# Patient Record
Sex: FEMALE | Race: WHITE | NOT HISPANIC OR LATINO | Employment: UNEMPLOYED | URBAN - METROPOLITAN AREA
[De-identification: names, ages, dates, MRNs, and addresses within clinical notes are randomized per-mention and may not be internally consistent; named-entity substitution may affect disease eponyms.]

---
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
## 2020-01-07 DIAGNOSIS — E28.39 PRIMARY OVARIAN FAILURE: ICD-10-CM

## 2020-01-07 RX ORDER — LEVONORGESTREL / ETHINYL ESTRADIOL AND ETHINYL ESTRADIOL 150-30(84)
1 KIT ORAL DAILY
Qty: 91 TABLET | Refills: 0 | Status: SHIPPED | OUTPATIENT
Start: 2020-01-07 | End: 2020-04-08

## 2020-01-08 DIAGNOSIS — Z94.0 KIDNEY REPLACED BY TRANSPLANT: ICD-10-CM

## 2020-01-08 PROCEDURE — 80197 ASSAY OF TACROLIMUS: CPT | Performed by: PEDIATRICS

## 2020-01-09 LAB
TACROLIMUS BLD-MCNC: 4 UG/L (ref 5–15)
TME LAST DOSE: ABNORMAL H

## 2020-01-14 DIAGNOSIS — Z94.0 KIDNEY REPLACED BY TRANSPLANT: ICD-10-CM

## 2020-01-16 DIAGNOSIS — Z94.0 KIDNEY REPLACED BY TRANSPLANT: ICD-10-CM

## 2020-01-16 RX ORDER — TACROLIMUS 0.5 MG/1
CAPSULE ORAL
Qty: 210 CAPSULE | Refills: 6 | Status: SHIPPED | OUTPATIENT
Start: 2020-01-16 | End: 2020-07-10

## 2020-01-16 NOTE — LETTER
OUTPATIENT LABORATORY TEST REQUEST  DIAGNOSES:  KIDNEY TRANSPLANT - PEDIATRIC (ICD-10 Z94.0);  AND LONG TERM USE OF MEDICATIONS (ICD-10 Z79.899)    Patient Name: Anuradha Pearson        YOB: 2000  MR #: [id number]  Issue Date & Time: March 5, 2021  2:01 PM  Expiration Date (1 year after date issued)    Please complete the following laboratory tests in order to assess the function of the transplanted kidney.  If a battery of tests include the following and are more cost effective to perform, please do so.    PLEASE FAX RESULTS -[phone]  Monthly  [x]      CBC with Platelets and Differential  [x]      Renal Panel (Sodium, Potassium, Chloride, CO2, Creatinine, Urea Nitrogen, Glucose, Calcium, Phosphorus and Albumin)  [x]      Magnesium  [x]      Tacrolimus drug level         [x]      EBV DNA Quant by PCR (EBQT)   Yearly  (due March 2021 and March 2022)  [x]      Hepatic Function (Albumin, ALK, ALT, AST, Bili Total, Protein)  [x]      FASTING Lipids (Cholesterol, Triglycerides, HDL, LDL)  [x]      PRA/Single antigen (DSA)  4mL in a red top- Send to [facility] with drug level. Must include lab Slip. Please make copies for future use.     One-time order  [x]      Vitamin D Deficiency    Please call Shayla Camargo RN Transplant Coordinator at [phone] with questions      Nesha MEDLEY CNP-Pediatric  Pediatric Nurse Practitioner  Pediatric Solid Organ Transplant

## 2020-01-16 NOTE — LETTER
OUTPATIENT LABORATORY TEST REQUEST  DIAGNOSES:  KIDNEY TRANSPLANT - PEDIATRIC (ICD-10 Z94.0);  AND LONG TERM USE OF MEDICATIONS (ICD-10 Z79.899)    Patient Name: Anuradha Pearson        YOB: 2000  MR #: [id number]  Issue Date & Time: January 16, 2020  8:34 AM  Expiration Date (1 year after date issued)    Please complete the following laboratory tests in order to assess the function of the transplanted kidney.  If a battery of tests include the following and are more cost effective to perform, please do so.    PLEASE FAX RESULTS -[phone]  Monthly  [x]      CBC with Platelets and Differential  [x]      Renal Panel (Sodium, Potassium, Chloride, CO2, Creatinine, Urea Nitrogen, Glucose, Calcium, Phosphorus and Albumin)  [x]      Magnesium  [x]      Tacrolimus drug level (mail to [facility]  and insructions provided by patient)         [x]      EBV DNA Quant by PCR (EBQT) (send to [facility] with drug level. Must include lab slip. Make Copies for future use)  Yearly  (due February 2020 and February 2021)  [x]      Hepatic Function (Albumin, ALK, ALT, AST, Bili Total, Protein)  [x]      FASTING Lipids (Cholesterol, Triglycerides, HDL, LDL)   [x]      PRA/Single antigen (DSA)  4mL in a red top- Send to [facility] with drug level. Must include lab Slip. Please make copies for future use.    Please call Shayla Camargo RN Transplant Coordinator at [phone] with questions      Nesha MEDLEY CNP-Pediatric  Pediatric Nurse Practitioner  Pediatric Solid Organ Transplant

## 2020-01-16 NOTE — LETTER
OUTPATIENT LABORATORY TEST REQUEST  DIAGNOSES:  KIDNEY TRANSPLANT - PEDIATRIC (ICD-10 Z94.0);  AND LONG TERM USE OF MEDICATIONS (ICD-10 Z79.899)    Patient Name: Anuradha Pearson        YOB: 2000  MR #: [id number]  Issue Date & Time: 12/30/2021  3:30 PM  Expiration Date (1 year after date issued)    Please complete the following laboratory tests in order to assess the function of the transplanted kidney.  If a battery of tests include the following and are more cost effective to perform, please do so.    PLEASE FAX RESULTS [phone]  Monthly  [x]      CBC with Platelets and Differential  [x]      Renal Panel (Sodium, Potassium, Chloride, CO2, Creatinine, Urea Nitrogen, Glucose, Calcium, Phosphorus and Albumin)  [x]      Magnesium  [x]      Tacrolimus drug level         [x]      EBV DNA Quant by PCR (EBQT)     Yearly  (due March 2022 and March 2023)  [x]      Hepatic Function (Albumin, ALK, ALT, AST, Bili Total, Protein)  [x]      FASTING Lipids (Cholesterol, Triglycerides, HDL, LDL)  [x]      PRA/Single antigen (DSA)   [x]      Vitamin D Deficiency         [x]      PTH intact        [x]      Iron and Iron Binding Capacities    With questions please call Pediatric Transplant Coordinator Susanne Samson at [phone]        Nesha MEDLEY CNP-Pediatric  Pediatric Nurse Practitioner  Pediatric Solid Organ Transplant

## 2020-03-10 DIAGNOSIS — I15.1 HYPERTENSION SECONDARY TO OTHER RENAL DISORDERS: ICD-10-CM

## 2020-03-10 RX ORDER — AMLODIPINE BESYLATE 10 MG/1
10 TABLET ORAL DAILY
Qty: 30 TABLET | Refills: 6 | Status: SHIPPED | OUTPATIENT
Start: 2020-03-10 | End: 2020-08-31

## 2020-03-17 ENCOUNTER — RESULTS ONLY (OUTPATIENT)
Dept: OTHER | Facility: CLINIC | Age: 20
End: 2020-03-17

## 2020-03-17 ENCOUNTER — APPOINTMENT (OUTPATIENT)
Dept: LAB | Facility: CLINIC | Age: 20
End: 2020-03-17
Attending: TRANSPLANT SURGERY
Payer: COMMERCIAL

## 2020-03-17 DIAGNOSIS — Z94.0 KIDNEY REPLACED BY TRANSPLANT: ICD-10-CM

## 2020-03-17 PROCEDURE — 86833 HLA CLASS II HIGH DEFIN QUAL: CPT | Performed by: PEDIATRICS

## 2020-03-17 PROCEDURE — 80197 ASSAY OF TACROLIMUS: CPT | Performed by: PEDIATRICS

## 2020-03-17 PROCEDURE — 87799 DETECT AGENT NOS DNA QUANT: CPT | Performed by: PEDIATRICS

## 2020-03-17 PROCEDURE — 86832 HLA CLASS I HIGH DEFIN QUAL: CPT | Performed by: PEDIATRICS

## 2020-03-19 DIAGNOSIS — Z94.0 KIDNEY REPLACED BY TRANSPLANT: ICD-10-CM

## 2020-03-19 DIAGNOSIS — Z94.0 KIDNEY TRANSPLANTED: Primary | ICD-10-CM

## 2020-03-19 LAB
EBV DNA # SPEC NAA+PROBE: 2831 {COPIES}/ML
EBV DNA SPEC NAA+PROBE-LOG#: 3.5 {LOG_COPIES}/ML
TACROLIMUS BLD-MCNC: 5.1 UG/L (ref 5–15)
TME LAST DOSE: NORMAL H

## 2020-03-19 NOTE — PROGRESS NOTES
Communicated to Anuradha to hydrate and repeat this week. If it's still elevated, will get: RBUS, repeat labs, DSA, UA and urine culture, CMV, EBV, BK. Then we can schedule the biopsy if needed.

## 2020-03-19 NOTE — LETTER
PHYSICIAN ORDERS      DATE & TIME ISSUED: July 3, 2020  5:03 PM  PATIENT NAME: Anuradha Pearson   : 2000     [facility] MR# [if applicable]: [id number]     DIAGNOSIS/ICD-10 CODE: Kidney transplanted [Z94.0}    Please draw the following for Anuradha ALVARENGA on or around the week of 2020    Renal Panel    EBV DNA Quant by PCR (EBQT) - Send to [facility] with Drug level.  Must include lab slip. Please make copies for future use.    BK virus Quantitative by PCR (BKQT) - Send to [facility] with Drug level.  Must include lab slip. Please make copies for future use.    If questions please call: Shayla Camargo RN Transplant Coordinator at [phone]    Please fax these results to [phone].      Nesha MEDLEY CNP-Pediatric  Pediatric Nurse Practitioner  Pediatric Solid Organ Transplant      Shayla Camargo RN, BSN  Pediatric Transplant Coordinator  Office: [phone]

## 2020-03-19 NOTE — LETTER
PHYSICIAN ORDERS      DATE & TIME ISSUED: 2020  12:52 PM  PATIENT NAME: Anuradha Pearson   : 2000     [facility] MR# [if applicable]: [id number]     DIAGNOSIS/ICD-10 CODE: Long Term Use of Medication [Z79.899}, After care following organ transplant  [Z48.288} and Kidney transplanted [Z94.0}    Repeat with in one week:    Renal Panel    If questions please call: Shayla Camargo [phone].    Please fax these results to [phone].      Nesha MEDLEY CNP-Pediatric  Pediatric Nurse Practitioner  Pediatric Solid Organ Transplant      Shayla Camargo, RN, BSN  Pediatric Transplant Coordinator  Office: [phone]

## 2020-04-06 DIAGNOSIS — E28.39 PRIMARY OVARIAN FAILURE: ICD-10-CM

## 2020-04-06 RX ORDER — LEVONORGESTREL / ETHINYL ESTRADIOL AND ETHINYL ESTRADIOL 150-30(84)
1 KIT ORAL DAILY
Qty: 91 TABLET | Refills: 0 | Status: CANCELLED | OUTPATIENT
Start: 2020-04-06

## 2020-04-07 DIAGNOSIS — Z92.3 S/P RADIATION THERAPY: ICD-10-CM

## 2020-04-07 DIAGNOSIS — C64.9 NEPHROBLASTOMA, UNSPECIFIED LATERALITY (H): ICD-10-CM

## 2020-04-07 DIAGNOSIS — Z92.21 STATUS POST CHEMOTHERAPY: Primary | ICD-10-CM

## 2020-04-07 DIAGNOSIS — Z94.0 KIDNEY REPLACED BY TRANSPLANT: ICD-10-CM

## 2020-04-07 DIAGNOSIS — E28.39 PRIMARY OVARIAN FAILURE: ICD-10-CM

## 2020-04-08 ENCOUNTER — TRANSFERRED RECORDS (OUTPATIENT)
Dept: HEALTH INFORMATION MANAGEMENT | Facility: CLINIC | Age: 20
End: 2020-04-08

## 2020-04-08 ENCOUNTER — MYC MEDICAL ADVICE (OUTPATIENT)
Dept: ENDOCRINOLOGY | Facility: CLINIC | Age: 20
End: 2020-04-08

## 2020-04-08 ENCOUNTER — MEDICAL CORRESPONDENCE (OUTPATIENT)
Dept: HEALTH INFORMATION MANAGEMENT | Facility: CLINIC | Age: 20
End: 2020-04-08

## 2020-04-08 RX ORDER — LEVONORGESTREL / ETHINYL ESTRADIOL AND ETHINYL ESTRADIOL 150-30(84)
1 KIT ORAL DAILY
Qty: 91 TABLET | Refills: 0 | Status: SHIPPED | OUTPATIENT
Start: 2020-04-08 | End: 2020-10-01

## 2020-04-09 ENCOUNTER — TRANSCRIBE ORDERS (OUTPATIENT)
Dept: OTHER | Age: 20
End: 2020-04-09

## 2020-04-09 DIAGNOSIS — C64.1 NEPHROBLASTOMA OF RIGHT KIDNEY (H): ICD-10-CM

## 2020-04-09 DIAGNOSIS — Z94.0 KIDNEY REPLACED BY TRANSPLANT: Primary | ICD-10-CM

## 2020-04-13 DIAGNOSIS — Z94.0 KIDNEY REPLACED BY TRANSPLANT: ICD-10-CM

## 2020-04-13 PROCEDURE — 80197 ASSAY OF TACROLIMUS: CPT | Performed by: PEDIATRICS

## 2020-04-13 PROCEDURE — 87799 DETECT AGENT NOS DNA QUANT: CPT | Performed by: PEDIATRICS

## 2020-04-15 LAB
TACROLIMUS BLD-MCNC: 4.8 UG/L (ref 5–15)
TME LAST DOSE: ABNORMAL H

## 2020-04-16 DIAGNOSIS — Z94.0 KIDNEY REPLACED BY TRANSPLANT: ICD-10-CM

## 2020-04-16 LAB
CMV DNA SPEC NAA+PROBE-ACNC: NORMAL [IU]/ML
CMV DNA SPEC NAA+PROBE-LOG#: NORMAL {LOG_IU}/ML
EBV DNA # SPEC NAA+PROBE: ABNORMAL {COPIES}/ML
EBV DNA SPEC NAA+PROBE-LOG#: 4.2 {LOG_COPIES}/ML
SPECIMEN SOURCE: NORMAL

## 2020-04-16 RX ORDER — PREDNISONE 1 MG/1
TABLET ORAL
Qty: 60 TABLET | Refills: 3 | Status: SHIPPED | OUTPATIENT
Start: 2020-04-16 | End: 2020-07-31

## 2020-04-17 NOTE — TELEPHONE ENCOUNTER
Spoke with Anuradha and informed her that we received a referral from Dr. Julio Alatorre for you to see Dr. Krishnamurthy for birth control management.  She indicated agreement.  Informed her that with the covid restrictions in place, we are delaying visits to clinic unless there are symptoms present. She denies symptoms.  Informed her will forward to  to schedule for summer with Dr. Krishnamurthy. She indicated understanding and agreed with plan.    Forwarded to  to schedule.

## 2020-05-12 ENCOUNTER — VIRTUAL VISIT (OUTPATIENT)
Dept: TRANSPLANT | Facility: CLINIC | Age: 20
End: 2020-05-12
Attending: NURSE PRACTITIONER
Payer: COMMERCIAL

## 2020-05-12 ENCOUNTER — ALLIED HEALTH/NURSE VISIT (OUTPATIENT)
Dept: PHARMACY | Facility: CLINIC | Age: 20
End: 2020-05-12
Payer: COMMERCIAL

## 2020-05-12 DIAGNOSIS — E28.39 PRIMARY OVARIAN FAILURE: ICD-10-CM

## 2020-05-12 DIAGNOSIS — N12 RECURRENT PYELONEPHRITIS: ICD-10-CM

## 2020-05-12 DIAGNOSIS — N18.2 CKD (CHRONIC KIDNEY DISEASE) STAGE 2, GFR 60-89 ML/MIN: ICD-10-CM

## 2020-05-12 DIAGNOSIS — E83.42 HYPOMAGNESEMIA: ICD-10-CM

## 2020-05-12 DIAGNOSIS — Z71.87 COUNSELING FOR TRANSITION FROM PEDIATRIC TO ADULT CARE PROVIDER: ICD-10-CM

## 2020-05-12 DIAGNOSIS — R21 RASH: ICD-10-CM

## 2020-05-12 DIAGNOSIS — Z94.0 KIDNEY REPLACED BY TRANSPLANT: Primary | ICD-10-CM

## 2020-05-12 DIAGNOSIS — I15.1 HYPERTENSION SECONDARY TO OTHER RENAL DISORDERS: ICD-10-CM

## 2020-05-12 DIAGNOSIS — I15.8 OTHER SECONDARY HYPERTENSION: ICD-10-CM

## 2020-05-12 DIAGNOSIS — D84.9 IMMUNOCOMPROMISED (H): ICD-10-CM

## 2020-05-12 DIAGNOSIS — B27.00 EBV (EPSTEIN-BARR VIRUS) VIREMIA: ICD-10-CM

## 2020-05-12 PROCEDURE — 99605 MTMS BY PHARM NP 15 MIN: CPT | Performed by: PHARMACIST

## 2020-05-12 RX ORDER — AZATHIOPRINE 50 MG/1
50 TABLET ORAL DAILY
Qty: 90 TABLET | Refills: 3 | Status: SHIPPED | OUTPATIENT
Start: 2020-05-12 | End: 2020-07-31

## 2020-05-12 NOTE — PROGRESS NOTES
"Anuradha Pearson is a 19 year old female who is being evaluated via a billable video visit.      The patient has been notified of following:     \"This video visit will be conducted via a call between you and your physician/provider. We have found that certain health care needs can be provided without the need for an in-person physical exam.  This service lets us provide the care you need with a video conversation.  If a prescription is necessary we can send it directly to your pharmacy.  If lab work is needed we can place an order for that and you can then stop by our lab to have the test done at a later time.    Video visits are billed at different rates depending on your insurance coverage.  Please reach out to your insurance provider with any questions.    If during the course of the call the physician/provider feels a video visit is not appropriate, you will not be charged for this service.\"    Patient has given verbal consent for Video visit? Yes    How would you like to obtain your AVS? Zachary    Patient would like the video invitation sent by: Text to cell phone: [phone]    Will anyone else be joining your video visit? No              "

## 2020-05-12 NOTE — PROGRESS NOTES
Video Start Time: 10:10    Shraddha Pearson complains of    Chief Complaint   Patient presents with     RECHECK     SOT       I have reviewed and updated the patient's Past Medical History, Social History, Family History and Medication List.    ALLERGIES  Ibuprofen; Shellfish-derived products; and Vancomycin    HPI  I had the pleasure of conducting a video visit with Shraddha Pearson today for follow-up of kidney transplant follow up. Shraddha is a 19 year old female who attends our video visit alone. 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     Shraddha is currently in college, she has 3 more online classes to get her A.A. in Equine science and is looking to move to [place] to work for the winter. Prefer to wait until age 21 to transition, and would like to stay in [place], her parents are still here and is planning on coming home for hedrick.    Shraddha is independent with her cares. She uses My Chart, fills her own pill box, takes her medications at 10 AM and 10 PM independently with reported excellent compliance. She knew her medications and current doses.    Shraddha currently has a slight red rash on her legs, she thought is was dry skin and has been using lotion but the rash is persisting. Denies cough, congestion, fever, lymphadenotomy, weight loss, constipation.  She will My Chart me recent blood pressures.     ROS    ROS: 10 point ROS neg other than the symptoms noted above in the HPI.    Objective  Vitals: None taken  GENERAL: Healthy, alert and no distress  EYES: Eyes grossly normal to inspection.  No discharge or erythema, or obvious scleral/conjunctival abnormalities.  RESP: No  audible wheeze, cough, or visible cyanosis.  No visible retractions or increased work of breathing.    SKIN: Visible skin clear. No significant rash, abnormal pigmentation or lesions.  NEURO: Cranial nerves grossly intact.  Mentation and speech appropriate for age.  PSYCH: Mentation appears normal, affect normal/bright, judgement and insight intact, normal speech and appearance well-groomed.      Assessment/Plan:  Impression: Anuradha is a 19 year old 15 years s/p living kidney transplant, stable labs and independent with cares.    1. Kidney replaced by transplant  No Donor Specific Antibodies as of 3/17/2020  - azaTHIOprine (IMURAN) 50 MG tablet; Take 1 tablet (50 mg) by mouth daily. Increased from 25 mg as we wean prednisone.  - Tacrolimus 1.5 mg twice daily goal 4-6  - Prednisone 2 mg M-W-F, currently weaning prednisone so she can be prednisone free for her second transplant.    2. Immunocompromised (H)  Bactrim for PCP prophylaxis     3. Hypertension secondary to other renal disorders  Take weekly home blood pressures  -Enalapril Hydrochlorothiazide 5-12.5 mg daily  -Labetalol 100 mg BID  -Amlodipine 10 mg daily    4. Hypomagnesemia  Last level 1.6  -Continue Magnesium 400 mg twice daily, Helping with tremors.    5. EBV (Derrell-Barr virus) viremia  - Check EBV DNA PCR with standing labs.   -Notify us of weight loss, night sweats, swollen lymph nodes, fevers    6. 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 Krishnamurthy in July 2020.    Primary care physician- Dr. Gutierrez Wood    Dermatology- for yearly skin checks due to increased risk for skin cancer due to immunosuppression medications    Opthomology- Yearly    Dental- Every 6  months    7. Rash on legs  -Try over the counter hydrocortisone cream, and make an appointment with dermatology locally.    Return in about 10 weeks (around 7/21/2020).    Video-Visit Details    Type of service:  Video Visit    Video End Time (time video stopped): 10:34  Total visit time: 24 mniutes    Originating Location (pt. Location): Home    Distant Location (provider location):  PEDS TRANSPLANT SURGERY     Mode of Communication:  Video Conference via [facility]      JASMYNE Mir CNP

## 2020-05-12 NOTE — PROGRESS NOTES
MT ENCOUNTER  SUBJECTIVE/OBJECTIVE:                           Anuradha Pearson is a 19 year old female contacted via secure video for a follow-up visit. She was referred to me from Nesha Orta. Today's visit is a co-visit with Nesha Orta.. Today's visit is a follow-up [facility] visit from 7/23/19     Patient consented to a telehealth visit: yes  Telemedicine Visit Details  Type of service:  Video visit  Start Time: 1000  End Time: 1024  Originating Location (pt. Location): Home  Distant Location (provider location):  [facility] PEDIATRIC [facility] TRANS[facility]IC  Mode of Communication:  Video Conference via [employer]    Chief Complaint: Kidney Transplant.    Allergies/ADRs: Reviewed in Epic  Tobacco:  reports that she has never smoked. She has never used smokeless tobacco.  Alcohol: none    Medication Adherence/Access:  no issues reported  Patient uses pill box(es) and uses reminders/alarms.  Patient takes medications 2 time(s) per day (1000/2200).   Per patient, misses medication 0 times per week. She said its been a really long time since she has missed any medications.   The patient fills medications at [facility]: NO, fills medications at [facility] specialty per insurance.    Kidney Transplant:  Patient is on the steroid inclusive protocol (standard protocol at the time of transplant). She has has a complicated post transplant course including infections (viral meningitis, EBV viremia, warts), pseudotumor cerebri and fracture of right femur.     Current immunosuppressants   Tacrolimus 2 mg qAM, 1.5 mg qPM (>12 months post tx (no DSA, but has had low level DR51 in the past), goal 3-5)  Azathioprine (AZA) 50 mg M,W,F and 25 mg daily (0.62 mg/kg)  Prednisone 2 mg every M,W,F. Last weaned 4/16/2020    AZA dosing low secondary to EBV viremia and bone marrow suppression. Dose was slowly increased in July, but patient reports that she is still taking 25 mg daily. She reports that they forgot to increase the dose. No  current side effects reported. .     Estimated Creatinine Clearance: 66 mL/min (A) (based on SCr of 1.24 mg/dL (H)).  CMV prophylaxis: Completed    PCP prophylaxis:Bactrim 80 mg M,W,F  Antifungal Prophylaxis: Completed  Thrombosis prophylaxis: Completed  Supplements: started on magnesium 400 mg BID in July 2019 for fine hand tremors. Anuradha reports improvement in tremors today. Last magnesium 4/13/2020 was 1.6. No side effects reported.   Tx Coordinator: Doug Day MD:Clarisa/Nesha Orta, Lab Frequency: monthly  Recent Infections:  None reported  Recent Hospitalizations: none reported  Date last skin check: uses sunscreen daily, also wears long pants due to riding horses  Date last dentist appt: every 6 months  Immunizations: annual flu shot 12/20/2019, Pneumovax 23: 1/15/19; Prevnar 13: 4/10/18, DTap/TDaP:  3/12/17; HPV: completed      CKD stage 2:    Vitamin D/bone: on calcium plus D with 400 units vitamin D daily. Also on women's one a day with 1000 units vitamin D daily for a total intake by supplement of 1400 units daily. Last vitamin D level 1/15/19 was ~88. PTH 4/10/18 10.  Lipid panel:   Recent Labs   Lab Test 3/17/2020     CHOL 202   HDL 50      TRIG 139     Anemia: on ferrous sulfate 65 mg elemental iron BID. No reported adverse effects.  Last iron studies done 4/10/18 were WNL. Last hemoglobin 4/13/2020 10.5. Anuradha reports that when she decreases her ferrous sulfate dose to once daily, she becomes tired and hemoglobin drops.     Hypertension:   Amlodipine 10 mg daily (at night),   Enalapril-hydrochlorothiazide 5-12.5 mg daily (takes in AM)  Labetalol 100 mg BID      Patient does occasionally self-monitor BP. She did not have access to last BP's but will send to Nesha via "[employer]. No side effects reported. Last Echo 1/15/19 WNL.     BP Readings from Last 3 Encounters:   07/23/19 126/73   07/23/19 126/73   07/23/19 126/73     Recurrent UTI: Anuradha has a history of recurrent UTI. She was  previously on nitrofurantoin 50 mg nightly, this was discontinued in July. She has not had UTI since that time.      Primary ovarian failure: Anruadha is on Seasonique (or equivalent generic) hormone replacement. No reported adverse effects or other issues. She is followed closely by endocrine. Last bone mineral density 1/2019 was normal per report. She is on Oscal plus D supplement as noted above. She eats cheese as a main source of dietary calcium.     Patient Education: Anuradha is now in college and doing well. She now knows all her medications by name. She fills her pill box. She is no longer using alarms, but reports she never misses medications. She communicates via [employer]. No other issues reported.      ASSESSMENT:                              Medication Adherence: excellent, no issues identified    Kidney Transplant: Needs improvement. Anuradha continues on very low dose azathioprine secondary to persistent EBV. Her prednisone has been weaned about every 3 months per Nesha Orta to eventual discontinuation. Anuradha forgot to increase her Azathioprine back in September per last clinic discussions. Will increase her to a full 50 mg tab today as we have had several decreases in prednisone since that time as to optimize her immune suppression. Anuradha's tremors are better on magnesium, plan to continue.     CKD stage 2: Stable. No medication issues identified today.      Hypertension: Stable, BP from last visit within goal <130/80. Anuradha will send home blood pressure into Nesha via [employer].     Recurrent UTI: Resolved. No issues off of nitrofurantoin for the past 9 months.     Primary ovarian failure: Stable. No issues identified today.     Patient Education: No current issues, Anuradha has good medication independence.     PLAN:                            1. Increase azathioprine to 50 mg daily    I spent 24 min with this patient today as co-visit with Nesha Orta. All changes were made via verbal approval with  Nesha Orta.     Will follow up in 3-6 months with next in person visit with Nesha Orta.    The patient declined a summary of these recommendations. See Provider note/AVS from today.     Ibeth MonroyD, [facility]  Pediatric Medication Therapy Management Pharmacist-Solid Organ Transplant  Pager: [phone]

## 2020-06-02 ENCOUNTER — TRANSFERRED RECORDS (OUTPATIENT)
Dept: HEALTH INFORMATION MANAGEMENT | Facility: CLINIC | Age: 20
End: 2020-06-02

## 2020-06-26 DIAGNOSIS — Z94.0 KIDNEY REPLACED BY TRANSPLANT: ICD-10-CM

## 2020-06-26 PROCEDURE — 87799 DETECT AGENT NOS DNA QUANT: CPT | Performed by: PEDIATRICS

## 2020-06-26 PROCEDURE — 80197 ASSAY OF TACROLIMUS: CPT | Performed by: PEDIATRICS

## 2020-06-30 DIAGNOSIS — Z94.0 KIDNEY REPLACED BY TRANSPLANT: ICD-10-CM

## 2020-06-30 DIAGNOSIS — I15.1 HYPERTENSION SECONDARY TO OTHER RENAL DISORDERS: ICD-10-CM

## 2020-06-30 RX ORDER — ENALAPRIL MALEATE AND HYDROCHLOROTHIAZIDE 5; 12.5 MG/1; MG/1
1 TABLET ORAL DAILY
Qty: 30 TABLET | Refills: 11 | Status: SHIPPED | OUTPATIENT
Start: 2020-06-30 | End: 2021-05-17

## 2020-07-01 LAB
TACROLIMUS BLD-MCNC: 4.6 UG/L (ref 5–15)
TME LAST DOSE: ABNORMAL H

## 2020-07-02 LAB
EBV DNA # SPEC NAA+PROBE: ABNORMAL {COPIES}/ML
EBV DNA SPEC NAA+PROBE-LOG#: 4.5 {LOG_COPIES}/ML

## 2020-07-03 ENCOUNTER — TELEPHONE (OUTPATIENT)
Dept: TRANSPLANT | Facility: CLINIC | Age: 20
End: 2020-07-03

## 2020-07-03 DIAGNOSIS — Z94.0 KIDNEY TRANSPLANTED: Primary | ICD-10-CM

## 2020-07-03 NOTE — TELEPHONE ENCOUNTER
Call placed to Anuradha's mobile phone to discuss labs for last week. No answer. Left message on voicemail that Dr. Hartman would like her to repeat labs next week. Instructed Anuradha to hydrate and have repeat labs; will fax over order to [facility]. Will also send message to [employer].

## 2020-07-07 DIAGNOSIS — Z94.0 KIDNEY REPLACED BY TRANSPLANT: ICD-10-CM

## 2020-07-07 RX ORDER — SULFAMETHOXAZOLE AND TRIMETHOPRIM 400; 80 MG/1; MG/1
TABLET ORAL
Qty: 12 TABLET | Refills: 6 | Status: SHIPPED | OUTPATIENT
Start: 2020-07-07 | End: 2020-07-17

## 2020-07-08 ENCOUNTER — TELEPHONE (OUTPATIENT)
Dept: TRANSPLANT | Facility: CLINIC | Age: 20
End: 2020-07-08

## 2020-07-08 NOTE — TELEPHONE ENCOUNTER
Several communications with patient regarding recent labs. EBV and creatinine noted to be slightly elevated. Instructed patient to HOLD Imuran and repeat labs in one week. Patient expressed concerns about holding Imuran and stated that she has been feeling good and asymptomatic. Explained thought behind holding the Imuran for a fews days help her body fight the EBVand her history of Wilms Tumor. Notified patient that her EBV has trended up the last few checks. Patient due to have visits with Hem/Onc and Nephrology the next couple weeks. Encouraged patient to discuss both EBV and kidney function with providers during the visit so she can be clear on risks and how to balance therapy. Instructed patient to not hold Imuran if she is not comfortable with MD recommendation. Will discuss with Nephrologist.

## 2020-07-10 DIAGNOSIS — Z94.0 KIDNEY REPLACED BY TRANSPLANT: ICD-10-CM

## 2020-07-10 RX ORDER — TACROLIMUS 0.5 MG/1
CAPSULE ORAL
Qty: 210 CAPSULE | Refills: 6 | Status: SHIPPED | OUTPATIENT
Start: 2020-07-10 | End: 2021-01-29

## 2020-07-17 ENCOUNTER — VIRTUAL VISIT (OUTPATIENT)
Dept: NEPHROLOGY | Facility: CLINIC | Age: 20
End: 2020-07-17
Attending: PEDIATRICS
Payer: COMMERCIAL

## 2020-07-17 DIAGNOSIS — Z94.0 KIDNEY REPLACED BY TRANSPLANT: ICD-10-CM

## 2020-07-17 RX ORDER — SULFAMETHOXAZOLE AND TRIMETHOPRIM 400; 80 MG/1; MG/1
TABLET ORAL
Qty: 12 TABLET | Refills: 6 | Status: SHIPPED | OUTPATIENT
Start: 2020-07-17 | End: 2020-10-23

## 2020-07-17 NOTE — NURSING NOTE
"Anuradha Pearson is a 20 year old female who is being evaluated via a billable video visit.      The patient has been notified of following:     \"This video visit will be conducted via a call between you and your physician/provider. We have found that certain health care needs can be provided without the need for an in-person physical exam.  This service lets us provide the care you need with a video conversation.  If a prescription is necessary we can send it directly to your pharmacy.  If lab work is needed we can place an order for that and you can then stop by our lab to have the test done at a later time.    Video visits are billed at different rates depending on your insurance coverage.  Please reach out to your insurance provider with any questions.    If during the course of the call the physician/provider feels a video visit is not appropriate, you will not be charged for this service.\"     How would you like to obtain your AVS? Igorharmaximilian    Anuradha Pearson complains of    Chief Complaint   Patient presents with     Video Visit     Nephrology recheck       Patient has given verbal consent for Video visit? Yes    Patient would like the video invitation sent by: Text to cell phone: [phone]     I have reviewed and updated the patient's medication list, allergies and preferred pharmacy.      Candace Benites [facility]    "

## 2020-07-17 NOTE — LETTER
7/17/2020      RE: Anuradha Pearson  [address]  [place] [address]       Return Visit for Kidney Transplant, Immunosuppression Management, CKD, Stage III    Chief Complaint:  Chief Complaint   Patient presents with     Video Visit     Nephrology recheck       HPI:    I had the pleasure of seeing Anuradha Pearson in the Pediatric Nephrology Clinic today for follow-up of her kidney transplant (history of Wilms tumor complicated by inadvertent ligation of the contralateral renal vein, kidney transplant in 2004). Her post-transplant course has been complicated by infections (viral meningitis, EBV viremia, warts), pseudotumor cerebri, and fracture of her right femur.  Anuradha is a 20 year old female who attended the video visit by herself.    Start Time: 0901  Stop Time: 0913    Today is my first time meeting Anuradha.  She had been followed by Dr. Ruiz previously.      She reports that she is feeling well and has no concerns.  She has been going to a lot of [employer] and participating in [employer] racing.  She denies any issues taking her medications.  She reports that she has been weaning down on her prednisone as prescribed.  She was also wondering about decreasing her Bactrim (she is actually on this more for UTI prophylaxis as opposed to PCP prophylaxis).  She denies taking any NSAIDS and reports drinking 2-2.5L of water per day.      Most recently, her creatinine was on the higher end of her baseline (1.46) and her EBV titer was elevated (both an increase in the log as well as the absolute value).  I had recommended holding her Imuran, but this made her nervous and so she opted to continue taking it.  I have asked her to repeat her labs and she is going to do that on Monday of next week.  She denies any lymphadenopathy, fevers or fatigue.     She reports that her tremor in her hands has been much improved since taking the magnesium supplement.    Review of Systems:  A comprehensive review of  systems was performed and found to be negative other than noted in the HPI.    Allergies:  Anuradha is allergic to ibuprofen; shellfish-derived products; and vancomycin..    Active Medications:  Current Outpatient Medications   Medication Sig Dispense Refill     amLODIPine (NORVASC) 10 MG tablet Take 1 tablet (10 mg) by mouth daily 30 tablet 6     azaTHIOprine (IMURAN) 50 MG tablet Take 1 tablet (50 mg) by mouth daily 90 tablet 3     calcium carbonate-vitamin D (OS-DEE) 500-400 MG-UNIT tablet Take 1 tablet by mouth daily 30 tablet 11     Enalapril-hydroCHLOROthiazide 5-12.5 MG TABS Take 1 tablet by mouth daily 30 tablet 11     ferrous sulfate (FEROSUL) 325 (65 Fe) MG tablet Take 1 tablet (325 mg) by mouth 2 times daily 180 tablet 6     labetalol (NORMODYNE) 100 MG tablet Take 1 tablet (100 mg) by mouth 2 times daily 180 tablet 6     levonorgest-eth estrad 91-Day (SEASONIQUE) 0.15-0.03 &0.01 MG tablet Take 1 tablet by mouth daily 91 tablet 0     magnesium oxide (MAG-OX) 400 (241.3 Mg) MG tablet Take 1 tablet (400 mg) by mouth 2 times daily 60 tablet 11     Multiple Vitamins-Minerals (WOMENS MULTIVITAMIN PO) Take 1 tablet by mouth daily       predniSONE (DELTASONE) 1 MG tablet Take 2 tabs (2 mg) by mouth every Mon-Wed-Fri 60 tablet 3     sulfamethoxazole-trimethoprim (BACTRIM) 400-80 MG tablet Take one tab by mouth every Mon-Thursday 12 tablet 6     tacrolimus (GENERIC EQUIVALENT) 0.5 MG capsule Please take 4 capsules (2mg) by mouth in am and take 3 capsules (1.5mg) in the evening 210 capsule 6     amoxicillin (AMOXIL) 500 MG capsule Take 4 caps (2 grams) one hour prior to dental procedure. (Patient not taking: Reported on 7/17/2020) 12 capsule 3        Immunizations:  Immunization History   Administered Date(s) Administered     HEPA 08/04/2004     HPV9 04/10/2018, 07/17/2018, 01/15/2019     HepB 2000, 2000, 06/21/2001     Hib (PRP-T) 2000, 2000, 06/21/2001     Historical DTP/aP 2000,  2000, 02/28/2001, 12/06/2001     Influenza (H1N1) 10/27/2009     Influenza (IIV3) PF 10/25/2005, 02/09/2007, 11/09/2007, 01/22/2009, 10/16/2009, 11/03/2010, 09/19/2011, 09/10/2012, 01/03/2014, 12/01/2014     Influenza Vaccine IM > 6 months Valent IIV4 10/26/2015, 01/09/2017, 01/29/2018, 11/13/2018     Influenza Vaccine Im 4yrs+ 4 Valent CCIIV4 12/20/2019     MMR 06/21/2001     Mantoux Tuberculin Skin Test 08/04/2004     Meningococcal (Bexsero ) 07/23/2019     Meningococcal (Menactra ) 11/13/2018     Pneumo Conj 13-V (2010&after) 04/10/2018     Pneumococcal (PCV 7) 2000, 2000, 02/28/2001, 06/21/2001     Pneumococcal 23 valent 01/15/2019     Poliovirus, inactivated (IPV) 2000, 2000, 12/06/2001     TDAP Vaccine (Adacel) 03/12/2017     Tdap (Adacel,Boostrix) 09/19/2011     Varicella 06/21/2001        PMHx:  Past Medical History:   Diagnosis Date     H/O kidney transplant      Hypertension      Wilm's tumor          Rejection History     Kidney Transplant - 11/9/2004  (#1)     No rejections noted for this transplant.            Infection History     Kidney Transplant - 11/9/2004  (#1)       POD Infections Treatments Organisms Resolved    10/24/2014 9 years 11 months EBV (Derrell-Barr virus) viremia         Recurrent pyelonephritis Antibiotics, Antibiotics, Antibiotics, Antibiotics, Antibiotics KLEBSIELLA, ENTEROCOCCUS, PSEUDOMONAS             Problems     Kidney Transplant - 11/9/2004  (#1)       POD Problem Resolved    11/9/2004 N/A Kidney replaced by transplant       Gingival hypertrophy 11/8/2018          Non-Transplant Related Problems       Problem Resolved    8/14/2015 Scar adherent     7/21/2015 Immunosuppression (H)     7/21/2015 HTN (hypertension)     11/7/2013 Dehydration 11/8/2018 6/6/2013 Primary ovarian failure     10/5/2012 Lack of expected normal physiological development     8/20/2012 Femur fracture, right (H) 11/8/2018 7/17/2012 Wilms' tumor (H)     7/17/2012 Ankle  pain 11/8/2018 7/17/2012 Growth deceleration 7/21/2015 7/17/2012 S/P radiation therapy     7/17/2012 Status post chemotherapy                 PSHx:    Past Surgical History:   Procedure Laterality Date     APPENDECTOMY       CHOLECYSTECTOMY  05/20/2003     ENT SURGERY       OPEN REDUCTION INTERNAL FIXATION RODDING INTRAMEDULLARY FEMUR  8/20/2012    Procedure: OPEN REDUCTION INTERNAL FIXATION RODDING INTRAMEDULLARY FEMUR;  Closed Reduction Internal Fixation Right Femur;  Surgeon: Catracho Hook MD;  Location: [facility] OR     REVISE SCAR TRUNK Right 11/6/2015    Procedure: REVISE SCAR TRUNK;  Surgeon: COOPER Anderson MD;  Location: [facility] OR     right nephrectomy  05/19/2003    due to Wilms tumor; performed by Dr. Myles Velez at [facility]     TONSILLECTOMY  01/31/2005     TRANSPLANT KIDNEY RECIPIENT LIVING RELATED CHILD  11/09/2004    left nephrectomy performed at time of transplant       FHx:  Family History   Problem Relation Age of Onset     Hypertension Father        SHx:  Social History     Tobacco Use     Smoking status: Never Smoker     Smokeless tobacco: Never Used   Substance Use Topics     Alcohol use: No     Drug use: No     Social History     Social History Narrative     Not on file       Physical Exam:    There were no vitals taken for this visit.  Blood pressure percentiles are not available for patients who are 18 years or older.    Exam:  GENERAL: Healthy, alert and no distress  EYES: Eyes grossly normal to inspection.  No discharge or erythema, or obvious scleral/conjunctival abnormalities.  RESP: No audible wheeze, cough, or visible cyanosis.  No visible retractions or increased work of breathing.    SKIN: Visible skin clear. No significant rash, abnormal pigmentation or lesions.  NEURO: Cranial nerves grossly intact.  Mentation and speech appropriate for age.  PSYCH: Mentation appears normal, affect normal/bright, judgement and insight intact, normal speech and  appearance well-groomed.    Labs and Imaging:  No results found for any visits on 07/17/20.  I reviewed labs from 6/26/2020 and previous labs going back to 2018.      I personally reviewed results of laboratory evaluation, imaging studies and past medical records that were available during this outpatient visit.      Assessment and Plan:      ICD-10-CM    1. Kidney replaced by transplant  Z94.0 sulfamethoxazole-trimethoprim (BACTRIM) 400-80 MG tablet       Immunosuppression: Anuradha Pearson is on individualized protocol due to EBV viremia, other infections. tacrolimus goal is 4-6.  Imuran dose is 50mg daily (recently increased with decreasing prednisone)  SteroidsYes, dose 2mg every Monday/Wednesday/Friday  Her steroid dose has been decreasing to transition to a steroid avoidance protocol for her next kidney.    Immunosuppressive Medications     Immunosuppressive Agents     azaTHIOprine (IMURAN) 50 MG tablet        tacrolimus (GENERIC EQUIVALENT) 0.5 MG capsule            Serology Results     Recipient (Pre-transplant Results)     Anti-HBcAb   No results on file    HBsAg   No results on file    HBsAb   No results on file           HBV DNA    No results on file    Anti-HCV   No results on file    Anti-HIV I/II   No results on file           Anti-CMV   No results on file    Anti-HTLV I/II   No results on file    RPR/VDRL   No results on file           EBV IgG   No results on file    EBV IgM   No results on file    EBNA   No results on file                      Left Kidney Donor (Pre-donation Results)     Anti-HBcAb   HBC Total:  Negative    HBsAg   HBsAg:  Negative    HBsAb   No results on file           HBV DNA    No results on file    Anti-HCV   No results on file    Anti-HIV I/II   No results on file           Anti-CMV   No results on file    Anti-HTLV I/II   No results on file    RPR/VDRL   No results on file           EBV IgG   No results on file    EBV IgM   No results on file    EBNA   No results on file                        CKD: Stage III.  Her creatinine fluctuates between 1.2-1.5.  Most recently, it has been at the higher end of her baseline.  She does have a history of low level DSA.  Will need to follow closely and obtain CKD labs next week.    HTN: BP was not obtained, but I will ask her follow-up with a blood pressure locally and report to us.   Most recent blood pressure reading   07/23/19 126/73       Antihypertensive Medications     Antihypertensive Combinations     Enalapril-hydroCHLOROthiazide 5-12.5 MG TABS              Last ECHO done 1/15/2019 and results were Unremarkable    Immunoprophylaxis:  PCP prophylaxis: Bactrim  Antiviral prophylaxis: No  Antifungal prophylaxis: No    Patient Education: During this visit I discussed in detail the patient s symptoms, physical exam and evaluation results findings, tentative diagnosis as well as the treatment plan (Including but not limited to possible side effects and complications related to the disease, treatment modalities and intervention(s). Family expressed understanding and consent. Family was receptive and ready to learn; no apparent learning barriers were identified.  Live virus vaccines are contraindicated in this patient. Any new medications prescribed must be assessed for kidney toxicity and drug-interactions before use.    Health Maintenance: Live vaccines are contraindicated due to immunosuppression.    Anuradha must have all other vaccines updated in a timely fashion including an annual influenza vaccine.  Anuradha must be seen by the dentist annually.  Over the counter medications should be checked prior to use to ensure they are safe in patients with kidney disease.    Follow up: Return in about 3 months (around 10/17/2020) for In-Clinic Visit. Please return sooner should Anuradha become symptomatic. For any questions or concerns, feel free to contact the transplant coordinators   at [phone].    Anuradha would like to transition to adult care  after college (she is currently a sophomore).    Sincerely,    Natalya Hartman MD   Pediatric Nephrology    CC:   Patient Care Team:  Gutierrez Wood MD as PCP - General (Family Practice)  Shayla Camargo, RN as Transplant Coordinator (Transplant)  Nesha Orta APRN CNP as Nurse Practitioner (Nurse Practitioner - Pediatrics)  Jian Alatorre MD as MD (Pediatrics)  Dorcas Samayoa APRN CNP as Nurse Practitioner (Nurse Practitioner - Pediatrics)  Ike Rosa MA as Medical Assistant (Transplant)  Natalya Hartman MD as Transplant Physician (Pediatric Nephrology)  Gauri Mckeon [facility] as Pharmacist (Pharmacist)  PRAVEEN BELLA    Copy to patient  Parent(s) of Anuradha Pearson  [address]  [place] [address]        Natalya Hartman MD

## 2020-07-17 NOTE — PROGRESS NOTES
Return Visit for Kidney Transplant, Immunosuppression Management, CKD, Stage III    Chief Complaint:  Chief Complaint   Patient presents with     Video Visit     Nephrology recheck       HPI:    I had the pleasure of seeing Anuradha Pearson in the Pediatric [facility]phrology [facility] today for follow-up of her kidney transplant (history of Wilms tumor complicated by inadvertent ligation of the contralateral renal vein, kidney transplant in 2004). Her post-transplant course has been complicated by infections (viral meningitis, EBV viremia, warts), pseudotumor cerebri, and fracture of her right femur.  Anuradha is a 20 year old female who attended the video visit by herself.    Start Time: 0901  Stop Time: 0913    Today is my first time meeting Anuradha.  She had been followed by Dr. Ruiz previously.      She reports that she is feeling well and has no concerns.  She has been going to a lot of [employer] and participating in [employer] racing.  She denies any issues taking her medications.  She reports that she has been weaning down on her prednisone as prescribed.  She was also wondering about decreasing her Bactrim (she is actually on this more for UTI prophylaxis as opposed to PCP prophylaxis).  She denies taking any NSAIDS and reports drinking 2-2.5L of water per day.      Most recently, her creatinine was on the higher end of her baseline (1.46) and her EBV titer was elevated (both an increase in the log as well as the absolute value).  I had recommended holding her Imuran, but this made her nervous and so she opted to continue taking it.  I have asked her to repeat her labs and she is going to do that on Monday of next week.  She denies any lymphadenopathy, fevers or fatigue.     She reports that her tremor in her hands has been much improved since taking the magnesium supplement.    Review of Systems:  A comprehensive review of systems was performed and found to be negative other than noted in the  HPI.    Allergies:  Anuradha is allergic to ibuprofen; shellfish-derived products; and vancomycin..    Active Medications:  Current Outpatient Medications   Medication Sig Dispense Refill     amLODIPine (NORVASC) 10 MG tablet Take 1 tablet (10 mg) by mouth daily 30 tablet 6     azaTHIOprine (IMURAN) 50 MG tablet Take 1 tablet (50 mg) by mouth daily 90 tablet 3     calcium carbonate-vitamin D (OS-DEE) 500-400 MG-UNIT tablet Take 1 tablet by mouth daily 30 tablet 11     Enalapril-hydroCHLOROthiazide 5-12.5 MG TABS Take 1 tablet by mouth daily 30 tablet 11     ferrous sulfate (FEROSUL) 325 (65 Fe) MG tablet Take 1 tablet (325 mg) by mouth 2 times daily 180 tablet 6     labetalol (NORMODYNE) 100 MG tablet Take 1 tablet (100 mg) by mouth 2 times daily 180 tablet 6     levonorgest-eth estrad 91-Day (SEASONIQUE) 0.15-0.03 &0.01 MG tablet Take 1 tablet by mouth daily 91 tablet 0     magnesium oxide (MAG-OX) 400 (241.3 Mg) MG tablet Take 1 tablet (400 mg) by mouth 2 times daily 60 tablet 11     Multiple Vitamins-Minerals (WOMENS MULTIVITAMIN PO) Take 1 tablet by mouth daily       predniSONE (DELTASONE) 1 MG tablet Take 2 tabs (2 mg) by mouth every Mon-Wed-Fri 60 tablet 3     sulfamethoxazole-trimethoprim (BACTRIM) 400-80 MG tablet Take one tab by mouth every Mon-Thursday 12 tablet 6     tacrolimus (GENERIC EQUIVALENT) 0.5 MG capsule Please take 4 capsules (2mg) by mouth in am and take 3 capsules (1.5mg) in the evening 210 capsule 6     amoxicillin (AMOXIL) 500 MG capsule Take 4 caps (2 grams) one hour prior to dental procedure. (Patient not taking: Reported on 7/17/2020) 12 capsule 3        Immunizations:  Immunization History   Administered Date(s) Administered     HEPA 08/04/2004     HPV9 04/10/2018, 07/17/2018, 01/15/2019     HepB 2000, 2000, 06/21/2001     Hib (PRP-T) 2000, 2000, 06/21/2001     Historical DTP/aP 2000, 2000, 02/28/2001, 12/06/2001     Influenza (H1N1) 10/27/2009      Influenza (IIV3) PF 10/25/2005, 02/09/2007, 11/09/2007, 01/22/2009, 10/16/2009, 11/03/2010, 09/19/2011, 09/10/2012, 01/03/2014, 12/01/2014     Influenza Vaccine IM > 6 months Valent IIV4 10/26/2015, 01/09/2017, 01/29/2018, 11/13/2018     Influenza Vaccine Im 4yrs+ 4 Valent CCIIV4 12/20/2019     MMR 06/21/2001     Mantoux Tuberculin Skin Test 08/04/2004     Meningococcal (Bexsero ) 07/23/2019     Meningococcal (Menactra ) 11/13/2018     Pneumo Conj 13-V (2010&after) 04/10/2018     Pneumococcal (PCV 7) 2000, 2000, 02/28/2001, 06/21/2001     Pneumococcal 23 valent 01/15/2019     Poliovirus, inactivated (IPV) 2000, 2000, 12/06/2001     TDAP Vaccine (Adacel) 03/12/2017     Tdap (Adacel,Boostrix) 09/19/2011     Varicella 06/21/2001        PMHx:  Past Medical History:   Diagnosis Date     H/O kidney transplant      Hypertension      Wilm's tumor          Rejection History     Kidney Transplant - 11/9/2004  (#1)     No rejections noted for this transplant.            Infection History     Kidney Transplant - 11/9/2004  (#1)       POD Infections Treatments Organisms Resolved    10/24/2014 9 years 11 months EBV (Derrell-Barr virus) viremia         Recurrent pyelonephritis Antibiotics, Antibiotics, Antibiotics, Antibiotics, Antibiotics KLEBSIELLA, ENTEROCOCCUS, PSEUDOMONAS             Problems     Kidney Transplant - 11/9/2004  (#1)       POD Problem Resolved    11/9/2004 N/A Kidney replaced by transplant       Gingival hypertrophy 11/8/2018          Non-Transplant Related Problems       Problem Resolved    8/14/2015 Scar adherent     7/21/2015 Immunosuppression (H)     7/21/2015 HTN (hypertension)     11/7/2013 Dehydration 11/8/2018 6/6/2013 Primary ovarian failure     10/5/2012 Lack of expected normal physiological development     8/20/2012 Femur fracture, right (H) 11/8/2018 7/17/2012 Wilms' tumor (H)     7/17/2012 Ankle pain 11/8/2018 7/17/2012 Growth deceleration 7/21/2015 7/17/2012 S/P  radiation therapy     7/17/2012 Status post chemotherapy                 PSHx:    Past Surgical History:   Procedure Laterality Date     APPENDECTOMY       CHOLECYSTECTOMY  05/20/2003     ENT SURGERY       OPEN REDUCTION INTERNAL FIXATION RODDING INTRAMEDULLARY FEMUR  8/20/2012    Procedure: OPEN REDUCTION INTERNAL FIXATION RODDING INTRAMEDULLARY FEMUR;  Closed Reduction Internal Fixation Right Femur;  Surgeon: Catracho Hook MD;  Location: [facility] OR     REVISE SCAR TRUNK Right 11/6/2015    Procedure: REVISE SCAR TRUNK;  Surgeon: COOPER Anderson MD;  Location: [facility] OR     right nephrectomy  05/19/2003    due to Wilms tumor; performed by Dr. Myles Velez at [facility]     TONSILLECTOMY  01/31/2005     TRANSPLANT KIDNEY RECIPIENT LIVING RELATED CHILD  11/09/2004    left nephrectomy performed at time of transplant       FHx:  Family History   Problem Relation Age of Onset     Hypertension Father        SHx:  Social History     Tobacco Use     Smoking status: Never Smoker     Smokeless tobacco: Never Used   Substance Use Topics     Alcohol use: No     Drug use: No     Social History     Social History Narrative     Not on file       Physical Exam:    There were no vitals taken for this visit.  Blood pressure percentiles are not available for patients who are 18 years or older.    Exam:  GENERAL: Healthy, alert and no distress  EYES: Eyes grossly normal to inspection.  No discharge or erythema, or obvious scleral/conjunctival abnormalities.  RESP: No audible wheeze, cough, or visible cyanosis.  No visible retractions or increased work of breathing.    SKIN: Visible skin clear. No significant rash, abnormal pigmentation or lesions.  NEURO: Cranial nerves grossly intact.  Mentation and speech appropriate for age.  PSYCH: Mentation appears normal, affect normal/bright, judgement and insight intact, normal speech and appearance well-groomed.    Labs and Imaging:  No results found for any visits on  07/17/20.  I reviewed labs from 6/26/2020 and previous labs going back to 2018.      I personally reviewed results of laboratory evaluation, imaging studies and past medical records that were available during this outpatient visit.      Assessment and Plan:      ICD-10-CM    1. Kidney replaced by transplant  Z94.0 sulfamethoxazole-trimethoprim (BACTRIM) 400-80 MG tablet       Immunosuppression: Anuradha Pearson is on individualized protocol due to EBV viremia, other infections. tacrolimus goal is 4-6.  Imuran dose is 50mg daily (recently increased with decreasing prednisone)  SteroidsYes, dose 2mg every Monday/Wednesday/Friday  Her steroid dose has been decreasing to transition to a steroid avoidance protocol for her next kidney.    Immunosuppressive Medications     Immunosuppressive Agents     azaTHIOprine (IMURAN) 50 MG tablet        tacrolimus (GENERIC EQUIVALENT) 0.5 MG capsule            Serology Results     Recipient (Pre-transplant Results)     Anti-HBcAb   No results on file    HBsAg   No results on file    HBsAb   No results on file           HBV DNA    No results on file    Anti-HCV   No results on file    Anti-HIV I/II   No results on file           Anti-CMV   No results on file    Anti-HTLV I/II   No results on file    RPR/VDRL   No results on file           EBV IgG   No results on file    EBV IgM   No results on file    EBNA   No results on file                      Left Kidney Donor (Pre-donation Results)     Anti-HBcAb   HBC Total:  Negative    HBsAg   HBsAg:  Negative    HBsAb   No results on file           HBV DNA    No results on file    Anti-HCV   No results on file    Anti-HIV I/II   No results on file           Anti-CMV   No results on file    Anti-HTLV I/II   No results on file    RPR/VDRL   No results on file           EBV IgG   No results on file    EBV IgM   No results on file    EBNA   No results on file                       CKD: Stage III.  Her creatinine fluctuates between 1.2-1.5.   Most recently, it has been at the higher end of her baseline.  She does have a history of low level DSA.  Will need to follow closely and obtain CKD labs next week.    HTN: BP was not obtained, but I will ask her follow-up with a blood pressure locally and report to us.   Most recent blood pressure reading   07/23/19 126/73       Antihypertensive Medications     Antihypertensive Combinations     Enalapril-hydroCHLOROthiazide 5-12.5 MG TABS              Last ECHO done 1/15/2019 and results were Unremarkable    Immunoprophylaxis:  PCP prophylaxis: Bactrim  Antiviral prophylaxis: No  Antifungal prophylaxis: No    Patient Education: During this visit I discussed in detail the patient s symptoms, physical exam and evaluation results findings, tentative diagnosis as well as the treatment plan (Including but not limited to possible side effects and complications related to the disease, treatment modalities and intervention(s). Family expressed understanding and consent. Family was receptive and ready to learn; no apparent learning barriers were identified.  Live virus vaccines are contraindicated in this patient. Any new medications prescribed must be assessed for kidney toxicity and drug-interactions before use.    Health Maintenance: Live vaccines are contraindicated due to immunosuppression.    Anuradha must have all other vaccines updated in a timely fashion including an annual influenza vaccine.  Anuradha must be seen by the dentist annually.  Over the counter medications should be checked prior to use to ensure they are safe in patients with kidney disease.    Follow up: Return in about 3 months (around 10/17/2020) for In-Clinic Visit. Please return sooner should Anuradha become symptomatic. For any questions or concerns, feel free to contact the transplant coordinators   at [phone].    Anuradha would like to transition to adult care after college (she is currently a sophomore).    Sincerely,    Natalya Hartman MD    Pediatric Nephrology    CC:   Patient Care Team:  Gutierrez Wood MD as PCP - General (Family Practice)  Shayla Camargo, RN as Transplant Coordinator (Transplant)  Nesha Orta APRN CNP as Nurse Practitioner (Nurse Practitioner - Pediatrics)  Jian Alatorre MD as MD (Pediatrics)  Dorcas Samayoa APRN CNP as Nurse Practitioner (Nurse Practitioner - Pediatrics)  Ike Rosa MA as Medical Assistant (Transplant)  Natalya Hartman MD as Transplant Physician (Pediatric Nephrology)  Gauri Mckeon [facility] as Pharmacist (Pharmacist)  PRAVEEN BELLA    Copy to patient  Yaritza Pearson [place]  [address] [place] [id number]

## 2020-07-20 ENCOUNTER — RESULTS ONLY (OUTPATIENT)
Dept: OTHER | Facility: CLINIC | Age: 20
End: 2020-07-20

## 2020-07-20 DIAGNOSIS — Z94.0 KIDNEY TRANSPLANTED: ICD-10-CM

## 2020-07-20 DIAGNOSIS — Z94.0 KIDNEY REPLACED BY TRANSPLANT: ICD-10-CM

## 2020-07-20 PROCEDURE — 86832 HLA CLASS I HIGH DEFIN QUAL: CPT | Performed by: TRANSPLANT SURGERY

## 2020-07-20 PROCEDURE — 86833 HLA CLASS II HIGH DEFIN QUAL: CPT | Performed by: TRANSPLANT SURGERY

## 2020-07-20 PROCEDURE — 87799 DETECT AGENT NOS DNA QUANT: CPT | Performed by: PEDIATRICS

## 2020-07-20 PROCEDURE — 80197 ASSAY OF TACROLIMUS: CPT | Performed by: PEDIATRICS

## 2020-07-21 ENCOUNTER — VIRTUAL VISIT (OUTPATIENT)
Dept: PEDIATRIC HEMATOLOGY/ONCOLOGY | Facility: CLINIC | Age: 20
End: 2020-07-21
Attending: NURSE PRACTITIONER
Payer: COMMERCIAL

## 2020-07-21 DIAGNOSIS — Z85.528 H/O WILMS' TUMOR: Primary | ICD-10-CM

## 2020-07-21 DIAGNOSIS — Z92.3 S/P RADIATION THERAPY: ICD-10-CM

## 2020-07-21 DIAGNOSIS — Z92.21 STATUS POST CHEMOTHERAPY: ICD-10-CM

## 2020-07-21 ASSESSMENT — PAIN SCALES - GENERAL: PAINLEVEL: NO PAIN (0)

## 2020-07-21 NOTE — NURSING NOTE
"Anuradha Pearson is a 20 year old female who is being evaluated via a billable video visit.      The patient has been notified of following:     \"This video visit will be conducted via a call between you and your physician/provider. We have found that certain health care needs can be provided without the need for an in-person physical exam.  This service lets us provide the care you need with a video conversation.  If a prescription is necessary we can send it directly to your pharmacy.  If lab work is needed we can place an order for that and you can then stop by our lab to have the test done at a later time.    If during the course of the call the physician/provider feels a video visit is not appropriate, you will not be charged for this service.\"     Patient has given verbal consent for Video visit? Yes    Patient would like the video invitation sent by: Text to cell phone: [phone]    Video Start Time: 1:15 PM    Anuradha Pearson complains of    Chief Complaint   Patient presents with     RECHECK     Long term follow-up for wilms tumor       No Pain (0)  Data Unavailable      I have reviewed and updated the patient's Past Medical History, Social History, Family History and Medication List.    ALLERGIES  Ibuprofen; Shellfish-derived products; and Vancomycin    "

## 2020-07-21 NOTE — LETTER
"  7/21/2020      RE: Shraddha Pearson  [address]  [place] [address]           Shraddha Pearson is being evaluated via a billable telephone visit due to COVID-19 clinic restrictions for in-person visits.      The patient has been notified of following:     \"This telephone visit will be conducted via a call between you and your physician/provider. We have found that certain health care needs can be provided without the need for a physical exam.  This service lets us provide the care you need with a short phone conversation.  If a prescription is necessary we can send it directly to your pharmacy.  If lab work is needed we can place an order for that and you can then stop by our lab to have the test done at a later time.  If during the course of the call the physician/provider feels a telephone visit is not appropriate, you will not be charged for this service.\"     I have reviewed and updated the patient's Past Medical History, Social History, Family History and Medication List.    Telephone contact:  Shraddha's cell   Phone call start:  1:30pm  Phone call end:  1:52pm         LONG-TERM FOLLOW-UP CLINIC      We had the pleasure of seeing your patient, Shraddha Pearson, at the [facility] Long-Term Follow-Up Clinic for childhood cancer survivors.  Shraddha was referred to us by Dr. Ingrid Constantino for ongoing management and long-term follow up of her Wilms tumor and associated chemotherapy.  The following is a summary of your patient's cancer, therapy, current history, and physical findings followed by assessment and long-term followup recommendations.      THERAPY ACCORDING TO AVAILABLE RECORDS:  Shraddha Pearson is a now 20-year-old  female with a history of stage IV favorable histology Wilms tumor that was diagnosed on 05/19/2003 at 2 1/2 years of age.  She initially presented with disease in her right kidney as well as her lungs.  She had " chemotherapy, radiation, and surgery as part of her treatment.  Her initial surgery was on  05/19/2003 in which she had a right nephrectomy.  Unfortunately, she had some surgical complications which caused her to lose blood supply to the left kidney, and she also lost that one as well.  Her surgeries in detail are as follows:   1. Right nephrectomy on 05/19/2003 by Dr. Myles Velez at the [facility].   2. Cadaveric saphenous vein graft to IVC on 05/20/2003.   3. Cholecystectomy on 05/20/2003.   4. Left nephrectomy and living donor renal transplant from patient's father on 11/14/2004.   5. Tonsillectomy on 01/31/2005.   6. Liver biopsy on 01/31/2005.   7. Left renal biopsy on 11/07/2005.      Shraddha received the following chemotherapies as part of her treatment:   1. Vincristine intravenously.   2. Actinomycin D intravenously.   3. Doxorubicin intravenously with a cumulative dose of 128 mg/m2.      Radiation therapy is as follows:   1. Whole abdomen radiation for a total dose of 1080 cGy.      Shraddha's acute and chronic late effects known to date include:   1. 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   2. 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   3. Idiopathic intracranial hypertension 05/01/2007, resolved.   4. Recurrent UTIs and pyelonephritis, resolved.   5. Hemorrhagic gastritis 04/2007, resolved.   6. Viral meningitis 10/05/2008, resolved.   7. Elevated EBV titers requiring rituximab infusion for 2 doses in March and August 2005.   8.  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  Growth deceleration       HISTORY OF PRESENT ILLNESS:  Shraddha report to the visit today via phone.  She did not have a stable internet connection for the video.   She has had follow up with renal transplant team recently.   No recent fractures.  She did have a break to her left clavicle 4/2019 after her horse kicked at her when it was stung by a bee.  She has completely  recovered and did not need to have surgery.   No fever.  No palpable lymph nodes.  Having regular BMs.  No problems with urination.   EBV levels have been fluctuating and have been going up recently.  Levels from this week are pending.     No night sweats or weight loss. No swelling recently.   No recent hospitalizations. She denies any current N/V/D/C.  No abdominal pain. She doesn't have any activity restrictions. No cough, dizziness or SOB. Shraddha has continued follow up with endocrine for her growth deceleration and primary ovarian failure.  They asked that she transition to adult GYN and she needs to reschedule this appt.     Her menstrual periods have been regular now that they switched to Seasonique.  She has ongoing dental follow up and will be seeing them soon.   Her gingival hyperplasia resolved now that she has been on tacrolimus. No more issues with sleeping.  Completed gardasil vaccines and got Bexsero last visit.  Patient continues to compete in horseback riding.  She took her college classes online this 1st year so she could still travel with her [employer] competitions. She is planning to do online likely this year as well.  Has yearly eye exam.  Creatinine has been uptrending and followed by nephrology closely.  Had an echo in 2019. No recent UTIs and is on Bactrim prophylaxis.      REVIEW OF SYSTEMS:   General:  No acute distress  Skin: negative  Eyes: glasses  Ears/Nose/Throat: gingival hyperplasia- resolved;   Respiratory: No shortness of breath, dyspnea on exertion, cough, or hemoptysis  Cardiovascular: negative  Gastrointestinal: negative  Genitourinary: s/p kidney transplant  Musculoskeletal: left broken clavicle 4/2019  Neurologic: negative  Psychiatric: negative  Hematologic/Lymphatic/Immunologic: negative  Endocrine: ovarian failure and growth deceleration       IMMUNIZATIONS:  Up to date per parents      SOCIAL HISTORY:  Shraddha is currently out of school until fall.  She graduated from [employer]  school and was 3rd in her class.   She is very active,  riding horses. Parents report they have cleared her horseback riding with the renal transplant team.   She plans to study equine science at [employer] and will start her 3rd year in the Fall.  She will plan on doing online learning.     FAMILY HISTORY:  Maternal grandmother with a history of heart valve disease, carotid artery obstruction, and congestive heart failure in her 50s.  Maternal grandfather with cardiac bypass in his 60s.  Paternal grandmother with history of hypertension in her 50s.  Maternal aunt with history of diabetes in her 50s.  Paternal great-grandfather with colon cancer at age 68.  Maternal  great-grandfather with history of colon cancer at 60.  Father with hypertension.  Reviewed with father, No changes.      CURRENT MEDICATIONS:     Current Outpatient Medications   Medication     amLODIPine (NORVASC) 10 MG tablet     azaTHIOprine (IMURAN) 50 MG tablet     calcium carbonate-vitamin D (OS-DEE) 500-400 MG-UNIT tablet     Enalapril-hydroCHLOROthiazide 5-12.5 MG TABS     ferrous sulfate (FEROSUL) 325 (65 Fe) MG tablet     labetalol (NORMODYNE) 100 MG tablet     levonorgest-eth estrad 91-Day (SEASONIQUE) 0.15-0.03 &0.01 MG tablet     magnesium oxide (MAG-OX) 400 (241.3 Mg) MG tablet     Multiple Vitamins-Minerals (WOMENS MULTIVITAMIN PO)     predniSONE (DELTASONE) 1 MG tablet     sulfamethoxazole-trimethoprim (BACTRIM) 400-80 MG tablet     tacrolimus (GENERIC EQUIVALENT) 0.5 MG capsule     amoxicillin (AMOXIL) 500 MG capsule     No current facility-administered medications for this visit.         ALLERGIES:  Vancomycin which causes Ramin syndrome.      PHYSICAL EXAMINATION:   VITAL SIGNS: There were no vitals taken for this visit.    Wt Readings from Last 3 Encounters:   07/23/19 57 kg (125 lb 10.6 oz) (48 %, Z= -0.05)*   07/23/19 56.8 kg (125 lb 3.5 oz) (47 %, Z= -0.07)*   07/23/19 56.8 kg (125 lb 3.5 oz) (47 %, Z= -0.07)*     * Growth percentiles  "are based on [facility] (Girls, 2-20 Years) data.     Ht Readings from Last 2 Encounters:   07/23/19 1.483 m (4' 10.39\") (1 %, Z= -2.30)*   07/23/19 1.484 m (4' 10.43\") (1 %, Z= -2.29)*     * Growth percentiles are based on [facility] (Girls, 2-20 Years) data.     No height and weight on file for this encounter.  Height has started to plateau    Exam deferred as pt had phone visit.     LABORATORY AND DIAGNOSTIC STUDIES:   Reviewed labs in [facility] from last few months.    ASSESSMENT, PLAN, AND LONG-TERM FOLLOWUP RECOMMENDATIONS:  Shraddha Pearson is a now 20-year-old  female with a history of stage IV favorable histology Wilms tumor who is status post renal transplant approximately 16 years ago.  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  She also has some macrocytosis that is likely related to her antirejection medications, work up completed a few years ago.  The following are our late effects recommendations:   1. Risk of recurrence:  Shraddha is currently 16 years status post end of her treatment from her Wilms tumor.  Given the distance from the diagnosis of her primary malignancy, the likelihood of recurrence at this point would be extremely low.   2. Psychosocial effects:  Shraddha appears to be doing very well socially, as well as with her school performance.  We do not believe she is having any difficulties in this area.SW evaluation today.  3. Renal transplant:  Shraddha is currently followed by  Pediatric Nephrology, and we recommend continued follow up and management of her renal transplant.  Continue to report any signs of bowel obstruction related to her previous surgeries and new medications. Drink plenty of water.  Any medications the patient decides to take should be cleared with her renal transplant team.  Additionally, Shraddha should clear any major activity changes with her renal transplant team as well.    4. Risk for cardiac toxicity:  Shraddha has a history of 128 mg/m2 of " doxorubicin, as well as whole abdomen radiation, which can predispose her to late cardiac difficulties, including dilated cardiomyopathy.  Shraddha had an echocardiogram completed in 2019 which was within normal limits. She needs to have surveillance echos from an oncology perspective every 5 years based on the new updated guidelines.  Next due in 2024.  5. Female issues related to fertility:  Shraddha has a history of whole abdomen radiation in which her ovaries were included in the field.  This may potentially have an impact on her future fertility.  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  Fertility potential would be low given this diagnosis.  She will have continued follow up with adult GYN.  We gave her the phone number to reschedule this appt.  6. Risk for peripheral neuropathy:  Shraddha has a history of vincristine exposure which can predispose her to having issues with ongoing peripheral neuropathy.  She did have problems with foot drop during treatment.  However, this is resolved, and she is doing well.   7. Growth and development:  Shraddha's height has drifted slightly down to below the 5th percentile over the past several years.  She is following up with endocrine regularly. This year her growth has really started to plateau.  She has  reached her final adult height.   8. Risk for secondary malignancies:  We asked that Sharddha wear sunscreen when she is outdoors due to her increased risk of skin cancer from radiation.  Additionally, should she noted any new mass, lump or area of concern, particularly in the radiation field, we would recommend prompt evaluation. New guidelines recommend starting early colon cancer screening when she is 30 years old due to her abdominal radiation.  9.  EBV viremia:  Shraddha has been having rising EBV levels since 2013.  Last assessment was improved . Continue to monitor EBV level monthly given her risk for PTLD.  If level continues  to rise, I would recommend that she have a CT of the neck/chest/abdomen/pelvis. No palpable LN currently.  She will have continued EBV labs followed by nephrology.    11.  Macrocytosis:  Shraddha has been having a rising MCV.  She has had some mild chronic anemia.  Work up in 2016 and this is likely related to her antirejection meds.  12.  Insomnia:  Resolved  13.  Immunizations:  UTD to parents.  Not able to get live virus vaccines due to immunosuppression.    It was a pleasure to see Shraddha in our Long-Term Follow-Up Clinic today.  We certainly appreciate the opportunity to participate in your patient's care.  If you have any questions or concerns, please do not hesitate to contact us at [phone].   We ask that Shraddha return to our clinic in one year for followup.  We will try to coordinate this on a day that she is seeing nephrology.            Dorcas Samayoa MSN, APRN, CPNP-AC, CPON  Department of Pediatrics  Division of Hematology/Oncology

## 2020-07-22 LAB
TACROLIMUS BLD-MCNC: 7.3 UG/L (ref 5–15)
TME LAST DOSE: NORMAL H

## 2020-07-23 LAB
DONOR IDENTIFICATION: NORMAL
DR51: 672
DSA COMMENTS: NORMAL
DSA PRESENT: YES
DSA TEST METHOD: NORMAL
EBV DNA # SPEC NAA+PROBE: 7675 {COPIES}/ML
EBV DNA SPEC NAA+PROBE-LOG#: 3.9 {LOG_COPIES}/ML
ORGAN: NORMAL
SA1 CELL: NORMAL
SA1 COMMENTS: NORMAL
SA1 HI RISK ABY: NORMAL
SA1 MOD RISK ABY: NORMAL
SA1 TEST METHOD: NORMAL
SA2 CELL: NORMAL
SA2 COMMENTS: NORMAL
SA2 HI RISK ABY UA: NORMAL
SA2 MOD RISK ABY: NORMAL
SA2 TEST METHOD: NORMAL
UNACCEPTABLE ANTIGEN: NORMAL
UNOS CPRA: 51

## 2020-07-23 NOTE — PROGRESS NOTES
"    Shraddha Pearson is being evaluated via a billable telephone visit due to COVID-19 clinic restrictions for in-person visits.      The patient has been notified of following:     \"This telephone visit will be conducted via a call between you and your physician/provider. We have found that certain health care needs can be provided without the need for a physical exam.  This service lets us provide the care you need with a short phone conversation.  If a prescription is necessary we can send it directly to your pharmacy.  If lab work is needed we can place an order for that and you can then stop by our lab to have the test done at a later time.  If during the course of the call the physician/provider feels a telephone visit is not appropriate, you will not be charged for this service.\"     I have reviewed and updated the patient's Past Medical History, Social History, Family History and Medication List.    Telephone contact:  Shraddha's cell   Phone call start:  1:30pm  Phone call end:  1:52pm         LONG-TERM FOLLOW-UP CLINIC      We had the pleasure of seeing your patient, Shraddha Pearson, at the [facility] Long-Term Follow-Up Clinic for childhood cancer survivors.  Shraddha was referred to us by Dr. Ingrid Constantino for ongoing management and long-term follow up of her Wilms tumor and associated chemotherapy.  The following is a summary of your patient's cancer, therapy, current history, and physical findings followed by assessment and long-term followup recommendations.      THERAPY ACCORDING TO AVAILABLE RECORDS:  Shraddha Pearson is a now 20-year-old  female with a history of stage IV favorable histology Wilms tumor that was diagnosed on 05/19/2003 at 2 1/2 years of age.  She initially presented with disease in her right kidney as well as her lungs.  She had chemotherapy, radiation, and surgery as part of her treatment.  Her initial surgery was on  " 05/19/2003 in which she had a right nephrectomy.  Unfortunately, she had some surgical complications which caused her to lose blood supply to the left kidney, and she also lost that one as well.  Her surgeries in detail are as follows:   1. Right nephrectomy on 05/19/2003 by Dr. Myles Velez at the [facility].   2. Cadaveric saphenous vein graft to IVC on 05/20/2003.   3. Cholecystectomy on 05/20/2003.   4. Left nephrectomy and living donor renal transplant from patient's father on 11/14/2004.   5. Tonsillectomy on 01/31/2005.   6. Liver biopsy on 01/31/2005.   7. Left renal biopsy on 11/07/2005.      Shraddha received the following chemotherapies as part of her treatment:   1. Vincristine intravenously.   2. Actinomycin D intravenously.   3. Doxorubicin intravenously with a cumulative dose of 128 mg/m2.      Radiation therapy is as follows:   1. Whole abdomen radiation for a total dose of 1080 cGy.      Shraddha's acute and chronic late effects known to date include:   1. 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   2. 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   3. Idiopathic intracranial hypertension 05/01/2007, resolved.   4. Recurrent UTIs and pyelonephritis, resolved.   5. Hemorrhagic gastritis 04/2007, resolved.   6. Viral meningitis 10/05/2008, resolved.   7. Elevated EBV titers requiring rituximab infusion for 2 doses in March and August 2005.   8.  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  Growth deceleration       HISTORY OF PRESENT ILLNESS:  Shraddha report to the visit today via phone.  She did not have a stable internet connection for the video.   She has had follow up with renal transplant team recently.   No recent fractures.  She did have a break to her left clavicle 4/2019 after her horse kicked at her when it was stung by a bee.  She has completely recovered and did not need to have surgery.   No fever.  No palpable lymph nodes.  Having regular  BMs.  No problems with urination.   EBV levels have been fluctuating and have been going up recently.  Levels from this week are pending.     No night sweats or weight loss. No swelling recently.   No recent hospitalizations. She denies any current N/V/D/C.  No abdominal pain. She doesn't have any activity restrictions. No cough, dizziness or SOB. Shraddha has continued follow up with endocrine for her growth deceleration and primary ovarian failure.  They asked that she transition to adult GYN and she needs to reschedule this appt.     Her menstrual periods have been regular now that they switched to Seasonique.  She has ongoing dental follow up and will be seeing them soon.   Her gingival hyperplasia resolved now that she has been on tacrolimus. No more issues with sleeping.  Completed gardasil vaccines and got Bexsero last visit.  Patient continues to compete in horseback riding.  She took her college classes online this 1st year so she could still travel with her [employer] competitions. She is planning to do online likely this year as well.  Has yearly eye exam.  Creatinine has been uptrending and followed by nephrology closely.  Had an echo in 2019. No recent UTIs and is on Bactrim prophylaxis.      REVIEW OF SYSTEMS:   General:  No acute distress  Skin: negative  Eyes: glasses  Ears/Nose/Throat: gingival hyperplasia- resolved;   Respiratory: No shortness of breath, dyspnea on exertion, cough, or hemoptysis  Cardiovascular: negative  Gastrointestinal: negative  Genitourinary: s/p kidney transplant  Musculoskeletal: left broken clavicle 4/2019  Neurologic: negative  Psychiatric: negative  Hematologic/Lymphatic/Immunologic: negative  Endocrine: ovarian failure and growth deceleration       IMMUNIZATIONS:  Up to date per parents      SOCIAL HISTORY:  Shraddha is currently out of school until fall.  She graduated from high school and was 3rd in her class.   She is very active,  riding horses. Parents report they have  cleared her horseback riding with the renal transplant team.   She plans to study equine science at [employer] and will start her 3rd year in the Fall.  She will plan on doing online learning.     FAMILY HISTORY:  Maternal grandmother with a history of heart valve disease, carotid artery obstruction, and congestive heart failure in her 50s.  Maternal grandfather with cardiac bypass in his 60s.  Paternal grandmother with history of hypertension in her 50s.  Maternal aunt with history of diabetes in her 50s.  Paternal great-grandfather with colon cancer at age 68.  Maternal  great-grandfather with history of colon cancer at 60.  Father with hypertension.  Reviewed with father, No changes.      CURRENT MEDICATIONS:     Current Outpatient Medications   Medication     amLODIPine (NORVASC) 10 MG tablet     azaTHIOprine (IMURAN) 50 MG tablet     calcium carbonate-vitamin D (OS-DEE) 500-400 MG-UNIT tablet     Enalapril-hydroCHLOROthiazide 5-12.5 MG TABS     ferrous sulfate (FEROSUL) 325 (65 Fe) MG tablet     labetalol (NORMODYNE) 100 MG tablet     levonorgest-eth estrad 91-Day (SEASONIQUE) 0.15-0.03 &0.01 MG tablet     magnesium oxide (MAG-OX) 400 (241.3 Mg) MG tablet     Multiple Vitamins-Minerals (WOMENS MULTIVITAMIN PO)     predniSONE (DELTASONE) 1 MG tablet     sulfamethoxazole-trimethoprim (BACTRIM) 400-80 MG tablet     tacrolimus (GENERIC EQUIVALENT) 0.5 MG capsule     amoxicillin (AMOXIL) 500 MG capsule     No current facility-administered medications for this visit.         ALLERGIES:  Vancomycin which causes Ramin syndrome.      PHYSICAL EXAMINATION:   VITAL SIGNS: There were no vitals taken for this visit.    Wt Readings from Last 3 Encounters:   07/23/19 57 kg (125 lb 10.6 oz) (48 %, Z= -0.05)*   07/23/19 56.8 kg (125 lb 3.5 oz) (47 %, Z= -0.07)*   07/23/19 56.8 kg (125 lb 3.5 oz) (47 %, Z= -0.07)*     * Growth percentiles are based on CDC (Girls, 2-20 Years) data.     Ht Readings from Last 2 Encounters:   07/23/19  "1.483 m (4' 10.39\") (1 %, Z= -2.30)*   07/23/19 1.484 m (4' 10.43\") (1 %, Z= -2.29)*     * Growth percentiles are based on CDC (Girls, 2-20 Years) data.     No height and weight on file for this encounter.  Height has started to plateau    Exam deferred as pt had phone visit.     LABORATORY AND DIAGNOSTIC STUDIES:   Reviewed labs in [facility] from last few months.    ASSESSMENT, PLAN, AND LONG-TERM FOLLOWUP RECOMMENDATIONS:  Shraddha Pearson is a now 20-year-old  female with a history of stage IV favorable histology Wilms tumor who is status post renal transplant approximately 16 years ago.  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  She also has some macrocytosis that is likely related to her antirejection medications, work up completed a few years ago.  The following are our late effects recommendations:   1. Risk of recurrence:  Shraddha is currently 16 years status post end of her treatment from her Wilms tumor.  Given the distance from the diagnosis of her primary malignancy, the likelihood of recurrence at this point would be extremely low.   2. Psychosocial effects:  Shraddha appears to be doing very well socially, as well as with her school performance.  We do not believe she is having any difficulties in this area.SW evaluation today.  3. Renal transplant:  Shraddha is currently followed by  Pediatric Nephrology, and we recommend continued follow up and management of her renal transplant.  Continue to report any signs of bowel obstruction related to her previous surgeries and new medications. Drink plenty of water.  Any medications the patient decides to take should be cleared with her renal transplant team.  Additionally, Shraddha should clear any major activity changes with her renal transplant team as well.    4. Risk for cardiac toxicity:  Shraddha has a history of 128 mg/m2 of doxorubicin, as well as whole abdomen radiation, which can predispose her to late cardiac difficulties, " including dilated cardiomyopathy.  Shraddha had an echocardiogram completed in 2019 which was within normal limits. She needs to have surveillance echos from an oncology perspective every 5 years based on the new updated guidelines.  Next due in 2024.  5. Female issues related to fertility:  Shraddha has a history of whole abdomen radiation in which her ovaries were included in the field.  This may potentially have an impact on her future fertility.  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  Fertility potential would be low given this diagnosis.  She will have continued follow up with adult GYN.  We gave her the phone number to reschedule this appt.  6. Risk for peripheral neuropathy:  Shraddha has a history of vincristine exposure which can predispose her to having issues with ongoing peripheral neuropathy.  She did have problems with foot drop during treatment.  However, this is resolved, and she is doing well.   7. Growth and development:  Shraddha's height has drifted slightly down to below the 5th percentile over the past several years.  She is following up with endocrine regularly. This year her growth has really started to plateau.  She has  reached her final adult height.   8. Risk for secondary malignancies:  We asked that Shraddha wear sunscreen when she is outdoors due to her increased risk of skin cancer from radiation.  Additionally, should she noted any new mass, lump or area of concern, particularly in the radiation field, we would recommend prompt evaluation. New guidelines recommend starting early colon cancer screening when she is 30 years old due to her abdominal radiation.  9.  EBV viremia:  Shraddha has been having rising EBV levels since 2013.  Last assessment was improved . Continue to monitor EBV level monthly given her risk for PTLD.  If level continues to rise, I would recommend that she have a CT of the neck/chest/abdomen/pelvis. No palpable LN  currently.  She will have continued EBV labs followed by nephrology.    11.  Macrocytosis:  Shraddha has been having a rising MCV.  She has had some mild chronic anemia.  Work up in 2016 and this is likely related to her antirejection meds.  12.  Insomnia:  Resolved  13.  Immunizations:  UTD to parents.  Not able to get live virus vaccines due to immunosuppression.    It was a pleasure to see Shraddha in our Long-Term Follow-Up Clinic today.  We certainly appreciate the opportunity to participate in your patient's care.  If you have any questions or concerns, please do not hesitate to contact us at [phone].   We ask that Shraddha return to our clinic in one year for followup.  We will try to coordinate this on a day that she is seeing nephrology.            Dorcas Samayoa MSN, APRN, CPNP-AC, CPON  Department of Pediatrics  Division of Hematology/Oncology

## 2020-07-24 LAB
BKV DNA # SPEC NAA+PROBE: NORMAL COPIES/ML
BKV DNA SPEC NAA+PROBE-LOG#: NORMAL LOG COPIES/ML
SPECIMEN SOURCE: NORMAL

## 2020-07-27 ENCOUNTER — TELEPHONE (OUTPATIENT)
Dept: NEPHROLOGY | Facility: CLINIC | Age: 20
End: 2020-07-27

## 2020-07-31 ENCOUNTER — VIRTUAL VISIT (OUTPATIENT)
Dept: TRANSPLANT | Facility: CLINIC | Age: 20
End: 2020-07-31
Attending: NURSE PRACTITIONER
Payer: COMMERCIAL

## 2020-07-31 DIAGNOSIS — E83.42 HYPOMAGNESEMIA: ICD-10-CM

## 2020-07-31 DIAGNOSIS — Z94.0 KIDNEY REPLACED BY TRANSPLANT: ICD-10-CM

## 2020-07-31 DIAGNOSIS — D84.9 IMMUNOCOMPROMISED (H): ICD-10-CM

## 2020-07-31 DIAGNOSIS — B27.00 EBV (EPSTEIN-BARR VIRUS) VIREMIA: ICD-10-CM

## 2020-07-31 DIAGNOSIS — I15.1 HYPERTENSION SECONDARY TO OTHER RENAL DISORDERS: Primary | ICD-10-CM

## 2020-07-31 DIAGNOSIS — Z71.87 COUNSELING FOR TRANSITION FROM PEDIATRIC TO ADULT CARE PROVIDER: ICD-10-CM

## 2020-07-31 DIAGNOSIS — D63.1 ANEMIA DUE TO CHRONIC KIDNEY DISEASE, UNSPECIFIED CKD STAGE: ICD-10-CM

## 2020-07-31 DIAGNOSIS — N18.9 ANEMIA DUE TO CHRONIC KIDNEY DISEASE, UNSPECIFIED CKD STAGE: ICD-10-CM

## 2020-07-31 RX ORDER — AZATHIOPRINE 50 MG/1
75 TABLET ORAL DAILY
Qty: 140 TABLET | Refills: 3 | Status: SHIPPED | OUTPATIENT
Start: 2020-07-31 | End: 2021-05-17

## 2020-07-31 NOTE — LETTER
7/31/2020      RE: Shraddha Pearson  [address]  [place] [address]       Video Start Time: 10:57    Shraddha Pearson complains of    Chief Complaint   Patient presents with     Transplant     Follow up       I have reviewed and updated the patient's Past Medical History, Social History, Family History and Medication List.    ALLERGIES  Ibuprofen; Shellfish-derived products; and Vancomycin    HPI  I had the pleasure of conducting a video visit with Shraddha Pearson today for follow-up of kidney transplant follow up. Shraddha is a 20 year old female who attends our video visit alone. 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     Shraddha is currently in [employer] and participating in [employer] 2ours as she has been doing for many years. She attends college, she has 2 more online classes to get her A.A. in Equine science and is looking to move to [place] to work for the winter. Prefer to wait until age 21 to transition, and would like to stay in [place], her parents are still here and is planning on coming home for hedrick.    Shraddha is independent with her cares. She uses My Chart, fills her own pill box, takes her medications at 10 AM and 10 PM independently with reported excellent compliance. She knew her medications and current doses. Mom is doing refills for her but she knows how.    Shraddha denies rash, cough, congestion, fever, lymphadenotomy, weight loss, constipation.  She has not taken recent blood pressure.     ROS    ROS: 10 point ROS neg other than the symptoms noted above in the HPI.    Objective  Vitals: None taken  GENERAL: Healthy, alert and no  distress  EYES: Eyes grossly normal to inspection.  No discharge or erythema, or obvious scleral/conjunctival abnormalities.  RESP: No audible wheeze, cough, or visible cyanosis.  No visible retractions or increased work of breathing.    SKIN: Visible skin clear. No significant rash, abnormal pigmentation or lesions.  NEURO: Cranial nerves grossly intact.  Mentation and speech appropriate for age.  PSYCH: Mentation appears normal, affect normal/bright, judgement and insight intact, normal speech and appearance well-groomed.    Assessment/Plan:  Impression: Anuradha is a 19 year old 15 years s/p living kidney transplant, stable labs and independent with cares.    1. Kidney replaced by transplant  No Donor Specific Antibodies as of 3/17/2020  - azaTHIOprine (IMURAN) 50 MG tablet; Take 1 tablet (50 mg) by mouth daily. Increased to 75 mg daily as we wean prednisone  - Tacrolimus 1.5 mg twice daily goal 4-6 (last level 7's, timing a little off. No changes today.)  - Prednisone 2 mg M-W-F, Stop prednisone so she can be prednisone free for her second transplant.  - Labs again in August    2. Immunocompromised (H)  Bactrim for PCP prophylaxis  -Monday and Thursdays    3. Hypertension secondary to other renal disorders  Take weekly home blood pressures  -Enalapril Hydrochlorothiazide 5-12.5 mg daily  -Labetalol 100 mg BID  -Amlodipine 10 mg daily    4. CKD stage 2, Anemia- Iron Saturation good at 31 with last labs. Currently on ferrous sulfate 325 mg twice daily, tolerating dose.  -Continue supplement, in the past we we have decreased she felt dizzy and needed to return to twice a day dosing.    5. Hypomagnesemia  Last level 1.8 (normal at home lab 1.8-2.4)  -Continue Magnesium 400 mg twice daily, Helping with tremors.    6. EBV (Derrell-Barr virus) viremia  - Check EBV DNA PCR with standing labs.   -Notify us of weight loss, night sweats, swollen lymph nodes, fevers    7. Counseling for transition from pediatric to adult  care provider  Anuradha will need the follow adult providers:    Adult Tx Nephrology- for history of kidney transplant    Heme/Onc Long Term Follow up- for history of Wilms Tumor will continue to see Dorcas Samayoa    Endocrine- for history of primary ovarian failure /GYN for woman's health issues scheduled with Dr. Krishnamurthy in July 2020.    Primary care physician- Dr. Gutierrez Wood (needs yearly physical- discussed with patient)    Dermatology- for yearly skin checks due to increased risk for skin cancer due to immunosuppression medications (Aug 3rd, 2020 at home clinic)    Opthomology- Yearly    Dental- Every 6 months    Return in about 12 weeks (around 10/23/2020).    Video-Visit Details    Type of service:  Video Visit    Video End Time (time video stopped): 11:17  Total visit time: 20 minutes    Originating Location (pt. Location): Home    Distant Location (provider location):  PEDS TRANSPLANT SURGERY     Mode of Communication:  Video Conference via [employer]      JASMYNE Mir CNP

## 2020-07-31 NOTE — PROGRESS NOTES
Video Start Time: 10:57    Shraddha Pearson complains of    Chief Complaint   Patient presents with     Transplant     Follow up       I have reviewed and updated the patient's Past Medical History, Social History, Family History and Medication List.    ALLERGIES  Ibuprofen; Shellfish-derived products; and Vancomycin    HPI  I had the pleasure of conducting a video visit with Shraddha Pearson today for follow-up of kidney transplant follow up. Shraddha is a 20 year old female who attends our video visit alone. 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     hSraddha is currently in [facility] [facility] [employer] [employer] [facility] and participating in [employer] as she has been doing for many years. She attends college, she has 2 more online classes to get her A.A. in Equine science and is looking to move to [place] to work for the winter. Prefer to wait until age 21 to transition, and would like to stay in [place], her parents are still here and is planning on coming home for hedrick.    Shraddha is independent with her cares. She uses My Chart, fills her own pill box, takes her medications at 10 AM and 10 PM independently with reported excellent compliance. She knew her medications and current doses. Mom is doing refills for her but she knows how.    Shraddha denies rash, cough, congestion, fever, lymphadenotomy, weight loss, constipation.  She has not taken recent blood pressure.     ROS    ROS: 10 point ROS neg other than the symptoms noted above in the HPI.    Objective  Vitals: None taken  GENERAL: Healthy, alert and no distress  EYES: Eyes grossly normal to inspection.  No discharge or erythema, or obvious  scleral/conjunctival abnormalities.  RESP: No audible wheeze, cough, or visible cyanosis.  No visible retractions or increased work of breathing.    SKIN: Visible skin clear. No significant rash, abnormal pigmentation or lesions.  NEURO: Cranial nerves grossly intact.  Mentation and speech appropriate for age.  PSYCH: Mentation appears normal, affect normal/bright, judgement and insight intact, normal speech and appearance well-groomed.    Assessment/Plan:  Impression: Anuradha is a 19 year old 15 years s/p living kidney transplant, stable labs and independent with cares.    1. Kidney replaced by transplant  No Donor Specific Antibodies as of 3/17/2020  - azaTHIOprine (IMURAN) 50 MG tablet; Take 1 tablet (50 mg) by mouth daily. Increased to 75 mg daily as we wean prednisone  - Tacrolimus 1.5 mg twice daily goal 4-6 (last level 7's, timing a little off. No changes today.)  - Prednisone 2 mg M-W-F, Stop prednisone so she can be prednisone free for her second transplant.  - Labs again in August    2. Immunocompromised (H)  Bactrim for PCP prophylaxis  -Monday and Thursdays    3. Hypertension secondary to other renal disorders  Take weekly home blood pressures  -Enalapril Hydrochlorothiazide 5-12.5 mg daily  -Labetalol 100 mg BID  -Amlodipine 10 mg daily    4. CKD stage 2, Anemia- Iron Saturation good at 31 with last labs. Currently on ferrous sulfate 325 mg twice daily, tolerating dose.  -Continue supplement, in the past we we have decreased she felt dizzy and needed to return to twice a day dosing.    5. Hypomagnesemia  Last level 1.8 (normal at home lab 1.8-2.4)  -Continue Magnesium 400 mg twice daily, Helping with tremors.    6. EBV (Derrell-Barr virus) viremia  - Check EBV DNA PCR with standing labs.   -Notify us of weight loss, night sweats, swollen lymph nodes, fevers    7. Counseling for transition from pediatric to adult care provider  Anuradha will need the follow adult providers:    Adult Tx Nephrology- for  history of kidney transplant    Heme/Onc Long Term Follow up- for history of Wilms Tumor will continue to see Dorcas Samayoa    Endocrine- for history of primary ovarian failure /GYN for woman's health issues scheduled with Dr. Krishnamurthy in July 2020.    Primary care physician- Dr. Gutierrez Wood (needs yearly physical- discussed with patient)    Dermatology- for yearly skin checks due to increased risk for skin cancer due to immunosuppression medications (Aug 3rd, 2020 at home clinic)    Opthomology- Yearly    Dental- Every 6 months    Return in about 12 weeks (around 10/23/2020).    Video-Visit Details    Type of service:  Video Visit    Video End Time (time video stopped): 11:17  Total visit time: 20 minutes    Originating Location (pt. Location): Home    Distant Location (provider location):  PEDS TRANSPLANT SURGERY     Mode of Communication:  Video Conference via JASMYNE Meyers CNP

## 2020-07-31 NOTE — NURSING NOTE
"Anuradha Pearson is a 20 year old female who is being evaluated via a billable video visit.      The patient has been notified of following:     \"This video visit will be conducted via a call between you and your physician/provider. We have found that certain health care needs can be provided without the need for an in-person physical exam.  This service lets us provide the care you need with a video conversation.  If a prescription is necessary we can send it directly to your pharmacy.  If lab work is needed we can place an order for that and you can then stop by our lab to have the test done at a later time.    Video visits are billed at different rates depending on your insurance coverage.  Please reach out to your insurance provider with any questions.    If during the course of the call the physician/provider feels a video visit is not appropriate, you will not be charged for this service.\"     How would you like to obtain your AVS? Zachary    Anuradha Pearson complains of    Chief Complaint   Patient presents with     Transplant     Follow up       Patient has given verbal consent for Video visit? Yes    Patient would like the video invitation sent by: Text to cell phone: [phone]     I have reviewed and updated the patient's medication list, allergies and preferred pharmacy.      Candace Benites [facility]      "

## 2020-08-31 DIAGNOSIS — I15.1 HYPERTENSION SECONDARY TO OTHER RENAL DISORDERS: ICD-10-CM

## 2020-08-31 RX ORDER — AMLODIPINE BESYLATE 10 MG/1
10 TABLET ORAL DAILY
Qty: 30 TABLET | Refills: 6 | Status: SHIPPED | OUTPATIENT
Start: 2020-08-31 | End: 2021-04-16

## 2020-09-02 DIAGNOSIS — Z94.0 KIDNEY TRANSPLANTED: ICD-10-CM

## 2020-09-02 PROCEDURE — 80197 ASSAY OF TACROLIMUS: CPT | Performed by: PEDIATRICS

## 2020-09-02 PROCEDURE — 87799 DETECT AGENT NOS DNA QUANT: CPT | Performed by: PEDIATRICS

## 2020-09-03 LAB
TACROLIMUS BLD-MCNC: 3.6 UG/L (ref 5–15)
TME LAST DOSE: ABNORMAL H

## 2020-09-04 DIAGNOSIS — I15.1 HYPERTENSION SECONDARY TO OTHER RENAL DISORDERS: ICD-10-CM

## 2020-09-04 LAB
EBV DNA # SPEC NAA+PROBE: ABNORMAL {COPIES}/ML
EBV DNA SPEC NAA+PROBE-LOG#: 4.2 {LOG_COPIES}/ML

## 2020-09-04 RX ORDER — LABETALOL 100 MG/1
100 TABLET, FILM COATED ORAL 2 TIMES DAILY
Qty: 180 TABLET | Refills: 6 | Status: SHIPPED | OUTPATIENT
Start: 2020-09-04 | End: 2021-09-27

## 2020-10-01 ENCOUNTER — TELEPHONE (OUTPATIENT)
Dept: ENDOCRINOLOGY | Facility: CLINIC | Age: 20
End: 2020-10-01

## 2020-10-01 DIAGNOSIS — E28.39 PRIMARY OVARIAN FAILURE: ICD-10-CM

## 2020-10-01 RX ORDER — LEVONORGESTREL / ETHINYL ESTRADIOL AND ETHINYL ESTRADIOL 150-30(84)
1 KIT ORAL DAILY
Qty: 91 TABLET | Refills: 0 | Status: SHIPPED | OUTPATIENT
Start: 2020-10-01 | End: 2020-10-14

## 2020-10-01 NOTE — TELEPHONE ENCOUNTER
Writer returned mother's call and let her know we sent through one more refill to allow coverage until Anuradha murrieta's the adult endocrinologist at the [facility].     Danielle PATELN, RN, PHN  Pediatric Endocrine Nurse Care Coordinator  [facility]  Phone: [phone]  Fax: [phone]

## 2020-10-01 NOTE — TELEPHONE ENCOUNTER
[facility] Call Center    Phone Message    May a detailed message be left on voicemail: yes     Reason for Call: Medication Refill Request    Has the patient contacted the pharmacy for the refill? Yes   Name of medication being requested: birth control pills  Provider who prescribed the medication: Dr. Julio Alatorre  Pharmacy: [facility] in [place]  Date medication is needed: as soon as possible    Mom Yaritza is calling to get renewal for patient's birth control pills that Dr. Alatorre had prescribed. Mom is requesting for one month refill until patient is able to see Dr. Delgadillo on 10/14.

## 2020-10-02 NOTE — TELEPHONE ENCOUNTER
RECORDS RECEIVED FROM: internal   DATE RECEIVED: 10.14.20    NOTES (FOR ALL VISITS) STATUS DETAILS   OFFICE NOTES from referring provider Internal  Landry Villar    OFFICE NOTES from other specialist Internal  7.23.19 [facility]   1.15.19 [facility]ER    ED NOTES na    OPERATIVE REPORT  (thyroid, pituitary, adrenal, parathyroid) na    MEDICATION LIST Internal     IMAGING      DEXASCAN Internal  1.15.19    MRI (BRAIN) na    XR (Chest) na    CT (HEAD/NECK/CHEST/ABDOMEN) na    NUCLEAR  na    ULTRASOUND (HEAD/NECK) na    LABS     DIABETES: HBGA1C, CREATININE, FASTING LIPIDS, MICROALBUMIN URINE, POTASSIUM, TSH, T4    THYROID: TSH, T4, CBC, THYRODLONULIN, TOTAL T3, FREE T4, CALCITONIN, CEA Internal  CREATININE 6.28.18  LIPIDS 5.1.19   T4- 1.15.19   TSH- 1.15.19

## 2020-10-13 NOTE — PROGRESS NOTES
"Anuradha Pearson is a 20 year old female who is being evaluated via a billable telephone visit.      The patient has been notified of following:     \"This telephone visit will be conducted via a call between you and your physician/provider. We have found that certain health care needs can be provided without the need for a physical exam.  This service lets us provide the care you need with a short phone conversation.  If a prescription is necessary we can send it directly to your pharmacy.  If lab work is needed we can place an order for that and you can then stop by our lab to have the test done at a later time.    Telephone visits are billed at different rates depending on your insurance coverage. During this emergency period, for some insurers they may be billed the same as an in-person visit.  Please reach out to your insurance provider with any questions.    If during the course of the call the physician/provider feels a telephone visit is not appropriate, you will not be charged for this service.\"    Patient has given verbal consent for Telephone visit?  Yes    What phone number would you like to be contacted at?   [phone]    How would you like to obtain your AVS? 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 She had a kidney transplant at age 4.     She was evaluated by [facility] for delayed " growth. At the time of the initial evaluation in 07/2012, she had LH and FSH values that were significantly elevated at 40 and 93.7, respectively, with a detectable but low estradiol of 34. She was initially treated with Premarin and then Provera was added, and eventually was switched to Seasonique (extended cycle OCP). She never took growth hormone. She is now almost 5 foot tall.   She gets a menstrual cycle every 4 months. No breakthrough bleeding. No side effects to the OCPs.      Regarding her bone health:   - DXA in Jan 2019: Bone mineral density within the expected range for age with significant increase since DXA on 6/28/13.  - no major bone fx except 2 from falling off from horse riding.   - takes Oscal 500 mg tablet once a day, and women's one-a-day MVI. No separate vitamin D.   - last vitamin D level: 88 in 2019. (stopped the separate vitamin D after that lab).   - dairy intake: no milk. Eats cheese and yogurt.    Her thyroid function tests were last checked in January of 2018 and were normal. She denies any symptoms of hypothyroidism (constipation, irritability, fatigue/sleepiness, brittle hair or unexpected weight gain).     She was on prednisone every other day for immune suppression, and eventually weaned off of it 2-3 ago. She takes Imurane and tacrolimus.     She also remains on 3 medications for hypertension (amlodipine, labetalol and enalapril-hydrochlorothiazide).     Problem List     Patient Active Problem List   Diagnosis     Kidney replaced by transplant     Wilms' tumor (H)     Status post chemotherapy     S/P radiation therapy     Lack of expected normal physiological development     Primary ovarian failure     EBV (Derrell-Barr virus) viremia     HTN (hypertension)     Immunosuppression (H)     Scar adherent     Recurrent pyelonephritis        Medications     Current Outpatient Medications   Medication     amLODIPine (NORVASC) 10 MG tablet     amoxicillin (AMOXIL) 500 MG capsule      azaTHIOprine (IMURAN) 50 MG tablet     calcium carbonate-vitamin D (OS-DEE) 500-400 MG-UNIT tablet     Enalapril-hydroCHLOROthiazide 5-12.5 MG TABS     ferrous sulfate (FEROSUL) 325 (65 Fe) MG tablet     labetalol (NORMODYNE) 100 MG tablet     levonorgest-eth estrad 91-Day (SEASONIQUE) 0.15-0.03 &0.01 MG tablet     magnesium oxide (MAG-OX) 400 (241.3 Mg) MG tablet     Multiple Vitamins-Minerals (WOMENS MULTIVITAMIN PO)     sulfamethoxazole-trimethoprim (BACTRIM) 400-80 MG tablet     tacrolimus (GENERIC EQUIVALENT) 0.5 MG capsule     No current facility-administered medications for this visit.         Allergies     Allergies   Allergen Reactions     Ibuprofen Other (See Comments)     Avoid nephrotoxic medications as needed due to kidney transplant status     Shellfish-Derived Products Nausea and Vomiting     Vancomycin      Uses Benadryl Prior to administration       Medical / Surgical History     Past Medical History:   Diagnosis Date     H/O kidney transplant      Hypertension      Wilm's tumor      Past Surgical History:   Procedure Laterality Date     APPENDECTOMY       CHOLECYSTECTOMY  05/20/2003     ENT SURGERY       OPEN REDUCTION INTERNAL FIXATION RODDING INTRAMEDULLARY FEMUR  8/20/2012    Procedure: OPEN REDUCTION INTERNAL FIXATION RODDING INTRAMEDULLARY FEMUR;  Closed Reduction Internal Fixation Right Femur;  Surgeon: Catracho Hook MD;  Location: [facility] OR     REVISE SCAR TRUNK Right 11/6/2015    Procedure: REVISE SCAR TRUNK;  Surgeon: COOPER Anderson MD;  Location: [facility] OR     right nephrectomy  05/19/2003    due to Wilms tumor; performed by Dr. Myles Velez at [facility]     TONSILLECTOMY  01/31/2005     TRANSPLANT KIDNEY RECIPIENT LIVING RELATED CHILD  11/09/2004    left nephrectomy performed at time of transplant       Social History     Social History     Socioeconomic History     Marital status: Single     Spouse name: Not on file     Number of children: Not on file     Years  of education: Not on file     Highest education level: Not on file   Occupational History     Not on file   Social Needs     Financial resource strain: Not on file     Food insecurity     Worry: Not on file     Inability: Not on file     Transportation needs     Medical: Not on file     Non-medical: Not on file   Tobacco Use     Smoking status: Never Smoker     Smokeless tobacco: Never Used   Substance and Sexual Activity     Alcohol use: No     Drug use: No     Sexual activity: Never   Lifestyle     Physical activity     Days per week: Not on file     Minutes per session: Not on file     Stress: Not on file   Relationships     Social connections     Talks on phone: Not on file     Gets together: Not on file     Attends Rastafarian service: Not on file     Active member of club or organization: Not on file     Attends meetings of clubs or organizations: Not on file     Relationship status: Not on file     Intimate partner violence     Fear of current or ex partner: Not on file     Emotionally abused: Not on file     Physically abused: Not on file     Forced sexual activity: Not on file   Other Topics Concern     Not on file   Social History Narrative     Not on file       Family History     Family History   Problem Relation Age of Onset     Hypertension Father        ROS     Constitutional: no fevers, chills, night sweats. No weight loss/gain. No fatigue. Good appetite  Eyes: no vision changes, no eye redness, no diplopia  Ears, Nose, mouth, throat: no hearing changes, no tinnitus, no rhinorrhea, no nasal congestion, no anosmia  Cardiovascular: no chest pain, no orthopnea or PND, no edema, no palpitations  Respiratory: no dyspnea, no cough, no sputum, no wheezing  Gastrointestinal: no nausea, no vomiting, no abdominal pain, no diarrhea, no constipation  Genitourinary: no dysuria, no frequency, no urgency, no nocturia  Musculoskeletal: no joint pains, no back pain, no cramps, no fractures  Skin: no rash, no itching, no  dryness, no ulcers, no hair loss, no nail changes  Neurological:no headaches, no weakness, no numbness, no tingling, no tremors, no difficulty sleeping, no snoring, no AM sleepiness  Psychiatric: no anxiety, no sadness  Hematologic/lymphatic: no easy bruising, no bleeding, no palor    Physical Exam   There were no vitals taken for this visit.   No exam was done since this was a telephone visit.     Labs/Imaging     Pertinent Labs were reviewed and updated in [facility].  Radiology Results were  reviewed and updated in EPIC.    Summary of recent findings:   No results found for: A1C    TSH   Date Value Ref Range Status   01/15/2019 1.68 0.40 - 4.00 mU/L Final   01/29/2018 0.81 0.40 - 4.00 mU/L Final   08/31/2015 1.23 0.40 - 4.00 mU/L Final   07/17/2012 1.05 0.4 - 5.0 mU/L Final   06/22/2009 0.76 0.4 - 5.0 mU/L Final     T4 Free   Date Value Ref Range Status   01/15/2019 1.08 0.76 - 1.46 ng/dL Final   01/29/2018 1.05 0.76 - 1.46 ng/dL Final   08/31/2015 1.13 0.76 - 1.46 ng/dL Final   07/17/2012 0.98 0.70 - 1.85 ng/dL Final       Creatinine   Date Value Ref Range Status   01/15/2019 1.19 (H) 0.50 - 1.00 mg/dL Final       Recent Labs   Lab Test 07/21/15  1324   CHOL 196*   HDL 57   LDL 99   TRIG 202*   CHOLHDLRATIO 3.4       No results found for: [id number], [id number], [id number]    I personally reviewed the patient's outside records from [facility] EMR. Summary of pertinent findings in HPI.    Impression / Plan     1. Primary Ovarian Failure:   Related to chemotherapy which she received at age 3-4 for Wilm's tumor.   Treated with OCP. Tolerating them fine.   Plan:   - continue Seasonique    2. Bone Health:   She is off prednisone since 2020  She takes calcium and vitamin D  DXA in Jan 2019: within expected range for age.   - update vitamin D status  - check TSH      Follow up: annual      Isidoro Delgadillo MD  Endocrinology, Diabetes and Metabolism  [facility]

## 2020-10-13 NOTE — NURSING NOTE
Chief Complaint   Patient presents with     New Patient     primary ovarian failure     Brina Lim CMA

## 2020-10-14 ENCOUNTER — PRE VISIT (OUTPATIENT)
Dept: ENDOCRINOLOGY | Facility: CLINIC | Age: 20
End: 2020-10-14

## 2020-10-14 ENCOUNTER — TELEPHONE (OUTPATIENT)
Dept: ENDOCRINOLOGY | Facility: CLINIC | Age: 20
End: 2020-10-14

## 2020-10-14 ENCOUNTER — VIRTUAL VISIT (OUTPATIENT)
Dept: ENDOCRINOLOGY | Facility: CLINIC | Age: 20
End: 2020-10-14
Payer: COMMERCIAL

## 2020-10-14 DIAGNOSIS — Z92.21 STATUS POST CHEMOTHERAPY: ICD-10-CM

## 2020-10-14 DIAGNOSIS — Z94.0 KIDNEY REPLACED BY TRANSPLANT: ICD-10-CM

## 2020-10-14 DIAGNOSIS — C64.9 NEPHROBLASTOMA, UNSPECIFIED LATERALITY (H): Primary | ICD-10-CM

## 2020-10-14 DIAGNOSIS — Z92.3 S/P RADIATION THERAPY: ICD-10-CM

## 2020-10-14 DIAGNOSIS — E28.39 PRIMARY OVARIAN FAILURE: Primary | ICD-10-CM

## 2020-10-14 PROCEDURE — 99214 OFFICE O/P EST MOD 30 MIN: CPT | Mod: TEL | Performed by: INTERNAL MEDICINE

## 2020-10-14 RX ORDER — LEVONORGESTREL / ETHINYL ESTRADIOL AND ETHINYL ESTRADIOL 150-30(84)
1 KIT ORAL DAILY
Qty: 91 TABLET | Refills: 3 | Status: SHIPPED | OUTPATIENT
Start: 2020-10-14 | End: 2021-12-01

## 2020-10-14 NOTE — TELEPHONE ENCOUNTER
Lab fax number [facility]   Lab orders faxed to [facility].   Kristy Alatorre RN on 10/14/2020 at 12:50 PM

## 2020-10-14 NOTE — LETTER
[facility] [address] [address]  [phone]  Fax     FACSIMILE TRANSMITTAL SHEET sent 14 OCT 2020 at 12:54PM    TO:  LAB  COMPANY: [facility]  FAX NUMBER:   PHONE NUMBER:      FROM:   PHONE:   DATE: 10/14/20  NUMBER OF PAGES:     _____URGENT _____REVIEW ONLY _____PLEASE COMMENT____PLEASE REPLY    NOTES/COMMENTS: Please forward results via CareEverywhere/fax  attn: Dr Isidoro Delgadillo     RE: Anuradha Pearson.      Female, 20 year old, 2000      Phone: [phone]              IF YOU DID NOT RECEIVE THE CORRECT NUMBER OF PAGES OR THE FAX DID NOT COME THROUGH CLEARLY, PLEASE CALL THE SENDER     CONFIDENTIALITY STATEMENT: Confidential information that may accompany this transmission contains protected health information under state and federal law and is legally privileged. This information is intended only for the use of the individual or entity named above and may be used only for carrying out treatment, payment or other healthcare operations. The recipient or person responsible for delivering this information is prohibited by law from disclosing this information without proper authorization to any other party, unless required to do so by law or regulation. If you are not the intended recipient, you are hereby notified that any review, dissemination, distribution, or copying of this message is strictly prohibited. If you have received this communication in error, please destroy the materials and contact us immediately by calling the number listed above. No response indicates that the information was received by the appropriate authorized party

## 2020-10-14 NOTE — TELEPHONE ENCOUNTER
----- Message from Opal Delgadillo MD sent at 10/14/2020 12:22 PM CDT -----  Regarding: labs  This patients needs a TSH and vitamin D checked at [facility]. Can you fill out a lab form and have me sign it and then fax it over and let her know?   Thanks!  Opal

## 2020-10-14 NOTE — LETTER
"10/14/2020       RE: Anuradha Pearson  [address]  [facility] [address]     Dear Colleague,    Thank you for referring your patient, Anuradha Pearson, to the [facility] ENDOCRINOLOG[facility]IC [place] at [facility]. Please see a copy of my visit note below.    Anuradha Pearson is a 20 year old female who is being evaluated via a billable telephone visit.      The patient has been notified of following:     \"This telephone visit will be conducted via a call between you and your physician/provider. We have found that certain health care needs can be provided without the need for a physical exam.  This service lets us provide the care you need with a short phone conversation.  If a prescription is necessary we can send it directly to your pharmacy.  If lab work is needed we can place an order for that and you can then stop by our lab to have the test done at a later time.    Telephone visits are billed at different rates depending on your insurance coverage. During this emergency period, for some insurers they may be billed the same as an in-person visit.  Please reach out to your insurance provider with any questions.    If during the course of the call the physician/provider feels a telephone visit is not appropriate, you will not be charged for this service.\"    Patient has given verbal consent for Telephone visit?  Yes    What phone number would you like to be contacted at?   [phone]    How would you like to obtain your AVS? Mail a copy    Phone call duration: 30 minutes    Isidoro Delgadillo MD    Endocrinology Clinic Visit 10/14/2020    NAME:  Anuradha Pearson  PCP:  Gutierrez Wood  MRN:  [id number]  Reason for Consult:  Primary ovarian failure  Requesting Provider:  Jian Alatorre    Chief Complaint     Chief Complaint   Patient presents with     New Patient     primary ovarian failure       History of Present Illness     Anuradha" ISACC Pearson is a 20 year old female who I am evaluating today via telephone visit for the first time (transferring from 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 She had a kidney transplant at age 4.     She was evaluated by [facility] for delayed growth. At the time of the initial evaluation in 07/2012, she had LH and FSH values that were significantly elevated at 40 and 93.7, respectively, with a detectable but low estradiol of 34. She was initially treated with Premarin and then Provera was added, and eventually was switched to Seasonique (extended cycle OCP). She never took growth hormone. She is now almost 5 foot tall.   She gets a menstrual cycle every 4 months. No breakthrough bleeding. No side effects to the OCPs.      Regarding her bone health:   - DXA in Jan 2019: Bone mineral density within the expected range for age with significant increase since DXA on 6/28/13.  - no major bone fx except 2 from falling off from horse riding.   - takes Oscal 500 mg tablet once a day, and women's one-a-day MVI. No separate vitamin D.   - last vitamin D level: 88 in 2019. (stopped the separate vitamin D after that lab).   - dairy intake: no milk. Eats cheese and yogurt.    Her thyroid function tests were last checked in January of 2018 and were normal. She denies any symptoms of hypothyroidism (constipation, irritability, fatigue/sleepiness, brittle hair or unexpected weight gain).     She was on prednisone every other day for immune suppression, and eventually weaned off of it 2-3 ago. She takes Imurane and tacrolimus.     She also remains on 3 medications for hypertension (amlodipine, labetalol and enalapril-hydrochlorothiazide).     Problem List     Patient Active Problem List   Diagnosis     Kidney replaced by transplant     Wilms' tumor (H)     Status post chemotherapy     S/P radiation therapy     Lack of expected normal physiological  development     Primary ovarian failure     EBV (Derrell-Barr virus) viremia     HTN (hypertension)     Immunosuppression (H)     Scar adherent     Recurrent pyelonephritis        Medications     Current Outpatient Medications   Medication     amLODIPine (NORVASC) 10 MG tablet     amoxicillin (AMOXIL) 500 MG capsule     azaTHIOprine (IMURAN) 50 MG tablet     calcium carbonate-vitamin D (OS-DEE) 500-400 MG-UNIT tablet     Enalapril-hydroCHLOROthiazide 5-12.5 MG TABS     ferrous sulfate (FEROSUL) 325 (65 Fe) MG tablet     labetalol (NORMODYNE) 100 MG tablet     levonorgest-eth estrad 91-Day (SEASONIQUE) 0.15-0.03 &0.01 MG tablet     magnesium oxide (MAG-OX) 400 (241.3 Mg) MG tablet     Multiple Vitamins-Minerals (WOMENS MULTIVITAMIN PO)     sulfamethoxazole-trimethoprim (BACTRIM) 400-80 MG tablet     tacrolimus (GENERIC EQUIVALENT) 0.5 MG capsule     No current facility-administered medications for this visit.         Allergies     Allergies   Allergen Reactions     Ibuprofen Other (See Comments)     Avoid nephrotoxic medications as needed due to kidney transplant status     Shellfish-Derived Products Nausea and Vomiting     Vancomycin      Uses Benadryl Prior to administration       Medical / Surgical History     Past Medical History:   Diagnosis Date     H/O kidney transplant      Hypertension      Wilm's tumor      Past Surgical History:   Procedure Laterality Date     APPENDECTOMY       CHOLECYSTECTOMY  05/20/2003     ENT SURGERY       OPEN REDUCTION INTERNAL FIXATION RODDING INTRAMEDULLARY FEMUR  8/20/2012    Procedure: OPEN REDUCTION INTERNAL FIXATION RODDING INTRAMEDULLARY FEMUR;  Closed Reduction Internal Fixation Right Femur;  Surgeon: Catracho Hook MD;  Location: [facility] OR     REVISE SCAR TRUNK Right 11/6/2015    Procedure: REVISE SCAR TRUNK;  Surgeon: COOPER Anderson MD;  Location: [facility] OR     right nephrectomy  05/19/2003    due to Wilms tumor; performed by Dr. Myles Velez at [facility]  [place]     TONSILLECTOMY  01/31/2005     TRANSPLANT KIDNEY RECIPIENT LIVING RELATED CHILD  11/09/2004    left nephrectomy performed at time of transplant       Social History     Social History     Socioeconomic History     Marital status: Single     Spouse name: Not on file     Number of children: Not on file     Years of education: Not on file     Highest education level: Not on file   Occupational History     Not on file   Social Needs     Financial resource strain: Not on file     Food insecurity     Worry: Not on file     Inability: Not on file     Transportation needs     Medical: Not on file     Non-medical: Not on file   Tobacco Use     Smoking status: Never Smoker     Smokeless tobacco: Never Used   Substance and Sexual Activity     Alcohol use: No     Drug use: No     Sexual activity: Never   Lifestyle     Physical activity     Days per week: Not on file     Minutes per session: Not on file     Stress: Not on file   Relationships     Social connections     Talks on phone: Not on file     Gets together: Not on file     Attends Hoahaoism service: Not on file     Active member of club or organization: Not on file     Attends meetings of clubs or organizations: Not on file     Relationship status: Not on file     Intimate partner violence     Fear of current or ex partner: Not on file     Emotionally abused: Not on file     Physically abused: Not on file     Forced sexual activity: Not on file   Other Topics Concern     Not on file   Social History Narrative     Not on file       Family History     Family History   Problem Relation Age of Onset     Hypertension Father        ROS     Constitutional: no fevers, chills, night sweats. No weight loss/gain. No fatigue. Good appetite  Eyes: no vision changes, no eye redness, no diplopia  Ears, Nose, mouth, throat: no hearing changes, no tinnitus, no rhinorrhea, no nasal congestion, no anosmia  Cardiovascular: no chest pain, no orthopnea or PND, no edema, no  palpitations  Respiratory: no dyspnea, no cough, no sputum, no wheezing  Gastrointestinal: no nausea, no vomiting, no abdominal pain, no diarrhea, no constipation  Genitourinary: no dysuria, no frequency, no urgency, no nocturia  Musculoskeletal: no joint pains, no back pain, no cramps, no fractures  Skin: no rash, no itching, no dryness, no ulcers, no hair loss, no nail changes  Neurological:no headaches, no weakness, no numbness, no tingling, no tremors, no difficulty sleeping, no snoring, no AM sleepiness  Psychiatric: no anxiety, no sadness  Hematologic/lymphatic: no easy bruising, no bleeding, no palor    Physical Exam   There were no vitals taken for this visit.   No exam was done since this was a telephone visit.     Labs/Imaging     Pertinent Labs were reviewed and updated in [facility].  Radiology Results were  reviewed and updated in EPIC.    Summary of recent findings:   No results found for: A1C    TSH   Date Value Ref Range Status   01/15/2019 1.68 0.40 - 4.00 mU/L Final   01/29/2018 0.81 0.40 - 4.00 mU/L Final   08/31/2015 1.23 0.40 - 4.00 mU/L Final   07/17/2012 1.05 0.4 - 5.0 mU/L Final   06/22/2009 0.76 0.4 - 5.0 mU/L Final     T4 Free   Date Value Ref Range Status   01/15/2019 1.08 0.76 - 1.46 ng/dL Final   01/29/2018 1.05 0.76 - 1.46 ng/dL Final   08/31/2015 1.13 0.76 - 1.46 ng/dL Final   07/17/2012 0.98 0.70 - 1.85 ng/dL Final       Creatinine   Date Value Ref Range Status   01/15/2019 1.19 (H) 0.50 - 1.00 mg/dL Final       Recent Labs   Lab Test 07/21/15  1324   CHOL 196*   HDL 57   LDL 99   TRIG 202*   CHOLHDLRATIO 3.4       No results found for: [id number], [id number], [id number]    I personally reviewed the patient's outside records from [facility] EMR. Summary of pertinent findings in HPI.    Impression / Plan     1. Primary Ovarian Failure:   Related to chemotherapy which she received at age 3-4 for Wilm's tumor.   Treated with OCP. Tolerating them fine.   Plan:   - continue Seasonique    2. Bone  Health:   She is off prednisone since 2020  She takes calcium and vitamin D  DXA in Jan 2019: within expected range for age.   - update vitamin D status  - check TSH      Follow up: annual      Isidoro Delgadillo MD  Endocrinology, Diabetes and Metabolism  [facility]

## 2020-10-23 ENCOUNTER — VIRTUAL VISIT (OUTPATIENT)
Dept: TRANSPLANT | Facility: CLINIC | Age: 20
End: 2020-10-23
Attending: NURSE PRACTITIONER
Payer: COMMERCIAL

## 2020-10-23 DIAGNOSIS — Z94.0 KIDNEY REPLACED BY TRANSPLANT: ICD-10-CM

## 2020-10-23 DIAGNOSIS — N18.9 ANEMIA DUE TO CHRONIC KIDNEY DISEASE, UNSPECIFIED CKD STAGE: ICD-10-CM

## 2020-10-23 DIAGNOSIS — I15.1 HYPERTENSION SECONDARY TO OTHER RENAL DISORDERS: Primary | ICD-10-CM

## 2020-10-23 DIAGNOSIS — E83.42 HYPOMAGNESEMIA: ICD-10-CM

## 2020-10-23 DIAGNOSIS — Z71.87 COUNSELING FOR TRANSITION FROM PEDIATRIC TO ADULT CARE PROVIDER: ICD-10-CM

## 2020-10-23 DIAGNOSIS — B27.00 EBV (EPSTEIN-BARR VIRUS) VIREMIA: ICD-10-CM

## 2020-10-23 DIAGNOSIS — D63.1 ANEMIA DUE TO CHRONIC KIDNEY DISEASE, UNSPECIFIED CKD STAGE: ICD-10-CM

## 2020-10-23 DIAGNOSIS — D84.9 IMMUNOCOMPROMISED (H): ICD-10-CM

## 2020-10-23 PROCEDURE — 99214 OFFICE O/P EST MOD 30 MIN: CPT | Mod: 95 | Performed by: NURSE PRACTITIONER

## 2020-10-23 NOTE — LETTER
10/23/2020      RE: Shraddha Vizcarratinyyandel  [address]  [place] [address]       Video Start Time: 11:57    Shraddha Pearson complains of    Chief Complaint   Patient presents with     Transplant       I have reviewed and updated the patient's Past Medical History, Social History, Family History and Medication List.    ALLERGIES  Ibuprofen, Shellfish-derived products, and Vancomycin    HPI  I had the pleasure of conducting a video visit with Shraddha Pearson today for follow-up of kidney transplant follow up. Shraddha is a 20 year old female who attends our video visit alone. 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     Kev continues to participate in barrel racing with her horses, she just returned from [place] where she had been for 1 1/2 months. She qualified for a special event in [facility] [place] in March.     Shraddha is independent with her cares. She uses My Chart, fills her own pill box, takes her medications at 10 AM and 10 PM independently with reported excellent compliance. She knew her medications and current doses. Mom is doing refills for her but she knows how.    Shraddha denies rash, cough, congestion, fever, lymphadenotomy, weight loss, constipation.       ROS    ROS: 10 point ROS neg other than the symptoms noted above in the HPI.    Objective  Vitals: None taken  GENERAL: Healthy, alert and no distress  EYES: Eyes grossly normal to inspection.  No discharge or erythema, or obvious scleral/conjunctival abnormalities.  RESP: No audible wheeze, cough, or visible cyanosis.  No visible retractions or increased work of breathing.    SKIN: Visible skin clear. No  significant rash, abnormal pigmentation or lesions.  NEURO: Cranial nerves grossly intact.  Mentation and speech appropriate for age.  PSYCH: Mentation appears normal, affect normal/bright, judgement and insight intact, normal speech and appearance well-groomed.    Assessment/Plan:  Impression: Anuradha is a 19 year old 15 years s/p living kidney transplant, stable labs and independent with cares.    1. Kidney replaced by transplant  Low MFI DR51 on and off, last Donor Specific Antibodies 7/2020  - azaTHIOprine (IMURAN) 50 MG tablet;  75 mg daily   - Tacrolimus 1.5 mg twice daily goal 4-6   - Prednisone has been weaned off  - Labs on Monday    2. Immunocompromised (H)  Bactrim was stopped by patient.    3. Hypertension secondary to other renal disorders  Take weekly home blood pressures  -Enalapril Hydrochlorothiazide 5-12.5 mg daily  -Labetalol 100 mg BID  -Amlodipine 10 mg daily    4. CKD stage 2, Anemia- Iron Saturation good at 31 with last labs. Currently on ferrous sulfate 325 mg twice daily, tolerating dose.  -Continue supplement, in the past we we have decreased she felt dizzy and needed to return to twice a day dosing.    5. Hypomagnesemia  Last level 1.6 (normal at home lab 1.8-2.4)  -Continue Magnesium 400 mg twice daily, Helping with tremors.    6. EBV (Derrell-Barr virus) viremia  - Check EBV DNA PCR with standing labs.   -Notify us of weight loss, night sweats, swollen lymph nodes, fevers    7. Counseling for transition from pediatric to adult care provider  [facility] transition Checklist completed today.  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 Krishnamurthy in July 2020.    Primary care physician- Dr. Gutierrez Wood (needs yearly physical- discussed with  patient)    Dermatology- for yearly skin checks due to increased risk for skin cancer due to immunosuppression medications (Aug 3rd, 2020 at home clinic)    Opthomology- Yearly    Dental- Every 6 months    Return in about 5 months (around 3/15/2021).          TRANSITION READINESS CHECKLIST LATE TRANSITION (17 YEARS and older)    NAME:  Anuradha Pearson                       DATE: October 23, 2020       DOMAINS COMMENTS   MY TRANSPLANT     1. I know why I needed to have a transplant and I can name my disease/condition that required transplantation [x] I know this         [] I know some things about this        [] I don't know anything about this   2. I know what rejection is, how my healthcare provider will check for rejection, and how it would be treated. [x] I know this       [] I  know some things about this   [] I don't know anything about this       3. I know why it is important to get my labs checked routinely. [x] I know this       [] I  know some things about this   [] I don't know anything about this       4. I have a personal health record (hard copy or electronic).   [x] I have a personal health record    [] I  have some information  [] I'm not sure  [] I don't know anything about this         MY MEDICATIONS   5. I can list each of my medications, why I take each medication, the dose, and the time I take the medication.   [x] I can do this for all my meds  [] I can do this for most meds  [] I can do this for a couple meds     [] I cannot do this at all  [] This does not apply to me   6. I can list the most common side effects of each of my medications.   [] I can do this  for all my meds  [] I can do this for most meds  [] I can do this for a couple meds     [x] I cannot do this at all  [] This does not apply to me   7. I independently keep track of my medications and update any changes through an organized method (tino, on my phone, hard copy, and/or communicating with my health care provider). [x] I do  this    [] I do this sometimes   [] I never do this  [] This does not apply to me   8. I independently contact my pharmacy for medication refills before I run out of medication. [] I do this    [] I do this sometimes   [x] I never do this  [] This does not apply to me   ADHERENCE   9.   I usually take my medications every day and on time.  [x] I agree  [] I somewhat agree  [] I disagree  [] This does not apply to me   10. I take my medications independently without any supervision by my parents/guardians.  [x] I agree  [] I somewhat agree  [] I disagree  [] This does not apply to me   11. I have an organized routine for taking my medications (pill container, phone alarms, other reminders) [x] I agree  [] I somewhat agree  [] I disagree  [] This does not apply to me   12. I get my labs drawn routinely as requested by my healthcare provider. [x] I always do this      [] I sometimes do this      [] I never do this    [] I'm not sure  [] This does not apply to me   RISKY BEHAVIORS   13. I know that smoking, drinking and/or taking street drugs are behaviors that can affect everyone's health and why these behaviors are more unsafe for me because I had a transplant. [x] I know this       [] I know some things about this        [] I don't know anything about this  [] I'm not sure         14. If I am with a group of friends and there is some drinking or drug activity going on, I have a plan for what to do so that I do not get involved in these behaviors. [x] I have plan       [] I have some ideas of what to do       [] I don't know anything about this  [] I'm not sure           MANAGING MY HEALTH: WHAT I DO TO STAY HEALTHY   15. I live a healthy lifestyle and do things to stay healthy. [x] I always do this      [] I sometimes do this      [] I never do this    [] I'm not sure   16. I know what foods I should not eat because I had a transplant and why I should avoid them. [x] I know this       [] I know some things about this         [] I don't know anything about this        17. I know that sun exposure can lead to skin problems in transplant patients and I can list ways to protect my skin from the sun. [x] I know this       [] I know some things about this        [] I don't know anything about this        18. I know what over-the-counter medications I should not take because I have had a transplant and why I should avoid them. [x] I know this       [] I know some things about this        [] I don't know anything about this      19. If I have questions about my health, medications, or medical care, I know who I should call for advice. [x] I agree  [] I somewhat agree  [] I disagree   20. I independently keep track of my health information (labs, appointments, medication changes, procedures). [x] I always do this      [] I sometimes do this      [] I never do this     MANAGING MY HEALTH CARE NEEDS (SELF-ADVOCACY)   21. I independently contact my health care provider to check my labs, ask about medications, or to make appointments. [x] I always do this      [] I sometimes do this      [] I never do this     22. I meet with my health care provider by myself for appointments and I discuss my health, medical needs and questions with him/her.  [x] I always do this      [] I sometimes do this      [] I never do this     23. I am able to complete a personal medical history form if asked to do this (i.e. first appointment with a new physician, going to an ER) [x] I can do this      [] I can sometimes do this      [] I never do this    [] I'm not sure if I could do this   24. I have a plan for my health care needs if I am traveling away from home or if there was an emergency situation (i.e. earthquake, flooding, hurricane)?  [x] I have a plan  [] I have some ideas of what to do  [] I do not know what to do   25. I know how to get a referral for an adult health care provider when I am ready to transfer to adult care. [] I know how to do this       [x] I know some things about this        [] I don't know anything about this     REPRODUCTIVE HEALTH   26. Females:  Having a transplant may affect my ability to have a baby and may also affect the unborn baby's health during pregnancy.  I know what medications may be harmful to the developing baby.    Males:  Having a transplant may affect my ability to father a child. [x] I agree  [] I somewhat agree  [] I disagree  [] I'm not sure   []  This does not apply to me   27.   I know my best options for birth control if/when I become sexually active. [x] I agree  [] I somewhat agree  [] I disagree  [] I'm not sure  [] This does not apply to me   28. I know what sexually transmitted infections (STI) are, my risk of getting an STI, and how to prevent getting an STI.   [x] I agree  [] I somewhat agree  [] I disagree  [] I'm not sure  [] This does not apply to me   SCHOOL/WORK   29. I attend school and/or work regularly and usually don't miss many days due to illness. [x] I agree  [] I somewhat agree  [] I disagree  [] This does not apply to me   30. I have plans for my future (school, career, employment, family).   [x] I agree  [] I somewhat agree  [] I disagree   MY SUPPORT SYSTEM   31. I have someone to contact when I need to talk or need help with a problem.   [x] I agree  [] I somewhat agree  [] I disagree  [] I'm not sure     32. I participate in activities at my school or in my community with family and/or friends. [x] I always do this      [] I sometimes do this      [] I never do this     HOW I FEEL ABOUT MYSELF   33.   I have concerns about my health because I had a transplant.   [] I agree  [] I somewhat agree  [x] I disagree  [] I'm not sure   34.   I have concerns about my future because I had a transplant.   [] I agree  [] I somewhat agree  [x] I disagree  [] I'm not sure   PAYING FOR MY HEALTH CARE   35.   I can name my current health care insurance provider.    [x] I know this       [] I  know some things  about this        [] I don't know anything about this     36. I have a current insurance card and can access my insurance information (ID number, phone numbers to call for questions) when I need it. [x] I agree  [] I somewhat agree  [] I disagree   37. I know what  out-of-pocket expenses  are and what expenses I have to pay.   [] I know this       [x] I  know some things about this        [] I don't know anything about this    [] I'm not sure       38. I know how old I will be when I will no longer be covered by my parent/guardian's insurance and how to get information about getting my own insurance. [] I know this       [x] I  know some things about this        [] I don't know anything about this    [] I'm not sure           Video-Visit Details    Type of service:  Video Visit    Video End Time (time video stopped): 12:22  Total visit time: 25 minutes    Originating Location (pt. Location): Home    Distant Location (provider location):  PEDS TRANSPLANT SURGERY     Mode of Communication:  Video Conference via [facility]ASMYNE Degroot CNP

## 2020-10-23 NOTE — NURSING NOTE
"Anuradha Pearson is a 20 year old female who is being evaluated via a billable video visit.      The patient has been notified of following:     \"This video visit will be conducted via a call between you and your physician/provider. We have found that certain health care needs can be provided without the need for an in-person physical exam.  This service lets us provide the care you need with a video conversation.  If a prescription is necessary we can send it directly to your pharmacy.  If lab work is needed we can place an order for that and you can then stop by our lab to have the test done at a later time.    Video visits are billed at different rates depending on your insurance coverage.  Please reach out to your insurance provider with any questions.    If during the course of the call the physician/provider feels a video visit is not appropriate, you will not be charged for this service.\"     How would you like to obtain your AVS? Zachary    Anuradha Pearson complains of  No chief complaint on file.      Patient has given verbal consent for Video visit? Yes    Patient would like the video invitation sent by: Text to cell phone: [phone]     I have reviewed and updated the patient's medication list, allergies and preferred pharmacy.      Latesha Jonas, EMT  "

## 2020-10-23 NOTE — PROGRESS NOTES
Video Start Time: 11:57    Shraddha Pearson complains of    Chief Complaint   Patient presents with     Transplant       I have reviewed and updated the patient's Past Medical History, Social History, Family History and Medication List.    ALLERGIES  Ibuprofen, Shellfish-derived products, and Vancomycin    HPI  I had the pleasure of conducting a video visit with Shraddha Pearson today for follow-up of kidney transplant follow up. Shraddha is a 20 year old female who attends our video visit alone. 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     Kev continues to participate in barrel racing with her horses, she just returned from [place] where she had been for 1 1/2 months. She qualified for a special event in [facility] in March.     Shraddha is independent with her cares. She uses My Chart, fills her own pill box, takes her medications at 10 AM and 10 PM independently with reported excellent compliance. She knew her medications and current doses. Mom is doing refills for her but she knows how.    Shraddha denies rash, cough, congestion, fever, lymphadenotomy, weight loss, constipation.       ROS    ROS: 10 point ROS neg other than the symptoms noted above in the HPI.    Objective  Vitals: None taken  GENERAL: Healthy, alert and no distress  EYES: Eyes grossly normal to inspection.  No discharge or erythema, or obvious scleral/conjunctival abnormalities.  RESP: No audible wheeze, cough, or visible cyanosis.  No visible retractions or increased work of breathing.    SKIN: Visible skin clear. No significant rash, abnormal pigmentation or lesions.  NEURO: Cranial nerves grossly intact.  Mentation and  speech appropriate for age.  PSYCH: Mentation appears normal, affect normal/bright, judgement and insight intact, normal speech and appearance well-groomed.    Assessment/Plan:  Impression: Anuradha is a 19 year old 15 years s/p living kidney transplant, stable labs and independent with cares.    1. Kidney replaced by transplant  Low MFI DR51 on and off, last Donor Specific Antibodies 7/2020  - azaTHIOprine (IMURAN) 50 MG tablet;  75 mg daily   - Tacrolimus 1.5 mg twice daily goal 4-6   - Prednisone has been weaned off  - Labs on Monday    2. Immunocompromised (H)  Bactrim was stopped by patient.    3. Hypertension secondary to other renal disorders  Take weekly home blood pressures  -Enalapril Hydrochlorothiazide 5-12.5 mg daily  -Labetalol 100 mg BID  -Amlodipine 10 mg daily    4. CKD stage 2, Anemia- Iron Saturation good at 31 with last labs. Currently on ferrous sulfate 325 mg twice daily, tolerating dose.  -Continue supplement, in the past we we have decreased she felt dizzy and needed to return to twice a day dosing.    5. Hypomagnesemia  Last level 1.6 (normal at home lab 1.8-2.4)  -Continue Magnesium 400 mg twice daily, Helping with tremors.    6. EBV (Derrell-Barr virus) viremia  - Check EBV DNA PCR with standing labs.   -Notify us of weight loss, night sweats, swollen lymph nodes, fevers    7. Counseling for transition from pediatric to adult care provider  [facility] transition Checklist completed today.  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 Krishnamurthy in July 2020.    Primary care physician- Dr. Gutierrez Wood (needs yearly physical- discussed with patient)    Dermatology- for yearly skin checks due to increased risk for skin cancer due to immunosuppression  medications (Aug 3rd, 2020 at home clinic)    Opthomology- Yearly    Dental- Every 6 months    Return in about 5 months (around 3/15/2021).          TRANSITION READINESS CHECKLIST LATE TRANSITION (17 YEARS and older)    NAME:  Anuradah Pearson                       DATE: October 23, 2020       DOMAINS COMMENTS   MY TRANSPLANT     1. I know why I needed to have a transplant and I can name my disease/condition that required transplantation [x] I know this         [] I know some things about this        [] I don't know anything about this   2. I know what rejection is, how my healthcare provider will check for rejection, and how it would be treated. [x] I know this       [] I  know some things about this   [] I don't know anything about this       3. I know why it is important to get my labs checked routinely. [x] I know this       [] I  know some things about this   [] I don't know anything about this       4. I have a personal health record (hard copy or electronic).   [x] I have a personal health record    [] I  have some information  [] I'm not sure  [] I don't know anything about this         MY MEDICATIONS   5. I can list each of my medications, why I take each medication, the dose, and the time I take the medication.   [x] I can do this for all my meds  [] I can do this for most meds  [] I can do this for a couple meds     [] I cannot do this at all  [] This does not apply to me   6. I can list the most common side effects of each of my medications.   [] I can do this  for all my meds  [] I can do this for most meds  [] I can do this for a couple meds     [x] I cannot do this at all  [] This does not apply to me   7. I independently keep track of my medications and update any changes through an organized method (tino, on my phone, hard copy, and/or communicating with my health care provider). [x] I do this    [] I do this sometimes   [] I never do this  [] This does not apply to me   8. I independently contact  my pharmacy for medication refills before I run out of medication. [] I do this    [] I do this sometimes   [x] I never do this  [] This does not apply to me   ADHERENCE   9.   I usually take my medications every day and on time.  [x] I agree  [] I somewhat agree  [] I disagree  [] This does not apply to me   10. I take my medications independently without any supervision by my parents/guardians.  [x] I agree  [] I somewhat agree  [] I disagree  [] This does not apply to me   11. I have an organized routine for taking my medications (pill container, phone alarms, other reminders) [x] I agree  [] I somewhat agree  [] I disagree  [] This does not apply to me   12. I get my labs drawn routinely as requested by my healthcare provider. [x] I always do this      [] I sometimes do this      [] I never do this    [] I'm not sure  [] This does not apply to me   RISKY BEHAVIORS   13. I know that smoking, drinking and/or taking street drugs are behaviors that can affect everyone's health and why these behaviors are more unsafe for me because I had a transplant. [x] I know this       [] I know some things about this        [] I don't know anything about this  [] I'm not sure         14. If I am with a group of friends and there is some drinking or drug activity going on, I have a plan for what to do so that I do not get involved in these behaviors. [x] I have plan       [] I have some ideas of what to do       [] I don't know anything about this  [] I'm not sure           MANAGING MY HEALTH: WHAT I DO TO STAY HEALTHY   15. I live a healthy lifestyle and do things to stay healthy. [x] I always do this      [] I sometimes do this      [] I never do this    [] I'm not sure   16. I know what foods I should not eat because I had a transplant and why I should avoid them. [x] I know this       [] I know some things about this        [] I don't know anything about this        17. I know that sun exposure can lead to skin problems in  transplant patients and I can list ways to protect my skin from the sun. [x] I know this       [] I know some things about this        [] I don't know anything about this        18. I know what over-the-counter medications I should not take because I have had a transplant and why I should avoid them. [x] I know this       [] I know some things about this        [] I don't know anything about this      19. If I have questions about my health, medications, or medical care, I know who I should call for advice. [x] I agree  [] I somewhat agree  [] I disagree   20. I independently keep track of my health information (labs, appointments, medication changes, procedures). [x] I always do this      [] I sometimes do this      [] I never do this     MANAGING MY HEALTH CARE NEEDS (SELF-ADVOCACY)   21. I independently contact my health care provider to check my labs, ask about medications, or to make appointments. [x] I always do this      [] I sometimes do this      [] I never do this     22. I meet with my health care provider by myself for appointments and I discuss my health, medical needs and questions with him/her.  [x] I always do this      [] I sometimes do this      [] I never do this     23. I am able to complete a personal medical history form if asked to do this (i.e. first appointment with a new physician, going to an ER) [x] I can do this      [] I can sometimes do this      [] I never do this    [] I'm not sure if I could do this   24. I have a plan for my health care needs if I am traveling away from home or if there was an emergency situation (i.e. earthquake, flooding, hurricane)?  [x] I have a plan  [] I have some ideas of what to do  [] I do not know what to do   25. I know how to get a referral for an adult health care provider when I am ready to transfer to adult care. [] I know how to do this      [x] I know some things about this        [] I don't know anything about this     REPRODUCTIVE HEALTH   26.  Females:  Having a transplant may affect my ability to have a baby and may also affect the unborn baby's health during pregnancy.  I know what medications may be harmful to the developing baby.    Males:  Having a transplant may affect my ability to father a child. [x] I agree  [] I somewhat agree  [] I disagree  [] I'm not sure   []  This does not apply to me   27.   I know my best options for birth control if/when I become sexually active. [x] I agree  [] I somewhat agree  [] I disagree  [] I'm not sure  [] This does not apply to me   28. I know what sexually transmitted infections (STI) are, my risk of getting an STI, and how to prevent getting an STI.   [x] I agree  [] I somewhat agree  [] I disagree  [] I'm not sure  [] This does not apply to me   SCHOOL/WORK   29. I attend school and/or work regularly and usually don't miss many days due to illness. [x] I agree  [] I somewhat agree  [] I disagree  [] This does not apply to me   30. I have plans for my future (school, career, employment, family).   [x] I agree  [] I somewhat agree  [] I disagree   MY SUPPORT SYSTEM   31. I have someone to contact when I need to talk or need help with a problem.   [x] I agree  [] I somewhat agree  [] I disagree  [] I'm not sure     32. I participate in activities at my school or in my community with family and/or friends. [x] I always do this      [] I sometimes do this      [] I never do this     HOW I FEEL ABOUT MYSELF   33.   I have concerns about my health because I had a transplant.   [] I agree  [] I somewhat agree  [x] I disagree  [] I'm not sure   34.   I have concerns about my future because I had a transplant.   [] I agree  [] I somewhat agree  [x] I disagree  [] I'm not sure   PAYING FOR MY HEALTH CARE   35.   I can name my current health care insurance provider.    [x] I know this       [] I  know some things about this        [] I don't know anything about this     36. I have a current insurance card and can access  my insurance information (ID number, phone numbers to call for questions) when I need it. [x] I agree  [] I somewhat agree  [] I disagree   37. I know what  out-of-pocket expenses  are and what expenses I have to pay.   [] I know this       [x] I  know some things about this        [] I don't know anything about this    [] I'm not sure       38. I know how old I will be when I will no longer be covered by my parent/guardian's insurance and how to get information about getting my own insurance. [] I know this       [x] I  know some things about this        [] I don't know anything about this    [] I'm not sure           Video-Visit Details    Type of service:  Video Visit    Video End Time (time video stopped): 12:22  Total visit time: 25 minutes    Originating Location (pt. Location): Home    Distant Location (provider location):  PEDS TRANSPLANT SURGERY     Mode of Communication:  Video Conference via [facility] Degroot CNP

## 2020-10-23 NOTE — LETTER
TRANSITION READINESS CHECKLIST LATE TRANSITION (17 YEARS and older)    NAME:  Anuradha Pearson                       DATE: October 23, 2020       DOMAINS COMMENTS   MY TRANSPLANT     1. I know why I needed to have a transplant and I can name my disease/condition that required transplantation [x] I know this         [] I know some things about this        [] I don't know anything about this   2. I know what rejection is, how my healthcare provider will check for rejection, and how it would be treated. [x] I know this       [] I  know some things about this   [] I don't know anything about this       3. I know why it is important to get my labs checked routinely. [x] I know this       [] I  know some things about this   [] I don't know anything about this       4. I have a personal health record (hard copy or electronic).   [x] I have a personal health record    [] I  have some information  [] I'm not sure  [] I don't know anything about this         MY MEDICATIONS   5. I can list each of my medications, why I take each medication, the dose, and the time I take the medication.   [x] I can do this for all my meds  [] I can do this for most meds  [] I can do this for a couple meds     [] I cannot do this at all  [] This does not apply to me   6. I can list the most common side effects of each of my medications.   [] I can do this  for all my meds  [] I can do this for most meds  [] I can do this for a couple meds     [x] I cannot do this at all  [] This does not apply to me   7. I independently keep track of my medications and update any changes through an organized method (tino, on my phone, hard copy, and/or communicating with my health care provider). [x] I do this    [] I do this sometimes   [] I never do this  [] This does not apply to me   8. I independently contact my pharmacy for medication refills before I run out of medication. [] I do this    [] I do this sometimes   [x] I never do this  [] This does  not apply to me   ADHERENCE   9.   I usually take my medications every day and on time.  [x] I agree  [] I somewhat agree  [] I disagree  [] This does not apply to me   10. I take my medications independently without any supervision by my parents/guardians.  [x] I agree  [] I somewhat agree  [] I disagree  [] This does not apply to me   11. I have an organized routine for taking my medications (pill container, phone alarms, other reminders) [x] I agree  [] I somewhat agree  [] I disagree  [] This does not apply to me   12. I get my labs drawn routinely as requested by my healthcare provider. [x] I always do this      [] I sometimes do this      [] I never do this    [] I'm not sure  [] This does not apply to me   RISKY BEHAVIORS   13. I know that smoking, drinking and/or taking street drugs are behaviors that can affect everyone's health and why these behaviors are more unsafe for me because I had a transplant. [x] I know this       [] I know some things about this        [] I don't know anything about this  [] I'm not sure         14. If I am with a group of friends and there is some drinking or drug activity going on, I have a plan for what to do so that I do not get involved in these behaviors. [x] I have plan       [] I have some ideas of what to do       [] I don't know anything about this  [] I'm not sure           MANAGING MY HEALTH: WHAT I DO TO STAY HEALTHY   15. I live a healthy lifestyle and do things to stay healthy. [x] I always do this      [] I sometimes do this      [] I never do this    [] I'm not sure   16. I know what foods I should not eat because I had a transplant and why I should avoid them. [x] I know this       [] I know some things about this        [] I don't know anything about this        17. I know that sun exposure can lead to skin problems in transplant patients and I can list ways to protect my skin from the sun. [x] I know this       [] I know some things about this        [] I  don't know anything about this        18. I know what over-the-counter medications I should not take because I have had a transplant and why I should avoid them. [x] I know this       [] I know some things about this        [] I don't know anything about this      19. If I have questions about my health, medications, or medical care, I know who I should call for advice. [x] I agree  [] I somewhat agree  [] I disagree   20. I independently keep track of my health information (labs, appointments, medication changes, procedures). [x] I always do this      [] I sometimes do this      [] I never do this     MANAGING MY HEALTH CARE NEEDS (SELF-ADVOCACY)   21. I independently contact my health care provider to check my labs, ask about medications, or to make appointments. [x] I always do this      [] I sometimes do this      [] I never do this     22. I meet with my health care provider by myself for appointments and I discuss my health, medical needs and questions with him/her.  [x] I always do this      [] I sometimes do this      [] I never do this     23. I am able to complete a personal medical history form if asked to do this (i.e. first appointment with a new physician, going to an ER) [x] I can do this      [] I can sometimes do this      [] I never do this    [] I'm not sure if I could do this   24. I have a plan for my health care needs if I am traveling away from home or if there was an emergency situation (i.e. earthquake, flooding, hurricane)?  [x] I have a plan  [] I have some ideas of what to do  [] I do not know what to do   25. I know how to get a referral for an adult health care provider when I am ready to transfer to adult care. [] I know how to do this      [x] I know some things about this        [] I don't know anything about this     REPRODUCTIVE HEALTH   26. Females:  Having a transplant may affect my ability to have a baby and may also affect the unborn baby's health during pregnancy.  I know what  medications may be harmful to the developing baby.    Males:  Having a transplant may affect my ability to father a child. [x] I agree  [] I somewhat agree  [] I disagree  [] I'm not sure   []  This does not apply to me   27.   I know my best options for birth control if/when I become sexually active. [x] I agree  [] I somewhat agree  [] I disagree  [] I'm not sure  [] This does not apply to me   28. I know what sexually transmitted infections (STI) are, my risk of getting an STI, and how to prevent getting an STI.   [x] I agree  [] I somewhat agree  [] I disagree  [] I'm not sure  [] This does not apply to me   SCHOOL/WORK   29. I attend school and/or work regularly and usually don't miss many days due to illness. [x] I agree  [] I somewhat agree  [] I disagree  [] This does not apply to me   30. I have plans for my future (school, career, employment, family).   [x] I agree  [] I somewhat agree  [] I disagree   MY SUPPORT SYSTEM   31. I have someone to contact when I need to talk or need help with a problem.   [x] I agree  [] I somewhat agree  [] I disagree  [] I'm not sure     32. I participate in activities at my school or in my community with family and/or friends. [x] I always do this      [] I sometimes do this      [] I never do this     HOW I FEEL ABOUT MYSELF   33.   I have concerns about my health because I had a transplant.   [] I agree  [] I somewhat agree  [x] I disagree  [] I'm not sure   34.   I have concerns about my future because I had a transplant.   [] I agree  [] I somewhat agree  [x] I disagree  [] I'm not sure   PAYING FOR MY HEALTH CARE   35.   I can name my current health care insurance provider.    [x] I know this       [] I  know some things about this        [] I don't know anything about this     36. I have a current insurance card and can access my insurance information (ID number, phone numbers to call for questions) when I need it. [x] I agree  [] I somewhat agree  [] I disagree   37.  I know what  out-of-pocket expenses  are and what expenses I have to pay.   [] I know this       [x] I  know some things about this        [] I don't know anything about this    [] I'm not sure       38. I know how old I will be when I will no longer be covered by my parent/guardian's insurance and how to get information about getting my own insurance. [] I know this       [x] I  know some things about this        [] I don't know anything about this    [] I'm not sure

## 2020-11-11 ENCOUNTER — TELEPHONE (OUTPATIENT)
Dept: TRANSPLANT | Facility: CLINIC | Age: 20
End: 2020-11-11

## 2020-11-11 NOTE — TELEPHONE ENCOUNTER
Received a call from Mom (Lexy) stating that Anurad[place] was currently out of state. Reported complaints of mouth pain, sore throat; dysphagia. Denies any breathing issues. Mom instructed Anuradha to go to a local dentist. Mom stated that dentist confirmed that Anuradha has an infection. During call with mom; she received a call from Nesha Orta NP who instructed her to have Anuradha go to the ED. Reinforced recommendations to go to local ED for evaluation and possible IV ABT. Mom verbalized understanding.

## 2020-11-20 DIAGNOSIS — Z94.0 KIDNEY REPLACED BY TRANSPLANT: ICD-10-CM

## 2020-11-20 RX ORDER — FERROUS SULFATE 325(65) MG
325 TABLET ORAL 2 TIMES DAILY
Qty: 180 TABLET | Refills: 6 | Status: SHIPPED | OUTPATIENT
Start: 2020-11-20 | End: 2021-11-23

## 2020-11-22 ENCOUNTER — HEALTH MAINTENANCE LETTER (OUTPATIENT)
Age: 20
End: 2020-11-22

## 2021-01-29 DIAGNOSIS — Z94.0 KIDNEY REPLACED BY TRANSPLANT: ICD-10-CM

## 2021-01-29 RX ORDER — TACROLIMUS 0.5 MG/1
CAPSULE ORAL
Qty: 210 CAPSULE | Refills: 6 | Status: SHIPPED | OUTPATIENT
Start: 2021-01-29 | End: 2021-09-02

## 2021-03-05 ENCOUNTER — VIRTUAL VISIT (OUTPATIENT)
Dept: TRANSPLANT | Facility: CLINIC | Age: 21
End: 2021-03-05
Attending: NURSE PRACTITIONER
Payer: COMMERCIAL

## 2021-03-05 DIAGNOSIS — I15.1 HYPERTENSION SECONDARY TO OTHER RENAL DISORDERS: ICD-10-CM

## 2021-03-05 DIAGNOSIS — N18.31 STAGE 3A CHRONIC KIDNEY DISEASE (H): ICD-10-CM

## 2021-03-05 DIAGNOSIS — Z94.0 KIDNEY REPLACED BY TRANSPLANT: Primary | ICD-10-CM

## 2021-03-05 PROBLEM — N18.30 CHRONIC KIDNEY DISEASE, STAGE 3 (H): Status: ACTIVE | Noted: 2021-03-05

## 2021-03-05 PROCEDURE — 99215 OFFICE O/P EST HI 40 MIN: CPT | Mod: GT | Performed by: NURSE PRACTITIONER

## 2021-03-05 NOTE — PROGRESS NOTES
Video Start Time: 10:15 AM    Shraddha Pearson complains of    Chief Complaint   Patient presents with     Video Visit     TX FOLLOW UP       I have reviewed and updated the patient's Past Medical History, Social History, Family History and Medication List.    ALLERGIES  Ibuprofen, Shellfish-derived products, and Vancomycin    HPI  I had the pleasure of conducting a video visit with Shraddha Pearson today for follow-up of kidney transplant follow up. Shraddha is a 20 year old female who attends our video visit alone. 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     Kev continues to participate in barrel racing with her horses, she is traveling next week to the [place]. She had dental abscess in November that needed to be treated with IV and oral antibiotics (see notes in care everywhere), this was the last time she had her creatinine checked, it was 1.39. She will get labs on Monday March 8th, she requests updated orders to be faxed.    Shraddha is independent with her cares. She uses My Chart, fills her own pill box, takes her medications at 10 AM and 10 PM independently with reported excellent compliance. She knew her medications and current doses. She has even taken over ordering and picking up her medication refills.    Shraddha denies rash, cough, congestion, fever, lymphadenotomy, weight loss, constipation.       ROS    ROS: 10 point ROS neg other than the symptoms noted above in the HPI.    Objective  Vitals: None taken  GENERAL: Healthy, alert and no distress  EYES: Eyes grossly normal to inspection.  No discharge or erythema, or obvious scleral/conjunctival  abnormalities.  RESP: No audible wheeze, cough, or visible cyanosis.  No visible retractions or increased work of breathing.    SKIN: Visible skin clear. No significant rash, abnormal pigmentation or lesions.  NEURO: Cranial nerves grossly intact.  Mentation and speech appropriate for age.  PSYCH: Mentation appears normal, affect normal/bright, judgement and insight intact, normal speech and appearance well-groomed.    Assessment/Plan:  Impression: Anuradha is a 19 year old 15 years s/p living kidney transplant, stable labs and independent with cares.    1. Kidney replaced by transplant  Low MFI DR51 on and off, last Donor Specific Antibodies 7/2020  - azaTHIOprine (IMURAN) 50 MG tablet;  75 mg daily 1.5 mg/kg lower dose due to EBV  - Tacrolimus 1.5 mg twice daily goal 4-6   - Prednisone has been weaned off  - Labs on Monday    2. Immunocompromised (H)  Bactrim was stopped by patient.    3. Hypertension secondary to other renal disorders  Normal ECHO 1/2019, get ECHO and 24 hour APBM when here in July.  Continue:  -Enalapril Hydrochlorothiazide 5-12.5 mg daily  -Labetalol 100 mg BID  -Amlodipine 10 mg daily    4. CKD stage 2, Anemia- Iron Saturation good at 31 July/2020. Currently on ferrous sulfate 325 mg twice daily, tolerating dose.  -Continue supplement, in the past we we have decreased she felt dizzy and needed to return to twice a day dosing.    5. Hypomagnesemia  -Continue Magnesium 400 mg twice daily, Helping with hand tremors.    6. EBV (Derrell-Barr virus) viremia  - Check EBV DNA PCR with standing labs.   -Notify us of weight loss, night sweats, swollen lymph nodes, fevers    7. Counseling for transition from pediatric to adult care provider  Work on compliance with monthly labs.  Anuradha will need the follow adult providers:    Adult Tx Nephrology- for history of kidney transplant    Heme/Onc Long Term Follow up- for history of Wilms Tumor will continue to see Dorcas Samayoa    Endocrine- for history of primary  ovarian failure /GYN for woman's health issues scheduled with Dr. Krishnamurthy in July 2021.    Primary care physician- Dr. Gutierrez Wood (needs yearly physical- discussed with patient)    Dermatology- for yearly skin checks due to increased risk for skin cancer due to immunosuppression medications (Last apt Aug 3rd, 2020 at home clinic)    Opthomology- Yearly    Dental- Every 6 months    Return in about 5 months (around 7/21/2021). with Labs/ECHO/24 hour ABPM    Video-Visit Details  45 minutes spent on the date of the encounter doing chart review, history and exam, documentation and further activities as noted above      Type of service:  Video Visit    Video End Time (time video stopped): 10:43  Total visit time: 27 minutes    Originating Location (pt. Location): Other traveling and in [place] at the time of the visit.    Distant Location (provider location):  PEDS TRANSPLANT SURGERY     Mode of Communication:  Video Conference via [employer]    I had a virtual visit with Anuradha DEXTER Lili on March 5, 2021. At the time of the visit Anuradha DEXTER Gabyyandel was located in [place]. I do not have a license in [place] but in my clinical judgment because this patient requires follow up care by a provider who is familiar with our transplant specific protocols, I provided the virtual visit. I advised the patient that if at any time I determine s/he should be seen in person, s/he will either need to travel to [place] or I will arrange transfer of her/his care. I also advised her/him that if her/his health condition becomes emergent, s/he will need to seek care with a local provider and/or emergency room.      JASMYNE Mir CNP

## 2021-03-05 NOTE — LETTER
3/5/2021      RE: Shraddha Pearson  [address]  [place] [address]       Video Start Time: 10:15 AM    Shraddha Pearson complains of    Chief Complaint   Patient presents with     Video Visit     TX FOLLOW UP       I have reviewed and updated the patient's Past Medical History, Social History, Family History and Medication List.    ALLERGIES  Ibuprofen, Shellfish-derived products, and Vancomycin    HPI  I had the pleasure of conducting a video visit with Shraddha Pearson today for follow-up of kidney transplant follow up. Shraddha is a 20 year old female who attends our video visit alone. 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     Kev continues to participate in barrel racing with her horses, she is traveling next week to the [employer] [place] tour. She had dental abscess in November that needed to be treated with IV and oral antibiotics (see notes in care everywhere), this was the last time she had her creatinine checked, it was 1.39. She will get labs on Monday March 8th, she requests updated orders to be faxed.    Shraddha is independent with her cares. She uses My Chart, fills her own pill box, takes her medications at 10 AM and 10 PM independently with reported excellent compliance. She knew her medications and current doses. She has even taken over ordering and picking up her medication refills.    Shraddha denies rash, cough, congestion, fever, lymphadenotomy, weight loss, constipation.       ROS    ROS: 10 point ROS neg other than the symptoms noted above in the HPI.    Objective  Vitals: None taken  GENERAL: Healthy, alert and no distress  EYES: Eyes grossly normal  to inspection.  No discharge or erythema, or obvious scleral/conjunctival abnormalities.  RESP: No audible wheeze, cough, or visible cyanosis.  No visible retractions or increased work of breathing.    SKIN: Visible skin clear. No significant rash, abnormal pigmentation or lesions.  NEURO: Cranial nerves grossly intact.  Mentation and speech appropriate for age.  PSYCH: Mentation appears normal, affect normal/bright, judgement and insight intact, normal speech and appearance well-groomed.    Assessment/Plan:  Impression: Anuradha is a 19 year old 15 years s/p living kidney transplant, stable labs and independent with cares.    1. Kidney replaced by transplant  Low MFI DR51 on and off, last Donor Specific Antibodies 7/2020  - azaTHIOprine (IMURAN) 50 MG tablet;  75 mg daily 1.5 mg/kg lower dose due to EBV  - Tacrolimus 1.5 mg twice daily goal 4-6   - Prednisone has been weaned off  - Labs on Monday    2. Immunocompromised (H)  Bactrim was stopped by patient.    3. Hypertension secondary to other renal disorders  Normal ECHO 1/2019, get ECHO and 24 hour APBM when here in July.  Continue:  -Enalapril Hydrochlorothiazide 5-12.5 mg daily  -Labetalol 100 mg BID  -Amlodipine 10 mg daily    4. CKD stage 2, Anemia- Iron Saturation good at 31 July/2020. Currently on ferrous sulfate 325 mg twice daily, tolerating dose.  -Continue supplement, in the past we we have decreased she felt dizzy and needed to return to twice a day dosing.    5. Hypomagnesemia  -Continue Magnesium 400 mg twice daily, Helping with hand tremors.    6. EBV (Derrell-Barr virus) viremia  - Check EBV DNA PCR with standing labs.   -Notify us of weight loss, night sweats, swollen lymph nodes, fevers    7. Counseling for transition from pediatric to adult care provider  Work on compliance with monthly labs.  Anuradha will need the follow adult providers:    Adult Tx Nephrology- for history of kidney transplant    Heme/Onc Long Term Follow up- for history of  Wilms Tumor will continue to see Dorcas Samayoa    Endocrine- for history of primary ovarian failure /GYN for woman's health issues scheduled with Dr. Krishnamurthy in July 2021.    Primary care physician- Dr. Gutierrez Wood (needs yearly physical- discussed with patient)    Dermatology- for yearly skin checks due to increased risk for skin cancer due to immunosuppression medications (Last apt Aug 3rd, 2020 at home clinic)    Opthomology- Yearly    Dental- Every 6 months    Return in about 5 months (around 7/21/2021). with Labs/ECHO/24 hour ABPM    Video-Visit Details  45 minutes spent on the date of the encounter doing chart review, history and exam, documentation and further activities as noted above      Type of service:  Video Visit    Video End Time (time video stopped): 10:43  Total visit time: 27 minutes    Originating Location (pt. Location): Other traveling and in [place] at the time of the visit.    Distant Location (provider location):  PEDS TRANSPLANT SURGERY     Mode of Communication:  Video Conference via [employer]    I had a virtual visit with Anuradha Pearson on March 5, 2021. At the time of the visit Anuradha Pearson was located in [place]. I do not have a license in [place] but in my clinical judgment because this patient requires follow up care by a provider who is familiar with our transplant specific protocols, I provided the virtual visit. I advised the patient that if at any time I determine s/he should be seen in person, s/he will either need to travel to [place] or I will arrange transfer of her/his care. I also advised her/him that if her/his health condition becomes emergent, s/he will need to seek care with a local provider and/or emergency room.      JASMYNE Mir CNP

## 2021-03-27 ENCOUNTER — MYC MEDICAL ADVICE (OUTPATIENT)
Dept: TRANSPLANT | Facility: CLINIC | Age: 21
End: 2021-03-27

## 2021-03-30 NOTE — TELEPHONE ENCOUNTER
Called Edi Ling to start Claritin or Zyrtec for allergies, 10 mg daily.    Called labs in [place] to get results that were done March 8th.

## 2021-04-16 DIAGNOSIS — I15.1 HYPERTENSION SECONDARY TO OTHER RENAL DISORDERS: ICD-10-CM

## 2021-04-16 RX ORDER — AMLODIPINE BESYLATE 10 MG/1
10 TABLET ORAL DAILY
Qty: 30 TABLET | Refills: 6 | Status: SHIPPED | OUTPATIENT
Start: 2021-04-16 | End: 2021-10-21

## 2021-05-17 DIAGNOSIS — I15.1 HYPERTENSION SECONDARY TO OTHER RENAL DISORDERS: ICD-10-CM

## 2021-05-17 DIAGNOSIS — Z94.0 KIDNEY REPLACED BY TRANSPLANT: ICD-10-CM

## 2021-05-17 RX ORDER — AZATHIOPRINE 50 MG/1
75 TABLET ORAL DAILY
Qty: 140 TABLET | Refills: 11 | Status: SHIPPED | OUTPATIENT
Start: 2021-05-17 | End: 2022-06-02

## 2021-05-17 RX ORDER — ENALAPRIL MALEATE AND HYDROCHLOROTHIAZIDE 5; 12.5 MG/1; MG/1
1 TABLET ORAL DAILY
Qty: 30 TABLET | Refills: 11 | Status: SHIPPED | OUTPATIENT
Start: 2021-05-17 | End: 2021-09-28

## 2021-08-26 ENCOUNTER — LAB (OUTPATIENT)
Dept: LAB | Facility: CLINIC | Age: 21
End: 2021-08-26
Payer: COMMERCIAL

## 2021-08-26 DIAGNOSIS — Z94.0 KIDNEY TRANSPLANTED: ICD-10-CM

## 2021-08-26 PROCEDURE — 86833 HLA CLASS II HIGH DEFIN QUAL: CPT | Performed by: PEDIATRICS

## 2021-08-26 PROCEDURE — 86832 HLA CLASS I HIGH DEFIN QUAL: CPT | Performed by: PEDIATRICS

## 2021-08-27 DIAGNOSIS — Z94.0 KIDNEY TRANSPLANTED: Primary | ICD-10-CM

## 2021-08-28 PROCEDURE — 36415 COLL VENOUS BLD VENIPUNCTURE: CPT

## 2021-08-28 PROCEDURE — 80197 ASSAY OF TACROLIMUS: CPT

## 2021-08-29 LAB
TACROLIMUS BLD-MCNC: 3.7 UG/L (ref 5–15)
TME LAST DOSE: ABNORMAL H
TME LAST DOSE: ABNORMAL H

## 2021-09-02 ENCOUNTER — TELEPHONE (OUTPATIENT)
Dept: TRANSPLANT | Facility: CLINIC | Age: 21
End: 2021-09-02

## 2021-09-02 DIAGNOSIS — Z94.0 KIDNEY REPLACED BY TRANSPLANT: ICD-10-CM

## 2021-09-02 RX ORDER — TACROLIMUS 0.5 MG/1
CAPSULE ORAL
Qty: 210 CAPSULE | Refills: 6 | Status: SHIPPED | OUTPATIENT
Start: 2021-09-02 | End: 2022-02-22

## 2021-09-02 NOTE — TELEPHONE ENCOUNTER
Provider Call: Medication Refill  Route to LPN  Pharmacy Name: [facility]  Pharmacy Location: [place]  Name of Medication: Tacro Dose: 0.5 mg   30 day wit refills   When will the patient be out of this medication?: Less than 3 days (Route high priority)  Callback needed? Yes

## 2021-09-03 ENCOUNTER — HOSPITAL ENCOUNTER (INPATIENT)
Facility: CLINIC | Age: 21
LOS: 1 days | Discharge: HOME | DRG: 208 | End: 2021-09-05
Attending: STUDENT IN AN ORGANIZED HEALTH CARE EDUCATION/TRAINING PROGRAM | Admitting: PEDIATRICS
Payer: COMMERCIAL

## 2021-09-03 ENCOUNTER — TELEPHONE (OUTPATIENT)
Dept: TRANSPLANT | Facility: CLINIC | Age: 21
End: 2021-09-03

## 2021-09-03 DIAGNOSIS — Z94.0 STATUS POST KIDNEY TRANSPLANT: ICD-10-CM

## 2021-09-03 DIAGNOSIS — N17.9 ACUTE KIDNEY INJURY (H): ICD-10-CM

## 2021-09-03 DIAGNOSIS — R50.9 FEVER, UNSPECIFIED FEVER CAUSE: ICD-10-CM

## 2021-09-03 DIAGNOSIS — Z94.0 KIDNEY REPLACED BY TRANSPLANT: ICD-10-CM

## 2021-09-03 DIAGNOSIS — U07.1 COVID-19: ICD-10-CM

## 2021-09-03 LAB
ALBUMIN SERPL-MCNC: 3.3 G/DL (ref 3.4–5)
ANION GAP SERPL CALCULATED.3IONS-SCNC: 5 MMOL/L (ref 3–14)
BUN SERPL-MCNC: 36 MG/DL (ref 7–30)
CALCIUM SERPL-MCNC: 8.8 MG/DL (ref 8.5–10.1)
CHLORIDE BLD-SCNC: 100 MMOL/L (ref 94–109)
CO2 SERPL-SCNC: 26 MMOL/L (ref 20–32)
CREAT SERPL-MCNC: 2.1 MG/DL (ref 0.52–1.04)
CRP SERPL-MCNC: 53 MG/L (ref 0–8)
DEPRECATED S PYO AG THROAT QL EIA: NEGATIVE
GFR SERPL CREATININE-BSD FRML MDRD: 33 ML/MIN/1.73M2
GLUCOSE BLD-MCNC: 122 MG/DL (ref 70–99)
PHOSPHATE SERPL-MCNC: 3.8 MG/DL (ref 2.5–4.5)
POTASSIUM BLD-SCNC: 4.2 MMOL/L (ref 3.4–5.3)
SARS-COV-2 RNA RESP QL NAA+PROBE: POSITIVE
SODIUM SERPL-SCNC: 131 MMOL/L (ref 133–144)

## 2021-09-03 PROCEDURE — 87651 STREP A DNA AMP PROBE: CPT | Performed by: STUDENT IN AN ORGANIZED HEALTH CARE EDUCATION/TRAINING PROGRAM

## 2021-09-03 PROCEDURE — 87799 DETECT AGENT NOS DNA QUANT: CPT | Performed by: STUDENT IN AN ORGANIZED HEALTH CARE EDUCATION/TRAINING PROGRAM

## 2021-09-03 PROCEDURE — 86140 C-REACTIVE PROTEIN: CPT | Performed by: STUDENT IN AN ORGANIZED HEALTH CARE EDUCATION/TRAINING PROGRAM

## 2021-09-03 PROCEDURE — 87040 BLOOD CULTURE FOR BACTERIA: CPT | Performed by: STUDENT IN AN ORGANIZED HEALTH CARE EDUCATION/TRAINING PROGRAM

## 2021-09-03 PROCEDURE — 99285 EMERGENCY DEPT VISIT HI MDM: CPT | Mod: 25 | Performed by: STUDENT IN AN ORGANIZED HEALTH CARE EDUCATION/TRAINING PROGRAM

## 2021-09-03 PROCEDURE — 80069 RENAL FUNCTION PANEL: CPT | Performed by: STUDENT IN AN ORGANIZED HEALTH CARE EDUCATION/TRAINING PROGRAM

## 2021-09-03 PROCEDURE — 85027 COMPLETE CBC AUTOMATED: CPT | Performed by: STUDENT IN AN ORGANIZED HEALTH CARE EDUCATION/TRAINING PROGRAM

## 2021-09-03 PROCEDURE — 80197 ASSAY OF TACROLIMUS: CPT | Performed by: STUDENT IN AN ORGANIZED HEALTH CARE EDUCATION/TRAINING PROGRAM

## 2021-09-03 PROCEDURE — U0003 INFECTIOUS AGENT DETECTION BY NUCLEIC ACID (DNA OR RNA); SEVERE ACUTE RESPIRATORY SYNDROME CORONAVIRUS 2 (SARS-COV-2) (CORONAVIRUS DISEASE [COVID-19]), AMPLIFIED PROBE TECHNIQUE, MAKING USE OF HIGH THROUGHPUT TECHNOLOGIES AS DESCRIBED BY CMS-2020-01-R: HCPCS | Performed by: STUDENT IN AN ORGANIZED HEALTH CARE EDUCATION/TRAINING PROGRAM

## 2021-09-03 PROCEDURE — 36415 COLL VENOUS BLD VENIPUNCTURE: CPT | Performed by: STUDENT IN AN ORGANIZED HEALTH CARE EDUCATION/TRAINING PROGRAM

## 2021-09-03 PROCEDURE — 99285 EMERGENCY DEPT VISIT HI MDM: CPT | Performed by: STUDENT IN AN ORGANIZED HEALTH CARE EDUCATION/TRAINING PROGRAM

## 2021-09-03 RX ORDER — DIPHENHYDRAMINE HCL 12.5MG/5ML
50 LIQUID (ML) ORAL ONCE
Status: DISCONTINUED | OUTPATIENT
Start: 2021-09-03 | End: 2021-09-04 | Stop reason: CLARIF

## 2021-09-03 RX ORDER — VANCOMYCIN HYDROCHLORIDE 1 G/200ML
20 INJECTION, SOLUTION INTRAVENOUS ONCE
Status: COMPLETED | OUTPATIENT
Start: 2021-09-04 | End: 2021-09-04

## 2021-09-03 RX ORDER — CEFEPIME HYDROCHLORIDE 1 G/1
1 INJECTION, POWDER, FOR SOLUTION INTRAMUSCULAR; INTRAVENOUS ONCE
Status: COMPLETED | OUTPATIENT
Start: 2021-09-03 | End: 2021-09-04

## 2021-09-03 ASSESSMENT — MIFFLIN-ST. JEOR: SCORE: 1186.13

## 2021-09-03 NOTE — TELEPHONE ENCOUNTER
Received a call from patients mom stating that Shraddha has had fever between 100-102 since Sunday. Mom stated that Shraddha has been taking Tylenol for fevers all week. Held tylenol today and temp around 102.2. Advised parents that they should go to local ED for evaluation and have provider call Ped Nephrology here at the [facility]; parents do not want to go locally to ED; would rather bring her here to the ED for evaluation. Stated that they will drive Shraddha here ASAP. Estimated arrival 4 hours. Spoke with Renal provider on call; will also give ED report that patient is coming.

## 2021-09-04 ENCOUNTER — APPOINTMENT (OUTPATIENT)
Dept: GENERAL RADIOLOGY | Facility: CLINIC | Age: 21
DRG: 208 | End: 2021-09-04
Attending: PEDIATRICS
Payer: COMMERCIAL

## 2021-09-04 PROBLEM — N17.9 ACUTE KIDNEY INJURY (H): Status: ACTIVE | Noted: 2021-09-04

## 2021-09-04 LAB
ALBUMIN SERPL-MCNC: 2.8 G/DL (ref 3.4–5)
ALBUMIN UR-MCNC: 10 MG/DL
ALT SERPL W P-5'-P-CCNC: 46 U/L (ref 0–50)
ANION GAP SERPL CALCULATED.3IONS-SCNC: 6 MMOL/L (ref 3–14)
APPEARANCE UR: ABNORMAL
AST SERPL W P-5'-P-CCNC: 35 U/L (ref 0–45)
BACTERIA #/AREA URNS HPF: ABNORMAL /HPF
BASOPHILS # BLD MANUAL: 0 10E3/UL (ref 0–0.2)
BASOPHILS NFR BLD MANUAL: 0 %
BILIRUB UR QL STRIP: NEGATIVE
BUN SERPL-MCNC: 29 MG/DL (ref 7–30)
CALCIUM SERPL-MCNC: 8.1 MG/DL (ref 8.5–10.1)
CHLORIDE BLD-SCNC: 104 MMOL/L (ref 94–109)
CMV DNA SPEC NAA+PROBE-ACNC: NOT DETECTED IU/ML
CO2 SERPL-SCNC: 25 MMOL/L (ref 20–32)
COLOR UR AUTO: ABNORMAL
CREAT SERPL-MCNC: 1.48 MG/DL (ref 0.52–1.04)
D DIMER PPP FEU-MCNC: <0.27 UG/ML FEU (ref 0–0.5)
EOSINOPHIL # BLD MANUAL: 0 10E3/UL (ref 0–0.7)
EOSINOPHIL NFR BLD MANUAL: 0 %
ERYTHROCYTE [DISTWIDTH] IN BLOOD BY AUTOMATED COUNT: 11.5 % (ref 10–15)
GFR SERPL CREATININE-BSD FRML MDRD: 50 ML/MIN/1.73M2
GLUCOSE BLD-MCNC: 92 MG/DL (ref 70–99)
GLUCOSE UR STRIP-MCNC: NEGATIVE MG/DL
GROUP A STREP BY PCR: NOT DETECTED
HCG UR QL: NEGATIVE
HCT VFR BLD AUTO: 33.2 % (ref 35–47)
HGB BLD-MCNC: 10.9 G/DL (ref 11.7–15.7)
HGB UR QL STRIP: ABNORMAL
HOLD SPECIMEN: NORMAL
HYALINE CASTS: 1 /LPF
KETONES UR STRIP-MCNC: NEGATIVE MG/DL
LEUKOCYTE ESTERASE UR QL STRIP: ABNORMAL
LYMPHOCYTES # BLD MANUAL: 2.1 10E3/UL (ref 0.8–5.3)
LYMPHOCYTES NFR BLD MANUAL: 13 %
MCH RBC QN AUTO: 32 PG (ref 26.5–33)
MCHC RBC AUTO-ENTMCNC: 32.8 G/DL (ref 31.5–36.5)
MCV RBC AUTO: 97 FL (ref 78–100)
MONOCYTES # BLD MANUAL: 0.5 10E3/UL (ref 0–1.3)
MONOCYTES NFR BLD MANUAL: 3 %
MUCOUS THREADS #/AREA URNS LPF: PRESENT /LPF
NEUTROPHILS # BLD MANUAL: 13.4 10E3/UL (ref 1.6–8.3)
NEUTROPHILS NFR BLD MANUAL: 84 %
NITRATE UR QL: NEGATIVE
PH UR STRIP: 5.5 [PH] (ref 5–7)
PHOSPHATE SERPL-MCNC: 3.3 MG/DL (ref 2.5–4.5)
PLAT MORPH BLD: ABNORMAL
PLATELET # BLD AUTO: 337 10E3/UL (ref 150–450)
POTASSIUM BLD-SCNC: 4.4 MMOL/L (ref 3.4–5.3)
RBC # BLD AUTO: 3.41 10E6/UL (ref 3.8–5.2)
RBC MORPH BLD: ABNORMAL
RBC URINE: 2 /HPF
SODIUM SERPL-SCNC: 135 MMOL/L (ref 133–144)
SP GR UR STRIP: 1.01 (ref 1–1.03)
SQUAMOUS EPITHELIAL: 7 /HPF
TACROLIMUS BLD-MCNC: 3.1 UG/L (ref 5–15)
TME LAST DOSE: ABNORMAL H
TME LAST DOSE: ABNORMAL H
TRANSITIONAL EPI: <1 /HPF
TROPONIN I SERPL-MCNC: <0.015 UG/L (ref 0–0.04)
UROBILINOGEN UR STRIP-MCNC: NORMAL MG/DL
WBC # BLD AUTO: 15.9 10E3/UL (ref 4–11)
WBC URINE: 16 /HPF

## 2021-09-04 PROCEDURE — 85379 FIBRIN DEGRADATION QUANT: CPT | Performed by: PEDIATRICS

## 2021-09-04 PROCEDURE — 258N000003 HC RX IP 258 OP 636

## 2021-09-04 PROCEDURE — 80069 RENAL FUNCTION PANEL: CPT | Performed by: PEDIATRICS

## 2021-09-04 PROCEDURE — G0378 HOSPITAL OBSERVATION PER HR: HCPCS

## 2021-09-04 PROCEDURE — 96361 HYDRATE IV INFUSION ADD-ON: CPT

## 2021-09-04 PROCEDURE — 250N000011 HC RX IP 250 OP 636: Performed by: PEDIATRICS

## 2021-09-04 PROCEDURE — 250N000013 HC RX MED GY IP 250 OP 250 PS 637: Performed by: STUDENT IN AN ORGANIZED HEALTH CARE EDUCATION/TRAINING PROGRAM

## 2021-09-04 PROCEDURE — 250N000013 HC RX MED GY IP 250 OP 250 PS 637: Performed by: PEDIATRICS

## 2021-09-04 PROCEDURE — 96376 TX/PRO/DX INJ SAME DRUG ADON: CPT

## 2021-09-04 PROCEDURE — 81001 URINALYSIS AUTO W/SCOPE: CPT | Performed by: STUDENT IN AN ORGANIZED HEALTH CARE EDUCATION/TRAINING PROGRAM

## 2021-09-04 PROCEDURE — 36415 COLL VENOUS BLD VENIPUNCTURE: CPT | Performed by: PEDIATRICS

## 2021-09-04 PROCEDURE — 84484 ASSAY OF TROPONIN QUANT: CPT | Performed by: PEDIATRICS

## 2021-09-04 PROCEDURE — 258N000003 HC RX IP 258 OP 636: Performed by: STUDENT IN AN ORGANIZED HEALTH CARE EDUCATION/TRAINING PROGRAM

## 2021-09-04 PROCEDURE — 71045 X-RAY EXAM CHEST 1 VIEW: CPT | Mod: 26 | Performed by: RADIOLOGY

## 2021-09-04 PROCEDURE — 87086 URINE CULTURE/COLONY COUNT: CPT | Performed by: STUDENT IN AN ORGANIZED HEALTH CARE EDUCATION/TRAINING PROGRAM

## 2021-09-04 PROCEDURE — 84460 ALANINE AMINO (ALT) (SGPT): CPT | Performed by: PEDIATRICS

## 2021-09-04 PROCEDURE — 250N000012 HC RX MED GY IP 250 OP 636 PS 637: Performed by: PEDIATRICS

## 2021-09-04 PROCEDURE — 258N000003 HC RX IP 258 OP 636: Performed by: PEDIATRICS

## 2021-09-04 PROCEDURE — 99222 1ST HOSP IP/OBS MODERATE 55: CPT | Mod: GC | Performed by: PEDIATRICS

## 2021-09-04 PROCEDURE — 96375 TX/PRO/DX INJ NEW DRUG ADDON: CPT

## 2021-09-04 PROCEDURE — 84450 TRANSFERASE (AST) (SGOT): CPT | Performed by: PEDIATRICS

## 2021-09-04 PROCEDURE — 120N000007 HC R&B PEDS UMMC

## 2021-09-04 PROCEDURE — 250N000011 HC RX IP 250 OP 636: Performed by: STUDENT IN AN ORGANIZED HEALTH CARE EDUCATION/TRAINING PROGRAM

## 2021-09-04 PROCEDURE — 71045 X-RAY EXAM CHEST 1 VIEW: CPT

## 2021-09-04 PROCEDURE — 81025 URINE PREGNANCY TEST: CPT | Performed by: STUDENT IN AN ORGANIZED HEALTH CARE EDUCATION/TRAINING PROGRAM

## 2021-09-04 PROCEDURE — 96365 THER/PROPH/DIAG IV INF INIT: CPT | Performed by: STUDENT IN AN ORGANIZED HEALTH CARE EDUCATION/TRAINING PROGRAM

## 2021-09-04 RX ORDER — MULTIVITAMIN,THERAPEUTIC
1 TABLET ORAL DAILY
Status: DISCONTINUED | OUTPATIENT
Start: 2021-09-04 | End: 2021-09-05 | Stop reason: HOSPADM

## 2021-09-04 RX ORDER — SODIUM CHLORIDE 9 MG/ML
INJECTION, SOLUTION INTRAVENOUS
Status: COMPLETED
Start: 2021-09-04 | End: 2021-09-04

## 2021-09-04 RX ORDER — CEFEPIME HYDROCHLORIDE 1 G/1
1 INJECTION, POWDER, FOR SOLUTION INTRAMUSCULAR; INTRAVENOUS EVERY 12 HOURS
Status: CANCELLED | OUTPATIENT
Start: 2021-09-04

## 2021-09-04 RX ORDER — DIPHENHYDRAMINE HCL 25 MG
50 CAPSULE ORAL ONCE
Status: COMPLETED | OUTPATIENT
Start: 2021-09-04 | End: 2021-09-04

## 2021-09-04 RX ORDER — LABETALOL 100 MG/1
100 TABLET, FILM COATED ORAL 2 TIMES DAILY
Status: DISCONTINUED | OUTPATIENT
Start: 2021-09-04 | End: 2021-09-05 | Stop reason: HOSPADM

## 2021-09-04 RX ORDER — AMLODIPINE BESYLATE 10 MG/1
10 TABLET ORAL DAILY
Status: DISCONTINUED | OUTPATIENT
Start: 2021-09-04 | End: 2021-09-05 | Stop reason: HOSPADM

## 2021-09-04 RX ORDER — LEVONORGESTREL / ETHINYL ESTRADIOL AND ETHINYL ESTRADIOL 150-30(84)
1 KIT ORAL EVERY 24 HOURS
Status: DISCONTINUED | OUTPATIENT
Start: 2021-09-04 | End: 2021-09-05 | Stop reason: HOSPADM

## 2021-09-04 RX ORDER — DIPHENHYDRAMINE HCL 25 MG
50 CAPSULE ORAL
Status: COMPLETED | OUTPATIENT
Start: 2021-09-04 | End: 2021-09-05

## 2021-09-04 RX ORDER — VANCOMYCIN HYDROCHLORIDE 1 G/200ML
20 INJECTION, SOLUTION INTRAVENOUS EVERY 24 HOURS
Status: DISCONTINUED | OUTPATIENT
Start: 2021-09-05 | End: 2021-09-05

## 2021-09-04 RX ORDER — ENALAPRIL MALEATE AND HYDROCHLOROTHIAZIDE 5; 12.5 MG/1; MG/1
1 TABLET ORAL DAILY
Status: DISCONTINUED | OUTPATIENT
Start: 2021-09-04 | End: 2021-09-04

## 2021-09-04 RX ORDER — SODIUM CHLORIDE 9 MG/ML
INJECTION, SOLUTION INTRAVENOUS CONTINUOUS
Status: DISCONTINUED | OUTPATIENT
Start: 2021-09-04 | End: 2021-09-05 | Stop reason: HOSPADM

## 2021-09-04 RX ORDER — CEFEPIME HYDROCHLORIDE 1 G/1
1 INJECTION, POWDER, FOR SOLUTION INTRAMUSCULAR; INTRAVENOUS EVERY 12 HOURS
Status: DISCONTINUED | OUTPATIENT
Start: 2021-09-04 | End: 2021-09-05

## 2021-09-04 RX ORDER — ENALAPRIL MALEATE 2.5 MG/1
5 TABLET ORAL DAILY
Status: DISCONTINUED | OUTPATIENT
Start: 2021-09-04 | End: 2021-09-05 | Stop reason: HOSPADM

## 2021-09-04 RX ORDER — LEVONORGESTREL / ETHINYL ESTRADIOL AND ETHINYL ESTRADIOL 150-30(84)
1 KIT ORAL DAILY
Status: DISCONTINUED | OUTPATIENT
Start: 2021-09-04 | End: 2021-09-04

## 2021-09-04 RX ORDER — TACROLIMUS 1 MG/1
2 CAPSULE ORAL
Status: DISCONTINUED | OUTPATIENT
Start: 2021-09-04 | End: 2021-09-05 | Stop reason: HOSPADM

## 2021-09-04 RX ORDER — HYDROCHLOROTHIAZIDE 12.5 MG/1
12.5 TABLET ORAL DAILY
Status: DISCONTINUED | OUTPATIENT
Start: 2021-09-04 | End: 2021-09-05 | Stop reason: HOSPADM

## 2021-09-04 RX ORDER — FERROUS SULFATE 325(65) MG
325 TABLET ORAL 2 TIMES DAILY
Status: DISCONTINUED | OUTPATIENT
Start: 2021-09-04 | End: 2021-09-05 | Stop reason: HOSPADM

## 2021-09-04 RX ORDER — MAGNESIUM OXIDE 400 MG/1
400 TABLET ORAL 2 TIMES DAILY
Status: DISCONTINUED | OUTPATIENT
Start: 2021-09-04 | End: 2021-09-05 | Stop reason: HOSPADM

## 2021-09-04 RX ORDER — ACETAMINOPHEN 325 MG/1
650 TABLET ORAL EVERY 4 HOURS PRN
Status: DISCONTINUED | OUTPATIENT
Start: 2021-09-04 | End: 2021-09-05 | Stop reason: HOSPADM

## 2021-09-04 RX ADMIN — Medication 400 MG: at 09:59

## 2021-09-04 RX ADMIN — SODIUM CHLORIDE: 9 INJECTION, SOLUTION INTRAVENOUS at 13:01

## 2021-09-04 RX ADMIN — LABETALOL HYDROCHLORIDE 100 MG: 100 TABLET, FILM COATED ORAL at 20:58

## 2021-09-04 RX ADMIN — SODIUM CHLORIDE: 9 INJECTION, SOLUTION INTRAVENOUS at 01:53

## 2021-09-04 RX ADMIN — VANCOMYCIN HYDROCHLORIDE 1000 MG: 1 INJECTION, SOLUTION INTRAVENOUS at 01:56

## 2021-09-04 RX ADMIN — FERROUS SULFATE TAB 325 MG (65 MG ELEMENTAL FE) 325 MG: 325 (65 FE) TAB at 20:57

## 2021-09-04 RX ADMIN — TACROLIMUS 1.5 MG: 1 CAPSULE ORAL at 20:58

## 2021-09-04 RX ADMIN — CEFEPIME HYDROCHLORIDE 1 G: 1 INJECTION, POWDER, FOR SOLUTION INTRAMUSCULAR; INTRAVENOUS at 23:57

## 2021-09-04 RX ADMIN — CEFEPIME HYDROCHLORIDE 1 G: 1 INJECTION, POWDER, FOR SOLUTION INTRAMUSCULAR; INTRAVENOUS at 00:15

## 2021-09-04 RX ADMIN — SODIUM CHLORIDE: 9 INJECTION, SOLUTION INTRAVENOUS at 23:56

## 2021-09-04 RX ADMIN — THERA TABS 1 TABLET: TAB at 09:59

## 2021-09-04 RX ADMIN — ENALAPRIL MALEATE 5 MG: 2.5 TABLET ORAL at 09:58

## 2021-09-04 RX ADMIN — BENZOCAINE AND MENTHOL 1 LOZENGE: 15; 3.6 LOZENGE ORAL at 21:11

## 2021-09-04 RX ADMIN — SODIUM CHLORIDE, POTASSIUM CHLORIDE, SODIUM LACTATE AND CALCIUM CHLORIDE 1000 ML: 600; 310; 30; 20 INJECTION, SOLUTION INTRAVENOUS at 00:15

## 2021-09-04 RX ADMIN — FERROUS SULFATE TAB 325 MG (65 MG ELEMENTAL FE) 325 MG: 325 (65 FE) TAB at 10:00

## 2021-09-04 RX ADMIN — Medication 400 MG: at 20:58

## 2021-09-04 RX ADMIN — LEVONORGESTREL / ETHINYL ESTRADIOL AND ETHINYL ESTRADIOL 1 TABLET: KIT at 20:59

## 2021-09-04 RX ADMIN — DIPHENHYDRAMINE HYDROCHLORIDE 50 MG: 25 CAPSULE ORAL at 00:21

## 2021-09-04 RX ADMIN — SODIUM CHLORIDE 1000 ML: 9 INJECTION, SOLUTION INTRAVENOUS at 00:15

## 2021-09-04 RX ADMIN — TACROLIMUS 2 MG: 1 CAPSULE ORAL at 10:05

## 2021-09-04 RX ADMIN — HYDROCHLOROTHIAZIDE 12.5 MG: 12.5 TABLET ORAL at 09:59

## 2021-09-04 RX ADMIN — LABETALOL HYDROCHLORIDE 100 MG: 100 TABLET, FILM COATED ORAL at 09:59

## 2021-09-04 RX ADMIN — CEFEPIME HYDROCHLORIDE 1 G: 1 INJECTION, POWDER, FOR SOLUTION INTRAMUSCULAR; INTRAVENOUS at 12:23

## 2021-09-04 RX ADMIN — AMLODIPINE BESYLATE 10 MG: 10 TABLET ORAL at 20:58

## 2021-09-04 RX ADMIN — BENZOCAINE AND MENTHOL 1 LOZENGE: 15; 3.6 LOZENGE ORAL at 17:04

## 2021-09-04 ASSESSMENT — MIFFLIN-ST. JEOR: SCORE: 1199.58

## 2021-09-04 NOTE — ED TRIAGE NOTES
"Pt presents with mom for fever x 5 days, starting Monday.  Recent travel outside [place].  Per mom, pt fatigued, pt describes head fog (\"head heaviness\"), migraines, sore throat, congestion vs allergies, cough with phlegm last night.  Pt having \"chills and hot flashes\".  Tmax at home 102.7 tympanic - last Tylenol at 1700hrs. Pt afebrile in triage with oral temp and no fever without antipyretics on board (typenaic in triage was 99.3).  Per mom, tactile fevers at home.     Pt awake, alert, resps with ease.  No cough today.  Pt denies rash.  Pt denies rash.  Denies dysuria, no changes in urine.  Pt denies back pain.   "

## 2021-09-04 NOTE — PLAN OF CARE
Tmax 99.1. VSS. Denies pain. Good appetite. Voiding. IVMF running at 90ml/hr. CXR and labs done. Continuing abx. Plan for Regen-Cov to be administered this afternoon/evening. Mom at bedside, updated on plan of care.

## 2021-09-04 NOTE — PLAN OF CARE
Pt admitted at 0130 from ED. VSS. Afebrile. Denies pain. Pt does have some congestion and infrequent cough, but LS clear and O2 sats high 90's. IV fluids running. Voiding. Labs pending. Mom attentive at bedside.

## 2021-09-04 NOTE — H&P
[facility]    History and Physical - Pediatric Nephrology Service        Date of Admission:  9/3/2021    Assessment & Plan      Anuradha Pearson is a 21 year old female admitted on 9/3/2021. She has a history of Wilms tumor and living donor kidney transplant in 11/2004, and CKD and is admitted for 5 days of fever, malaise, and decreased appetite.  UTI cannot be ruled out at this time given wbc in urine and elevated CRP. Acute kidney injury may also be caused by pyelonephritis or decreased intake. COVID19+, however minimally symptomatic from respiratory standpoint and not requiring oxygen. Given her history of kidney transplant and her elevation in creatinine, decision was made to admit Anuradha for IV fluids and IV antibiotics while awaiting blood and urine culture results. Additional viral testing was obtained in the ED and further results are pending at this time. At time of admission, Anuradha is well appearing and hemodynamically stable. She does not meet SIRS criteria at this time.     FEN/Renal:  # CKD  # SHARON  # UTI  Estimated GFR based on creatinine at time of admission is 33  - UCx - pending  - BCx - pending  - Vancomycin renally dosed per pharmacy  - Cefepime 1g q12H  - NS at 90mL/hr  - Strict I/Os  - Regular diet  - PTA mag oxide 400 mg BID    # Hyponatremia  - Started NS at maintenance  - Renal panel - qAM    # Hypertension  - PTA amlodipine 10 mg every day  - PTA enalapril-hydrochlorothiazide 5-12.5 mg every day  - PTA labetalol 100 mg BID      ID/immunology:  # H/o kidney transplant  - Adenovirus, BK virus, CMV virus - pending  - PTA tacrolimus 2.0 mg qAM  - PTA tacrolimus 1.5 mg qHS  - Tacro goal 4 - 6  - Tacro level qAM      # Acute COVID-19 infection  - Consider baseline COVID labs qAM       - Recommended baseline COVID labs:CBC w/diff, retic count, CMP, D-dimer, CRP, IL-6, PT/INR, aPTT, fibrinogen, antithrombin, ferritin, LDH, HBV, HIV, HCV, EKG,  troponin, BNP, CPK  - Patient is stable on room air. No indication for supplemental oxygen at this time.  - Patient may meet criteria for remdesivir.  - Remdesivir treatment criteria        - Positive COVID-19 PCR within 14 days        - GFR greater than 30 mL/min/1.73m2 and NOT on renal replacement therapy        - AST and ALT less than 5x Upper Limit Normal (ULN)       - 1 out of 3 of the following must be met:         - Lung infiltrates on imaging         - O2 saturation less than or equal to 94% on room air         - Requiring supplemental oxygen (e.g. nasal cannula, CPAP/BiPAP, mechanical ventilation).    # VTE ppx  Per treatment protocols, VTE prophylaxis is recommended for all adults admitted with COVID-19, with treatment intensity determined based on severity of symptoms. Based on patient's mild symptoms, prophylactic dosing would be appropriate at this time. Based on GFR >30 and BMI 18-40, recommended lovenox 40mg subQ once daily.  - Consider baseline COVID labs qAM   - Consider VTE prophylaxis qAM    Heme:  # Anemia  - PTA ferrous sulfate 325 mg  BID    HCM:  - PTA OCPs  - daily MV    Disposition Plan   Expected discharge: 1-3 days recommended to prior living arrangement once adequate pain management/ tolerating PO medications, antibiotic plan established and SIRS/Sepsis treated.     The patient's care was discussed with the Bedside Nurse, Patient and Patient's Family.    Kyle Mitchell  Pediatric Resident  Pediatric Nephrology Service  [facility]  Securely message with the Vocera Web Console (learn more here)  Text page via [facility] Paging/Directory    Physician Attestation   I, Shilpi Wild MD, saw this patient independently of this resident and agree with the resident/fellow's findings and plan of care as documented in the note.      I personally reviewed vital signs, medications and labs.    Key findings: 21 year old young woman s/p kidney transplant  in 2004. Baseline creatinine 1.3-1.4. SHARON most likely due to decrease po intake in the past week. Received bolus and IV fluids overnight. Recheck later today. Fever x 4-5 days at home. Differential includes UTI with wbc in urine, other bacterial or viral infection (pending) or COVID19. Currently mild respiratory symptoms. Will treat with IV antibiotics pending culture results. Will consult transplant ID for advice RE: COVID treatment. Discussed with Anuradha and her mother at bedside.     Shilpi Wild MD  Date of Service (when I saw the patient): 09/04/21______________________________________________________________________    Chief Complaint   Fever    History is obtained from the patient and the patient's parent(s)    History of Present Illness   Anuradha Pearson is a 21 year old female. She has a history of Wilms tumor and living donor kidney transplant in 11/2004, and CKD and is admitted for 5 days of fever, malaise, and decreased appetite. Anuradha is admitted for IV fluids and IV antibiotics in the setting of 6 days of fever and malaise.    Anuradha states that 6 days prior to admission she developed a fever that she was managing at home with APAP. Fevers ar responsive to tylenol, but she states that she never drops < 100.5. In addition to he fever, she has had headaches, congestion, sore throat, and an occasional cough. She denies any SOB, N/V, constipation/diarrhea, pain with urination, or changes in her urine. She is currently living at home with her mother and father and only leaves home for lab draws. She is not vaccinated against COVID-19. She has no recent sick contacts or known COVID exposures. She takes tacrolimus for management of her solid organ transplant and states that she has not missed any doses.      Review of Systems    The 10 point Review of Systems is negative other than noted in the HPI.    Past Medical History    I have reviewed this patient's medical history and updated it with  pertinent information if needed.   Past Medical History:   Diagnosis Date     H/O kidney transplant      Hypertension      Wilm's tumor         Past Surgical History   I have reviewed this patient's surgical history and updated it with pertinent information if needed.  Past Surgical History:   Procedure Laterality Date     APPENDECTOMY       CHOLECYSTECTOMY  05/20/2003     ENT SURGERY       OPEN REDUCTION INTERNAL FIXATION RODDING INTRAMEDULLARY FEMUR  8/20/2012    Procedure: OPEN REDUCTION INTERNAL FIXATION RODDING INTRAMEDULLARY FEMUR;  Closed Reduction Internal Fixation Right Femur;  Surgeon: Catracho Hook MD;  Location: [facility] OR     REVISE SCAR TRUNK Right 11/6/2015    Procedure: REVISE SCAR TRUNK;  Surgeon: COOPER Anderson MD;  Location: [facility] OR     right nephrectomy  05/19/2003    due to Wilms tumor; performed by Dr. Myles Velez at [facility]     TONSILLECTOMY  01/31/2005     TRANSPLANT KIDNEY RECIPIENT LIVING RELATED CHILD  11/09/2004    left nephrectomy performed at time of transplant        Social History   I have reviewed this patient's social history and updated it with pertinent information if needed. Anuradha Pearson  reports that she has never smoked. She has never used smokeless tobacco. She reports that she does not drink alcohol and does not use drugs.    Family History   I have reviewed this patient's family history and updated it with pertinent information if needed.  Family History   Problem Relation Age of Onset     Hypertension Father        Prior to Admission Medications   Prior to Admission Medications   Prescriptions Last Dose Informant Patient Reported? Taking?   Enalapril-hydroCHLOROthiazide 5-12.5 MG TABS 9/3/2021 at 1000  No Yes   Sig: Take 1 tablet by mouth daily   Multiple Vitamins-Minerals (WOMENS MULTIVITAMIN PO)  at 1000  Yes No   Sig: Take 1 tablet by mouth daily   amLODIPine (NORVASC) 10 MG tablet 9/3/2021 at 1000  No Yes   Sig: Take 1 tablet (10 mg)  by mouth daily   amoxicillin (AMOXIL) 500 MG capsule   No No   Sig: Take 4 caps (2 grams) one hour prior to dental procedure.   azaTHIOprine (IMURAN) 50 MG tablet 9/3/2021 at 2200  No Yes   Sig: Take 1.5 tablets (75 mg) by mouth daily   calcium carbonate-vitamin D (OS-DEE) 500-400 MG-UNIT tablet   No No   Sig: Take 1 tablet by mouth daily   ferrous sulfate (FEROSUL) 325 (65 Fe) MG tablet   No No   Sig: Take 1 tablet (325 mg) by mouth 2 times daily   labetalol (NORMODYNE) 100 MG tablet   No No   Sig: Take 1 tablet (100 mg) by mouth 2 times daily   levonorgest-eth estrad 91-Day (SEASONIQUE) 0.15-0.03 &0.01 MG tablet   No No   Sig: Take 1 tablet by mouth daily   magnesium oxide (MAG-OX) 400 (241.3 Mg) MG tablet   No No   Sig: Take 1 tablet (400 mg) by mouth 2 times daily   tacrolimus (GENERIC EQUIVALENT) 0.5 MG capsule   No No   Sig: Please take 4 capsules (2mg) by mouth in am and take 3 capsules (1.5mg) in the evening      Facility-Administered Medications: None     Allergies   Allergies   Allergen Reactions     Ibuprofen Other (See Comments)     Avoid nephrotoxic medications as needed due to kidney transplant status     Shellfish-Derived Products Nausea and Vomiting     Vancomycin      Uses Benadryl Prior to administration       Physical Exam   Vital Signs: Temp: 98.4  F (36.9  C) Temp src: Oral BP: 112/74 Pulse: 103   Resp: 16 SpO2: 100 % O2 Device: None (Room air)    Weight: 117 lbs 12.8 oz    Appearance: Alert and appropriate, well developed, nontoxic, with moist mucous membranes.  HEENT: Head: Normocephalic and atraumatic. Eyes: PERRL, EOM grossly intact, conjunctivae and sclerae clear. Nose: Congested sounding, but nares clear with no active discharge.  Mouth/Throat: No oral lesions, pharynx clear with no erythema or exudate.  Pulmonary: Lungs clear bilaterally. No increased work of breathing.   Cardiovascular: Regular rate and rhythm, normal S1 and S2, with no murmurs.  Abdominal: Normal bowel sounds, soft,  nontender, nondistended, with no masses or hepatosplenomegaly.  Neurologic: Alert and oriented, cranial nerves II-XII grossly intact, moving all extremities equally with grossly normal coordination.  Extremities/Back: No deformity, no CVA tenderness.  Skin: No significant rashes, ecchymoses, or lacerations.      Data   Data reviewed today: I reviewed all medications, new labs and imaging results over the last 24 hours. I personally reviewed no images or EKG's today.    Recent Labs   Lab 09/03/21  2253   WBC 15.9*   HGB 10.9*   MCV 97      *   POTASSIUM 4.2   CHLORIDE 100   CO2 26   BUN 36*   CR 2.10*   ANIONGAP 5   DEE 8.8   *   ALBUMIN 3.3*

## 2021-09-04 NOTE — ED PROVIDER NOTES
History     Chief Complaint   Patient presents with     Fever     HPI    History obtained from patient and mother    Anuradha is a 21 year old F who presents at 10:17 PM with fever for 5 days with accompanying malaise, fatigue, decreased appetite. She denies any shortness of breath but she does have decreased energy. She reports sore throat. She is not vaccinated for COVID. She states that she has not recently been traveling. No known sick contacts, only recent travel has been for lab draws. She states that she has been tending to her horses at home. She has decreased appetite. She has been compliant with her medications which include tacrolimus as well as antihypertensives. She denies any chest pain, shortness of breath, nausea/vomiting/diarrhea, dysuria, discharge  PMHx:  Past Medical History:   Diagnosis Date     H/O kidney transplant      Hypertension      Wilm's tumor      Past Surgical History:   Procedure Laterality Date     APPENDECTOMY       CHOLECYSTECTOMY  05/20/2003     ENT SURGERY       OPEN REDUCTION INTERNAL FIXATION RODDING INTRAMEDULLARY FEMUR  8/20/2012    Procedure: OPEN REDUCTION INTERNAL FIXATION RODDING INTRAMEDULLARY FEMUR;  Closed Reduction Internal Fixation Right Femur;  Surgeon: Catracho Hook MD;  Location: [facility] OR     REVISE SCAR TRUNK Right 11/6/2015    Procedure: REVISE SCAR TRUNK;  Surgeon: COOPER Anderson MD;  Location: [facility] OR     right nephrectomy  05/19/2003    due to Wilms tumor; performed by Dr. Myles Velez at [facility]     TONSILLECTOMY  01/31/2005     TRANSPLANT KIDNEY RECIPIENT LIVING RELATED CHILD  11/09/2004    left nephrectomy performed at time of transplant     These were reviewed with the patient/family.    MEDICATIONS were reviewed and are as follows:   Current Facility-Administered Medications   Medication     lidocaine 1 %     Current Outpatient Medications   Medication     amLODIPine (NORVASC) 10 MG tablet     amoxicillin (AMOXIL) 500 MG  capsule     azaTHIOprine (IMURAN) 50 MG tablet     calcium carbonate-vitamin D (OS-DEE) 500-400 MG-UNIT tablet     Enalapril-hydroCHLOROthiazide 5-12.5 MG TABS     ferrous sulfate (FEROSUL) 325 (65 Fe) MG tablet     labetalol (NORMODYNE) 100 MG tablet     levonorgest-eth estrad 91-Day (SEASONIQUE) 0.15-0.03 &0.01 MG tablet     magnesium oxide (MAG-OX) 400 (241.3 Mg) MG tablet     Multiple Vitamins-Minerals (WOMENS MULTIVITAMIN PO)     tacrolimus (GENERIC EQUIVALENT) 0.5 MG capsule       ALLERGIES:  Ibuprofen, Shellfish-derived products, and Vancomycin    IMMUNIZATIONS:  Up to date by report.    SOCIAL HISTORY: Anuradha lives with parents.      I have reviewed the Medications, Allergies, Past Medical and Surgical History, and Social History in the Epic system.    Review of Systems  Please see HPI for pertinent positives and negatives.  All other systems reviewed and found to be negative.        Physical Exam   BP: 114/73 (right arm, adult cuff)  Pulse: 105  Temp: 98.5  F (36.9  C)  Resp: 16  SpO2: 99 %      Physical Exam  Vitals and nursing note reviewed.   Constitutional:       General: She is not in acute distress.     Appearance: Normal appearance. She is normal weight. She is not ill-appearing, toxic-appearing or diaphoretic.   HENT:      Head: Normocephalic and atraumatic.      Right Ear: External ear normal.      Left Ear: External ear normal.      Nose: Nose normal. No congestion or rhinorrhea.      Mouth/Throat:      Mouth: Mucous membranes are moist.      Pharynx: No oropharyngeal exudate or posterior oropharyngeal erythema.   Eyes:      General:         Right eye: No discharge.         Left eye: No discharge.      Conjunctiva/sclera: Conjunctivae normal.   Cardiovascular:      Rate and Rhythm: Tachycardia present.      Pulses: Normal pulses.      Heart sounds: No murmur heard.     Pulmonary:      Effort: Pulmonary effort is normal. No respiratory distress.      Breath sounds: No stridor. No wheezing or  rales.   Abdominal:      General: Abdomen is flat. There is no distension.      Palpations: Abdomen is soft.      Tenderness: There is no abdominal tenderness.      Comments: Midline abdominal surgical scar from transplant surgery   Musculoskeletal:         General: No swelling or tenderness. Normal range of motion.      Cervical back: Normal range of motion and neck supple. No rigidity or tenderness.   Skin:     General: Skin is warm and dry.      Capillary Refill: Capillary refill takes less than 2 seconds.   Neurological:      General: No focal deficit present.      Mental Status: She is alert and oriented to person, place, and time.   Psychiatric:         Mood and Affect: Mood normal.         ED Course     ED Course as of Sep 04 0027   Fri Sep 03, 2021   2341 Creatinine(!): 2.10   2342 WBC(!): 15.9   2346 SARS CoV2 PCR(!): Positive   2347 Rapid Strep A Screen: Negative     Procedures    Results for orders placed or performed during the hospital encounter of 09/03/21 (from the past 24 hour(s))   CBC with platelets differential    Narrative    The following orders were created for panel order CBC with platelets differential.  Procedure                               Abnormality         Status                     ---------                               -----------         ------                     CBC with platelets and d...[[id number]]                      In process                   Please view results for these tests on the individual orders.   [place] Draw    Narrative    The following orders were created for panel order [place] Draw.  Procedure                               Abnormality         Status                     ---------                               -----------         ------                     Extra Blue Top Tube[[id number]]                              In process                 Extra Green Top (Lithium...[[id number]]                      In process                   Please view results for these  tests on the individual orders.       Medications   lidocaine 1 % (has no administration in time range)       Old chart from [facility] reviewed, supported history as above.  Labs reviewed and revealed SHARON and COVID.  Patient was attended to immediately upon arrival and assessed for immediate life-threatening conditions.  The patient was rechecked before leaving the Emergency Department.  Her symptoms were stable and the repeat exam is benign.  Discussed with the admitting physician, Dr. Wild.  History obtained from family.    Critical care time:  none       Assessments & Plan (with Medical Decision Making)     Anuradha Pearson is a 21 year old F with hx of renal transpant 15 years ago for Wilms tumor here for 5 days of fever found to have acute kidney injury, COVID-19, and we will initiate sepsis evaluation in context of fever in transplant patient on tacrolimus. Will give a fluid bolus as well as cefepime and vancomycin. Found to be positive for COVID-19 which I suspect is largely the cause of her current illness. She is not in respiratory distress at this time. Will admit to the renal service for continued management at this time.     I have reviewed the nursing notes.    I have reviewed the findings, diagnosis, plan and need for follow up with the patient.  New Prescriptions    No medications on file       Final diagnoses:   Fever, unspecified fever cause   COVID-19   Status post kidney transplant   Acute kidney injury (H)     Chalino Ospina MD  Attending Emergency Physician  12:40 AM 9/4/2021   9/3/2021   [facility] EMERGENCY DEPARTMENT     Chalino Ospina MD  09/04/21 0041

## 2021-09-04 NOTE — PROGRESS NOTES
[facility]    Progress Note - Pediatric Nephrology Service        Date of Admission:  9/3/2021    Assessment & Plan      Anuradha Pearson is a 21 year old female admitted on 9/3/2021. She has a history of Wilms tumor and living donor kidney transplant in 11/2004, and CKD and is admitted for 5 days of fever, malaise, and decreased appetite, found to be positive for COVID-19 on admission. Despite lack of urinary symptoms, patient's UA, leukocytosis with left shift, and creatinine bump from her baseline of 1.46 to 2.10 are concerning for UTI and this is important to rule out given her history of kidney transplant. She requires admission for IV fluids and IV antibiotics while awaiting blood and urine culture results. Patient is well appearing and has reassuring CXR and initial select COVID screening labs.     Changes 9/4:  - Talked to transplant ID, obtained CXR, troponin, D-dimer, AST/ALT, all reassuring  - Discussed Regeneron COVID antibody (Transplant ID suggested, pharmacy ordered, fact sheet reviewed), mom and patient want to think about it and readdress 9/5  - Antibody stored in fridge, scheduled to be given 9/5 at noon if family agrees after discussion on rounds  - Will consider anticoagulation 9/5 pending ID recs (would need enoxaparin 40 mg subcutaneous Q24H)  - Tomorrow discuss with ID waiting period prior to COVID vaccine    FEN/Renal:  # CKD  # SHARON  # UTI  Estimated GFR based on creatinine at time of admission is 33.  - UCx - pending  - BCx - pending  - Vancomycin renally dosed per pharmacy  - Cefepime 1g q12H  - NS at 90mL/hr  - Strict I/Os  - Regular diet  - PTA mag oxide 400 mg BID  - Labs: 9/5 renal panel, CBC, tacro level     # Hyponatremia  Sodium of 131 on admission, improved to 135 9/4.  - Continue NS at maintenance  - Renal panel - qAM     # Hypertension  - PTA amlodipine 10 mg every day  - PTA enalapril-hydrochlorothiazide 5-12.5 mg every day  -  PTA labetalol 100 mg BID     ID/immunology:  # H/o kidney transplant  - Adenovirus, BK virus, CMV virus - pending  - PTA tacrolimus 2.0 mg qAM  - PTA tacrolimus 1.5 mg qHS  - Tacro goal 4 - 6  - Tacro level qAM  - On azathioprine 75 mg PTA, held this admission     # Acute COVID-19 infection  Discussed with transplant ID (Dr. Rodriguez), ordered select COVID screening workup 9/4, all reassuring (CXR, D-dimer, troponin, AST, ALT) reassuring.  - Appreciate ongoing input from Transplant ID  - Patient is stable on room air. No indication for supplemental oxygen at this time.  - Patient candidate for Regeneron antibody treatment, discussed with ID and family, family considering and wishes to discuss further 9/5, stored in fridge 9/4 and available to dose 9/5 at noon if patient consents  - Patient does not meet criteria for remdesivir.  - Remdesivir treatment criteria        - Positive COVID-19 PCR within 14 days - true       - GFR greater than 30 mL/min/1.73m2 and NOT on renal replacement therapy - true       - AST and ALT less than 5x Upper Limit Normal (ULN) - true       - 1 out of 3 of the following must be met (none met):        - Lung infiltrates on imaging         - O2 saturation less than or equal to 94% on room air         - Requiring supplemental oxygen (e.g. nasal cannula, CPAP/BiPAP, mechanical ventilation).     # VTE ppx  Per treatment protocols, VTE prophylaxis is recommended for all adults admitted with COVID-19, with treatment intensity determined based on severity of symptoms. Based on patient's mild symptoms, prophylactic dosing would be appropriate at this time. Based on GFR >30 and BMI 18-40, recommended lovenox 40mg subQ once daily.  - Consider baseline COVID labs qAM   - Consider VTE prophylaxis qAM, discuss with transplant ID 9/5     Heme:  # Anemia  - PTA ferrous sulfate 325 mg  BID     HCM:  - PTA OCPs  - daily MV     Diet: Peds Diet Age 9-18 yrs    DVT Prophylaxis: Currently none, likely starting  lovenox 9/5 after discussion  Hagan Catheter: Not present  Fluids: NS at 90mL/hr  Central Lines: None  Code Status:  Full    Disposition Plan   Expected discharge: Likely 2-4 days pending urine culture results and therapy plan.     The patient's care was discussed with the Attending Physician, Dr. Wild.    Abigail Burgos  Pediatric Nephrology Service  [facility]  Securely message with the Vocera Web Console (learn more here)  Text page via [facility] Paging/Directory    Physician Attestation   I, Shilpi Wild MD, saw this patient with the resident and agree with the resident/fellow's findings and plan of care as documented in the note.      I personally reviewed vital signs, medications and labs.    Key findings: Afebrile. Cultures pending. Will discuss regeneron with transplant ID and patient. Continue antibiotics. Discussed with mother. SHARON improved.     Shilpi Wild MD  Date of Service (when I saw the patient): 9/4/21_____________________________________________________________________    Interval History   Overnight, patient had congestion and infrequent cough, maintaining saturations in high 90's. Voiding. This morning, patient is feeling fine aside from a bit of a congestion headache. Mild cough at times but not bothersome. No abdominal pain or urinary symptoms. Discussed therapy plan and antibody treatment option at length with both patient and her mother. They prefer to think about it and discuss more tomorrow 9/5. Questions answered.     Data reviewed today: I reviewed all medications, new labs and imaging results over the last 24 hours. I personally reviewed the chest x-ray image(s) showing no infiltrate.    Temp:  [97.8  F (36.6  C)-98.7  F (37.1  C)] 97.8  F (36.6  C)  Pulse:  [] 93  Resp:  [16] 16  BP: ()/(56-74) 94/56  SpO2:  [98 %-100 %] 99 %      Physical Exam   Vital Signs: Temp: 97.8  F (36.6  C) Temp src: Oral BP: 94/56  Pulse: 93   Resp: 16 SpO2: 99 % O2 Device: None (Room air)    Weight: 117 lbs 12.8 oz     Constitutional: Awake, alert, laying comfortably supine in bed  Eyes: Conjunctiva clear  ENT: Moist mucous membranes  Respiratory: CTAB  Cardiovascular: RRR  GI: Abdomen soft, non-tender, non-distended, normal bowel sounds  Skin: No obvious rashes on exposed skin  Musculoskeletal: No peripheral edema  Neurologic: Conversational, moving all 4 extremities  Neuropsychiatric: Appropriate affect    Data   Recent Labs   Lab 09/04/21  1315 09/03/21  2253   WBC  --  15.9*   HGB  --  10.9*   MCV  --  97   PLT  --  337    131*   POTASSIUM 4.4 4.2   CHLORIDE 104 100   CO2 25 26   BUN 29 36*   CR 1.48* 2.10*   ANIONGAP 6 5   DEE 8.1* 8.8   GLC 92 122*   ALBUMIN 2.8* 3.3*   ALT 46  --    AST 35  --    TROPONIN <0.015  --      Recent Results (from the past 24 hour(s))   XR Chest Port 1 View    Narrative    EXAM: XR CHEST PORT 1 VIEW  9/4/2021 12:45 PM     HISTORY:  21 yoF hx renal transplant, here with symptomatic COVID-19        COMPARISON:  2/10/2012    TECHNIQUE: Single frontal radiograph of the chest    FINDINGS:     Midline trachea. Well-defined cardiomediastinal silhouette. Distinct  pulmonary vasculature. No consolidation, pleural effusion or  appreciable pneumothorax.      Impression    IMPRESSION: No acute consolidation or diffuse opacities.     I have personally reviewed the examination and initial interpretation  and I agree with the findings.    NELSY WILLIAMSON MD         SYSTEM ID:  [id number]

## 2021-09-04 NOTE — PHARMACY-VANCOMYCIN DOSING SERVICE
Pharmacy Vancomycin Initial Note  Date of Service 2021  Patient's  2000  21 year old, female    Indication: Sepsis    Current estimated CrCl = Estimated Creatinine Clearance: 35.7 mL/min (A) (based on SCr of 2.1 mg/dL (H)).    Creatinine for last 3 days  9/3/2021: 10:53 PM Creatinine 2.10 mg/dL    Recent Vancomycin Level(s) for last 3 days  No results found for requested labs within last 72 hours.      Vancomycin IV Administrations (past 72 hours)                   vancomycin (VANCOCIN) 1000 mg in dextrose 5% 200 mL PREMIX (mg) 1,000 mg New Bag 21 0156                Nephrotoxins and other renal medications (From now, onward)    Start     Dose/Rate Route Frequency Ordered Stop    21 0800  Enalapril-hydroCHLOROthiazide 5-12.5 MG TABS 1 tablet      1 tablet Oral DAILY 21 0405      21 0800  tacrolimus (GENERIC EQUIVALENT) capsule 1.5 mg      1.5 mg Oral EVERY MORNING. 21 0405            Contrast Orders - past 72 hours (72h ago, onward)    None        Loading dose: N/A  Regimen: 1000 mg IV every 24 hours.  Start time: 04:43 on 2021  Exposure target: AUC24 (range)400-600 mg/L.hr                           AUC24,ss: 532 mg/L.hr  Probability of AUC24 > 400: 82 %  Ctrough,ss: 16.5 mg/L  Probability of Ctrough,ss > 20: 29 %  Probability of nephrotoxicity (Lodise GEORGE ): 12 %        Plan:  1. Start vancomycin  1000 mg IV q24h.   2. Vancomycin monitoring method: AUC  3. Vancomycin therapeutic monitoring goal: 400-600 mg*h/L  4. Pharmacy will check vancomycin levels as appropriate in 1-3 Days.    5. Serum creatinine levels will be ordered daily for the first week of therapy and at least twice weekly for subsequent weeks.      Sony Boss [facility]

## 2021-09-05 VITALS
SYSTOLIC BLOOD PRESSURE: 111 MMHG | RESPIRATION RATE: 16 BRPM | TEMPERATURE: 98.3 F | WEIGHT: 117.06 LBS | DIASTOLIC BLOOD PRESSURE: 73 MMHG | HEIGHT: 59 IN | BODY MASS INDEX: 23.6 KG/M2 | HEART RATE: 88 BPM | OXYGEN SATURATION: 98 %

## 2021-09-05 LAB
ALBUMIN SERPL-MCNC: 2.6 G/DL (ref 3.4–5)
ANION GAP SERPL CALCULATED.3IONS-SCNC: 5 MMOL/L (ref 3–14)
BACTERIA UR CULT: NO GROWTH
BASOPHILS # BLD MANUAL: 0 10E3/UL (ref 0–0.2)
BASOPHILS NFR BLD MANUAL: 0 %
BKV DNA # SPEC NAA+PROBE: NOT DETECTED COPIES/ML
BUN SERPL-MCNC: 22 MG/DL (ref 7–30)
CALCIUM SERPL-MCNC: 8.5 MG/DL (ref 8.5–10.1)
CHLORIDE BLD-SCNC: 106 MMOL/L (ref 94–109)
CO2 SERPL-SCNC: 25 MMOL/L (ref 20–32)
CREAT SERPL-MCNC: 1.27 MG/DL (ref 0.52–1.04)
EOSINOPHIL # BLD MANUAL: 0.1 10E3/UL (ref 0–0.7)
EOSINOPHIL NFR BLD MANUAL: 1 %
ERYTHROCYTE [DISTWIDTH] IN BLOOD BY AUTOMATED COUNT: 11.7 % (ref 10–15)
GFR SERPL CREATININE-BSD FRML MDRD: 60 ML/MIN/1.73M2
GLUCOSE BLD-MCNC: 95 MG/DL (ref 70–99)
HCT VFR BLD AUTO: 29.5 % (ref 35–47)
HGB BLD-MCNC: 9.5 G/DL (ref 11.7–15.7)
LYMPHOCYTES # BLD MANUAL: 2.7 10E3/UL (ref 0.8–5.3)
LYMPHOCYTES NFR BLD MANUAL: 33 %
MCH RBC QN AUTO: 32.3 PG (ref 26.5–33)
MCHC RBC AUTO-ENTMCNC: 32.2 G/DL (ref 31.5–36.5)
MCV RBC AUTO: 100 FL (ref 78–100)
MONOCYTES # BLD MANUAL: 0.3 10E3/UL (ref 0–1.3)
MONOCYTES NFR BLD MANUAL: 4 %
MYELOCYTES # BLD MANUAL: 0.1 10E3/UL
MYELOCYTES NFR BLD MANUAL: 1 %
NEUTROPHILS # BLD MANUAL: 5.1 10E3/UL (ref 1.6–8.3)
NEUTROPHILS NFR BLD MANUAL: 61 %
NRBC # BLD AUTO: 0.1 10E3/UL
NRBC BLD MANUAL-RTO: 1 %
PHOSPHATE SERPL-MCNC: 3.7 MG/DL (ref 2.5–4.5)
PLAT MORPH BLD: ABNORMAL
PLATELET # BLD AUTO: 268 10E3/UL (ref 150–450)
POTASSIUM BLD-SCNC: 4.3 MMOL/L (ref 3.4–5.3)
RBC # BLD AUTO: 2.94 10E6/UL (ref 3.8–5.2)
RBC MORPH BLD: ABNORMAL
SODIUM SERPL-SCNC: 136 MMOL/L (ref 133–144)
TACROLIMUS BLD-MCNC: 5.5 UG/L (ref 5–15)
TME LAST DOSE: NORMAL H
TME LAST DOSE: NORMAL H
WBC # BLD AUTO: 8.3 10E3/UL (ref 4–11)

## 2021-09-05 PROCEDURE — 80069 RENAL FUNCTION PANEL: CPT | Performed by: PEDIATRICS

## 2021-09-05 PROCEDURE — 258N000003 HC RX IP 258 OP 636: Performed by: PEDIATRICS

## 2021-09-05 PROCEDURE — 85027 COMPLETE CBC AUTOMATED: CPT | Performed by: PEDIATRICS

## 2021-09-05 PROCEDURE — 5A1945Z RESPIRATORY VENTILATION, 24-96 CONSECUTIVE HOURS: ICD-10-PCS | Performed by: PEDIATRICS

## 2021-09-05 PROCEDURE — 250N000013 HC RX MED GY IP 250 OP 250 PS 637: Performed by: PEDIATRICS

## 2021-09-05 PROCEDURE — 250N000011 HC RX IP 250 OP 636: Performed by: PEDIATRICS

## 2021-09-05 PROCEDURE — 80197 ASSAY OF TACROLIMUS: CPT | Performed by: PEDIATRICS

## 2021-09-05 PROCEDURE — 250N000013 HC RX MED GY IP 250 OP 250 PS 637: Performed by: STUDENT IN AN ORGANIZED HEALTH CARE EDUCATION/TRAINING PROGRAM

## 2021-09-05 PROCEDURE — BW03ZZZ PLAIN RADIOGRAPHY OF CHEST: ICD-10-PCS | Performed by: PEDIATRICS

## 2021-09-05 PROCEDURE — 99239 HOSP IP/OBS DSCHRG MGMT >30: CPT | Mod: GC | Performed by: PEDIATRICS

## 2021-09-05 PROCEDURE — 250N000012 HC RX MED GY IP 250 OP 636 PS 637: Performed by: PEDIATRICS

## 2021-09-05 PROCEDURE — 36415 COLL VENOUS BLD VENIPUNCTURE: CPT | Performed by: PEDIATRICS

## 2021-09-05 RX ADMIN — FERROUS SULFATE TAB 325 MG (65 MG ELEMENTAL FE) 325 MG: 325 (65 FE) TAB at 09:23

## 2021-09-05 RX ADMIN — Medication 400 MG: at 09:23

## 2021-09-05 RX ADMIN — ENALAPRIL MALEATE 5 MG: 2.5 TABLET ORAL at 09:22

## 2021-09-05 RX ADMIN — HYDROCHLOROTHIAZIDE 12.5 MG: 12.5 TABLET ORAL at 09:21

## 2021-09-05 RX ADMIN — VANCOMYCIN HYDROCHLORIDE 1000 MG: 1 INJECTION, SOLUTION INTRAVENOUS at 01:00

## 2021-09-05 RX ADMIN — LABETALOL HYDROCHLORIDE 100 MG: 100 TABLET, FILM COATED ORAL at 09:23

## 2021-09-05 RX ADMIN — THERA TABS 1 TABLET: TAB at 09:23

## 2021-09-05 RX ADMIN — DIPHENHYDRAMINE HYDROCHLORIDE 50 MG: 25 CAPSULE ORAL at 00:07

## 2021-09-05 RX ADMIN — SODIUM CHLORIDE: 9 INJECTION, SOLUTION INTRAVENOUS at 10:28

## 2021-09-05 RX ADMIN — TACROLIMUS 2 MG: 1 CAPSULE ORAL at 09:22

## 2021-09-05 ASSESSMENT — ACTIVITIES OF DAILY LIVING (ADL)
TOILETING_ISSUES: NO
VISION_MANAGEMENT: GLASSES
HEARING_DIFFICULTY_OR_DEAF: NO
FALL_HISTORY_WITHIN_LAST_SIX_MONTHS: NO
DRESSING/BATHING_DIFFICULTY: NO
DIFFICULTY_COMMUNICATING: NO
WEAR_GLASSES_OR_BLIND: YES
WALKING_OR_CLIMBING_STAIRS_DIFFICULTY: NO
DIFFICULTY_EATING/SWALLOWING: NO
DOING_ERRANDS_INDEPENDENTLY_DIFFICULTY: NO
CONCENTRATING,_REMEMBERING_OR_MAKING_DECISIONS_DIFFICULTY: NO

## 2021-09-05 ASSESSMENT — MIFFLIN-ST. JEOR: SCORE: 1196.24

## 2021-09-05 NOTE — DISCHARGE SUMMARY
[facility]  Discharge Summary - Medicine & Pediatrics       Date of Admission:  9/3/2021  Date of Discharge:  9/5/2021  Discharging Provider: Shilpi Wild MD  Discharge Service: Pediatric Nephrology    Discharge Diagnoses   COVID-19 infection  History of Wilms tumor  S/p living donor kidney transplant 2004  Chronic kidney disease  Acute kidney injury secondary to volume depletion    Follow-ups Needed After Discharge    - Monthly transplant labs  - Follow up with pediatric nephrology (Dr. Hartman) 12/1 as previously scheduled    Unresulted Labs Ordered in the Past 30 Days of this Admission     Date and Time Order Name Status Description    9/3/2021 10:30 PM Blood Culture Peripheral Blood Preliminary     9/3/2021 10:30 PM Adenovirus DNA QT PCR In process     9/3/2021 10:30 PM EBV DNA PCR Quantitative Whole Blood In process       These results will be followed up by pediatric nephrology.    Discharge Disposition   Discharged to home  Condition at discharge: Stable    Hospital Course   Anuradha Pearson was admitted on 9/3/2021 to rule out UTI in the setting of multiple days of fevers and malaise, found to be COVID-positive. The following problems were addressed during her hospitalization:    FEN/Renal  CKD  SHARON  Hyponatremia  S/p kidney transplant  Anuradha was initially mildly hyponatremic, but this resolved with use of normal saline IV fluids. Her tacrolimus was continued on admission, and tacrolimus level was 5.5, within her goal of 4-6. Her creatinine was monitored and was 1.27 at discharge, compared to 2.10 on admission.     ID  Concern for UTI, culture negative  COVID-19 positive  Anuradha had an equivocal urinalysis on admission and was started on vancomycin and cefepime during UTI rule-out. Her urine and blood cultures both had no growth to date at the time of her discharge. BK and CMV viruses were negative; adenovirus DNA is in process.     Anuradha's  presentation was discussed with the Transplant ID team, and it was decided that she would get select COVID screening tests and would be a candidate for the Regeneron antibody treatment. Her screening tests including troponin, D-dimer, ALT/AST, and CXR were unremarkable. Information regarding Regeneron antibody treatement was provided to the patient and her mother. As she was afebrile and feeling better, they elected to not pursue this treatment. It was emphasized that if her symptoms were to worsen, she should return to care promptly and antibody treatment could be considered. Recommendations for isolation and testing for close contacts was given to the family.     Anuradha's azathioprine was held while she was admitted but can be restarted upon her discharge.    CV  Hypertension  Anuradha was continued on her home amlodipine, enalapril, and labetalol. She was normotensive throughout her admission.    Heme  Anemia  Anuradha continued on her home ferrous sulfate.    Consultations This Hospital Stay   PHARMACY TO DOSE VANCO    Code Status   Full Code       The patient was discussed with Dr. Wild.    Sheba Ryan MD  Pediatric Nephrology Service  [facility] P[facility]IATR[facility] SURGICAL UNIT [address]  [address] [place] [id number]  Phone: [phone]  ______________________________________________________________________    Physical Exam   Vital Signs: Temp: 98.3  F (36.8  C) Temp src: Oral BP: 111/73 Pulse: 88   Resp: 16 SpO2: 98 % O2 Device: None (Room air)    Weight: 117 lbs 1.03 oz    Appearance: Alert and appropriate, well developed, nontoxic, pleasant and interactive.  HEENT: Head: Normocephalic and atraumatic. Eyes: PERRL, EOM grossly intact, conjunctivae and sclerae clear. Nose: Congested sounding, but nares clear with no active discharge.  Mouth/Throat: Moist mucous membranes.  Pulmonary: Lungs clear to auscultation bilaterally. No increased work of breathing. Speaking easily in full sentences on  room air.   Cardiovascular: Regular rate and rhythm, normal S1 and S2, with no murmurs. Peripheral pulse full/equal.  Abdominal: + bowel sounds. Abdomen is soft, nontender, nondistended, with no masses or hepatosplenomegaly.  Neurologic: Alert and oriented, cranial nerves II-XII grossly intact, moving all extremities equally with grossly normal coordination.  Extremities: No deformity. Warm and well-perfused.  Skin: Warm and dry. No rashes on exposed skin.      Primary Care Physician   Gutierrez Wood    Discharge Orders      Activity    Your activity upon discharge: activity as tolerated.     Follow Up and recommended labs and tests    Please get monthly transplant labs!    Follow-up with pediatric nephrology, Dr. Hartman, on 12/1 as previously scheduled. Please reach out sooner if you need us!     Reason for your hospital stay    Anuradha was hospitalized with fever and decreased appetite, found to be COVID-19 positive. Blood and urine cultures were collected which showed no growth, so antibiotics can be discontinued.     The transplant infectious disease doctor was consulted; Regeneron COVID antibody was offered but as you were without fever and feeling better, this was declined. If you develop worsening symptoms like high fevers, difficulty breathing, shortness of breath or difficulty catching your breath, or other concerning symptoms, please know that you can come back for this treatment. If you do have worsening symptoms, we would encourage you to promptly return to in-person care for evaluation. If you have ongoing trouble with runny nose, allergies, or recurrent sinus pain, we would recommend seeing the ear, nose and throat doctors (ENT) in clinic.    You can re-start your azathioprine at your usual dosage when you get home. There are no other medication changes. Try your best to stay well-hydrated.    Please isolate for 10 days from your positive COVID test (9/3/21). We recommend COVID-19 testing and  quarantine in all close contacts. Please follow CDC recommendations and guidance.     Diet    Follow this diet upon discharge: regular diet as tolerated. Stay well-hydrated.       Significant Results and Procedures   Most Recent 3 CBC's:  Recent Labs   Lab Test 09/05/21  0603 09/03/21  2253 01/15/19  1237   WBC 8.3 15.9* 11.7*   HGB 9.5* 10.9* 12.3    97 112*    337 357     Most Recent 3 BMP's:  Recent Labs   Lab Test 09/05/21  0603 09/04/21  1315 09/03/21  2253    135 131*   POTASSIUM 4.3 4.4 4.2   CHLORIDE 106 104 100   CO2 25 25 26   BUN 22 29 36*   CR 1.27* 1.48* 2.10*   ANIONGAP 5 6 5   DEE 8.5 8.1* 8.8   GLC 95 92 122*     Most Recent 2 LFT's:  Recent Labs   Lab Test 09/04/21  1315 01/15/19  1237 01/29/18  1608   AST 35 18 16   ALT 46 23 20   ALKPHOS  --  37* 35*   BILITOTAL  --  0.2 0.2   ,   Results for orders placed or performed during the hospital encounter of 09/03/21   XR Chest Port 1 View    Narrative    EXAM: XR CHEST PORT 1 VIEW  9/4/2021 12:45 PM     HISTORY:  21 yoF hx renal transplant, here with symptomatic COVID-19        COMPARISON:  2/10/2012    TECHNIQUE: Single frontal radiograph of the chest    FINDINGS:     Midline trachea. Well-defined cardiomediastinal silhouette. Distinct  pulmonary vasculature. No consolidation, pleural effusion or  appreciable pneumothorax.      Impression    IMPRESSION: No acute consolidation or diffuse opacities.     I have personally reviewed the examination and initial interpretation  and I agree with the findings.    NELSY WILLIAMSON MD         SYSTEM ID:  [id number]       Discharge Medications   Discharge Medication List as of 9/5/2021 12:21 PM      CONTINUE these medications which have NOT CHANGED    Details   amLODIPine (NORVASC) 10 MG tablet Take 1 tablet (10 mg) by mouth daily, Disp-30 tablet, R-6, E-Prescribe      amoxicillin (AMOXIL) 500 MG capsule Take 4 caps (2 grams) one hour prior to dental procedure., Disp-12 capsule, R-3, E-Prescribe       azaTHIOprine (IMURAN) 50 MG tablet Take 1.5 tablets (75 mg) by mouth daily, Disp-140 tablet, R-11, E-Prescribe      calcium carbonate-vitamin D (OS-DEE) 500-400 MG-UNIT tablet Take 1 tablet by mouth daily, Disp-30 tablet, R-11, E-Prescribe      Enalapril-hydroCHLOROthiazide 5-12.5 MG TABS Take 1 tablet by mouth daily, Disp-30 tablet, R-11, E-Prescribe      ferrous sulfate (FEROSUL) 325 (65 Fe) MG tablet Take 1 tablet (325 mg) by mouth 2 times daily, Disp-180 tablet, R-6, E-Prescribe      labetalol (NORMODYNE) 100 MG tablet Take 1 tablet (100 mg) by mouth 2 times daily, Disp-180 tablet, R-6, E-Prescribe      levonorgest-eth estrad 91-Day (SEASONIQUE) 0.15-0.03 &0.01 MG tablet Take 1 tablet by mouth daily, Disp-91 tablet, R-3, E-Prescribe      magnesium oxide (MAG-OX) 400 (241.3 Mg) MG tablet Take 1 tablet (400 mg) by mouth 2 times daily, Disp-60 tablet, R-11, E-Prescribe      Multiple Vitamins-Minerals (WOMENS MULTIVITAMIN PO) Take 1 tablet by mouth daily, Historical      tacrolimus (GENERIC EQUIVALENT) 0.5 MG capsule Please take 4 capsules (2mg) by mouth in am and take 3 capsules (1.5mg) in the evening, Disp-210 capsule, R-6, E-Prescribe           Allergies   Allergies   Allergen Reactions     Ibuprofen Other (See Comments)     Avoid nephrotoxic medications as needed due to kidney transplant status     Shellfish-Derived Products Nausea and Vomiting     Vancomycin      Uses Benadryl Prior to administration     Physician Attestation   I, Shilpi Wild, saw and evaluated this patient prior to discharge.  I discussed the patient with the resident/fellow and agree with plan of care as documented in the note.  Dr. Rodriguez discussed Regeneron treatment with patient and her mother and they declined. Afebrile since admission and cultures negative >36 hours. Primary nephrologist updated. Discussed with Anuradha and her mother.     I personally reviewed vital signs, medications, labs and imaging.    I personally  spent 35 minutes on discharge activities.    Shilpi Wild MD  Date of Service (when I saw the patient): 09/05/21

## 2021-09-05 NOTE — PLAN OF CARE
[id number]. VSS. Denies pain. Drinking well and IV fluids still running. Voiding well. Mom at bedside. Labs this AM.

## 2021-09-05 NOTE — PLAN OF CARE
VSS. Denies pain. C/o some burning in nose. Eating and drinking. Voiding. AVS reviewed with pt and mother. All questions and concerns addressed. Continue to monitor.

## 2021-09-07 LAB
EBV DNA COPIES/ML, INSTRUMENT: ABNORMAL COPIES/ML
EBV DNA SPEC NAA+PROBE-LOG#: 5.3 {LOG_COPIES}/ML
HADV DNA # SPEC NAA+PROBE: NOT DETECTED COPIES/ML

## 2021-09-08 DIAGNOSIS — Z94.0 KIDNEY TRANSPLANTED: Primary | ICD-10-CM

## 2021-09-09 ENCOUNTER — TELEPHONE (OUTPATIENT)
Dept: TRANSPLANT | Facility: CLINIC | Age: 21
End: 2021-09-09

## 2021-09-09 LAB — BACTERIA BLD CULT: NO GROWTH

## 2021-09-09 NOTE — TELEPHONE ENCOUNTER
Called and spoke to Anuradha Pugh's mother. She left me a message last night.    Anuradha was admitted 9/3-9/5 for fever and increased WBC, Creatinine. She tested positive for COVID. She felt great when she was discharged WBC and creatinine came down, but the fever is back, 100-101, having ear and sinus pain, and she feels dizzy. She is going to be seen at urgent care walk in clinic today. My have sinus infection, may be dehydrated and need fluids, she is on Enalapril-hydrochlorothiazide that can cause significant increased creatinine if she is dyhydrated.    Plan: when she is seen at Urgent care today ask provider to call Trang fairbanks renal MD on call (Dr. Hua, Dr. Cohn, or Dr. Mckenna) at [phone].

## 2021-09-09 NOTE — TELEPHONE ENCOUNTER
Anuradha reached out again, she was seen in ED today had elevated CRP, WBC, ANC, she was started on Amoxicillin for suspected sinus infection.  Her blood pressure usually runs 12/70's but was 94/60 in ED today and she is on Amlodipine, Enalapril-hydrochlorothiazide, labetalol for blood pressure.    She was instructed to hold Enalapril Hydrochlorothiazide if systolic is <95.  She was instructed to return to ED for worsening pain, fever, or other concerns.

## 2021-09-14 NOTE — PATIENT INSTRUCTIONS
Thank you for choosing [facility].  It was a pleasure to see you for your office visit today.   Jian Alatorre MD PhD, Anup Harmon MD,   Lianne Haines, MB[facility],  Lali Pinedo, RN CNP  Yenni Knapp MD    If you had any blood work, imaging or other tests:  Normal test results will be mailed to your home address in a letter.  Abnormal results will be communicated to you via phone call / letter.  Please allow 2 weeks for processing/interpretation of most lab work.  For urgent issues that cannot wait until the next business day, call [phone] and ask for the Pediatric Endocrinologist on call.    RN Care Coordinators (non urgent) Mon- Fri:  Dolores Rosas MS,RN  [phone]  Destiny Gregory [phone]  Please leave a message on one line only. Calls will be returned as soon as possible.  Requests for results will be returned after your physician has been able to review the results.  Main Office: [phone]  Fax: [phone]  Growth Hormone Coordinator:  Angélica Eddy [phone]  Medication renewals: Contact your pharmacy. Allow 3-4 days for completion.     Scheduling:    Pediatric Call Center, [phone]  Infusion Center: [phone] (for stimulation tests)  Radiology/ [phone]     Services:   [phone]     Please try the Passport to [facility] ([facility]) phone application for Virtual Tours, Procedure Preparation, Resources, Preparation for Hospital Stay and the Coloring Board.     MD Instructions:  I recommend continuing the current dose of premarin and medroxyprogesterone.  Please continue to monitor menstrual period frequency and contact my office if Anuradha is having irregular menstrual periods.   no

## 2021-09-19 ENCOUNTER — HEALTH MAINTENANCE LETTER (OUTPATIENT)
Age: 21
End: 2021-09-19

## 2021-09-27 DIAGNOSIS — I15.1 HYPERTENSION SECONDARY TO OTHER RENAL DISORDERS: ICD-10-CM

## 2021-09-27 RX ORDER — LABETALOL 100 MG/1
100 TABLET, FILM COATED ORAL 2 TIMES DAILY
Qty: 180 TABLET | Refills: 6 | Status: SHIPPED | OUTPATIENT
Start: 2021-09-27 | End: 2021-09-27

## 2021-09-27 RX ORDER — LABETALOL 100 MG/1
100 TABLET, FILM COATED ORAL 2 TIMES DAILY
Qty: 180 TABLET | Refills: 6 | Status: SHIPPED | OUTPATIENT
Start: 2021-09-27 | End: 2023-01-09

## 2021-09-27 NOTE — TELEPHONE ENCOUNTER
Refill request received from: [facility]  Medication Requested: Labetalol  Directions:Take 1 tablet by mouth twice a day  Quantity:180  Last Office Visit: 7/17/2020  Next Appointment Scheduled for: -  Last refill: 6/30/21  Sent To:  RN

## 2021-09-28 ENCOUNTER — TELEPHONE (OUTPATIENT)
Dept: TRANSPLANT | Facility: CLINIC | Age: 21
End: 2021-09-28

## 2021-09-28 NOTE — TELEPHONE ENCOUNTER
Spoke to Anuradha, she has been continuing to hold her enalapril since her b/p's have been 100's/70's.  She will get labs in October.  She is feeling great.

## 2021-10-21 DIAGNOSIS — I15.1 HYPERTENSION SECONDARY TO OTHER RENAL DISORDERS: ICD-10-CM

## 2021-10-21 RX ORDER — AMLODIPINE BESYLATE 10 MG/1
10 TABLET ORAL DAILY
Qty: 30 TABLET | Refills: 6 | Status: SHIPPED | OUTPATIENT
Start: 2021-10-21 | End: 2022-06-15

## 2021-10-21 NOTE — TELEPHONE ENCOUNTER
Refill request received from: [facility]  Medication Requested: Amlodipine Besylate 10mg Tab  Directions:Take 1 tablet by mouth every day  Quantity:30  Last Office Visit: 9/3/2021  Next Appointment Scheduled for: JOE  Last refill: 7/22/2021  Sent To:  Provider/Neph Pool

## 2021-11-04 ENCOUNTER — TELEPHONE (OUTPATIENT)
Dept: NEPHROLOGY | Facility: CLINIC | Age: 21
End: 2021-11-04

## 2021-11-04 ENCOUNTER — TELEPHONE (OUTPATIENT)
Dept: TRANSPLANT | Facility: CLINIC | Age: 21
End: 2021-11-04

## 2021-11-04 NOTE — TELEPHONE ENCOUNTER
Spoke to Anuradha. She went to the ED yesterday. They swabbed for strep and sent patient home. Patient is still feeling very sick. Low grade fever, sore throat, congested, swollen lymph nodes. Nesha would like patient to go back to ED. Patient needs an infectious workup done, CRP/CBC/renal panel, respiratory viral swab, EBV DNA PCR, possible rapid Mono test. Also have the ED physician call either myself, or on-call nephrologist. Anuradha will head to the ED in a couple of hours.

## 2021-11-04 NOTE — TELEPHONE ENCOUNTER
"Got a call from ER in [place].    Almaz has been having cough, runny nose and sore throat for a few days. No fevers, no emesis. She went to the ER and I spoke to the NP there.    BP is fine and no edema on exam, she \"overall looks good and it seems like this is a bad cold.\" Negative rapid strep and Monospot test, CBC with leukocytosis, Cr is up to 1.6mg/dL (baseline is about 1.2mg/dL), UA with some leuk esterase and WBCs, pending culture, sent EBV.    Asked NP to also send CMV, send UA for culture, get blood culture, send full respiratory viral panel, give a dose of CTX and send home with Cefdinir. He will encourage good PO intake and f/u with labs and repeat renal panel to trend creatinine in 2 days.    Giselle Hua, DO  Pediatric Nephrology Fellow  "

## 2021-11-22 ENCOUNTER — TELEPHONE (OUTPATIENT)
Dept: TRANSPLANT | Facility: CLINIC | Age: 21
End: 2021-11-22
Payer: COMMERCIAL

## 2021-11-22 NOTE — TELEPHONE ENCOUNTER
Pt left message on my vm over the weekend. Wanted to talk about recent illness. She tested negative for strep but has a very sore throat. She was prescribed antibiotics which did help initially. She tested negative for Covid. Told pt to call back to discuss.

## 2021-11-23 ENCOUNTER — TELEPHONE (OUTPATIENT)
Dept: TRANSPLANT | Facility: CLINIC | Age: 21
End: 2021-11-23
Payer: COMMERCIAL

## 2021-11-23 DIAGNOSIS — Z94.0 KIDNEY REPLACED BY TRANSPLANT: ICD-10-CM

## 2021-11-23 RX ORDER — FERROUS SULFATE 325(65) MG
325 TABLET ORAL 2 TIMES DAILY
Qty: 180 TABLET | Refills: 6 | Status: SHIPPED | OUTPATIENT
Start: 2021-11-23 | End: 2021-11-24

## 2021-11-24 DIAGNOSIS — Z94.0 KIDNEY REPLACED BY TRANSPLANT: ICD-10-CM

## 2021-11-24 RX ORDER — FERROUS SULFATE 325(65) MG
325 TABLET ORAL 2 TIMES DAILY
Qty: 180 TABLET | Refills: 6 | Status: SHIPPED | OUTPATIENT
Start: 2021-11-24 | End: 2022-11-29

## 2021-11-30 ENCOUNTER — VIRTUAL VISIT (OUTPATIENT)
Dept: PEDIATRIC HEMATOLOGY/ONCOLOGY | Facility: CLINIC | Age: 21
End: 2021-11-30
Attending: NURSE PRACTITIONER
Payer: COMMERCIAL

## 2021-11-30 DIAGNOSIS — Z92.21 STATUS POST CHEMOTHERAPY: ICD-10-CM

## 2021-11-30 DIAGNOSIS — Z85.528 H/O WILMS' TUMOR: Primary | ICD-10-CM

## 2021-11-30 DIAGNOSIS — Z92.3 S/P RADIATION THERAPY: ICD-10-CM

## 2021-11-30 PROCEDURE — 99215 OFFICE O/P EST HI 40 MIN: CPT | Mod: GT | Performed by: NURSE PRACTITIONER

## 2021-11-30 NOTE — PROGRESS NOTES
[facility]  Gynecology Consult Telephone Visit    Anuradha Pearson is a 21 year old female who is being evaluated via a billable telephone visit.    Patient opted to conduct today's return visit via telephone secondary to the COVID-19 pandemic vs. an in person visit to the clinic.    I spoke with: Anuradha Pearson    SUBJECTIVE    Anuradha Pearson is a 21 year old G0 who is here for a birth control consult.     Anuradha has had a kidney transplant because of a Wilms tumor diagnosed at age 2. She states that she believes she needed treatment to help her enter puberty, which happened around age 15 or so. When she started having periods, they were monthly, but they were quite painful, so she was given birth control around age 17 to help with this. Now she takes seasonique. Her periods are still painful and she manages with tylenol. They last 3-4 days.     Anuradha hasn't thought too much about whether she wants children. She has thought a little bit potentially about adoption. She was told she can't have her own biologic children.    She has never had a pap smear. She believes she has received the HPV vaccine previously.     PAST MEDICAL HISTORY  Past Medical History:   Diagnosis Date     H/O kidney transplant      Hypertension      Wilm's tumor        MEDICATIONS  Current Outpatient Medications   Medication     amLODIPine (NORVASC) 10 MG tablet     amoxicillin (AMOXIL) 500 MG capsule     azaTHIOprine (IMURAN) 50 MG tablet     calcium carbonate-vitamin D (OS-DEE) 500-400 MG-UNIT tablet     ferrous sulfate (FEROSUL) 325 (65 Fe) MG tablet     labetalol (NORMODYNE) 100 MG tablet     levonorgest-eth estrad 91-Day (SEASONIQUE) 0.15-0.03 &0.01 MG tablet     magnesium oxide (MAG-OX) 400 (241.3 Mg) MG tablet     Multiple Vitamins-Minerals (WOMENS MULTIVITAMIN PO)     tacrolimus (GENERIC EQUIVALENT) 0.5 MG capsule     No current facility-administered medications for this visit.       ALLERGIES  Allergies    Allergen Reactions     Ibuprofen Other (See Comments)     Avoid nephrotoxic medications as needed due to kidney transplant status     Shellfish-Derived Products Nausea and Vomiting     Vancomycin      Uses Benadryl Prior to administration       OB History   No obstetric history on file.       GYN HISTORY  Menarche around age 15. Required hormonal treatment to enter puberty per Anuradha's report.   Uses Seasonique for contraception.  No STD history.  Has never had a pap smear.     PAST SURGICAL HISTORY  Past Surgical History:   Procedure Laterality Date     APPENDECTOMY       CHOLECYSTECTOMY  05/20/2003     ENT SURGERY       OPEN REDUCTION INTERNAL FIXATION RODDING INTRAMEDULLARY FEMUR  8/20/2012    Procedure: OPEN REDUCTION INTERNAL FIXATION RODDING INTRAMEDULLARY FEMUR;  Closed Reduction Internal Fixation Right Femur;  Surgeon: Catracho Hook MD;  Location: [facility] OR     REVISE SCAR TRUNK Right 11/6/2015    Procedure: REVISE SCAR TRUNK;  Surgeon: COOPER Anderson MD;  Location: [facility] OR     right nephrectomy  05/19/2003    due to Wilms tumor; performed by Dr. Myles Velez at [facility]     TONSILLECTOMY  01/31/2005     TRANSPLANT KIDNEY RECIPIENT LIVING RELATED CHILD  11/09/2004    left nephrectomy performed at time of transplant       SOCIAL HISTORY  Social History     Tobacco Use     Smoking status: Never Smoker     Smokeless tobacco: Never Used   Substance Use Topics     Alcohol use: No     Drug use: No       FAMILY HISTORY  Family History   Problem Relation Age of Onset     Hypertension Father        REVIEW OF SYSTEMS  A 10 point review of systems including Constitutional, Eyes, Respiratory, Cardiovascular, Gastroenterology, Genitourinary, Integumentary, Musculoskeletal, and Psychiatric, were all negative, except for pertinent positives noted in the above HPI.    OBJECTIVE  No physical examination was perforemd during this virtual visit.    Gen: Anuradha was speaking in fluent sentences  without distress during our encounter.    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     1. Premature ovarian failure:   Anuradha and I discussed that her clinical history is concerning for premature ovarian failure, given the chemotherapy and radiation she received as a child and need for treatment to bring about puberty. We discussed the use of FSH and estradiol testing in the evaluation of POF. Her previous testing is inconsistent with POF, but may have been done while she was taking oral contraceptives which will falsely lower/normalize FSH.     We discussed to confirm the diagnosis for her to help her plan for family building in the future, I would recommend checking an FSH after a week-long pill-free interval. Anuradha lives far away; we will try to coordinate a future visit with this pill-free interval where she can have this bloodwork performed.    We otherwise discussed that hormone replacement in young individuals is important for prevention of osteoporosis, heart disease, and all-cause mortality. An oral contraceptive is an excellent method for this, and Anuradha is otherwise happy with this method. I have provided her with refills of her birth control pill.    2. Pelvic Pain:   I offered Anuradha an ultrasound to investigate for causes of the pain with menses. We will also coordinate this with an upcoming office visit.     3. Well-woman care:  I discussed that she should have a pelvic exam and pap smear for cervical cancer screening now that she is 21 years old, and a solid organ transplant recipient. She will make an appointment at her convenience. I asked her to confirm that she has received HPV vaccination, and otherwise encouraged her to receive the series.    Anuradha will  "return for an annual examination, ultrasound, and lab testing at her convenience.    An after visit summary was emailed to: [email or web address] with Anuradha's permission.    The patient was notified of following prior to conducting the telephone visit:   \"This telephone visit will be conducted via a call between you and your physician/provider. We have found that certain health care needs can be provided without the need for a physical exam. This service lets us provide the care you need with a short phone conversation. If a prescription is necessary we can send it directly to your pharmacy. If lab work is needed we can place an order for that and you can then stop by our lab to have the test done at a later time. If during the course of the call the physician/provider feels a telephone visit is not appropriate, you will not be charged for this service.\"     Phone call start: 228PM  Phone call end: 250PM  Phone call duration:  22 minutes    I spent 60 minutes on the date of the encounter doing chart review/review of test results/interpretation of tests/on the visit with the patient/documentation.    Sejal Webb MD, MSCI    [facility]/OB[facility]    "

## 2021-11-30 NOTE — PROGRESS NOTES
Shraddha is a 21 year old who is being evaluated via a billable video visit.      How would you like to obtain your AVS? MyChart  If the video visit is dropped, the invitation should be resent by: Send to e-mail at: [email or web address]  Will anyone else be joining your video visit? No    Video Start Time: 2:37pm       Video-Visit Details    Type of service:  Video Visit    Video End Time:3:08 pm    Originating Location (pt. Location): Home    Distant Location (provider location):  [facility]     Platform used for Video Visit: [employer]         LONG-TERM FOLLOW-UP CLINIC      We had the pleasure of seeing your patient, Shraddha Pearson, at the [facility]Term [facility]Up [facility] for childhood cancer survivors.  Shraddha was referred to us by Dr. Ingrid Constantino for ongoing management and long-term follow up of her Wilms tumor and associated chemotherapy.  The following is a summary of your patient's cancer, therapy, current history, and physical findings followed by assessment and long-term followup recommendations.      THERAPY ACCORDING TO AVAILABLE RECORDS:  Shraddha Pearson is a now 21-year-old  female with a history of stage IV favorable histology Wilms tumor that was diagnosed on 05/19/2003 at 2 1/2 years of age.  She initially presented with disease in her right kidney as well as her lungs.  She had chemotherapy, radiation, and surgery as part of her treatment.  Her initial surgery was on  05/19/2003 in which she had a right nephrectomy.  Unfortunately, she had some surgical complications which caused her to lose blood supply to the left kidney, and she also lost that one as well.  Her surgeries in detail are as follows:   1. Right nephrectomy on 05/19/2003 by Dr. Myles Velez at the [facility].   2. Cadaveric saphenous vein graft to IVC on 05/20/2003.   3. Cholecystectomy on 05/20/2003.   4. Left  nephrectomy and living donor renal transplant from patient's father on 11/14/2004.   5. Tonsillectomy on 01/31/2005.   6. Liver biopsy on 01/31/2005.   7. Left renal biopsy on 11/07/2005.      Shraddha received the following chemotherapies as part of her treatment:   1. Vincristine intravenously.   2. Actinomycin D intravenously.   3. Doxorubicin intravenously with a cumulative dose of 128 mg/m2.      Radiation therapy is as follows:   1. Whole abdomen radiation for a total dose of 1080 cGy.      Shraddha's acute and chronic late effects known to date include:   1. 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   2. 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   3. Idiopathic intracranial hypertension 05/01/2007, resolved.   4. Recurrent UTIs and pyelonephritis, resolved.   5. Hemorrhagic gastritis 04/2007, resolved.   6. Viral meningitis 10/05/2008, resolved.   7. Elevated EBV titers requiring rituximab infusion for 2 doses in March and August 2005.   8.  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  Growth deceleration       HISTORY OF PRESENT ILLNESS:  Shraddha report to the visit today via video.  She has been sick a few times recently.  She was seen in the ED here on 11/20/21 for fever, dehydration.  She was put on antibiotics for an infection.  In addition she was also see on 11/4/21 for a likely bacterial infection in her upper airway.  She said that several of her friends also had the same illness.  She did have COVID-19 diagnosed on 9/24/21.  She had to be hospitalized due to her immunocompromised status and fever.  She did get IV antibiotics for a few days as well for prophylaxis.  She has since recovered from all 3 of these recent infections.  She has completed her last dose of antibiotics after her 11/20/21 ED trip.  She is currently in [place] visiting family but she did buy a house in [place] close to [place] that is more convenient for her horse shows and rodeo in the winter.  She  is planning to be in [place] until after [place].  She completed college with a degree in Equine Science.  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 No issues with stomach pain.  Urination is normal.  No N/V/D/C.  No issues with breathing.  She is wondering about her BP.  She did have low BP around the time of her illness so she talked with nephrology about holding her enalapril.  She did start it back when her BP increased but now she is wondering if she should hold it again.  She has been getting some muscle cramping since restarting.  She has a visit with nephrology tomorrow.  She also has a visit with women's health tomorrow as well to discuss her hormone replacement therapy.   She has ongoing dental follow up and will be seeing them soon.   Her gingival hyperplasia resolved now that she has been on tacrolimus. No more issues with sleeping.  Completed gardasil vaccines and got Bexsero in past.  Considering the COVID vaccine but hasn't gotten it yet.  Had an echo in 2019.  Has yearly eye exam.       REVIEW OF SYSTEMS:   General:  No acute distress  Skin: negative  Eyes: glasses  Ears/Nose/Throat: gingival hyperplasia- resolved;   Respiratory: No shortness of breath, dyspnea on exertion, cough, or hemoptysis  Cardiovascular: negative  Gastrointestinal: negative  Genitourinary: s/p kidney transplant  Musculoskeletal: left broken clavicle 4/2019  Neurologic: negative  Psychiatric: negative  Hematologic/Lymphatic/Immunologic: negative  Endocrine: ovarian failure and growth deceleration       IMMUNIZATIONS:  Up to date per parents      SOCIAL HISTORY:  Shraddha has completed her 2 year degree in Equine Science.  She is very active,  riding horses. Parents report they have cleared her horseback riding with the renal transplant team.        FAMILY HISTORY:  Maternal grandmother with a history of heart valve disease, carotid artery obstruction, and congestive heart failure in her 50s.  Maternal grandfather  with cardiac bypass in his 60s.  Paternal grandmother with history of hypertension in her 50s.  Maternal aunt with history of diabetes in her 50s.  Paternal great-grandfather with colon cancer at age 68.  Maternal  great-grandfather with history of colon cancer at 60.  Father with hypertension. No changes.      CURRENT MEDICATIONS:     Current Outpatient Medications   Medication     amLODIPine (NORVASC) 10 MG tablet     amoxicillin (AMOXIL) 500 MG capsule     azaTHIOprine (IMURAN) 50 MG tablet     calcium carbonate-vitamin D (OS-DEE) 500-400 MG-UNIT tablet     ferrous sulfate (FEROSUL) 325 (65 Fe) MG tablet     labetalol (NORMODYNE) 100 MG tablet     levonorgest-eth estrad 91-Day (SEASONIQUE) 0.15-0.03 &0.01 MG tablet     magnesium oxide (MAG-OX) 400 (241.3 Mg) MG tablet     Multiple Vitamins-Minerals (WOMENS MULTIVITAMIN PO)     tacrolimus (GENERIC EQUIVALENT) 0.5 MG capsule     No current facility-administered medications for this visit.        ALLERGIES:  Vancomycin which causes Ramin syndrome.      PHYSICAL EXAMINATION:   VITAL SIGNS: There were no vitals taken for this visit.       Physical Exam   GENERAL: Healthy, alert and no distress  EYES: Eyes grossly normal to inspection.  No discharge or erythema, or obvious scleral/conjunctival abnormalities.  HENT: Normal cephalic/atraumatic.  External ears, nose and mouth without ulcers or lesions.  No nasal drainage visible.  NECK: No asymmetry, visible masses or scars  RESP: No audible wheeze, cough, or visible cyanosis.  No visible retractions or increased work of breathing.    MS: No gross musculoskeletal defects noted.  Normal range of motion.  No visible edema.  SKIN: Visible skin clear. No significant rash, abnormal pigmentation or lesions.  NEURO: Cranial nerves grossly intact.  Mentation and speech appropriate for age.  PSYCH: Mentation appears normal, affect normal/bright, judgement and insight intact, normal speech and appearance well-groomed.        Wt  "Readings from Last 3 Encounters:   09/05/21 53.1 kg (117 lb 1 oz)   07/23/19 57 kg (125 lb 10.6 oz) (48 %, Z= -0.05)*   07/23/19 56.8 kg (125 lb 3.5 oz) (47 %, Z= -0.07)*     * Growth percentiles are based on CDC (Girls, 2-20 Years) data.     Ht Readings from Last 2 Encounters:   09/04/21 1.49 m (4' 10.66\")   07/23/19 1.483 m (4' 10.39\") (1 %, Z= -2.30)*     * Growth percentiles are based on CDC (Girls, 2-20 Years) data.     No height and weight on file for this encounter.       LABORATORY AND DIAGNOSTIC STUDIES:   Reviewed labs in [facility] from last few months.    ASSESSMENT, PLAN, AND LONG-TERM FOLLOWUP RECOMMENDATIONS:  Shraddha Pearson is a now 21-year-old  female with a history of stage IV favorable histology Wilms tumor who is status post renal transplant approximately 17 years ago.  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  She also has some macrocytosis that is likely related to her antirejection medications, work up completed a few years ago.  She is mildly anemic.  She has been having issues with her BP lately and some cramps.  I recommended holding one dose of enalapril and discussing her symptoms at her appt tomorrow.   The following are our late effects recommendations:   1. Risk of recurrence:  Shraddha is currently 17 years status post end of her treatment from her Wilms tumor.  Given the distance from the diagnosis of her primary malignancy, the likelihood of recurrence at this point would be extremely low.   2. Psychosocial effects:  Shraddha appears to be doing very well socially, as well as with her school performance.  We do not believe she is having any difficulties in this area.  3. Renal transplant:  Shraddha is currently followed by  Pediatric Nephrology, and we recommend continued follow up and management of her renal transplant.  Continue to report any signs of bowel obstruction related to her previous surgeries and new medications. Drink plenty of water.  Any " medications the patient decides to take should be cleared with her renal transplant team.  Additionally, Shraddha should clear any major activity changes with her renal transplant team as well.    4. Risk for cardiac toxicity:  Shraddha has a history of 128 mg/m2 of doxorubicin, as well as whole abdomen radiation, which can predispose her to late cardiac difficulties, including dilated cardiomyopathy.  Shraddha had an echocardiogram completed in 2019 which was within normal limits. She needs to have surveillance echos from an oncology perspective every 5 years based on the new updated guidelines.  Next due in 2024.  May be sooner for nephrology.  5. Female issues related to fertility:  Shraddha has a history of whole abdomen radiation in which her ovaries were included in the field.  This may potentially have an impact on her future fertility.  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  Fertility potential would be low given this diagnosis.  She will have continued follow up with adult GYN.  Appt tomorrow.  6. Risk for peripheral neuropathy:  Shraddha has a history of vincristine exposure which can predispose her to having issues with ongoing peripheral neuropathy.  She did have problems with foot drop during treatment.  However, this is resolved, and she is doing well.   7. Growth and development:  Shraddha's height has drifted slightly down to below the 5th percentile over the past several years.  She is following up with endocrine regularly.   She has reached her final adult height.   8. Risk for secondary malignancies:  We asked that Shraddha wear sunscreen when she is outdoors due to her increased risk of skin cancer from radiation.  Additionally, should she noted any new mass, lump or area of concern, particularly in the radiation field, we would recommend prompt evaluation. New guidelines recommend starting early colon cancer screening when she is 30 years old due to her  abdominal radiation.  9.  EBV viremia:  Shraddha has been having rising EBV levels since 2013.  Last assessment was improved . Continue to monitor EBV level monthly given her risk for PTLD.  She will have continued EBV labs followed by nephrology.    11.  Macrocytosis:  Shraddha has been having a rising MCV.  She has had some mild chronic anemia.  Work up in 2016 and this is likely related to her antirejection meds.  12.  Insomnia:  Resolved  13.  Immunizations:  Not able to get live virus vaccines due to immunosuppression.  Considering the covid vaccine and I would recommend that she get this.    It was a pleasure to see Shraddha in our Long-Term Follow-Up Clinic today.  We certainly appreciate the opportunity to participate in your patient's care.  If you have any questions or concerns, please do not hesitate to contact us at [phone].   We ask that Shraddha return to our clinic in one year for followup.  We will try to coordinate this on a day that she is seeing nephrology.            Dorcas Samayoa MSN, APRN, CPNP-AC, CPON  Department of Pediatrics  Division of Hematology/Oncology    Review of external notes as documented elsewhere in note  Total time spent on the following services on the date of the encounter:  Preparing to see patient, chart review, review of outside records, Interpretation of labs, imaging and other tests, Counseling and educating the patient/family/caregiver , Documenting clinical information in the electronic or other health record  and Total time spent: 40 min

## 2021-11-30 NOTE — LETTER
11/30/2021      RE: Shraddha Pearson  [id number]  [facility] [id number]       LONG-TERM FOLLOW-UP CLINIC      We had the pleasure of seeing your patient, Shraddha Pearson, at the [facility] Long[facility]Term Follow[facility]Up [facility] for childhood cancer survivors.  Shraddha was referred to us by Dr. Ingrid Constantino for ongoing management and long-term follow up of her Wilms tumor and associated chemotherapy.  The following is a summary of your patient's cancer, therapy, current history, and physical findings followed by assessment and long-term followup recommendations.      THERAPY ACCORDING TO AVAILABLE RECORDS:  Shraddha Pearson is a now 21-year-old  female with a history of stage IV favorable histology Wilms tumor that was diagnosed on 05/19/2003 at 2 1/2 years of age.  She initially presented with disease in her right kidney as well as her lungs.  She had chemotherapy, radiation, and surgery as part of her treatment.  Her initial surgery was on  05/19/2003 in which she had a right nephrectomy.  Unfortunately, she had some surgical complications which caused her to lose blood supply to the left kidney, and she also lost that one as well.  Her surgeries in detail are as follows:   1. Right nephrectomy on 05/19/2003 by Dr. Myles Velez at the [facility].   2. Cadaveric saphenous vein graft to IVC on 05/20/2003.   3. Cholecystectomy on 05/20/2003.   4. Left nephrectomy and living donor renal transplant from patient's father on 11/14/2004.   5. Tonsillectomy on 01/31/2005.   6. Liver biopsy on 01/31/2005.   7. Left renal biopsy on 11/07/2005.      Shraddha received the following chemotherapies as part of her treatment:   1. Vincristine intravenously.   2. Actinomycin D intravenously.   3. Doxorubicin intravenously with a cumulative dose of 128 mg/m2.      Radiation therapy is as follows:   1. Whole abdomen radiation for a total dose of 1080 cGy.       Shraddha's acute and chronic late effects known to date include:   1. 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   2. 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   3. Idiopathic intracranial hypertension 05/01/2007, resolved.   4. Recurrent UTIs and pyelonephritis, resolved.   5. Hemorrhagic gastritis 04/2007, resolved.   6. Viral meningitis 10/05/2008, resolved.   7. Elevated EBV titers requiring rituximab infusion for 2 doses in March and August 2005.   8.  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  Growth deceleration       HISTORY OF PRESENT ILLNESS:  Shraddha report to the visit today via video.  She has been sick a few times recently.  She was seen in the ED here on 11/20/21 for fever, dehydration.  She was put on antibiotics for an infection.  In addition she was also see on 11/4/21 for a likely bacterial infection in her upper airway.  She said that several of her friends also had the same illness.  She did have COVID-19 diagnosed on 9/24/21.  She had to be hospitalized due to her immunocompromised status and fever.  She did get IV antibiotics for a few days as well for prophylaxis.  She has since recovered from all 3 of these recent infections.  She has completed her last dose of antibiotics after her 11/20/21 ED trip.  She is currently in [place] visiting family but she did buy a house in [place] close to [place] that is more convenient for her horse shows and rodeo in the winter.  She is planning to be in [place] until after Christmas.  She completed college with a degree in Equine Science.  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 No issues with stomach pain.  Urination is normal.  No N/V/D/C.  No issues with breathing.  She is wondering about her BP.  She did have low BP around the time of her illness so she talked with nephrology about holding her enalapril.  She did start it back when her BP increased but now she is wondering if she should hold it again.   She has been getting some muscle cramping since restarting.  She has a visit with nephrology tomorrow.  She also has a visit with women's health tomorrow as well to discuss her hormone replacement therapy.   She has ongoing dental follow up and will be seeing them soon.   Her gingival hyperplasia resolved now that she has been on tacrolimus. No more issues with sleeping.  Completed gardasil vaccines and got Bexsero in past.  Considering the COVID vaccine but hasn't gotten it yet.  Had an echo in 2019.  Has yearly eye exam.       REVIEW OF SYSTEMS:   General:  No acute distress  Skin: negative  Eyes: glasses  Ears/Nose/Throat: gingival hyperplasia- resolved;   Respiratory: No shortness of breath, dyspnea on exertion, cough, or hemoptysis  Cardiovascular: negative  Gastrointestinal: negative  Genitourinary: s/p kidney transplant  Musculoskeletal: left broken clavicle 4/2019  Neurologic: negative  Psychiatric: negative  Hematologic/Lymphatic/Immunologic: negative  Endocrine: ovarian failure and growth deceleration       IMMUNIZATIONS:  Up to date per parents      SOCIAL HISTORY:  Shraddha has completed her 2 year degree in Equine Science.  She is very active,  riding horses. Parents report they have cleared her horseback riding with the renal transplant team.        FAMILY HISTORY:  Maternal grandmother with a history of heart valve disease, carotid artery obstruction, and congestive heart failure in her 50s.  Maternal grandfather with cardiac bypass in his 60s.  Paternal grandmother with history of hypertension in her 50s.  Maternal aunt with history of diabetes in her 50s.  Paternal great-grandfather with colon cancer at age 68.  Maternal  great-grandfather with history of colon cancer at 60.  Father with hypertension. No changes.      CURRENT MEDICATIONS:     Current Outpatient Medications   Medication     amLODIPine (NORVASC) 10 MG tablet     amoxicillin (AMOXIL) 500 MG capsule     azaTHIOprine (IMURAN) 50 MG tablet  "    calcium carbonate-vitamin D (OS-DEE) 500-400 MG-UNIT tablet     ferrous sulfate (FEROSUL) 325 (65 Fe) MG tablet     labetalol (NORMODYNE) 100 MG tablet     levonorgest-eth estrad 91-Day (SEASONIQUE) 0.15-0.03 &0.01 MG tablet     magnesium oxide (MAG-OX) 400 (241.3 Mg) MG tablet     Multiple Vitamins-Minerals (WOMENS MULTIVITAMIN PO)     tacrolimus (GENERIC EQUIVALENT) 0.5 MG capsule     No current facility-administered medications for this visit.        ALLERGIES:  Vancomycin which causes Ramin syndrome.      PHYSICAL EXAMINATION:   VITAL SIGNS: There were no vitals taken for this visit.       Physical Exam   GENERAL: Healthy, alert and no distress  EYES: Eyes grossly normal to inspection.  No discharge or erythema, or obvious scleral/conjunctival abnormalities.  HENT: Normal cephalic/atraumatic.  External ears, nose and mouth without ulcers or lesions.  No nasal drainage visible.  NECK: No asymmetry, visible masses or scars  RESP: No audible wheeze, cough, or visible cyanosis.  No visible retractions or increased work of breathing.    MS: No gross musculoskeletal defects noted.  Normal range of motion.  No visible edema.  SKIN: Visible skin clear. No significant rash, abnormal pigmentation or lesions.  NEURO: Cranial nerves grossly intact.  Mentation and speech appropriate for age.  PSYCH: Mentation appears normal, affect normal/bright, judgement and insight intact, normal speech and appearance well-groomed.        Wt Readings from Last 3 Encounters:   09/05/21 53.1 kg (117 lb 1 oz)   07/23/19 57 kg (125 lb 10.6 oz) (48 %, Z= -0.05)*   07/23/19 56.8 kg (125 lb 3.5 oz) (47 %, Z= -0.07)*     * Growth percentiles are based on CDC (Girls, 2-20 Years) data.     Ht Readings from Last 2 Encounters:   09/04/21 1.49 m (4' 10.66\")   07/23/19 1.483 m (4' 10.39\") (1 %, Z= -2.30)*     * Growth percentiles are based on CDC (Girls, 2-20 Years) data.     No height and weight on file for this encounter.       LABORATORY AND " DIAGNOSTIC STUDIES:   Reviewed labs in [facility] from last few months.    ASSESSMENT, PLAN, AND LONG-TERM FOLLOWUP RECOMMENDATIONS:  Shraddha Pearson is a now 21-year-old  female with a history of stage IV favorable histology Wilms tumor who is status post renal transplant approximately 17 years ago.  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  She also has some macrocytosis that is likely related to her antirejection medications, work up completed a few years ago.  She is mildly anemic.  She has been having issues with her BP lately and some cramps.  I recommended holding one dose of enalapril and discussing her symptoms at her appt tomorrow.   The following are our late effects recommendations:   1. Risk of recurrence:  Shraddha is currently 17 years status post end of her treatment from her Wilms tumor.  Given the distance from the diagnosis of her primary malignancy, the likelihood of recurrence at this point would be extremely low.   2. Psychosocial effects:  Shraddha appears to be doing very well socially, as well as with her school performance.  We do not believe she is having any difficulties in this area.  3. Renal transplant:  Shraddha is currently followed by  Pediatric Nephrology, and we recommend continued follow up and management of her renal transplant.  Continue to report any signs of bowel obstruction related to her previous surgeries and new medications. Drink plenty of water.  Any medications the patient decides to take should be cleared with her renal transplant team.  Additionally, Shraddha should clear any major activity changes with her renal transplant team as well.    4. Risk for cardiac toxicity:  Shraddha has a history of 128 mg/m2 of doxorubicin, as well as whole abdomen radiation, which can predispose her to late cardiac difficulties, including dilated cardiomyopathy.  Shraddha had an echocardiogram completed in 2019 which was within normal limits. She needs to have  surveillance echos from an oncology perspective every 5 years based on the new updated guidelines.  Next due in 2024.  May be sooner for nephrology.  5. Female issues related to fertility:  Shraddha has a history of whole abdomen radiation in which her ovaries were included in the field.  This may potentially have an impact on her future fertility.  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  Fertility potential would be low given this diagnosis.  She will have continued follow up with adult GYN.  Appt tomorrow.  6. Risk for peripheral neuropathy:  Shraddha has a history of vincristine exposure which can predispose her to having issues with ongoing peripheral neuropathy.  She did have problems with foot drop during treatment.  However, this is resolved, and she is doing well.   7. Growth and development:  Shraddha's height has drifted slightly down to below the 5th percentile over the past several years.  She is following up with endocrine regularly.   She has reached her final adult height.   8. Risk for secondary malignancies:  We asked that Shraddha wear sunscreen when she is outdoors due to her increased risk of skin cancer from radiation.  Additionally, should she noted any new mass, lump or area of concern, particularly in the radiation field, we would recommend prompt evaluation. New guidelines recommend starting early colon cancer screening when she is 30 years old due to her abdominal radiation.  9.  EBV viremia:  Shraddha has been having rising EBV levels since 2013.  Last assessment was improved . Continue to monitor EBV level monthly given her risk for PTLD.  She will have continued EBV labs followed by nephrology.    11.  Macrocytosis:  Shraddha has been having a rising MCV.  She has had some mild chronic anemia.  Work up in 2016 and this is likely related to her antirejection meds.  12.  Insomnia:  Resolved  13.  Immunizations:  Not able to get live virus vaccines due  to immunosuppression.  Considering the covid vaccine and I would recommend that she get this.    It was a pleasure to see Shraddha in our Long-Term Follow-Up Clinic today.  We certainly appreciate the opportunity to participate in your patient's care.  If you have any questions or concerns, please do not hesitate to contact us at [phone].   We ask that Shraddha return to our clinic in one year for followup.  We will try to coordinate this on a day that she is seeing nephrology.            Dorcas Samayoa MSN, APRN, CPNP-AC, CPON  Department of Pediatrics  Division of Hematology/Oncology    Review of external notes as documented elsewhere in note  Total time spent on the following services on the date of the encounter:  Preparing to see patient, chart review, review of outside records, Interpretation of labs, imaging and other tests, Counseling and educating the patient/family/caregiver , Documenting clinical information in the electronic or other health record  and Total time spent: 40 min    JASMYNE Trevino CNP

## 2021-11-30 NOTE — NURSING NOTE
"Anuradha Pearson is a 21 year old female who is being evaluated via a billable video visit.      The patient has been notified of following:     \"This video visit will be conducted via a call between you and your physician/provider. We have found that certain health care needs can be provided without the need for an in-person physical exam.  This service lets us provide the care you need with a video conversation.  If a prescription is necessary we can send it directly to your pharmacy.  If lab work is needed we can place an order for that and you can then stop by our lab to have the test done at a later time.    If during the course of the call the physician/provider feels a video visit is not appropriate, you will not be charged for this service.\"     Patient has given verbal consent for Video visit? Yes    Patient would like the video invitation sent by: Text to cell phone: [phone]    Video Start Time: 2:32 PM    Anuradha Pearson complains of    Chief Complaint   Patient presents with     RECHECK     Pt here for Wilm's tumour       Data Unavailable  Data Unavailable      I have reviewed and updated the patient's Past Medical History, Social History, Family History and Medication List.    ALLERGIES  Ibuprofen, Shellfish-derived products, and Vancomycin    "

## 2021-12-01 ENCOUNTER — VIRTUAL VISIT (OUTPATIENT)
Dept: NEPHROLOGY | Facility: CLINIC | Age: 21
End: 2021-12-01
Attending: PEDIATRICS
Payer: COMMERCIAL

## 2021-12-01 ENCOUNTER — VIRTUAL VISIT (OUTPATIENT)
Dept: TRANSPLANT | Facility: CLINIC | Age: 21
End: 2021-12-01
Attending: NURSE PRACTITIONER
Payer: COMMERCIAL

## 2021-12-01 ENCOUNTER — VIRTUAL VISIT (OUTPATIENT)
Dept: OBGYN | Facility: CLINIC | Age: 21
End: 2021-12-01
Attending: OBSTETRICS & GYNECOLOGY
Payer: COMMERCIAL

## 2021-12-01 DIAGNOSIS — E28.39 PRIMARY OVARIAN FAILURE: ICD-10-CM

## 2021-12-01 DIAGNOSIS — J32.9 OTHER SINUSITIS, UNSPECIFIED CHRONICITY: ICD-10-CM

## 2021-12-01 DIAGNOSIS — I15.1 HYPERTENSION SECONDARY TO OTHER RENAL DISORDERS: Primary | ICD-10-CM

## 2021-12-01 DIAGNOSIS — Z30.09 GENERAL COUNSELING FOR PRESCRIPTION OF ORAL CONTRACEPTIVES: Primary | ICD-10-CM

## 2021-12-01 DIAGNOSIS — N94.6 MENSES PAINFUL: ICD-10-CM

## 2021-12-01 DIAGNOSIS — Z71.87 COUNSELING FOR TRANSITION FROM PEDIATRIC TO ADULT CARE PROVIDER: Primary | ICD-10-CM

## 2021-12-01 PROCEDURE — 99214 OFFICE O/P EST MOD 30 MIN: CPT | Mod: GT | Performed by: NURSE PRACTITIONER

## 2021-12-01 PROCEDURE — 99205 OFFICE O/P NEW HI 60 MIN: CPT | Mod: TEL | Performed by: OBSTETRICS & GYNECOLOGY

## 2021-12-01 PROCEDURE — 99214 OFFICE O/P EST MOD 30 MIN: CPT | Mod: GT | Performed by: PEDIATRICS

## 2021-12-01 RX ORDER — ENALAPRIL MALEATE AND HYDROCHLOROTHIAZIDE 5; 12.5 MG/1; MG/1
1 TABLET ORAL DAILY
Qty: 30 TABLET | Refills: 11 | COMMUNITY
Start: 2021-12-01 | End: 2021-12-01

## 2021-12-01 RX ORDER — LEVONORGESTREL / ETHINYL ESTRADIOL AND ETHINYL ESTRADIOL 150-30(84)
1 KIT ORAL DAILY
Qty: 91 TABLET | Refills: 3 | Status: SHIPPED | OUTPATIENT
Start: 2021-12-01 | End: 2022-12-05

## 2021-12-01 RX ORDER — ENALAPRIL MALEATE 5 MG/1
5 TABLET ORAL DAILY
Qty: 30 TABLET | Refills: 4 | Status: SHIPPED | OUTPATIENT
Start: 2021-12-01 | End: 2021-12-28

## 2021-12-01 NOTE — LETTER
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 Her post-transplant course has been complicated by infections (viral meningitis, EBV viremia, warts), pseudotumor cerebri, and fracture of her right femur.  Anuradha is a 21 year old female who attended the video visit by herself.    Start Time: 3:01  Stop Time: 3:13      Since her last visit with me in 2020, she has had several illnesses.  She was admitted at [facility] in September 2021 with COVID and a sinus infection.  She was recently treated for a tonsillitis in [place] and required two courses of antibiotics.     She also feels like she has never been able to breathe through her nose.     After her hospitalization in September 2021, her enalapril-hydrochlorothiazide was held due to low     Review of Systems:  A comprehensive review of systems was performed and found to be negative other than noted in the HPI.    Allergies:  Anuradha is allergic to ibuprofen, shellfish-derived products, and vancomycin..    Active Medications:  Current Outpatient Medications   Medication Sig Dispense Refill     enalapril (VASOTEC) 5 MG tablet Take 1 tablet (5 mg) by mouth daily 30 tablet 4     amLODIPine (NORVASC) 10 MG tablet Take 1 tablet (10 mg) by mouth daily 30 tablet 6     amoxicillin (AMOXIL) 500 MG capsule Take 4 caps (2 grams) one hour prior to dental procedure. 12 capsule 3     azaTHIOprine (IMURAN) 50 MG tablet Take 1.5 tablets (75 mg) by mouth daily 140 tablet 11     calcium carbonate-vitamin D  (OS-DEE) 500-400 MG-UNIT tablet Take 1 tablet by mouth daily (Patient not taking: Reported on 12/1/2021) 30 tablet 11     ferrous sulfate (FEROSUL) 325 (65 Fe) MG tablet Take 1 tablet (325 mg) by mouth 2 times daily 180 tablet 6     labetalol (NORMODYNE) 100 MG tablet Take 1 tablet (100 mg) by mouth 2 times daily 180 tablet 6     levonorgest-eth estrad 91-Day (SEASONIQUE) 0.15-0.03 &0.01 MG tablet Take 1 tablet by mouth daily 91 tablet 3     magnesium oxide (MAG-OX) 400 (241.3 Mg) MG tablet Take 1 tablet (400 mg) by mouth 2 times daily 60 tablet 11     Multiple Vitamins-Minerals (WOMENS MULTIVITAMIN PO) Take 1 tablet by mouth daily       tacrolimus (GENERIC EQUIVALENT) 0.5 MG capsule Please take 4 capsules (2mg) by mouth in am and take 3 capsules (1.5mg) in the evening 210 capsule 6        Immunizations:  Immunization History   Administered Date(s) Administered     HEPA 08/04/2004     HPV9 04/10/2018, 07/17/2018, 01/15/2019     HepB 2000, 2000, 06/21/2001     Hib (PRP-T) 2000, 2000, 06/21/2001     Historical DTP/aP 2000, 2000, 02/28/2001, 12/06/2001     Influenza (H1N1) 10/27/2009     Influenza (IIV3) PF 10/25/2005, 02/09/2007, 11/09/2007, 01/22/2009, 10/16/2009, 11/03/2010, 09/19/2011, 09/10/2012, 01/03/2014, 12/01/2014     Influenza Vaccine IM > 6 months Valent IIV4 (Alfuria,Fluzone) 10/26/2015, 01/09/2017, 01/29/2018, 11/13/2018     Influenza Vaccine Im 4yrs+ 4 Valent CCIIV4 12/20/2019     MMR 06/21/2001, 06/21/2018     Mantoux Tuberculin Skin Test 08/04/2004     Meningococcal (Bexsero ) 07/23/2019     Meningococcal (Menactra ) 11/13/2018     Pneumo Conj 13-V (2010&after) 04/10/2018     Pneumococcal (PCV 7) 2000, 2000, 02/28/2001, 06/21/2001     Pneumococcal 23 valent 01/15/2019     Poliovirus, inactivated (IPV) 2000, 2000, 12/06/2001     TDAP Vaccine (Adacel) 03/12/2017     Tdap (Adacel,Boostrix) 09/19/2011     Varicella 06/21/2001        PMHx:  Past  Medical History:   Diagnosis Date     H/O kidney transplant      Hypertension      Wilm's tumor          Rejection History     Kidney Transplant - 11/9/2004  (#1)     No rejections noted for this transplant.            Infection History     Kidney Transplant - 11/9/2004  (#1)       POD Infections Treatments Organisms Resolved    10/24/2014 9 years 11 months EBV (Derrell-Barr virus) viremia         Recurrent pyelonephritis Antibiotics, Antibiotics, Antibiotics, Antibiotics, Antibiotics KLEBSIELLA, ENTEROCOCCUS, PSEUDOMONAS             Problems     Kidney Transplant - 11/9/2004  (#1)       POD Problem Resolved    11/9/2004 N/A Kidney replaced by transplant       Gingival hypertrophy 11/8/2018          Non-Transplant Related Problems       Problem Resolved    8/14/2015 Scar adherent     7/21/2015 Immunosuppression (H)     7/21/2015 HTN (hypertension)     11/7/2013 Dehydration 11/8/2018 6/6/2013 Primary ovarian failure     10/5/2012 Lack of expected normal physiological development     8/20/2012 Femur fracture, right (H) 11/8/2018 7/17/2012 Wilms' tumor (H)     7/17/2012 Ankle pain 11/8/2018 7/17/2012 Growth deceleration 7/21/2015 7/17/2012 S/P radiation therapy     7/17/2012 Status post chemotherapy                 PSHx:    Past Surgical History:   Procedure Laterality Date     APPENDECTOMY       CHOLECYSTECTOMY  05/20/2003     ENT SURGERY       OPEN REDUCTION INTERNAL FIXATION RODDING INTRAMEDULLARY FEMUR  8/20/2012    Procedure: OPEN REDUCTION INTERNAL FIXATION RODDING INTRAMEDULLARY FEMUR;  Closed Reduction Internal Fixation Right Femur;  Surgeon: Catracho Hook MD;  [facility]     REVISE SCAR TRUNK Right 11/6/2015    Procedure: REVISE SCAR TRUNK;  Surgeon: COOPER Anderson MD;  Location: [facility] OR     right nephrectomy  05/19/2003    due to Wilms tumor; performed by Dr. Myles [facility] at [facility]     TONSILLECTOMY  01/31/2005     TRANSPLANT KIDNEY RECIPIENT LIVING RELATED  CHILD  11/09/2004    left nephrectomy performed at time of transplant       FHx:  Family History   Problem Relation Age of Onset     Hypertension Father        SHx:  Social History     Tobacco Use     Smoking status: Never Smoker     Smokeless tobacco: Never Used   Substance Use Topics     Alcohol use: No     Drug use: No     Social History     Social History Narrative     Not on file       Physical Exam:    There were no vitals taken for this visit.  Growth percentile SmartLinks can only be used for patients less than 20 years old.    Exam:  GENERAL: Healthy, alert and no distress  EYES: Eyes grossly normal to inspection.  No discharge or erythema, or obvious scleral/conjunctival abnormalities.  RESP: No audible wheeze, cough, or visible cyanosis.  No visible retractions or increased work of breathing.    SKIN: Visible skin clear. No significant rash, abnormal pigmentation or lesions.  NEURO: Cranial nerves grossly intact.  Mentation and speech appropriate for age.  PSYCH: Mentation appears normal, affect normal/bright, judgement and insight intact, normal speech and appearance well-groomed.    Labs and Imaging:  No results found for any visits on 12/01/21.  I reviewed labs from 6/26/2020 and previous labs going back to 2018.      I personally reviewed results of laboratory evaluation, imaging studies and past medical records that were available during this outpatient visit.      Assessment and Plan:      ICD-10-CM    1. Hypertension secondary to other renal disorders  I15.1 enalapril (VASOTEC) 5 MG tablet    N28.89    2. Other sinusitis, unspecified chronicity  J32.9 Otolaryngology Referral       Immunosuppression: Anuradha Pearson is on individualized protocol due to EBV viremia, other infections. tacrolimus goal is 4-6.  Imuran dose is 50mg daily (recently increased with decreasing prednisone)  SteroidsYes, dose 2mg every Monday/Wednesday/Friday  Her steroid dose has been decreasing to transition to a steroid  "avoidance protocol for her next kidney.    Immunosuppressive Medications     Immunosuppressive Agents     azaTHIOprine (IMURAN) 50 MG tablet        tacrolimus (GENERIC EQUIVALENT) 0.5 MG capsule            Serology Results     Recipient (Pre-transplant Results)     Anti-HBcAb   No results on file    HBsAg   No results on file    HBsAb   No results on file           HBV DNA    No results on file    Anti-HCV   No results on file    Anti-HIV I/II   No results on file           Anti-CMV   No results on file    Anti-HTLV I/II   No results on file    RPR/VDRL   No results on file           EBV IgG   No results on file    EBV IgM   No results on file    EBNA   No results on file                      Left Kidney Donor (Pre-donation Results)     Anti-HBcAb   HBC Total:  Negative    HBsAg   HBsAg:  Negative    HBsAb   No results on file           HBV DNA    No results on file    Anti-HCV   No results on file    Anti-HIV I/II   No results on file           Anti-CMV   No results on file    Anti-HTLV I/II   No results on file    RPR/VDRL   No results on file           EBV IgG   No results on file    EBV IgM   No results on file    EBNA   No results on file                       CKD: Stage III.  Her creatinine fluctuates between 1.2-1.5.  Most recently, it has been at the higher end of her baseline.  She does have a history of low level DSA.      She has not had full labs in several months due to her multiple trips to the hospital.    HTN: BP was not obtained, but it has most recently been in the 120s systolic at home.  She restarted her enalapril hydrochlorothiazide and has felt \"dehydrated\" and had some cramping in her hands.  I have recommended that she switch to enalapril and repeat labs next week.  Most recent blood pressure reading   09/05/21 111/73       Antihypertensive Medications     Antihypertensive Combinations     Enalapril-hydroCHLOROthiazide 5-12.5 MG TABS              Last ECHO done 1/15/2019 and results were " Unremarkable.  She needs to have this repeated and it has been ordered.    Immunoprophylaxis:  PCP prophylaxis: Bactrim  Antiviral prophylaxis: No  Antifungal prophylaxis: No    Patient Education: During this visit I discussed in detail the patient s symptoms, physical exam and evaluation results findings, tentative diagnosis as well as the treatment plan (Including but not limited to possible side effects and complications related to the disease, treatment modalities and intervention(s). Family expressed understanding and consent. Family was receptive and ready to learn; no apparent learning barriers were identified.  Live virus vaccines are contraindicated in this patient. Any new medications prescribed must be assessed for kidney toxicity and drug-interactions before use.    Health Maintenance: Live vaccines are contraindicated due to immunosuppression.    Anuradha must have all other vaccines updated in a timely fashion including an annual influenza vaccine.  Anuradha must be seen by the dentist annually.  Over the counter medications should be checked prior to use to ensure they are safe in patients with kidney disease.    Follow up: Return in about 6 months (around 6/1/2022). Please return sooner should Anuradha become symptomatic. For any questions or concerns, feel free to contact the transplant coordinators   at [phone].    Anuradha would like to transition to adult care after college (she is currently a sophomore).    Sincerely,    Natalya Hartman MD   Pediatric Nephrology    CC:   Patient Care Team:  Gutierrez Wood MD as PCP - General (Family Practice)  Nesha Orta APRN CNP as Nurse Practitioner (Nurse Practitioner - Pediatrics)  Jian Alatorre MD as MD (Pediatrics)  Dorcas Samayoa APRN CNP as Nurse Practitioner (Nurse Practitioner - Pediatrics)  Ike Rosa MA as Medical Assistant (Transplant)  Gauri Mckeon [facility] as Pharmacist (Pharmacist)  Isidoro Delgadillo MD as Assigned  Endocrinology Provider  Ellie Krishnamurthy MD as MD (OB/Gyn)  Susanne Leon, RN as Transplant Coordinator (Transplant)  PRAVEEN BELLA     Copy to patient    Parent(s) of Anuradha Pearson  [address]  [place] [address]      She will be getting the COVID vaccine and the flu shot later this month.      Natalya Hartman MD

## 2021-12-01 NOTE — NURSING NOTE
How would you like to obtain your AVS? Zachary Pearson complains of    Chief Complaint   Patient presents with     RECHECK     Virtual follow up       Patient would like the video invitation sent by: Text to cell phone: [phone]     Patient is located in [place]? Yes     I have reviewed and updated the patient's medication list, allergies and preferred pharmacy.    Brad Barrientos, EMT

## 2021-12-01 NOTE — NURSING NOTE
How would you like to obtain your AVS? Zachary Pearson complains of    Chief Complaint   Patient presents with     RECHECK     Patient transition readiness transplant       Patient would like the video invitation sent by: Text to cell phone: [phone]     Patient is located in [place]? Yes     I have reviewed and updated the patient's medication list, allergies and preferred pharmacy.      Giselle Murray, EMT

## 2021-12-01 NOTE — LETTER
12/1/2021       RE: Anuradha Pearson  [address]  [place] [address]     Dear Colleague,    Thank you for referring your patient, Anuradha Pearson, to the [facility] [place] [facility]. Please see a copy of my visit note below.    [facility]  Gynecology Consult Telephone Visit    Anuradha Pearson is a 21 year old female who is being evaluated via a billable telephone visit.    Patient opted to conduct today's return visit via telephone secondary to the COVID-19 pandemic vs. an in person visit to the clinic.    I spoke with: Anuradha Pearson    SUBJECTIVE    Anuradha Pearson is a 21 year old G0 who is here for a birth control consult.     Anuradha has had a kidney transplant because of a Wilms tumor diagnosed at age 2. She states that she believes she needed treatment to help her enter puberty, which happened around age 15 or so. When she started having periods, they were monthly, but they were quite painful, so she was given birth control around age 17 to help with this. Now she takes seasonique. Her periods are still painful and she manages with tylenol. They last 3-4 days.     Anuradha hasn't thought too much about whether she wants children. She has thought a little bit potentially about adoption. She was told she can't have her own biologic children.    She has never had a pap smear. She believes she has received the HPV vaccine previously.     PAST MEDICAL HISTORY  Past Medical History:   Diagnosis Date     H/O kidney transplant      Hypertension      Wilm's tumor        MEDICATIONS  Current Outpatient Medications   Medication     amLODIPine (NORVASC) 10 MG tablet     amoxicillin (AMOXIL) 500 MG capsule     azaTHIOprine (IMURAN) 50 MG tablet     calcium carbonate-vitamin D (OS-DEE) 500-400 MG-UNIT tablet     ferrous sulfate (FEROSUL) 325 (65 Fe) MG tablet     labetalol (NORMODYNE) 100 MG  tablet     levonorgest-eth estrad 91-Day (SEASONIQUE) 0.15-0.03 &0.01 MG tablet     magnesium oxide (MAG-OX) 400 (241.3 Mg) MG tablet     Multiple Vitamins-Minerals (WOMENS MULTIVITAMIN PO)     tacrolimus (GENERIC EQUIVALENT) 0.5 MG capsule     No current facility-administered medications for this visit.       ALLERGIES  Allergies   Allergen Reactions     Ibuprofen Other (See Comments)     Avoid nephrotoxic medications as needed due to kidney transplant status     Shellfish-Derived Products Nausea and Vomiting     Vancomycin      Uses Benadryl Prior to administration       OB History   No obstetric history on file.       GYN HISTORY  Menarche around age 15. Required hormonal treatment to enter puberty per Anuradha's report.   Uses Seasonique for contraception.  No STD history.  Has never had a pap smear.     PAST SURGICAL HISTORY  Past Surgical History:   Procedure Laterality Date     APPENDECTOMY       CHOLECYSTECTOMY  05/20/2003     ENT SURGERY       OPEN REDUCTION INTERNAL FIXATION RODDING INTRAMEDULLARY FEMUR  8/20/2012    Procedure: OPEN REDUCTION INTERNAL FIXATION RODDING INTRAMEDULLARY FEMUR;  Closed Reduction Internal Fixation Right Femur;  Surgeon: Catracho Hook MD;  [facility]     REVISE SCAR TRUNK Right 11/6/2015    Procedure: REVISE SCAR TRUNK;  Surgeon: COOPER Anderson MD;  Location: [facility] OR     right nephrectomy  05/19/2003    due to Wilms tumor; performed by Dr[facility]les [facility] at [facility]     TONSILLECTOMY  01/31/2005     TRANSPLANT KIDNEY RECIPIENT LIVING RELATED CHILD  11/09/2004    left nephrectomy performed at time of transplant       SOCIAL HISTORY  Social History     Tobacco Use     Smoking status: Never Smoker     Smokeless tobacco: Never Used   Substance Use Topics     Alcohol use: No     Drug use: No       FAMILY HISTORY  Family History   Problem Relation Age of Onset     Hypertension Father        REVIEW OF SYSTEMS  A 10 point review of systems including  Constitutional, Eyes, Respiratory, Cardiovascular, Gastroenterology, Genitourinary, Integumentary, Musculoskeletal, and Psychiatric, were all negative, except for pertinent positives noted in the above HPI.    OBJECTIVE  No physical examination was perforemd during this virtual visit.    Gen: Anuradha was speaking in fluent sentences without distress during our encounter.    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     1. Premature ovarian failure:   Anuradha and I discussed that her clinical history is concerning for premature ovarian failure, given the chemotherapy and radiation she received as a child and need for treatment to bring about puberty. We discussed the use of FSH and estradiol testing in the evaluation of POF. Her previous testing is inconsistent with POF, but may have been done while she was taking oral contraceptives which will falsely lower/normalize FSH.     We discussed to confirm the diagnosis for her to help her plan for family building in the future, I would recommend checking an FSH after a week-long pill-free interval. Anuradha lives far away; we will try to coordinate a future visit with this pill-free interval where she can have this bloodwork performed.    We otherwise discussed that hormone replacement in young individuals is important for prevention of osteoporosis, heart disease, and all-cause mortality. An oral contraceptive is an excellent method for this, and Anuradha is otherwise happy with this method. I have provided her with refills of her birth control pill.    2. Pelvic Pain:   I offered Anuradha an ultrasound to investigate for causes of the pain with menses. We will also coordinate this with an upcoming office visit.     3. Well-woman care:  I  "discussed that she should have a pelvic exam and pap smear for cervical cancer screening now that she is 21 years old, and a solid organ transplant recipient. She will make an appointment at her convenience. I asked her to confirm that she has received HPV vaccination, and otherwise encouraged her to receive the series.    Anuradha will return for an annual examination, ultrasound, and lab testing at her convenience.    An after visit summary was emailed to: [email or web address] with Anuradha's permission.    The patient was notified of following prior to conducting the telephone visit:   \"This telephone visit will be conducted via a call between you and your physician/provider. We have found that certain health care needs can be provided without the need for a physical exam. This service lets us provide the care you need with a short phone conversation. If a prescription is necessary we can send it directly to your pharmacy. If lab work is needed we can place an order for that and you can then stop by our lab to have the test done at a later time. If during the course of the call the physician/provider feels a telephone visit is not appropriate, you will not be charged for this service.\"     Phone call start: 228PM  Phone call end: 250PM  Phone call duration:  22 minutes    I spent 60 minutes on the date of the encounter doing chart review/review of test results/interpretation of tests/on the visit with the patient/documentation.    Sejal Webb MD, MSCI    [facility]/OBGYN      "

## 2021-12-01 NOTE — PROGRESS NOTES
Video Start Time: 1:06 PM    Shraddha Neelyeuniceyandel complains of    Chief Complaint   Patient presents with     RECHECK     Patient transition readiness transplant       I have reviewed and updated the patient's Past Medical History, Social History, Family History and Medication List.    ALLERGIES  Ibuprofen, Shellfish-derived products, and Vancomycin    HPI  I had the pleasure of conducting a video visit with Shraddha Neelyportiasameer today for follow-up of kidney transplant follow up. Shraddha is a 21 year old female who attends our video visit alone. She will also meet with Dr. Hartman and Gyn today.     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     Kev continues to participate in barrel racing with her horses, she is traveling after the holidays to [place] where she has a second home and trains. She had COVID in September and tonsillitis recently for which she received two rounds of antibiotics.    Shraddha is independent with her cares. She uses My Chart, fills her own pill box, takes her medications at 10 AM and 10 PM independently with reported excellent compliance. She knew her medications and current doses. She has even taken over ordering and picking up her medication refills.    Shraddha wants to discuss her blood pressure medications with Dr. Hartman and requests a referral to ENT for snoring/congestion in her sinuses.     ROS    ROS: 10 point ROS neg other than the symptoms noted above in the HPI.    Objective  Vitals: None taken  GENERAL: Healthy, alert and no distress  EYES: Eyes grossly normal to inspection.  No discharge or erythema, or obvious scleral/conjunctival  abnormalities.  RESP: No audible wheeze, cough, or visible cyanosis.  No visible retractions or increased work of breathing.    SKIN: Visible skin clear. No significant rash, abnormal pigmentation or lesions.  NEURO: Cranial nerves grossly intact.  Mentation and speech appropriate for age.  PSYCH: Mentation appears normal, affect normal/bright, judgement and insight intact, normal speech and appearance well-groomed.    Assessment/Plan:  Impression: Anuradha is a 19 year old 15 years s/p living kidney transplant, stable labs and independent with cares.    Counseling for transition from pediatric to adult care provider  Work on compliance with monthly labs.  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 Krishnamurthy in July 2021. Will see GYN today also Dr. Webb    Primary care physician- Dr. Gutierrez Wood     Dermatology- for yearly skin checks due to increased risk for skin cancer due to immunosuppression medications (Last apt Aug 3rd, 2020 at home clinic)    Opthomology- Yearly    Dental- Every 6 months    Return in about 4 months (around 3/28/2022).     Video-Visit Details  30 minutes spent on the date of the encounter doing chart review, history and exam, documentation and further activities as noted above      Type of service:  Video Visit    Video End Time (time video stopped): 1:31 Total visit time: 25 minutes    Originating Location (pt. Location): Home    Distant Location (provider location):  PEDS TRANSPLANT SURGERY     Mode of Communication:  Video Conference via JASMYNE Meyers CNP

## 2021-12-01 NOTE — PATIENT INSTRUCTIONS
"Luis Daniel Ling, it was nice to meet by phone today!    We are planning on the following tests in the coming months:    Pelvic ultrasound to see if there are any reasons why your periods are painful.  Checking your hormone levels to see if you are in \"menopause\" or that your ovaries cannot produce their own estrogen and progesterone. The hormones we will check are FSH and estradiol. Please have these drawn at the end (the 7th day) of your placebo week of your birth control pills.  Please come in for an annual exam - we will do a pap smear to screen for early signs of cervical cancer.  Please check if you have gotten the HPV (also called Gardasil) vaccine. You can get it at my office if you haven't received the vaccine previously. It protects you from becoming infected with the virus that causes cervical cancer.    Please call my office with any questions! [phone]    Dr. Webb  "

## 2021-12-01 NOTE — LETTER
12/1/2021      RE: Shraddha DEXTER Lili  [address]  [place] [address]       Video Start Time: 1:06 PM    Shraddha DEXTER Lili complains of    Chief Complaint   Patient presents with     RECHECK     Patient transition readiness transplant       I have reviewed and updated the patient's Past Medical History, Social History, Family History and Medication List.    ALLERGIES  Ibuprofen, Shellfish-derived products, and Vancomycin    HPI  I had the pleasure of conducting a video visit with Shraddha J Lili today for follow-up of kidney transplant follow up. Shraddha is a 21 year old female who attends our video visit alone. She will also meet with Dr. Hartman and Gyn today.     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     Kev continues to participate in barrel racing with her horses, she is traveling after the holidays to [place] where she has a second home and trains. She had COVID in September and tonsillitis recently for which she received two rounds of antibiotics.    Shraddha is independent with her cares. She uses My Chart, fills her own pill box, takes her medications at 10 AM and 10 PM independently with reported excellent compliance. She knew her medications and current doses. She has even taken over ordering and picking up her medication refills.    Shraddha wants to discuss her blood pressure medications with Dr. Hartman and requests a referral to ENT for snoring/congestion in her sinuses.     ROS    ROS: 10 point ROS neg other than the symptoms noted above in the HPI.    Objective  Vitals: None taken  GENERAL: Healthy, alert and no distress  EYES:  Eyes grossly normal to inspection.  No discharge or erythema, or obvious scleral/conjunctival abnormalities.  RESP: No audible wheeze, cough, or visible cyanosis.  No visible retractions or increased work of breathing.    SKIN: Visible skin clear. No significant rash, abnormal pigmentation or lesions.  NEURO: Cranial nerves grossly intact.  Mentation and speech appropriate for age.  PSYCH: Mentation appears normal, affect normal/bright, judgement and insight intact, normal speech and appearance well-groomed.    Assessment/Plan:  Impression: Anuradha is a 19 year old 15 years s/p living kidney transplant, stable labs and independent with cares.    Counseling for transition from pediatric to adult care provider  Work on compliance with monthly labs.  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 Krishnamurthy in July 2021. Will see GYN today also Dr. Webb    Primary care physician- Dr. Gutierrez Wood     Dermatology- for yearly skin checks due to increased risk for skin cancer due to immunosuppression medications (Last apt Aug 3rd, 2020 at home clinic)    Opthomology- Yearly    Dental- Every 6 months    Return in about 4 months (around 3/28/2022).     Video-Visit Details  30 minutes spent on the date of the encounter doing chart review, history and exam, documentation and further activities as noted above      Type of service:  Video Visit    Video End Time (time video stopped): 1:31 Total visit time: 25 minutes    Originating Location (pt. Location): Home    Distant Location (provider location):  PEDS TRANSPLANT SURGERY     Mode of Communication:  Video Conference via JASMYNE Meyers CNP

## 2021-12-01 NOTE — PROGRESS NOTES
Return Visit for Kidney Transplant, Immunosuppression Management, CKD, Stage III    Chief Complaint:  Chief Complaint   Patient presents with     RECHECK     Virtual follow up       HPI:    I had the pleasure of seeing Anuradha Pearson in the Pediatric Nephrology Clinic today for follow-up of her kidney transplant (history of Wilms tumor complicated by inadvertent ligation of the contralateral renal vein, kidney transplant in 2004). Her post-transplant course has been complicated by infections (viral meningitis, EBV viremia, warts), pseudotumor cerebri, and fracture of her right femur.  Anuradha is a 21 year old female who attended the video visit by herself.    Start Time: 3:01  Stop Time: 3:13      Since her last visit with me in 2020, she has had several illnesses.  She was admitted at [facility] in September 2021 with COVID and a sinus infection.  She was recently treated for a tonsillitis in [place] and required two courses of antibiotics.     She also feels like she has never been able to breathe through her nose.     After her hospitalization in September 2021, her enalapril-hydrochlorothiazide was held due to low     Review of Systems:  A comprehensive review of systems was performed and found to be negative other than noted in the HPI.    Allergies:  Anuradha is allergic to ibuprofen, shellfish-derived products, and vancomycin..    Active Medications:  Current Outpatient Medications   Medication Sig Dispense Refill     enalapril (VASOTEC) 5 MG tablet Take 1 tablet (5 mg) by mouth daily 30 tablet 4     amLODIPine (NORVASC) 10 MG tablet Take 1 tablet (10 mg) by mouth daily 30 tablet 6     amoxicillin (AMOXIL) 500 MG capsule Take 4 caps (2 grams) one hour prior to dental procedure. 12 capsule 3     azaTHIOprine (IMURAN) 50 MG tablet Take 1.5 tablets (75 mg) by mouth daily 140 tablet 11     calcium carbonate-vitamin D (OS-DEE) 500-400 MG-UNIT tablet Take 1 tablet by mouth daily (Patient not taking: Reported on  12/1/2021) 30 tablet 11     ferrous sulfate (FEROSUL) 325 (65 Fe) MG tablet Take 1 tablet (325 mg) by mouth 2 times daily 180 tablet 6     labetalol (NORMODYNE) 100 MG tablet Take 1 tablet (100 mg) by mouth 2 times daily 180 tablet 6     levonorgest-eth estrad 91-Day (SEASONIQUE) 0.15-0.03 &0.01 MG tablet Take 1 tablet by mouth daily 91 tablet 3     magnesium oxide (MAG-OX) 400 (241.3 Mg) MG tablet Take 1 tablet (400 mg) by mouth 2 times daily 60 tablet 11     Multiple Vitamins-Minerals (WOMENS MULTIVITAMIN PO) Take 1 tablet by mouth daily       tacrolimus (GENERIC EQUIVALENT) 0.5 MG capsule Please take 4 capsules (2mg) by mouth in am and take 3 capsules (1.5mg) in the evening 210 capsule 6        Immunizations:  Immunization History   Administered Date(s) Administered     HEPA 08/04/2004     HPV9 04/10/2018, 07/17/2018, 01/15/2019     HepB 2000, 2000, 06/21/2001     Hib (PRP-T) 2000, 2000, 06/21/2001     Historical DTP/aP 2000, 2000, 02/28/2001, 12/06/2001     Influenza (H1N1) 10/27/2009     Influenza (IIV3) PF 10/25/2005, 02/09/2007, 11/09/2007, 01/22/2009, 10/16/2009, 11/03/2010, 09/19/2011, 09/10/2012, 01/03/2014, 12/01/2014     Influenza Vaccine IM > 6 months Valent IIV4 (Alfuria,Fluzone) 10/26/2015, 01/09/2017, 01/29/2018, 11/13/2018     Influenza Vaccine Im 4yrs+ 4 Valent CCIIV4 12/20/2019     MMR 06/21/2001, 06/21/2018     Mantoux Tuberculin Skin Test 08/04/2004     Meningococcal (Bexsero ) 07/23/2019     Meningococcal (Menactra ) 11/13/2018     Pneumo Conj 13-V (2010&after) 04/10/2018     Pneumococcal (PCV 7) 2000, 2000, 02/28/2001, 06/21/2001     Pneumococcal 23 valent 01/15/2019     Poliovirus, inactivated (IPV) 2000, 2000, 12/06/2001     TDAP Vaccine (Adacel) 03/12/2017     Tdap (Adacel,Boostrix) 09/19/2011     Varicella 06/21/2001        PMHx:  Past Medical History:   Diagnosis Date     H/O kidney transplant      Hypertension      Wilm's tumor           Rejection History     Kidney Transplant - 11/9/2004  (#1)     No rejections noted for this transplant.            Infection History     Kidney Transplant - 11/9/2004  (#1)       POD Infections Treatments Organisms Resolved    10/24/2014 9 years 11 months EBV (Derrell-Barr virus) viremia         Recurrent pyelonephritis Antibiotics, Antibiotics, Antibiotics, Antibiotics, Antibiotics KLEBSIELLA, ENTEROCOCCUS, PSEUDOMONAS             Problems     Kidney Transplant - 11/9/2004  (#1)       POD Problem Resolved    11/9/2004 N/A Kidney replaced by transplant       Gingival hypertrophy 11/8/2018          Non-Transplant Related Problems       Problem Resolved    8/14/2015 Scar adherent     7/21/2015 Immunosuppression (H)     7/21/2015 HTN (hypertension)     11/7/2013 Dehydration 11/8/2018 6/6/2013 Primary ovarian failure     10/5/2012 Lack of expected normal physiological development     8/20/2012 Femur fracture, right (H) 11/8/2018 7/17/2012 Wilms' tumor (H)     7/17/2012 Ankle pain 11/8/2018 7/17/2012 Growth deceleration 7/21/2015 7/17/2012 S/P radiation therapy     7/17/2012 Status post chemotherapy                 PSHx:    Past Surgical History:   Procedure Laterality Date     APPENDECTOMY       CHOLECYSTECTOMY  05/20/2003     ENT SURGERY       OPEN REDUCTION INTERNAL FIXATION RODDING INTRAMEDULLARY FEMUR  8/20/2012    Procedure: OPEN REDUCTION INTERNAL FIXATION RODDING INTRAMEDULLARY FEMUR;  Closed Reduction Internal Fixation Right Femur;  Surgeon: Catracho Hook MD;  Location: [facility] OR     REVISE SCAR TRUNK Right 11/6/2015    Procedure: REVISE SCAR TRUNK;  Surgeon: COOPER Anderson MD;  Location: [facility] OR     right nephrectomy  05/19/2003    due to Wilms tumor; performed by Dr. Myles Velez at [facility]     TONSILLECTOMY  01/31/2005     TRANSPLANT KIDNEY RECIPIENT LIVING RELATED CHILD  11/09/2004    left nephrectomy performed at time of transplant       FHx:  Family History    Problem Relation Age of Onset     Hypertension Father        SHx:  Social History     Tobacco Use     Smoking status: Never Smoker     Smokeless tobacco: Never Used   Substance Use Topics     Alcohol use: No     Drug use: No     Social History     Social History Narrative     Not on file       Physical Exam:    There were no vitals taken for this visit.  Growth percentile SmartLinks can only be used for patients less than 20 years old.    Exam:  GENERAL: Healthy, alert and no distress  EYES: Eyes grossly normal to inspection.  No discharge or erythema, or obvious scleral/conjunctival abnormalities.  RESP: No audible wheeze, cough, or visible cyanosis.  No visible retractions or increased work of breathing.    SKIN: Visible skin clear. No significant rash, abnormal pigmentation or lesions.  NEURO: Cranial nerves grossly intact.  Mentation and speech appropriate for age.  PSYCH: Mentation appears normal, affect normal/bright, judgement and insight intact, normal speech and appearance well-groomed.    Labs and Imaging:  No results found for any visits on 12/01/21.  I reviewed labs from 6/26/2020 and previous labs going back to 2018.      I personally reviewed results of laboratory evaluation, imaging studies and past medical records that were available during this outpatient visit.      Assessment and Plan:      ICD-10-CM    1. Hypertension secondary to other renal disorders  I15.1 enalapril (VASOTEC) 5 MG tablet    N28.89    2. Other sinusitis, unspecified chronicity  J32.9 Otolaryngology Referral       Immunosuppression: Anuradha Pearson is on individualized protocol due to EBV viremia, other infections. tacrolimus goal is 4-6.  Imuran dose is 50mg daily (recently increased with decreasing prednisone)  SteroidsYes, dose 2mg every Monday/Wednesday/Friday  Her steroid dose has been decreasing to transition to a steroid avoidance protocol for her next kidney.    Immunosuppressive Medications     Immunosuppressive  "Agents     azaTHIOprine (IMURAN) 50 MG tablet        tacrolimus (GENERIC EQUIVALENT) 0.5 MG capsule            Serology Results     Recipient (Pre-transplant Results)     Anti-HBcAb   No results on file    HBsAg   No results on file    HBsAb   No results on file           HBV DNA    No results on file    Anti-HCV   No results on file    Anti-HIV I/II   No results on file           Anti-CMV   No results on file    Anti-HTLV I/II   No results on file    RPR/VDRL   No results on file           EBV IgG   No results on file    EBV IgM   No results on file    EBNA   No results on file                      Left Kidney Donor (Pre-donation Results)     Anti-HBcAb   HBC Total:  Negative    HBsAg   HBsAg:  Negative    HBsAb   No results on file           HBV DNA    No results on file    Anti-HCV   No results on file    Anti-HIV I/II   No results on file           Anti-CMV   No results on file    Anti-HTLV I/II   No results on file    RPR/VDRL   No results on file           EBV IgG   No results on file    EBV IgM   No results on file    EBNA   No results on file                       CKD: Stage III.  Her creatinine fluctuates between 1.2-1.5.  Most recently, it has been at the higher end of her baseline.  She does have a history of low level DSA.      She has not had full labs in several months due to her multiple trips to the hospital.    HTN: BP was not obtained, but it has most recently been in the 120s systolic at home.  She restarted her enalapril hydrochlorothiazide and has felt \"dehydrated\" and had some cramping in her hands.  I have recommended that she switch to enalapril and repeat labs next week.  Most recent blood pressure reading   09/05/21 111/73       Antihypertensive Medications     Antihypertensive Combinations     Enalapril-hydroCHLOROthiazide 5-12.5 MG TABS              Last ECHO done 1/15/2019 and results were Unremarkable.  She needs to have this repeated and it has been ordered.    Immunoprophylaxis:  PCP " prophylaxis: Bactrim  Antiviral prophylaxis: No  Antifungal prophylaxis: No    Patient Education: During this visit I discussed in detail the patient s symptoms, physical exam and evaluation results findings, tentative diagnosis as well as the treatment plan (Including but not limited to possible side effects and complications related to the disease, treatment modalities and intervention(s). Family expressed understanding and consent. Family was receptive and ready to learn; no apparent learning barriers were identified.  Live virus vaccines are contraindicated in this patient. Any new medications prescribed must be assessed for kidney toxicity and drug-interactions before use.    Health Maintenance: Live vaccines are contraindicated due to immunosuppression.    Anuradha must have all other vaccines updated in a timely fashion including an annual influenza vaccine.  Anuradha must be seen by the dentist annually.  Over the counter medications should be checked prior to use to ensure they are safe in patients with kidney disease.    Follow up: Return in about 6 months (around 6/1/2022). Please return sooner should Anuradha become symptomatic. For any questions or concerns, feel free to contact the transplant coordinators   at [phone].    Anuradha would like to transition to adult care after college (she is currently a sophomore).    Sincerely,    Natalya Hartman MD   Pediatric Nephrology    CC:   Patient Care Team:  Gutierrez Wood MD as PCP - General (Family Practice)  Nesha Orta APRN CNP as Nurse Practitioner (Nurse Practitioner - Pediatrics)  Jian Alatorre MD as MD (Pediatrics)  Dorcas Saamyoa APRN CNP as Nurse Practitioner (Nurse Practitioner - Pediatrics)  Ike Rosa MA as Medical Assistant (Transplant)  Natalya Hartman MD as Transplant Physician (Pediatric Nephrology)  Gauri Mckeon [facility] as Pharmacist (Pharmacist)  Isidoro Delgadillo MD as MD (INTERNAL MEDICINE - ENDOCRINOLOGY,  DIABETES & METABOLISM)  Isidoro Delgadillo MD as Assigned Endocrinology Provider  Nesha Orta APRN CNP as Assigned Pediatric Specialist Provider  Ellie Krishnamurthy MD as MD (OB/Gyn)  Susanne Leon, RN as Transplant Coordinator (Transplant)  PRAVEEN BELLA    Copy to patient  [place]  [address]  [place] [address]    She will be getting the COVID vaccine and the flu shot later this month.

## 2021-12-02 ENCOUNTER — DOCUMENTATION ONLY (OUTPATIENT)
Dept: TRANSPLANT | Facility: CLINIC | Age: 21
End: 2021-12-02
Payer: COMMERCIAL

## 2021-12-02 DIAGNOSIS — Z94.0 KIDNEY TRANSPLANTED: Primary | ICD-10-CM

## 2021-12-06 ENCOUNTER — TELEPHONE (OUTPATIENT)
Dept: TRANSPLANT | Facility: CLINIC | Age: 21
End: 2021-12-06
Payer: COMMERCIAL

## 2021-12-07 DIAGNOSIS — Z94.0 KIDNEY TRANSPLANTED: Primary | ICD-10-CM

## 2021-12-10 ENCOUNTER — TELEPHONE (OUTPATIENT)
Dept: OBGYN | Facility: CLINIC | Age: 21
End: 2021-12-10
Payer: COMMERCIAL

## 2021-12-10 NOTE — TELEPHONE ENCOUNTER
----- Message from Sejal Webb MD sent at 12/1/2021  3:56 PM CST -----  Regarding: help schedule an annual exam  Hi  team,     Could you call Anuradha and help her schedule an annual exam visit with me with an ultrasound prior sometime in February or so of next year? She lives far away so we're trying to coordinate as much of her care on the same day as possible.     Thank you so much,  Sejal Webb

## 2021-12-17 DIAGNOSIS — Z94.0 KIDNEY TRANSPLANTED: Primary | ICD-10-CM

## 2021-12-17 RX ORDER — PANTOPRAZOLE SODIUM 40 MG/1
40 TABLET, DELAYED RELEASE ORAL DAILY
Qty: 30 TABLET | Refills: 11 | Status: SHIPPED | OUTPATIENT
Start: 2021-12-17 | End: 2022-03-29

## 2021-12-28 ENCOUNTER — TELEPHONE (OUTPATIENT)
Dept: TRANSPLANT | Facility: CLINIC | Age: 21
End: 2021-12-28
Payer: COMMERCIAL

## 2021-12-28 DIAGNOSIS — I15.1 HYPERTENSION SECONDARY TO OTHER RENAL DISORDERS: ICD-10-CM

## 2021-12-28 RX ORDER — ENALAPRIL MALEATE 5 MG/1
5 TABLET ORAL DAILY
Qty: 30 TABLET | Refills: 11 | Status: SHIPPED | OUTPATIENT
Start: 2021-12-28 | End: 2022-10-03

## 2021-12-28 NOTE — TELEPHONE ENCOUNTER
Left message on Anuradha's cell. The transplant team would prefer your adenoidectomy be done here at the [facility] instead of your local ENT. Please call back to discuss.

## 2021-12-29 NOTE — PROGRESS NOTES
Spoke to [facility]. They have tissue from Anuradha's recent adenoidectomy. Request faxed to have tissue sent to [facility] path lab for evaluation.

## 2021-12-30 DIAGNOSIS — Z94.0 KIDNEY TRANSPLANTED: Primary | ICD-10-CM

## 2022-01-04 PROCEDURE — 88341 IMHCHEM/IMCYTCHM EA ADD ANTB: CPT | Mod: 26 | Performed by: PATHOLOGY

## 2022-01-04 PROCEDURE — 88365 INSITU HYBRIDIZATION (FISH): CPT | Mod: TC | Performed by: PEDIATRICS

## 2022-01-04 PROCEDURE — 88321 CONSLTJ&REPRT SLD PREP ELSWR: CPT | Performed by: PATHOLOGY

## 2022-01-04 PROCEDURE — 88365 INSITU HYBRIDIZATION (FISH): CPT | Mod: 26 | Performed by: PATHOLOGY

## 2022-01-04 PROCEDURE — 88342 IMHCHEM/IMCYTCHM 1ST ANTB: CPT | Mod: 26 | Performed by: PATHOLOGY

## 2022-01-04 PROCEDURE — 88342 IMHCHEM/IMCYTCHM 1ST ANTB: CPT | Mod: TC | Performed by: PEDIATRICS

## 2022-01-09 ENCOUNTER — HEALTH MAINTENANCE LETTER (OUTPATIENT)
Age: 22
End: 2022-01-09

## 2022-01-10 ENCOUNTER — TELEPHONE (OUTPATIENT)
Dept: TRANSPLANT | Facility: CLINIC | Age: 22
End: 2022-01-10
Payer: COMMERCIAL

## 2022-01-10 NOTE — LETTER
OUTPATIENT LABORATORY TEST REQUEST  DIAGNOSES:  KIDNEY TRANSPLANT - PEDIATRIC (ICD-10 Z94.0);  AND LONG TERM USE OF MEDICATIONS (ICD-10 Z79.899)    Patient Name: Anuradha Pearson        YOB: 2000  MR #: [id number]  Issue Date & Time: January 11, 2022  2:42 PM  Expiration Date (1 year after date issued)    Please complete the following laboratory tests in order to assess the function of the transplanted kidney.    PLEASE FAX RESULTS -[phone]     Monthly  [x]      CBC with Platelets and Differential  [x]      Renal Panel (Sodium, Potassium, Chloride, CO2, Creatinine, Urea Nitrogen, Glucose, Calcium,          Phosphorus and Albumin)  [x]      Magnesium  [x]      Tacrolimus drug level  [x]      EBV DNA Quant by PCR    Every 3 Months  DUE January 2022, April 2022, July 2022, October 2022, January 2023)         [x] Iron binding capacities          [x] EBV PCR Quant Serum Plasma     Yearly  DUE January 2022  [x]      Hepatic Function (Albumin, ALK, ALT, AST, Bili Total, Protein)  [x]      FASTING Lipids (Cholesterol, Triglycerides, HDL, LDL)  [x]      HLA Antibody (PRA)      [x]      BK Virus by PCR, Quantitative (BKQT)      [x]      Urine Protein/Creatinine Ratio      Natalya Hartman MD  , Pediatric Nephrology    With questions please call Pediatric Transplant Coordinator Susanne Samson at [phone]

## 2022-01-10 NOTE — LETTER
2PHYSICIAN ORDERS      DATE & TIME ISSUED: 2022  PATIENT NAME: Anuradha Pearson  : 2000  [facility] MR# [if applicable]: [id number]  DIAGNOSIS/ICD-10 CODE: Kidney transplanted [Z94.0}    PLEASE FAX RESULTS -[phone]    Please draw the following lab in 2022  - EBV Nuclear Antigen EBNA Antibody     With questions please call Pediatric Transplant Coordinator Susanne Samson at [phone]      Susanne Samson, RN BSN        Natalya Hartman MD  , Pediatric Nephrology

## 2022-01-10 NOTE — LETTER
OUTPATIENT LABORATORY TEST REQUEST  DIAGNOSES:  KIDNEY TRANSPLANT - PEDIATRIC (ICD-10 Z94.0);  AND LONG TERM USE OF MEDICATIONS (ICD-10 Z79.899)    Patient Name: Anuradha Pearson        YOB: 2000  MR #: [id number]  Issue Date & Time: June 7, 2022 9:00a  Expiration Date (1 year after date issued)    Please complete the following laboratory tests in order to assess the function of the transplanted kidney.  PLEASE FAX RESULTS [phone]   Monthly  [x]      CBC with Platelets and Differential  [x]      Renal Panel (Sodium, Potassium, Chloride, CO2, Creatinine, Urea Nitrogen, Glucose, Calcium,          Phosphorus and Albumin)  [x]      Magnesium  [x]      Tacrolimus drug level  [x]      EBV DNA Quant by PCR  Every 3 Months  DUE July 2022, October 2022, January 2023)         [x] Iron binding capacities          [x] EBV PCR Quant Serum Plasma          [x]      HLA Antibody (PRA)  Yearly  DUE January 2023  [x]      Hepatic Function (Albumin, ALK, ALT, AST, Bili Total, Protein)  [x]      FASTING Lipids (Cholesterol, Triglycerides, HDL, LDL)        [x]      BK Virus by PCR, Quantitative (BKQT)        [x]      Urine Protein/Creatinine Ratio      Natalya Hartman MD  , Pediatric Nephrology    With questions please call Pediatric Transplant Coordinator Susanne Samson at [phone]

## 2022-01-10 NOTE — LETTER
PHYSICIAN ORDERS      DATE & TIME ISSUED: March 10, 2022  PATIENT NAME: Anuradha Pearson  : 2000  [facility] MR# [if applicable]: [id number]  DIAGNOSIS/ICD-10 CODE: Kidney transplanted [Z94.0}    PLEASE FAX RESULTS [phone]    Please perform the following labs the week of -3/18    -Renal panel  -Urine protein/creatinine ratio  -Urinalysis and urine culture     With questions please call Pediatric Transplant Coordinator Susanne Samson at [phone]      Susanne Samson, RN BSN        Natalya Hartman MD  , Pediatric Nephrology

## 2022-01-11 NOTE — TELEPHONE ENCOUNTER
Spoke to Anuradha. Dr. Hartman would like you to repeat labs this week. Also need a DSA,  iron studies, and EBV plasma. Updated lab order sent to lab and patient. Need to schedule patient in EBV clinic. Anuradha will be back in [place] 2/8 and 2/9. Will try to get her seen then.

## 2022-01-13 ENCOUNTER — TELEPHONE (OUTPATIENT)
Dept: TRANSPLANT | Facility: CLINIC | Age: 22
End: 2022-01-13

## 2022-02-03 NOTE — PROGRESS NOTES
[facility]  Gynecology Visit    SUBJECTIVE    Anuradha Pearson is a 21 year old P0 who is here for an annual exam. She had an US prior to this appointment to evaluate dysmenorrhea.     Regarding overall health, she has no concerns today. She is currently on OCPs. No issues.  She does still report pain with menses.  She uses Tylenol (she cannot use ibuprofen due to kidney function) and heat pads.  She finds the pain to be tolerable.    She has not become sexually active.  No concerns for STIs.  No increased vaginal discharge.  No new breast or vaginal complaints.     She is curious about her future fertility and would like to do initial work-up.    PAST MEDICAL HISTORY  Past Medical History:   Diagnosis Date     H/O kidney transplant      Hypertension      Urinary tract infection 11/04/2021     Wilm's tumor        MEDICATIONS  Current Outpatient Medications   Medication     amLODIPine (NORVASC) 10 MG tablet     amoxicillin (AMOXIL) 500 MG capsule     azaTHIOprine (IMURAN) 50 MG tablet     calcium carbonate-vitamin D (OS-DEE) 500-400 MG-UNIT tablet     enalapril (VASOTEC) 5 MG tablet     ferrous sulfate (FEROSUL) 325 (65 Fe) MG tablet     labetalol (NORMODYNE) 100 MG tablet     levonorgest-eth estrad 91-Day (SEASONIQUE) 0.15-0.03 &0.01 MG tablet     magnesium oxide (MAG-OX) 400 (241.3 Mg) MG tablet     Multiple Vitamins-Minerals (WOMENS MULTIVITAMIN PO)     pantoprazole (PROTONIX) 40 MG EC tablet     tacrolimus (GENERIC EQUIVALENT) 0.5 MG capsule     No current facility-administered medications for this visit.       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   Period  "cycle/length/flow/associated symptoms: some cramping, uses tylenol   Current contraception: OCPs  Number of partners in last year:  0  History of STDs: none  No results found for: collected today    OB History    Para Term  AB Living   0 0 0 0 0 0   SAB IAB Ectopic Multiple Live Births   0 0 0 0 0       PAST SURGICAL HISTORY   Past Surgical History:   Procedure Laterality Date     APPENDECTOMY       CHOLECYSTECTOMY  2003     ENT SURGERY       OPEN REDUCTION INTERNAL FIXATION RODDING INTRAMEDULLARY FEMUR  2012    Procedure: OPEN REDUCTION INTERNAL FIXATION RODDING INTRAMEDULLARY FEMUR;  Closed Reduction Internal Fixation Right Femur;  Surgeon: Catracho Hook MD;  Location: [facility]     REVISE SCAR TRUNK Right 2015    Procedure: REVISE SCAR TRUNK;  Surgeon: COOPER Anderson MD;  Location: [facility] OR     right nephrectomy  2003    due to Wilms tumor; performed by Dr. Myles [facility]elez at [facility]     TONSILLECTOMY  2005     TRANSPLANT KIDNEY RECIPIENT LIVING RELATED CHILD  2004    left nephrectomy performed at time of transplant       SOCIAL HISTORY    Social History     Tobacco Use     Smoking status: Never Smoker     Smokeless tobacco: Never Used   Substance Use Topics     Alcohol use: No     Drug use: No     Social History     Social History Narrative     Not on file       FAMILY HISTORY    Family History   Problem Relation Age of Onset     Hypertension Father        REVIEW OF SYSTEMS  A 10 point review of systems including Constitutional, Eyes, Respiratory, Cardiovascular, Gastroenterology, Genitourinary, Integumentary, Musculoskeletal, and Psychiatric, were all negative, except for pertinent positives noted in the above HPI.    OBJECTIVE  Ht 1.49 m (4' 10.66\")   Wt 53.3 kg (117 lb 8 oz)   BMI 24.01 kg/m      General: Alert, without distress  HEENT: normocephalic, without obvious abnormality; normal thyroid gland   Breast: Within normal limits " bilaterally, no nipple discharge, no lymphadenopathy  Cardiovascular: regular rate, well perfused   Lungs: no increased work of breathing    Abdomen: soft, non-tender, non-distended   Pelvic: normal external female genitalia; normal vagina without discharge; normal cervix without lesions/masses; uterus mobile anteverted, mobile, nontender; adnexae nontender and without masses; normal anus/perineum   Extremities: normal    Ultrasound 2/9/22  Gynecological Ultrasound Report   Pelvic U/S - Transabdominal   [facility]   Referring Provider: Sejal Webb MD   Sonographer:  Shilpi Wagner RDMS   Indication: Pain- Dyspareunia   LMP: No LMP recorded.   History: Wilm's Tumor, renal transplant   Gynecological Ultrasonography:   Uterus: anteverted. Contour is smooth/regular.   Size: 5.9 x 3.0 x 2.7 cm   Endometrium: Thickness Total 2.5 mm   Findings:   Right Ovary: Not visualized   Left Ovary:  Not visualized   Cul de Sac Free Fluid: Mild     Impression:   Normal uterus without structural cause for dysmenorrhea. +small free fluid in the cul de sac. Ovaries not visualized    ASSESSMENT/PLAN  Anuradha Pearson is a 21 year old P0 who presents here for an annual exam    #Primary ovarian insufficiency    -Continue on OCPs   -wondering about future fertility. Printed order of FSH after she's been off her OCPs for 7-10 days. She is living in [place] so will work on coordinating this blood draw.     Age 19-39 Annual Preventive Exam  1.  Screening:   STD testing: GC/CT, HIV - declined, discussed we can provide STD testing at any time in the future as needed. Reviewed indications for PrEP should Anuradha ever need this in the future.   Cervical cancer: pap collected today. If normal, repeat in 3 years.   Breast cancer: CBE today normal, repeat annually; patient believes no radiation to chest wall during cancer treatment, and no family history of breast/ovarian cancer; mammograms to be started at age 40    2.   Immunizations   S/p HPV series     RTC in one year for an annual examination.    Mikaela Lou MD MPH   OBGYN PGY-1  02/09/22  4:54 PM    OBGYN Attending Addendum     I, Sejal Webb, saw Anuradha Pearson with the resident, Dr. Catracho Lou, and agree with their findings and plan of care as documented in the above note.    I personally reviewed vitals, meds, labs, exam.     Key findings: 21 year old G0 here for an annual examination.     I agree with the plan for pap smear, assessment of FSH, OCPs for treatment of POF. Return in 1 year for annual exam, any time for concerns.    Seajl Webb MD, MSCI  Date of Service: 02/09/2022

## 2022-02-09 ENCOUNTER — OFFICE VISIT (OUTPATIENT)
Dept: OBGYN | Facility: CLINIC | Age: 22
End: 2022-02-09
Attending: OBSTETRICS & GYNECOLOGY
Payer: COMMERCIAL

## 2022-02-09 ENCOUNTER — ANCILLARY PROCEDURE (OUTPATIENT)
Dept: ULTRASOUND IMAGING | Facility: CLINIC | Age: 22
End: 2022-02-09
Attending: OBSTETRICS & GYNECOLOGY
Payer: COMMERCIAL

## 2022-02-09 ENCOUNTER — OFFICE VISIT (OUTPATIENT)
Dept: INFECTIOUS DISEASES | Facility: CLINIC | Age: 22
End: 2022-02-09
Attending: PEDIATRICS
Payer: COMMERCIAL

## 2022-02-09 VITALS — BODY MASS INDEX: 23.69 KG/M2 | WEIGHT: 117.5 LBS | HEIGHT: 59 IN

## 2022-02-09 VITALS
TEMPERATURE: 99 F | DIASTOLIC BLOOD PRESSURE: 84 MMHG | HEART RATE: 95 BPM | SYSTOLIC BLOOD PRESSURE: 125 MMHG | HEIGHT: 59 IN | WEIGHT: 116.4 LBS | BODY MASS INDEX: 23.47 KG/M2

## 2022-02-09 DIAGNOSIS — B27.00 EBV (EPSTEIN-BARR VIRUS) VIREMIA: ICD-10-CM

## 2022-02-09 DIAGNOSIS — D84.9 IMMUNOSUPPRESSION (H): Primary | ICD-10-CM

## 2022-02-09 DIAGNOSIS — Z12.4 CERVICAL CANCER SCREENING: Primary | ICD-10-CM

## 2022-02-09 DIAGNOSIS — N94.6 MENSES PAINFUL: ICD-10-CM

## 2022-02-09 DIAGNOSIS — Z01.419 ENCOUNTER FOR WELL WOMAN EXAM: ICD-10-CM

## 2022-02-09 PROCEDURE — 99213 OFFICE O/P EST LOW 20 MIN: CPT | Performed by: OBSTETRICS & GYNECOLOGY

## 2022-02-09 PROCEDURE — G0463 HOSPITAL OUTPT CLINIC VISIT: HCPCS

## 2022-02-09 PROCEDURE — 99203 OFFICE O/P NEW LOW 30 MIN: CPT | Performed by: PEDIATRICS

## 2022-02-09 PROCEDURE — 76856 US EXAM PELVIC COMPLETE: CPT

## 2022-02-09 PROCEDURE — G0463 HOSPITAL OUTPT CLINIC VISIT: HCPCS | Mod: 25

## 2022-02-09 PROCEDURE — 88175 CYTOPATH C/V AUTO FLUID REDO: CPT

## 2022-02-09 PROCEDURE — 76856 US EXAM PELVIC COMPLETE: CPT | Mod: 26 | Performed by: OBSTETRICS & GYNECOLOGY

## 2022-02-09 ASSESSMENT — MIFFLIN-ST. JEOR
SCORE: 1193.24
SCORE: 1198.21

## 2022-02-09 NOTE — PROGRESS NOTES
"PEDIATRIC INFECTIOUS DISEASES  [facility]  [address], [address]th [place] [address]  Office: [phone]  Fax: [phone]     Date: 2022     To: Gutierrez Wood MD  [facility] [place]  [address] [place] [address]    Pt: Anuradha Pearson  MR: [id number]  : 2000  GILLIAN: 2022     Dear Dr. Wood     I had the pleasure of seeing Anuradha Pearson at the Pediatric Infectious Diseases Clinic at the [facility]. 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 Her post-transplant course has been complicated by infections (viral meningitis, EBV viremia, warts), pseudotumor cerebri, and fracture of her right femur. She reports that she was treated with rituximab early in her transplant course for EBV (not PTLD), and her EBV DNA was undetectable between early  and late . Following EBV DNA rebound she peaked at log 5.3 in 2021 (measurements done at [facility]); most recently an outside lab reported a log EBV copy number of 3.95 on 1/3/22. She had her adenoids removed in 2021 in [facility] (brief report in Care Everywhere: \"Tonsillar tissue with reactive follicular and paracortical lymphoid hyperplasia.\" No viral testing was reported). On the day of my evaluation she had no symptoms concerning for PTLD including rash, abdominal pain, vomiting, diarrhea, adenopathy, recurrent fevers, weight loss, cough, dysphagia, snoring, or neurologic changes.    Immunosuppression is with tacrolimus and cyclospoirine.     Problem list:   Patient Active Problem List   Diagnosis     Kidney replaced by transplant     Wilms' tumor (H)     Status post chemotherapy     S/P radiation therapy     Lack of expected normal physiological development     Primary ovarian failure     EBV (Derrell-Barr " virus) viremia     HTN (hypertension)     Immunosuppression (H)     Scar adherent     Recurrent pyelonephritis     Chronic kidney disease, stage 3 (H)     Status post kidney transplant     Fever, unspecified fever cause     COVID-19     Acute kidney injury (H)        ROS: 10 point ROS neg other than the symptoms noted above in the HPI.     Past Medical History:   Diagnosis Date     H/O kidney transplant      Hypertension      Urinary tract infection 11/04/2021     Wilm's tumor        Past Surgical History:   Procedure Laterality Date     APPENDECTOMY       CHOLECYSTECTOMY  05/20/2003     ENT SURGERY       OPEN REDUCTION INTERNAL FIXATION RODDING INTRAMEDULLARY FEMUR  8/20/2012    Procedure: OPEN REDUCTION INTERNAL FIXATION RODDING INTRAMEDULLARY FEMUR;  Closed Reduction Internal Fixation Right Femur;  Surgeon: Catracho Hook MD;  [facility]     REVISE SCAR TRUNK Right 11/6/2015    Procedure: REVISE SCAR TRUNK;  Surgeon: COOPER Anderson MD;  Location: [facility] OR     right nephrectomy  05/19/2003    due to Wilms tumor; performed by Dr. Myles [facility]elez at [facility]     TONSILLECTOMY  01/31/2005     TRANSPLANT KIDNEY RECIPIENT LIVING RELATED CHILD  11/09/2004    left nephrectomy performed at time of transplant       Family History   Problem Relation Age of Onset     Hypertension Father        Social History     Tobacco Use     Smoking status: Never Smoker     Smokeless tobacco: Never Used   Substance Use Topics     Alcohol use: No         Immunization:   Immunization History   Administered Date(s) Administered     HEPA 08/04/2004     HPV9 04/10/2018, 07/17/2018, 01/15/2019     HepB 2000, 2000, 06/21/2001     Hib (PRP-T) 2000, 2000, 06/21/2001     Historical DTP/aP 2000, 2000, 02/28/2001, 12/06/2001     Influenza (H1N1) 10/27/2009     Influenza (IIV3) PF 10/25/2005, 02/09/2007, 11/09/2007, 01/22/2009, 10/16/2009, 11/03/2010, 09/19/2011, 09/10/2012,  01/03/2014, 12/01/2014     Influenza Vaccine IM > 6 months Valent IIV4 (Alfuria,Fluzone) 10/26/2015, 01/09/2017, 01/29/2018, 11/13/2018     Influenza Vaccine Im 4yrs+ 4 Valent CCIIV4 12/20/2019     MMR 06/21/2001, 06/21/2018     Mantoux Tuberculin Skin Test 08/04/2004     Meningococcal (Bexsero ) 07/23/2019     Meningococcal (Menactra ) 11/13/2018     Pneumo Conj 13-V (2010&after) 04/10/2018     Pneumococcal (PCV 7) 2000, 2000, 02/28/2001, 06/21/2001     Pneumococcal 23 valent 01/15/2019     Poliovirus, inactivated (IPV) 2000, 2000, 12/06/2001     TDAP Vaccine (Adacel) 03/12/2017     Tdap (Adacel,Boostrix) 09/19/2011     Varicella 06/21/2001     Allergies:    Allergies   Allergen Reactions     Ibuprofen Other (See Comments)     Avoid nephrotoxic medications as needed due to kidney transplant status     Shellfish-Derived Products Nausea and Vomiting     Vancomycin      Uses Benadryl Prior to administration        Current Outpatient Medications   Medication Sig Dispense Refill     amLODIPine (NORVASC) 10 MG tablet Take 1 tablet (10 mg) by mouth daily 30 tablet 6     amoxicillin (AMOXIL) 500 MG capsule Take 4 caps (2 grams) one hour prior to dental procedure. 12 capsule 3     azaTHIOprine (IMURAN) 50 MG tablet Take 1.5 tablets (75 mg) by mouth daily 140 tablet 11     calcium carbonate-vitamin D (OS-DEE) 500-400 MG-UNIT tablet Take 1 tablet by mouth daily 30 tablet 11     enalapril (VASOTEC) 5 MG tablet Take 1 tablet (5 mg) by mouth daily 30 tablet 11     ferrous sulfate (FEROSUL) 325 (65 Fe) MG tablet Take 1 tablet (325 mg) by mouth 2 times daily 180 tablet 6     labetalol (NORMODYNE) 100 MG tablet Take 1 tablet (100 mg) by mouth 2 times daily 180 tablet 6     levonorgest-eth estrad 91-Day (SEASONIQUE) 0.15-0.03 &0.01 MG tablet Take 1 tablet by mouth daily 91 tablet 3     magnesium oxide (MAG-OX) 400 (241.3 Mg) MG tablet Take 1 tablet (400 mg) by mouth 2 times daily 60 tablet 11     Multiple  "Vitamins-Minerals (WOMENS MULTIVITAMIN PO) Take 1 tablet by mouth daily       tacrolimus (GENERIC EQUIVALENT) 0.5 MG capsule Please take 4 capsules (2mg) by mouth in am and take 3 capsules (1.5mg) in the evening 210 capsule 6     pantoprazole (PROTONIX) 40 MG EC tablet Take 1 tablet (40 mg) by mouth daily (Patient not taking: Reported on 2/9/2022) 30 tablet 11        Vitals  Weight: 52.8 kg (116 lb 6.5 oz)  Height: 1.49 m (4' 10.66\")  Head circumference:    BMI: Body mass index is 23.78 kg/m . Facility age limit for growth percentiles is 20 years.    Physical Exam   GENERAL: Active, alert, in no acute distress.  SKIN: Clear. No significant rash, abnormal pigmentation or lesions  HEAD: Normocephalic  EYES: Pupils equal, round, reactive, Extraocular muscles intact. Normal conjunctivae.  NOSE: Normal without discharge.  MOUTH/THROAT: Clear. No oral lesions. Teeth without obvious abnormalities.  NECK: Supple, no masses.  No thyromegaly.  LYMPH NODES: No adenopathy   LUNGS: Clear. No rales, rhonchi, wheezing or retractions  HEART: Regular rhythm. Normal S1/S2. No murmurs. Normal pulses.  ABDOMEN: Soft, non-tender, not distended, no masses or hepatosplenomegaly. Bowel sounds normal.   NEUROLOGIC: No focal findings.   EXTREMITIES: Full range of motion, no deformities       Lab:  No results found for any visits on 02/09/22.     Assessment: Anuradha is a 21 year old female with iatrogenic immunosuppression following kidney transplant in 2004. Per most recent note from Nephrology she has CKD stage III and will likely proceed to a second transplant. It was also noted in the Nephro note that she would be transitioning to adult care following graduation from college. I will be happy to continue to follow Anuradha until that transition has happened.     At this time I have a very low level of suspicion for PTLD.      Plan:     1. Follow up with me in three months, or sooner if concerning symptoms develop.    2. Will obtain plasma " EBV level at next visit.      Follow up: I would like to see Anuradha in approximately three months. If symptoms reoccur or any new issues arise I would be happy to see her earlier in clinic.     I have reviewed Anuradha s past medical history, family history, social history, medications and allergies as documented in the medical record. There were no additional findings except as noted.    I spent a total of 30 minutes face-to-face with Anuradha Pearson during today s office visit.  Over 50% of this time was spent counseling the patient and/or coordinating care on the same day of her clinic appointment.    Sincerely,     Cuauhtemoc Rodriguez MD, PhD  Division of Pediatric Infectious Diseases  [facility]  Clinic Coordinator: Ryanne West: [phone]  [phone]  Fax: [phone]

## 2022-02-09 NOTE — LETTER
2022      RE: Anuradha Neelyportiasameer  [address]  [place] [address]       PEDIATRIC INFECTIOUS DISEASES  [facility]  [address] [place] [address]  Office: [phone]  Fax: [phone]     Date: 2022     To: Gutierrez Wood MD  [facility]  [address] [place] [address]    Pt: Anuradha Pearson  MR: [id number]  : 2000  GILLIAN: 2022     Dear Dr. Wood     I had the pleasure of seeing Anuradha Pearson at the Pediatric Infectious Diseases Clinic at the [facility]. 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[place], rodeos    COVID infection back in September, some cough, presented to ED for fever 102.5 x three days and dehydration; admitted     tacro and cyclospoirine     Problem list:   Patient Active Problem List   Diagnosis     Kidney replaced by transplant     Wilms' tumor (H)     Status post chemotherapy     S/P radiation therapy     Lack of expected normal physiological development     Primary ovarian failure     EBV (Derrell-Barr virus) viremia     HTN (hypertension)     Immunosuppression (H)     Scar adherent     Recurrent pyelonephritis     Chronic kidney disease, stage 3 (H)     Status post kidney transplant     Fever, unspecified fever cause     COVID-19     Acute kidney injury (H)        ROS: 10 point ROS neg other than the symptoms noted above in the HPI.     Past Medical History:   Diagnosis Date     H/O kidney transplant      Hypertension      Urinary tract infection 2021     Wilm's tumor        Past Surgical History:   Procedure Laterality Date     APPENDECTOMY       CHOLECYSTECTOMY  2003     ENT SURGERY       OPEN REDUCTION INTERNAL FIXATION RODDING INTRAMEDULLARY FEMUR  2012    Procedure:  OPEN REDUCTION INTERNAL FIXATION RODDING INTRAMEDULLARY FEMUR;  Closed Reduction Internal Fixation Right Femur;  Surgeon: Catracho Hook MD;  [facility]     REVISE SCAR TRUNK Right 11/6/2015    Procedure: REVISE SCAR TRUNK;  Surgeon: COOPER Anderson MD;  Location: [facility] OR     right nephrectomy  05/19/2003    due to Wilms tumor; performed by Dr. Myles Velez at [facility]     TONSILLECTOMY  01/31/2005     TRANSPLANT KIDNEY RECIPIENT LIVING RELATED CHILD  11/09/2004    left nephrectomy performed at time of transplant       Family History   Problem Relation Age of Onset     Hypertension Father        Social History     Tobacco Use     Smoking status: Never Smoker     Smokeless tobacco: Never Used   Substance Use Topics     Alcohol use: No         Immunization:   Immunization History   Administered Date(s) Administered     HEPA 08/04/2004     HPV9 04/10/2018, 07/17/2018, 01/15/2019     HepB 2000, 2000, 06/21/2001     Hib (PRP-T) 2000, 2000, 06/21/2001     Historical DTP/aP 2000, 2000, 02/28/2001, 12/06/2001     Influenza (H1N1) 10/27/2009     Influenza (IIV3) PF 10/25/2005, 02/09/2007, 11/09/2007, 01/22/2009, 10/16/2009, 11/03/2010, 09/19/2011, 09/10/2012, 01/03/2014, 12/01/2014     Influenza Vaccine IM > 6 months Valent IIV4 (Alfuria,Fluzone) 10/26/2015, 01/09/2017, 01/29/2018, 11/13/2018     Influenza Vaccine Im 4yrs+ 4 Valent CCIIV4 12/20/2019     MMR 06/21/2001, 06/21/2018     Mantoux Tuberculin Skin Test 08/04/2004     Meningococcal (Bexsero ) 07/23/2019     Meningococcal (Menactra ) 11/13/2018     Pneumo Conj 13-V (2010&after) 04/10/2018     Pneumococcal (PCV 7) 2000, 2000, 02/28/2001, 06/21/2001     Pneumococcal 23 valent 01/15/2019     Poliovirus, inactivated (IPV) 2000, 2000, 12/06/2001     TDAP Vaccine (Adacel) 03/12/2017     Tdap (Adacel,Boostrix) 09/19/2011     Varicella 06/21/2001     Allergies:    Allergies  "  Allergen Reactions     Ibuprofen Other (See Comments)     Avoid nephrotoxic medications as needed due to kidney transplant status     Shellfish-Derived Products Nausea and Vomiting     Vancomycin      Uses Benadryl Prior to administration        Current Outpatient Medications   Medication Sig Dispense Refill     amLODIPine (NORVASC) 10 MG tablet Take 1 tablet (10 mg) by mouth daily 30 tablet 6     amoxicillin (AMOXIL) 500 MG capsule Take 4 caps (2 grams) one hour prior to dental procedure. 12 capsule 3     azaTHIOprine (IMURAN) 50 MG tablet Take 1.5 tablets (75 mg) by mouth daily 140 tablet 11     calcium carbonate-vitamin D (OS-DEE) 500-400 MG-UNIT tablet Take 1 tablet by mouth daily 30 tablet 11     enalapril (VASOTEC) 5 MG tablet Take 1 tablet (5 mg) by mouth daily 30 tablet 11     ferrous sulfate (FEROSUL) 325 (65 Fe) MG tablet Take 1 tablet (325 mg) by mouth 2 times daily 180 tablet 6     labetalol (NORMODYNE) 100 MG tablet Take 1 tablet (100 mg) by mouth 2 times daily 180 tablet 6     levonorgest-eth estrad 91-Day (SEASONIQUE) 0.15-0.03 &0.01 MG tablet Take 1 tablet by mouth daily 91 tablet 3     magnesium oxide (MAG-OX) 400 (241.3 Mg) MG tablet Take 1 tablet (400 mg) by mouth 2 times daily 60 tablet 11     Multiple Vitamins-Minerals (WOMENS MULTIVITAMIN PO) Take 1 tablet by mouth daily       tacrolimus (GENERIC EQUIVALENT) 0.5 MG capsule Please take 4 capsules (2mg) by mouth in am and take 3 capsules (1.5mg) in the evening 210 capsule 6     pantoprazole (PROTONIX) 40 MG EC tablet Take 1 tablet (40 mg) by mouth daily (Patient not taking: Reported on 2/9/2022) 30 tablet 11        Vitals  Weight: 52.8 kg (116 lb 6.5 oz)  Height: 1.49 m (4' 10.66\")  Head circumference:    BMI: Body mass index is 23.78 kg/m . Facility age limit for growth percentiles is 20 years.    Physical Exam   {Physical exam:349877}    Lab:  No results found for any visits on 02/09/22.     Assessment: Anuradha is a 21 year old female with " ***.     Plan: ***      EBV PCR on serum/plasma  Anti-EBNA antibody    See you sometime in the couple - April would be fine, piggy-back on other visits    Can establish with Rafita after next follow up with me    Follow up: I would like to see Anuradha ***. If symptoms reoccur or any new issues arise I would be happy to see her earlier in clinic.     I have reviewed Anuradha s past medical history, family history, social history, medications and allergies as documented in the medical record. There were no additional findings except as noted.    I spent a total of [5:10:15:20:25:30:35:40:45:50:55:60:***] minutes face-to-face with Anuradha Pearson during today s office visit.  Over 50% of this time was spent counseling the patient and/or coordinating care on the same day of her clinic appointment.    Sincerely,     Cuauhtemoc Rodriguez MD, PhD  Division of Pediatric Infectious Diseases  [facility]  Clinic Coordinator: Ryanne West: [phone]  [phone]  Fax: [phone]             Cuauhtemoc Rodriguez MD

## 2022-02-09 NOTE — LETTER
2022      RE: Anuradha Neelyportiasameer  [address]  [place] [address]       PEDIATRIC INFECTIOUS DISEASES  [facility]  [address]th [place] [address]  Office: [phone]  Fax: [phone]     Date: 2022     To: Gutierrez Wood MD  [facility]  [address] [place] [address]    Pt: Anuradha Pearson  MR: [id number]  : 2000  GILLIAN: 2022     Dear Dr. Wood     I had the pleasure of seeing Anuradha Pearson at the Pediatric Infectious Diseases Clinic at the [facility]. 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 [place], [place]    COVID infection back in September, some cough, presented to ED for fever 102.5 x three days and dehydration; admitted     tacro and cyclospoirine     Problem list:   Patient Active Problem List   Diagnosis     Kidney replaced by transplant     Wilms' tumor (H)     Status post chemotherapy     S/P radiation therapy     Lack of expected normal physiological development     Primary ovarian failure     EBV (Derrell-Barr virus) viremia     HTN (hypertension)     Immunosuppression (H)     Scar adherent     Recurrent pyelonephritis     Chronic kidney disease, stage 3 (H)     Status post kidney transplant     Fever, unspecified fever cause     COVID-19     Acute kidney injury (H)        ROS: 10 point ROS neg other than the symptoms noted above in the HPI.     Past Medical History:   Diagnosis Date     H/O kidney transplant      Hypertension      Urinary tract infection 2021     Wilm's tumor        Past Surgical History:   Procedure Laterality Date     APPENDECTOMY       CHOLECYSTECTOMY  2003     ENT SURGERY       OPEN REDUCTION INTERNAL FIXATION RODDING INTRAMEDULLARY FEMUR  2012    Procedure: OPEN  REDUCTION INTERNAL FIXATION RODDING INTRAMEDULLARY FEMUR;  Closed Reduction Internal Fixation Right Femur;  Surgeon: Catracho Hook MD;  [facility]     REVISE SCAR TRUNK Right 11/6/2015    Procedure: REVISE SCAR TRUNK;  Surgeon: COOPER Anderson MD;  [facility]: [facility] OR     right nephrectomy  05/19/2003    due to Wilms tumor; performed by Dr. Myles Velez at [facility]     TONSILLECTOMY  01/31/2005     TRANSPLANT KIDNEY RECIPIENT LIVING RELATED CHILD  11/09/2004    left nephrectomy performed at time of transplant       Family History   Problem Relation Age of Onset     Hypertension Father        Social History     Tobacco Use     Smoking status: Never Smoker     Smokeless tobacco: Never Used   Substance Use Topics     Alcohol use: No         Immunization:   Immunization History   Administered Date(s) Administered     HEPA 08/04/2004     HPV9 04/10/2018, 07/17/2018, 01/15/2019     HepB 2000, 2000, 06/21/2001     Hib (PRP-T) 2000, 2000, 06/21/2001     Historical DTP/aP 2000, 2000, 02/28/2001, 12/06/2001     Influenza (H1N1) 10/27/2009     Influenza (IIV3) PF 10/25/2005, 02/09/2007, 11/09/2007, 01/22/2009, 10/16/2009, 11/03/2010, 09/19/2011, 09/10/2012, 01/03/2014, 12/01/2014     Influenza Vaccine IM > 6 months Valent IIV4 (Alfuria,Fluzone) 10/26/2015, 01/09/2017, 01/29/2018, 11/13/2018     Influenza Vaccine Im 4yrs+ 4 Valent CCIIV4 12/20/2019     MMR 06/21/2001, 06/21/2018     Mantoux Tuberculin Skin Test 08/04/2004     Meningococcal (Bexsero ) 07/23/2019     Meningococcal (Menactra ) 11/13/2018     Pneumo Conj 13-V (2010&after) 04/10/2018     Pneumococcal (PCV 7) 2000, 2000, 02/28/2001, 06/21/2001     Pneumococcal 23 valent 01/15/2019     Poliovirus, inactivated (IPV) 2000, 2000, 12/06/2001     TDAP Vaccine (Adacel) 03/12/2017     Tdap (Adacel,Boostrix) 09/19/2011     Varicella 06/21/2001     Allergies:    Allergies   Allergen  "Reactions     Ibuprofen Other (See Comments)     Avoid nephrotoxic medications as needed due to kidney transplant status     Shellfish-Derived Products Nausea and Vomiting     Vancomycin      Uses Benadryl Prior to administration        Current Outpatient Medications   Medication Sig Dispense Refill     amLODIPine (NORVASC) 10 MG tablet Take 1 tablet (10 mg) by mouth daily 30 tablet 6     amoxicillin (AMOXIL) 500 MG capsule Take 4 caps (2 grams) one hour prior to dental procedure. 12 capsule 3     azaTHIOprine (IMURAN) 50 MG tablet Take 1.5 tablets (75 mg) by mouth daily 140 tablet 11     calcium carbonate-vitamin D (OS-DEE) 500-400 MG-UNIT tablet Take 1 tablet by mouth daily 30 tablet 11     enalapril (VASOTEC) 5 MG tablet Take 1 tablet (5 mg) by mouth daily 30 tablet 11     ferrous sulfate (FEROSUL) 325 (65 Fe) MG tablet Take 1 tablet (325 mg) by mouth 2 times daily 180 tablet 6     labetalol (NORMODYNE) 100 MG tablet Take 1 tablet (100 mg) by mouth 2 times daily 180 tablet 6     levonorgest-eth estrad 91-Day (SEASONIQUE) 0.15-0.03 &0.01 MG tablet Take 1 tablet by mouth daily 91 tablet 3     magnesium oxide (MAG-OX) 400 (241.3 Mg) MG tablet Take 1 tablet (400 mg) by mouth 2 times daily 60 tablet 11     Multiple Vitamins-Minerals (WOMENS MULTIVITAMIN PO) Take 1 tablet by mouth daily       tacrolimus (GENERIC EQUIVALENT) 0.5 MG capsule Please take 4 capsules (2mg) by mouth in am and take 3 capsules (1.5mg) in the evening 210 capsule 6     pantoprazole (PROTONIX) 40 MG EC tablet Take 1 tablet (40 mg) by mouth daily (Patient not taking: Reported on 2/9/2022) 30 tablet 11        Vitals  Weight: 52.8 kg (116 lb 6.5 oz)  Height: 1.49 m (4' 10.66\")  Head circumference:    BMI: Body mass index is 23.78 kg/m . Facility age limit for growth percentiles is 20 years.    Physical Exam   {Physical exam:721686}    Lab:  No results found for any visits on 02/09/22.     Assessment: Anuradha is a 21 year old female with ***.   "   Plan: ***      EBV PCR on serum/plasma  Anti-EBNA antibody    See you sometime in the couple - April would be fine, piggy-back on other visits    Follow up: I would like to see Anuradha ***. If symptoms reoccur or any new issues arise I would be happy to see her earlier in clinic.     I have reviewed Anuradha s past medical history, family history, social history, medications and allergies as documented in the medical record. There were no additional findings except as noted.    I spent a total of [5:10:15:20:25:30:35:40:45:50:55:60:***] minutes face-to-face with Anuradha Pearson during today s office visit.  Over 50% of this time was spent counseling the patient and/or coordinating care on the same day of her clinic appointment.    Sincerely,     Cuauhtemoc Rodriguez MD, PhD  Division of Pediatric Infectious Diseases  [facility]  Clinic Coordinator: Ryanne West: [phone]  [phone]  Fax: [phone]

## 2022-02-09 NOTE — LETTER
2/9/2022       RE: Anuradha Pearson  [address]  [place] [address]     Dear Colleague,    Thank you for referring your patient, Anuradha Pearson, to the [facility] [place] at [facility]. Please see a copy of my visit note below.    [facility]  Gynecology Visit    SUBJECTIVE    Anuradha Pearson is a 21 year old P0 who is here for an annual exam. She had an US prior to this appointment to evaluate dysmenorrhea.     Regarding overall health, she has no concerns today. She is currently on OCPs. No issues.  She does still report pain with menses.  She uses Tylenol (she cannot use ibuprofen due to kidney function) and heat pads.  She finds the pain to be tolerable.    She has not become sexually active.  No concerns for STIs.  No increased vaginal discharge.  No new breast or vaginal complaints.     She is curious about her future fertility and would like to do initial work-up.    PAST MEDICAL HISTORY  Past Medical History:   Diagnosis Date     H/O kidney transplant      Hypertension      Urinary tract infection 11/04/2021     Wilm's tumor        MEDICATIONS  Current Outpatient Medications   Medication     amLODIPine (NORVASC) 10 MG tablet     amoxicillin (AMOXIL) 500 MG capsule     azaTHIOprine (IMURAN) 50 MG tablet     calcium carbonate-vitamin D (OS-DEE) 500-400 MG-UNIT tablet     enalapril (VASOTEC) 5 MG tablet     ferrous sulfate (FEROSUL) 325 (65 Fe) MG tablet     labetalol (NORMODYNE) 100 MG tablet     levonorgest-eth estrad 91-Day (SEASONIQUE) 0.15-0.03 &0.01 MG tablet     magnesium oxide (MAG-OX) 400 (241.3 Mg) MG tablet     Multiple Vitamins-Minerals (WOMENS MULTIVITAMIN PO)     pantoprazole (PROTONIX) 40 MG EC tablet     tacrolimus (GENERIC EQUIVALENT) 0.5 MG capsule     No current facility-administered medications for this visit.       ALLERGIES  Allergies   Allergen Reactions     Ibuprofen Other  (See Comments)     Avoid nephrotoxic medications as needed due to kidney transplant status     Shellfish-Derived Products Nausea and Vomiting     Vancomycin      Uses Benadryl Prior to administration       OBSTETRIC/GYNECOLOGIC HISTORY  Menarche: primary ovarian insufficiency secondary to chemotherapy   Period cycle/length/flow/associated symptoms: some cramping, uses tylenol   Current contraception: OCPs  Number of partners in last year:  0  History of STDs: none  No results found for: collected today    OB History    Para Term  AB Living   0 0 0 0 0 0   SAB IAB Ectopic Multiple Live Births   0 0 0 0 0       PAST SURGICAL HISTORY   Past Surgical History:   Procedure Laterality Date     APPENDECTOMY       CHOLECYSTECTOMY  2003     ENT SURGERY       OPEN REDUCTION INTERNAL FIXATION RODDING INTRAMEDULLARY FEMUR  2012    Procedure: OPEN REDUCTION INTERNAL FIXATION RODDING INTRAMEDULLARY FEMUR;  Closed Reduction Internal Fixation Right Femur;  Surgeon: Catracho Hook MD;  [facility]     REVISE SCAR TRUNK Right 2015    Procedure: REVISE SCAR TRUNK;  Surgeon: COOPER Anderson MD;  [facility] OR     right nephrectomy  2003    due to Wilms tumor; performed by Dr. Myles Velez at [facility]     TONSILLECTOMY  2005     TRANSPLANT KIDNEY RECIPIENT LIVING RELATED CHILD  2004    left nephrectomy performed at time of transplant       SOCIAL HISTORY    Social History     Tobacco Use     Smoking status: Never Smoker     Smokeless tobacco: Never Used   Substance Use Topics     Alcohol use: No     Drug use: No     Social History     Social History Narrative     Not on file       FAMILY HISTORY    Family History   Problem Relation Age of Onset     Hypertension Father        REVIEW OF SYSTEMS  A 10 point review of systems including Constitutional, Eyes, Respiratory, Cardiovascular, Gastroenterology, Genitourinary, Integumentary, Musculoskeletal, and  "Psychiatric, were all negative, except for pertinent positives noted in the above HPI.    OBJECTIVE  Ht 1.49 m (4' 10.66\")   Wt 53.3 kg (117 lb 8 oz)   BMI 24.01 kg/m      General: Alert, without distress  HEENT: normocephalic, without obvious abnormality; normal thyroid gland   Breast: Within normal limits bilaterally, no nipple discharge, no lymphadenopathy  Cardiovascular: regular rate, well perfused   Lungs: no increased work of breathing    Abdomen: soft, non-tender, non-distended   Pelvic: normal external female genitalia; normal vagina without discharge; normal cervix without lesions/masses; uterus mobile anteverted, mobile, nontender; adnexae nontender and without masses; normal anus/perineum   Extremities: normal    Ultrasound 2/9/22  Gynecological Ultrasound Report   Pelvic U/S - Transabdominal   [facility]   Referring Provider: Sejal Webb MD   Sonographer:  Shilpi Wagner RDMS   Indication: Pain- Dyspareunia   LMP: No LMP recorded.   History: Wilm's Tumor, renal transplant   Gynecological Ultrasonography:   Uterus: anteverted. Contour is smooth/regular.   Size: 5.9 x 3.0 x 2.7 cm   Endometrium: Thickness Total 2.5 mm   Findings:   Right Ovary: Not visualized   Left Ovary:  Not visualized   Cul de Sac Free Fluid: Mild     Impression:   Normal uterus without structural cause for dysmenorrhea. +small free fluid in the cul de sac. Ovaries not visualized    ASSESSMENT/PLAN  Anuradha Pearson is a 21 year old P0 who presents here for an annual exam    #Primary ovarian insufficiency    -Continue on OCPs   -wondering about future fertility. Printed order of FSH after she's been off her OCPs for 7-10 days. She is living in [place] so will work on coordinating this blood draw.     Age 19-39 Annual Preventive Exam  1.  Screening:   STD testing: GC/CT, HIV - declined, discussed we can provide STD testing at any time in the future as needed. Reviewed indications for PrEP should Anuradha" ever need this in the future.   Cervical cancer: pap collected today. If normal, repeat in 3 years.   Breast cancer: CBE today normal, repeat annually; patient believes no radiation to chest wall during cancer treatment, and no family history of breast/ovarian cancer; mammograms to be started at age 40    2.  Immunizations   S/p HPV series     RTC in one year for an annual examination.    Mikaela Lou MD MPH   OBGYN PGY-1  02/09/22  4:54 PM    OBGYN Attending Addendum     I, Sejal Webb, saw Anuradha Pearson with the resident, Dr. Catracho Lou, and agree with their findings and plan of care as documented in the above note.    I personally reviewed vitals, meds, labs, exam.     Key findings: 21 year old G0 here for an annual examination.     I agree with the plan for pap smear, assessment of FSH, OCPs for treatment of POF. Return in 1 year for annual exam, any time for concerns.    Sejal Webb MD, MSCI  Date of Service: 02/09/2022

## 2022-02-11 LAB
BKR LAB AP GYN ADEQUACY: NORMAL
BKR LAB AP GYN INTERPRETATION: NORMAL
BKR LAB AP HPV REFLEX: NO
BKR LAB AP PREVIOUS ABNORMAL: NORMAL
PATH REPORT.COMMENTS IMP SPEC: NORMAL
PATH REPORT.COMMENTS IMP SPEC: NORMAL
PATH REPORT.RELEVANT HX SPEC: NORMAL

## 2022-02-15 PROBLEM — Z01.419 ENCOUNTER FOR WELL WOMAN EXAM: Status: ACTIVE | Noted: 2022-02-15

## 2022-02-22 DIAGNOSIS — Z94.0 KIDNEY REPLACED BY TRANSPLANT: ICD-10-CM

## 2022-02-22 RX ORDER — TACROLIMUS 0.5 MG/1
CAPSULE ORAL
Qty: 210 CAPSULE | Refills: 6 | Status: SHIPPED | OUTPATIENT
Start: 2022-02-22 | End: 2022-03-29

## 2022-03-08 PROBLEM — N39.0 URINARY TRACT INFECTION: Status: ACTIVE | Noted: 2021-11-04

## 2022-03-25 ENCOUNTER — TELEPHONE (OUTPATIENT)
Dept: TRANSPLANT | Facility: CLINIC | Age: 22
End: 2022-03-25
Payer: COMMERCIAL

## 2022-03-25 DIAGNOSIS — Z94.0 KIDNEY TRANSPLANTED: Primary | ICD-10-CM

## 2022-03-25 RX ORDER — CEFDINIR 300 MG/1
300 CAPSULE ORAL 2 TIMES DAILY
Qty: 20 CAPSULE | Refills: 0 | Status: SHIPPED | OUTPATIENT
Start: 2022-03-25 | End: 2022-04-04

## 2022-03-25 NOTE — TELEPHONE ENCOUNTER
Spoke to Anuradha regarding her labs. Her UA shows possible UTI. She is not having symptoms but historically does not get symptoms w/ UTIs. We would like to treat with Omnicef 300 mg BID for 10 days. We would also like her to have a urine culture done today. Anuradha agrees to the plan. I will touch base once culture results are back.

## 2022-03-25 NOTE — LETTER
PHYSICIAN ORDERS      DATE & TIME ISSUED: 2022  PATIENT NAME: Anuradha Pearson  : 2000  [facility] MR# [if applicable]: [id number]  DIAGNOSIS/ICD-10 CODE: Kidney transplanted [Z94.0}    PLEASE FAX RESULTS [phone]    Please obtain the follow lab orders:     -Urine culture       For questions please call:With questions please call Pediatric Transplant Coordinator Susanne Samson at [phone]      Susanne Samson RN BSN        Natalya Hartman MD  , Pediatric Nephrology

## 2022-03-29 ENCOUNTER — VIRTUAL VISIT (OUTPATIENT)
Dept: TRANSPLANT | Facility: CLINIC | Age: 22
End: 2022-03-29
Attending: NURSE PRACTITIONER
Payer: COMMERCIAL

## 2022-03-29 DIAGNOSIS — Z71.87 COUNSELING FOR TRANSITION FROM PEDIATRIC TO ADULT CARE PROVIDER: ICD-10-CM

## 2022-03-29 DIAGNOSIS — Z94.0 KIDNEY TRANSPLANTED: Primary | ICD-10-CM

## 2022-03-29 PROCEDURE — 99214 OFFICE O/P EST MOD 30 MIN: CPT | Mod: GT | Performed by: NURSE PRACTITIONER

## 2022-03-29 RX ORDER — TACROLIMUS 1 MG/1
2 CAPSULE ORAL 2 TIMES DAILY
Qty: 360 CAPSULE | Refills: 3 | Status: SHIPPED | OUTPATIENT
Start: 2022-03-29 | End: 2023-02-14

## 2022-03-29 NOTE — PROGRESS NOTES
Video Start Time: 11:05 AM    Shraddha Pearson complains of    Chief Complaint   Patient presents with     Transplant       I have reviewed and updated the patient's Past Medical History, Social History, Family History and Medication List.    ALLERGIES  Ibuprofen, Shellfish-derived products, and Vancomycin    HPI  I had the pleasure of conducting a video visit with Shraddha Pearson today for follow-up of Kidney Transplant, counseling for transition to adult care. Shraddha is a 21 year old female who attends visit independently. Currently diagnosed with UTI, starting Cefdinir 300 mg twice a day for 10 days, Shraddha did leave urine culture before starting the antibiotic. She has no fevers of physical complaints.    Shraddha is independent with her cares. She uses My Chart, fills her own pill box, takes her medications at 10 AM and 10 PM independently with reported excellent compliance. She knew her medications and current doses. She has even taken over ordering and picking up her medication refills.     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 She has had her tonsils removed in the past and her adenoids 12/2021.    ROS    ROS: 10 point ROS neg other than the symptoms noted above in the HPI.    Objective  Vitals: None taken  GENERAL: Healthy, alert and no distress  EYES: Eyes grossly normal to inspection.  No discharge or erythema, or obvious scleral/conjunctival abnormalities.  RESP: No audible wheeze, cough, or visible cyanosis.  No visible retractions or increased work of breathing.    SKIN: Visible skin clear. No significant rash, abnormal pigmentation or  lesions.  NEURO: Cranial nerves grossly intact.  Mentation and speech appropriate for age.  PSYCH: Mentation appears normal, affect normal/bright, judgement and insight intact, normal speech and appearance well-groomed.      Assessment/Plan:  Impression: Anuradha is a 21 yeas old s/p living kidney transplant, stable labs and independent with cares.    1. Kidney transplanted  Immunosuppression: Azathioprine 75 mg daily. Tacrolimus goal 4-6  -Increased tacrolimus today, to 2 mg twice a day. Last levels have been below goal.   Monthly renal transplant labs.  UA with next set of labs to make sure UTI has cleared.    2. Counseling for transition from pediatric to adult care provider  Anuradha is slotted to transition to adult team November 2022  Anuradha will need the follow adult providers:    Adult Tx Nephrology- for history of kidney transplant    Heme/Onc Long Term Follow up- for history of Wilms Tumor will continue to see Dorcas Samayoa    Endocrine- for history of primary ovarian failure /GYN for woman's health issues scheduled with Dr. Krishnamurthy in July 2021. Will see GYN today also Dr. Jon REES- currently following with Dr. Jennifer fairbanks ID for EBV and recent adenoidectomy.    Primary care physician- Dr. Gutierrez Wood     Dermatology- for yearly skin checks due to increased risk for skin cancer due to immunosuppression medications (Last apt Aug 3rd, 2020 at home clinic)    Opthomology- Yearly    Dental- Every 6 months    Return in about 2 months (around 6/8/2022) for with Dr. Hartman.   Dr. Rodriguez with EBV IgG and EBV plasma  ECHO  24 hour ABPM    Video-Visit Details    Type of service:  Video Visit    Video End Time (time video stopped): 11:26  Total visit time: 21 minutes    Originating Location (pt. Location): Home    Distant Location (provider location):  [facility]     Mode of Communication:  Video Conference via [employer]    30 minutes spent on the date of the  encounter doing chart review, history and exam, documentation and further activities per the note          JASMYNE Mir CNP

## 2022-03-29 NOTE — LETTER
3/29/2022      RE: Shraddha Pearson  [address]  [place] [address]       Video Start Time: 11:05 AM    Shraddha Pearson complains of    Chief Complaint   Patient presents with     Transplant       I have reviewed and updated the patient's Past Medical History, Social History, Family History and Medication List.    ALLERGIES  Ibuprofen, Shellfish-derived products, and Vancomycin    HPI  I had the pleasure of conducting a video visit with Shraddha Pearson today for follow-up of Kidney Transplant, counseling for transition to adult care. Shraddha is a 21 year old female who attends visit independently. Currently diagnosed with UTI, starting Cefdinir 300 mg twice a day for 10 days, Shraddha did leave urine culture before starting the antibiotic. She has no fevers of physical complaints.    Shraddha is independent with her cares. She uses My Chart, fills her own pill box, takes her medications at 10 AM and 10 PM independently with reported excellent compliance. She knew her medications and current doses. She has even taken over ordering and picking up her medication refills.     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 She has had her tonsils removed in the past and her adenoids 12/2021.    ROS    ROS: 10 point ROS neg other than the symptoms noted above in the HPI.    Objective  Vitals: None taken  GENERAL: Healthy, alert and no distress  EYES: Eyes grossly normal to inspection.  No discharge or erythema, or obvious scleral/conjunctival abnormalities.  RESP: No audible wheeze, cough, or visible cyanosis.  No visible retractions or increased work of  breathing.    SKIN: Visible skin clear. No significant rash, abnormal pigmentation or lesions.  NEURO: Cranial nerves grossly intact.  Mentation and speech appropriate for age.  PSYCH: Mentation appears normal, affect normal/bright, judgement and insight intact, normal speech and appearance well-groomed.      Assessment/Plan:  Impression: Anuradha is a 21 yeas old s/p living kidney transplant, stable labs and independent with cares.    1. Kidney transplanted  Immunosuppression: Azathioprine 75 mg daily. Tacrolimus goal 4-6  -Increased tacrolimus today, to 2 mg twice a day. Last levels have been below goal.   Monthly renal transplant labs.  UA with next set of labs to make sure UTI has cleared.    2. Counseling for transition from pediatric to adult care provider  Anuradha is slotted to transition to adult team November 2022  Anuradha will need the follow adult providers:    Adult Tx Nephrology- for history of kidney transplant    Heme/Onc Long Term Follow up- for history of Wilms Tumor will continue to see Dorcas Samayoa    Endocrine- for history of primary ovarian failure /GYN for woman's health issues scheduled with Dr. Krishnamurthy in July 2021. Will see GYN today also Dr. Jon REES- currently following with Dr. Jennifer fairbanks ID for EBV and recent adenoidectomy.    Primary care physician- Dr. Gutierrez Wood     Dermatology- for yearly skin checks due to increased risk for skin cancer due to immunosuppression medications (Last apt Aug 3rd, 2020 at home clinic)    Opthomology- Yearly    Dental- Every 6 months    Return in about 2 months (around 6/8/2022) for with Dr. Hartman.   Dr. Rodriguez with EBV IgG and EBV plasma  ECHO  24 hour ABPM    Video-Visit Details    Type of service:  Video Visit    Video End Time (time video stopped): 11:26  Total visit time: 21 minutes    Originating Location (pt. Location): Home    Distant Location (provider location):  [facility]     Mode of  Communication:  Video Conference via [employer]    30 minutes spent on the date of the encounter doing chart review, history and exam, documentation and further activities per the note          JASMYNE Mir CNP

## 2022-04-01 DIAGNOSIS — Z94.0 KIDNEY TRANSPLANTED: Primary | ICD-10-CM

## 2022-05-10 ENCOUNTER — TELEPHONE (OUTPATIENT)
Dept: TRANSPLANT | Facility: CLINIC | Age: 22
End: 2022-05-10
Payer: COMMERCIAL

## 2022-05-10 NOTE — TELEPHONE ENCOUNTER
Left message asking Anuradha to get labs done this week. Please call back or send a my chart message.

## 2022-06-02 DIAGNOSIS — Z94.0 KIDNEY REPLACED BY TRANSPLANT: ICD-10-CM

## 2022-06-02 RX ORDER — AZATHIOPRINE 50 MG/1
75 TABLET ORAL DAILY
Qty: 45 TABLET | Refills: 11 | Status: SHIPPED | OUTPATIENT
Start: 2022-06-02 | End: 2022-06-27

## 2022-06-13 ENCOUNTER — TELEPHONE (OUTPATIENT)
Dept: TRANSPLANT | Facility: CLINIC | Age: 22
End: 2022-06-13
Payer: COMMERCIAL

## 2022-06-15 DIAGNOSIS — I15.1 HYPERTENSION SECONDARY TO OTHER RENAL DISORDERS: ICD-10-CM

## 2022-06-15 RX ORDER — AMLODIPINE BESYLATE 10 MG/1
10 TABLET ORAL DAILY
Qty: 30 TABLET | Refills: 11 | Status: SHIPPED | OUTPATIENT
Start: 2022-06-15 | End: 2022-06-16

## 2022-06-16 DIAGNOSIS — I15.1 HYPERTENSION SECONDARY TO OTHER RENAL DISORDERS: ICD-10-CM

## 2022-06-16 RX ORDER — AMLODIPINE BESYLATE 10 MG/1
10 TABLET ORAL DAILY
Qty: 30 TABLET | Refills: 11 | Status: SHIPPED | OUTPATIENT
Start: 2022-06-16 | End: 2024-03-11

## 2022-06-16 NOTE — TELEPHONE ENCOUNTER
1. Refill request received from: [facility]  2. Medication Requested: Amlodipine Besylate 10mg tablets  3. Directions:Take 1 tablet by mouth every day  4. Quantity:30  5. Last Office Visit: 12/1/21                    Has it been over a year since the last appointment (6 months for diabetes)? no                    If No:     Move on to next question.                    If Yes:                      Change refill quantity to 1 month.                      Route to Provider or Pool & let them know its been over a year since patient has been seen.                      If they do not have an upcoming appointment- reach out to family to schedule or route to .  6. Next Appointment Scheduled for: 11/16/2022  7. Last refill: 2/11/2022  8. Sent To: NEPHROLOGY POOL

## 2022-06-21 ENCOUNTER — TELEPHONE (OUTPATIENT)
Dept: TRANSPLANT | Facility: CLINIC | Age: 22
End: 2022-06-21
Payer: COMMERCIAL

## 2022-06-21 DIAGNOSIS — Z94.0 KIDNEY REPLACED BY TRANSPLANT: ICD-10-CM

## 2022-06-21 RX ORDER — TACROLIMUS 0.5 MG/1
CAPSULE ORAL
Qty: 150 CAPSULE | Refills: 6 | Status: CANCELLED | OUTPATIENT
Start: 2022-06-21

## 2022-06-21 NOTE — TELEPHONE ENCOUNTER
Left message. Called Anuradha about possible video visit tomorrow at 10:30 when she will be in [place]. Please c/b or send me a mychart if you can make that work.

## 2022-06-21 NOTE — LETTER
PHYSICIAN ORDERS      DATE & TIME ISSUED: 2022  PATIENT NAME: Anuradha Pearson  : 2000  [facility] MR# [if applicable]: [id number]  DIAGNOSIS/ICD-10 CODE: Kidney transplanted [Z94.0}    PLEASE FAX RESULTS -[phone]    Please perform the following lab tests in 2022.    -tacrolimus drug level (Purple top)   -urine protein/creatinine ratio  -EBV PCR Quant PLASMA (this is a send out lab to [place]) (ETDA, Purple top)    With questions please call Pediatric Transplant Coordinator Susanne Samson at [phone]      Susanne Samson RN BSN        Natalya Hartman MD  , Pediatric Nephrology

## 2022-06-21 NOTE — TELEPHONE ENCOUNTER
Spoke to Anuradha. She is traveling all over the country this summer. She will not be in [place] until at least October. It will be difficult for her to find a lab to have a urine protein creatinine ratio done. She will be in [place] for two days next month so will try to find a lab then. Anuradha believes the lab patricia an EBV plasma with her last labs.     She is ready to transition to adult team. She will need to see a peds nephrologist one more time before transitioning. Scheduling will be tricky since she will not be in [place] for a while.     Plan:   Send transition check list via My Chart for Anuradha to fill out.   Get appointment scheduled in prep for transition to adult service

## 2022-06-22 ENCOUNTER — VIRTUAL VISIT (OUTPATIENT)
Dept: NEPHROLOGY | Facility: CLINIC | Age: 22
End: 2022-06-22
Attending: PEDIATRICS
Payer: COMMERCIAL

## 2022-06-22 VITALS — HEIGHT: 59 IN | WEIGHT: 115 LBS | BODY MASS INDEX: 23.18 KG/M2

## 2022-06-22 DIAGNOSIS — Z94.0 KIDNEY REPLACED BY TRANSPLANT: Primary | ICD-10-CM

## 2022-06-22 PROCEDURE — 99215 OFFICE O/P EST HI 40 MIN: CPT | Mod: GT | Performed by: PEDIATRICS

## 2022-06-22 ASSESSMENT — PAIN SCALES - GENERAL: PAINLEVEL: NO PAIN (0)

## 2022-06-22 NOTE — PROGRESS NOTES
Anuradha Pearson  is being evaluated via a billable video visit.      How would you like to obtain your AVS? [employer]  For the video visit, send the invitation by: Text to cell phone: [phone]  Will anyone else be joining your video visit? No

## 2022-06-22 NOTE — LETTER
6/22/2022      RE: Anuradha Pearson  [address]  [place] [address]     Dear Colleague,    Thank you for the opportunity to participate in the care of your patient, Anuradha Pearson, at the [facility] at [facility]. Please see a copy of my visit note below.        Return Visit for Kidney Transplant, Immunosuppression Management, CKD, Stage III    Chief Complaint:  Chief Complaint   Patient presents with     Video Visit       HPI:    I had the pleasure of seeing Anuradha Pearson in the Pediatric Nephrology Clinic today for follow-up of her kidney transplant (history of Wilms tumor complicated by inadvertent ligation of the contralateral renal vein, kidney transplant in 2004). Her post-transplant course has been complicated by infections (viral meningitis, EBV viremia, warts), pseudotumor cerebri, and fracture of her right femur.  Anuradha is a 22 year old female who attended the video visit by herself.    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  Given her history of EBV viremia, she saw Dr. Rodriguez in February 2022 at which point he had a low suspicion for PTLD; he wanted to see her in follow-up in May 2022 with a plasma EBV level.  This has not yet been completed.     Her last visit with nephrology was with Nesha Orta in March 2022.  She had a UTI around the same time with an SHARON and proteinuria was noted.  It is unclear if the culture grew anything and she was asymptomatic at the time.  She has not had any repeat urine samples since that time.  Her creatinine is now back to baseline at 1.5, but her FK levels have been persistently low.  She is on reduced immunosuppression due to EBV viremia; she reports that her Imuran was increased from 50mg  BID to 75 mg BID 6-12 months ago when her prednisone was discontinued.    Follow-up has been intermittent due to Anuradha's travel schedule; she travels around the country doing horse shows.  She has been very busy.  Just got back from [place].  She has an appointment to get labs today (urine studies, repeat tacro level, and EBV plasma).          Review of Systems:  A comprehensive review of systems was performed and found to be negative other than noted in the HPI.    Allergies:  Anuradha is allergic to ibuprofen, shellfish-derived products, and vancomycin..    Active Medications:  Current Outpatient Medications   Medication Sig Dispense Refill     amLODIPine (NORVASC) 10 MG tablet Take 1 tablet (10 mg) by mouth daily 30 tablet 11     azaTHIOprine (IMURAN) 50 MG tablet Take 1.5 tablets (75 mg) by mouth daily 45 tablet 11     enalapril (VASOTEC) 5 MG tablet Take 1 tablet (5 mg) by mouth daily 30 tablet 11     ferrous sulfate (FEROSUL) 325 (65 Fe) MG tablet Take 1 tablet (325 mg) by mouth 2 times daily 180 tablet 6     labetalol (NORMODYNE) 100 MG tablet Take 1 tablet (100 mg) by mouth 2 times daily 180 tablet 6     levonorgest-eth estrad 91-Day (SEASONIQUE) 0.15-0.03 &0.01 MG tablet Take 1 tablet by mouth daily 91 tablet 3     magnesium oxide (MAG-OX) 400 (241.3 Mg) MG tablet Take 1 tablet (400 mg) by mouth 2 times daily 60 tablet 11     Multiple Vitamins-Minerals (WOMENS MULTIVITAMIN PO) Take 1 tablet by mouth daily       tacrolimus (GENERIC EQUIVALENT) 1 MG capsule Take 2 capsules (2 mg) by mouth 2 times daily 360 capsule 3     amoxicillin (AMOXIL) 500 MG capsule Take 4 caps (2 grams) one hour prior to dental procedure. (Patient not taking: Reported on 6/22/2022) 12 capsule 3        Immunizations:  Immunization History   Administered Date(s) Administered     HEPA 08/04/2004     HPV9 04/10/2018, 07/17/2018, 01/15/2019     HepB 2000, 2000, 06/21/2001     Hib (PRP-T) 2000, 2000, 06/21/2001      Historical DTP/aP 2000, 2000, 02/28/2001, 12/06/2001     Influenza (H1N1) 10/27/2009     Influenza (IIV3) PF 10/25/2005, 02/09/2007, 11/09/2007, 01/22/2009, 10/16/2009, 11/03/2010, 09/19/2011, 09/10/2012, 01/03/2014, 12/01/2014     Influenza Vaccine IM > 6 months Valent IIV4 (Alfuria,Fluzone) 10/26/2015, 01/09/2017, 01/29/2018, 11/13/2018     Influenza Vaccine Im 4yrs+ 4 Valent CCIIV4 12/20/2019     MMR 06/21/2001, 06/21/2018     Mantoux Tuberculin Skin Test 08/04/2004     Meningococcal (Bexsero ) 07/23/2019     Meningococcal (Menactra ) 11/13/2018     Pneumo Conj 13-V (2010&after) 04/10/2018     Pneumococcal (PCV 7) 2000, 2000, 02/28/2001, 06/21/2001     Pneumococcal 23 valent 01/15/2019     Poliovirus, inactivated (IPV) 2000, 2000, 12/06/2001     TDAP Vaccine (Adacel) 03/12/2017     Tdap (Adacel,Boostrix) 09/19/2011     Varicella 06/21/2001        PMHx:  Past Medical History:   Diagnosis Date     H/O kidney transplant      Hypertension      Urinary tract infection 11/04/2021     Wilm's tumor          Rejection History     Kidney Transplant - 11/9/2004  (#1)     No rejections noted for this transplant.            Infection History     Kidney Transplant - 11/9/2004  (#1)       POD Infections Treatments Organisms Resolved    10/24/2014 9 years 11 months EBV (Derrell-Barr virus) viremia         Recurrent pyelonephritis Antibiotics, Antibiotics, Antibiotics, Antibiotics, Antibiotics KLEBSIELLA, ENTEROCOCCUS, PSEUDOMONAS             Problems     Kidney Transplant - 11/9/2004  (#1)       POD Problem Resolved    11/9/2004 N/A Kidney replaced by transplant       Gingival hypertrophy 11/8/2018          Non-Transplant Related Problems       Problem Resolved    8/14/2015 Scar adherent     7/21/2015 Immunosuppression (H)     7/21/2015 HTN (hypertension)     11/7/2013 Dehydration 11/8/2018 6/6/2013 Primary ovarian failure     10/5/2012 Lack of expected normal physiological development      "8/20/2012 Femur fracture, right (H) 11/8/2018 7/17/2012 Wilms' tumor (H)     7/17/2012 Ankle pain 11/8/2018 7/17/2012 Growth deceleration 7/21/2015 7/17/2012 S/P radiation therapy     7/17/2012 Status post chemotherapy                 PSHx:    Past Surgical History:   Procedure Laterality Date     APPENDECTOMY       CHOLECYSTECTOMY  05/20/2003     ENT SURGERY       OPEN REDUCTION INTERNAL FIXATION RODDING INTRAMEDULLARY FEMUR  8/20/2012    Procedure: OPEN REDUCTION INTERNAL FIXATION RODDING INTRAMEDULLARY FEMUR;  Closed Reduction Internal Fixation Right Femur;  Surgeon: Catracho Hook MD;  Location: [facility] OR     REVISE SCAR TRUNK Right 11/6/2015    Procedure: REVISE SCAR TRUNK;  Surgeon: COOPER Anderson MD;  Location: [facility] OR     right nephrectomy  05/19/2003    due to Wilms tumor; performed by Dr. Myles Velez at [facility]     TONSILLECTOMY  01/31/2005     TRANSPLANT KIDNEY RECIPIENT LIVING RELATED CHILD  11/09/2004    left nephrectomy performed at time of transplant       FHx:  Family History   Problem Relation Age of Onset     Hypertension Father        SHx:  Social History     Tobacco Use     Smoking status: Never Smoker     Smokeless tobacco: Never Used   Substance Use Topics     Alcohol use: No     Drug use: No     Social History     Social History Narrative     Not on file       Physical Exam:    Ht 1.499 m (4' 11\")   Wt 52.2 kg (115 lb)   BMI 23.23 kg/m    Growth percentile SmartLinks can only be used for patients less than 20 years old.    Exam:  GENERAL: Healthy, alert and no distress  EYES: Eyes grossly normal to inspection.  No discharge or erythema, or obvious scleral/conjunctival abnormalities.  RESP: No audible wheeze, cough, or visible cyanosis.  No visible retractions or increased work of breathing.    SKIN: Visible skin clear. No significant rash, abnormal pigmentation or lesions.  NEURO: Cranial nerves grossly intact.  Mentation and speech appropriate for " age.  PSYCH: Mentation appears normal, affect normal/bright, judgement and insight intact, normal speech and appearance well-groomed.    Labs and Imaging:  No results found for any visits on 06/22/22.  I reviewed labs from 6/26/2020 and previous labs going back to 2018.      I personally reviewed results of laboratory evaluation, imaging studies and past medical records that were available during this outpatient visit.      Assessment and Plan:    No diagnosis found.    Immunosuppression: Anuradha Pearson is on individualized protocol due to EBV viremia, other infections. tacrolimus goal is 4-6.  Imuran dose is 50mg daily (recently increased with decreasing prednisone)  SteroidsYes, dose 2mg every Monday/Wednesday/Friday  Her steroid dose has been decreasing to transition to a steroid avoidance protocol for her next kidney.    Immunosuppressive Medications     Immunosuppressive Agents     azaTHIOprine (IMURAN) 50 MG tablet        tacrolimus (GENERIC EQUIVALENT) 1 MG capsule            Serology Results     Recipient (Pre-transplant Results)     Anti-HBcAb   No results on file    HBsAg   No results on file    HBsAb   No results on file           HBV DNA    No results on file    Anti-HCV   No results on file    Anti-HIV I/II   No results on file           Anti-CMV   No results on file    Anti-HTLV I/II   No results on file    RPR/VDRL   No results on file           EBV IgG   No results on file    EBV IgM   No results on file    EBNA   No results on file                      Left Kidney Donor (Pre-donation Results)     Anti-HBcAb   HBC Total:  Negative    HBsAg   HBsAg:  Negative    HBsAb   No results on file           HBV DNA    No results on file    Anti-HCV   No results on file    Anti-HIV I/II   No results on file           Anti-CMV   No results on file    Anti-HTLV I/II   No results on file    RPR/VDRL   No results on file           EBV IgG   No results on file    EBV IgM   No results on file    EBNA   No  results on file                       CKD: Stage III.  Her creatinine fluctuates between 1.2-1.5.  Most recently, it has been at the higher end of her baseline.  She does have a history of low level DSA.      HTN: BP was not obtained.  She will try to get a BP today at her lab draw.  Most recent blood pressure reading   02/09/22 125/84       Antihypertensive Medications     Antihypertensive Combinations     Enalapril-hydroCHLOROthiazide 5-12.5 MG TABS              Last ECHO done 3/5/2021 but I do not see the results.  I will look into this.    Immunoprophylaxis:  PCP prophylaxis: None - May need to restart bactrim.  Antiviral prophylaxis: No  Antifungal prophylaxis: No    Patient Education: During this visit I discussed in detail the patient s symptoms, physical exam and evaluation results findings, tentative diagnosis as well as the treatment plan (Including but not limited to possible side effects and complications related to the disease, treatment modalities and intervention(s). Family expressed understanding and consent. Family was receptive and ready to learn; no apparent learning barriers were identified.  Live virus vaccines are contraindicated in this patient. Any new medications prescribed must be assessed for kidney toxicity and drug-interactions before use.    Health Maintenance: Live vaccines are contraindicated due to immunosuppression.    Anuradha must have all other vaccines updated in a timely fashion including an annual influenza vaccine.  Anuradha must be seen by the dentist annually.  Over the counter medications should be checked prior to use to ensure they are safe in patients with kidney disease.      PLAN FOR TODAY:  1. Restart bactrim (unclear why it was stopped)  2. Await results of urine pr/cr, DSAs, tacro level, plasma free EBV - she may need a biopsy, an increase in tacrolimus and an increase in her Imuran to 100mg BID  3. Follow-up results on most recent echo  4. Schedule follow-up with  EBV clinic    Follow up: Return in about 3 months (around 9/22/2022). Please return sooner should Anuradha become symptomatic. For any questions or concerns, feel free to contact the transplant coordinators   at [phone].    Anuradha would like to transition to adult care later this year.    Sincerely,    Natalya Hartman MD   Pediatric Nephrology    CC:   Patient Care Team:  Gutierrez Wood MD as PCP - General (Family Practice)  Nesha Orta APRN CNP as Nurse Practitioner (Nurse Practitioner - Pediatrics)  Jian Alatorre MD as MD (Pediatrics)  Dorcas Samayoa APRN CNP as Nurse Practitioner (Nurse Practitioner - Pediatrics)  Ike oRsa MA as Medical Assistant (Transplant)  Natalya Hartman MD as Transplant Physician (Pediatric Nephrology)  Gauri Mckeon [facility] as Pharmacist (Pharmacist)  Isidoro Delgadillo MD as MD (INTERNAL MEDICINE - ENDOCRINOLOGY, DIABETES & METABOLISM)  Nesha Orta APRN CNP as Assigned Pediatric Specialist Provider  Ellie Krishnamurthy MD as MD (OB/Gyn)  Susanne Leon, RN as Transplant Coordinator (Transplant)  Sejal Webb MD as Assigned OBGYN Provider  PRAVEEN BELLA    Copy to patient  [place]  [address] [id number]

## 2022-06-22 NOTE — PROGRESS NOTES
Return Visit for Kidney Transplant, Immunosuppression Management, CKD, Stage III    Chief Complaint:  Chief Complaint   Patient presents with     Video Visit       HPI:    I had the pleasure of seeing Anuradha Pearson in the Pediatric Nephrology Clinic today for follow-up of her kidney transplant (history of Wilms tumor complicated by inadvertent ligation of the contralateral renal vein, kidney transplant in 2004). Her post-transplant course has been complicated by infections (viral meningitis, EBV viremia, warts), pseudotumor cerebri, and fracture of her right femur.  Anuradha is a 22 year old female who attended the video visit by herself.    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  Given her history of EBV viremia, she saw Dr. Rodriguez in February 2022 at which point he had a low suspicion for PTLD; he wanted to see her in follow-up in May 2022 with a plasma EBV level.  This has not yet been completed.     Her last visit with nephrology was with Nesha Orta in March 2022.  She had a UTI around the same time with an SHARON and proteinuria was noted.  It is unclear if the culture grew anything and she was asymptomatic at the time.  She has not had any repeat urine samples since that time.  Her creatinine is now back to baseline at 1.5, but her FK levels have been persistently low.  She is on reduced immunosuppression due to EBV viremia; she reports that her Imuran was increased from 50mg BID to 75 mg BID 6-12 months ago when her prednisone was discontinued.    Follow-up has been intermittent due to Anuradha's travel schedule; she travels around the country doing horse shows.  She has been very busy.  Just got back from [place].  She has an appointment to get labs today (urine studies, repeat tacro level, and EBV plasma).          Review of Systems:  A  comprehensive review of systems was performed and found to be negative other than noted in the HPI.    Allergies:  Anuradha is allergic to ibuprofen, shellfish-derived products, and vancomycin..    Active Medications:  Current Outpatient Medications   Medication Sig Dispense Refill     amLODIPine (NORVASC) 10 MG tablet Take 1 tablet (10 mg) by mouth daily 30 tablet 11     azaTHIOprine (IMURAN) 50 MG tablet Take 1.5 tablets (75 mg) by mouth daily 45 tablet 11     enalapril (VASOTEC) 5 MG tablet Take 1 tablet (5 mg) by mouth daily 30 tablet 11     ferrous sulfate (FEROSUL) 325 (65 Fe) MG tablet Take 1 tablet (325 mg) by mouth 2 times daily 180 tablet 6     labetalol (NORMODYNE) 100 MG tablet Take 1 tablet (100 mg) by mouth 2 times daily 180 tablet 6     levonorgest-eth estrad 91-Day (SEASONIQUE) 0.15-0.03 &0.01 MG tablet Take 1 tablet by mouth daily 91 tablet 3     magnesium oxide (MAG-OX) 400 (241.3 Mg) MG tablet Take 1 tablet (400 mg) by mouth 2 times daily 60 tablet 11     Multiple Vitamins-Minerals (WOMENS MULTIVITAMIN PO) Take 1 tablet by mouth daily       tacrolimus (GENERIC EQUIVALENT) 1 MG capsule Take 2 capsules (2 mg) by mouth 2 times daily 360 capsule 3     amoxicillin (AMOXIL) 500 MG capsule Take 4 caps (2 grams) one hour prior to dental procedure. (Patient not taking: Reported on 6/22/2022) 12 capsule 3        Immunizations:  Immunization History   Administered Date(s) Administered     HEPA 08/04/2004     HPV9 04/10/2018, 07/17/2018, 01/15/2019     HepB 2000, 2000, 06/21/2001     Hib (PRP-T) 2000, 2000, 06/21/2001     Historical DTP/aP 2000, 2000, 02/28/2001, 12/06/2001     Influenza (H1N1) 10/27/2009     Influenza (IIV3) PF 10/25/2005, 02/09/2007, 11/09/2007, 01/22/2009, 10/16/2009, 11/03/2010, 09/19/2011, 09/10/2012, 01/03/2014, 12/01/2014     Influenza Vaccine IM > 6 months Valent IIV4 (Robbie,Fluzone) 10/26/2015, 01/09/2017, 01/29/2018, 11/13/2018     Influenza  Vaccine Im 4yrs+ 4 Valent CCIIV4 12/20/2019     MMR 06/21/2001, 06/21/2018     Mantoux Tuberculin Skin Test 08/04/2004     Meningococcal (Bexsero ) 07/23/2019     Meningococcal (Menactra ) 11/13/2018     Pneumo Conj 13-V (2010&after) 04/10/2018     Pneumococcal (PCV 7) 2000, 2000, 02/28/2001, 06/21/2001     Pneumococcal 23 valent 01/15/2019     Poliovirus, inactivated (IPV) 2000, 2000, 12/06/2001     TDAP Vaccine (Adacel) 03/12/2017     Tdap (Adacel,Boostrix) 09/19/2011     Varicella 06/21/2001        PMHx:  Past Medical History:   Diagnosis Date     H/O kidney transplant      Hypertension      Urinary tract infection 11/04/2021     Wilm's tumor          Rejection History     Kidney Transplant - 11/9/2004  (#1)     No rejections noted for this transplant.            Infection History     Kidney Transplant - 11/9/2004  (#1)       POD Infections Treatments Organisms Resolved    10/24/2014 9 years 11 months EBV (Derrell-Barr virus) viremia         Recurrent pyelonephritis Antibiotics, Antibiotics, Antibiotics, Antibiotics, Antibiotics KLEBSIELLA, ENTEROCOCCUS, PSEUDOMONAS             Problems     Kidney Transplant - 11/9/2004  (#1)       POD Problem Resolved    11/9/2004 N/A Kidney replaced by transplant       Gingival hypertrophy 11/8/2018          Non-Transplant Related Problems       Problem Resolved    8/14/2015 Scar adherent     7/21/2015 Immunosuppression (H)     7/21/2015 HTN (hypertension)     11/7/2013 Dehydration 11/8/2018 6/6/2013 Primary ovarian failure     10/5/2012 Lack of expected normal physiological development     8/20/2012 Femur fracture, right (H) 11/8/2018 7/17/2012 Wilms' tumor (H)     7/17/2012 Ankle pain 11/8/2018 7/17/2012 Growth deceleration 7/21/2015 7/17/2012 S/P radiation therapy     7/17/2012 Status post chemotherapy                 PSHx:    Past Surgical History:   Procedure Laterality Date     APPENDECTOMY       CHOLECYSTECTOMY  05/20/2003     ENT  "SURGERY       OPEN REDUCTION INTERNAL FIXATION RODDING INTRAMEDULLARY FEMUR  8/20/2012    Procedure: OPEN REDUCTION INTERNAL FIXATION RODDING INTRAMEDULLARY FEMUR;  Closed Reduction Internal Fixation Right Femur;  Surgeon: Catracho Hook MD;  Location: [facility] OR     REVISE SCAR TRUNK Right 11/6/2015    Procedure: REVISE SCAR TRUNK;  Surgeon: COOPER Anderson MD;  Location: [facility] OR     right nephrectomy  05/19/2003    due to Wilms tumor; performed by Dr. Myles Velez at [facility]     TONSILLECTOMY  01/31/2005     TRANSPLANT KIDNEY RECIPIENT LIVING RELATED CHILD  11/09/2004    left nephrectomy performed at time of transplant       FHx:  Family History   Problem Relation Age of Onset     Hypertension Father        SHx:  Social History     Tobacco Use     Smoking status: Never Smoker     Smokeless tobacco: Never Used   Substance Use Topics     Alcohol use: No     Drug use: No     Social History     Social History Narrative     Not on file       Physical Exam:    Ht 1.499 m (4' 11\")   Wt 52.2 kg (115 lb)   BMI 23.23 kg/m    Growth percentile SmartLinks can only be used for patients less than 20 years old.    Exam:  GENERAL: Healthy, alert and no distress  EYES: Eyes grossly normal to inspection.  No discharge or erythema, or obvious scleral/conjunctival abnormalities.  RESP: No audible wheeze, cough, or visible cyanosis.  No visible retractions or increased work of breathing.    SKIN: Visible skin clear. No significant rash, abnormal pigmentation or lesions.  NEURO: Cranial nerves grossly intact.  Mentation and speech appropriate for age.  PSYCH: Mentation appears normal, affect normal/bright, judgement and insight intact, normal speech and appearance well-groomed.    Labs and Imaging:  No results found for any visits on 06/22/22.  I reviewed labs from 6/26/2020 and previous labs going back to 2018.      I personally reviewed results of laboratory evaluation, imaging studies and past medical " records that were available during this outpatient visit.      Assessment and Plan:    No diagnosis found.    Immunosuppression: Anuradha Pearson is on individualized protocol due to EBV viremia, other infections. tacrolimus goal is 4-6.  Imuran dose is 50mg daily (recently increased with decreasing prednisone)  SteroidsYes, dose 2mg every Monday/Wednesday/Friday  Her steroid dose has been decreasing to transition to a steroid avoidance protocol for her next kidney.    Immunosuppressive Medications     Immunosuppressive Agents     azaTHIOprine (IMURAN) 50 MG tablet        tacrolimus (GENERIC EQUIVALENT) 1 MG capsule            Serology Results     Recipient (Pre-transplant Results)     Anti-HBcAb   No results on file    HBsAg   No results on file    HBsAb   No results on file           HBV DNA    No results on file    Anti-HCV   No results on file    Anti-HIV I/II   No results on file           Anti-CMV   No results on file    Anti-HTLV I/II   No results on file    RPR/VDRL   No results on file           EBV IgG   No results on file    EBV IgM   No results on file    EBNA   No results on file                      Left Kidney Donor (Pre-donation Results)     Anti-HBcAb   HBC Total:  Negative    HBsAg   HBsAg:  Negative    HBsAb   No results on file           HBV DNA    No results on file    Anti-HCV   No results on file    Anti-HIV I/II   No results on file           Anti-CMV   No results on file    Anti-HTLV I/II   No results on file    RPR/VDRL   No results on file           EBV IgG   No results on file    EBV IgM   No results on file    EBNA   No results on file                       CKD: Stage III.  Her creatinine fluctuates between 1.2-1.5.  Most recently, it has been at the higher end of her baseline.  She does have a history of low level DSA.      HTN: BP was not obtained.  She will try to get a BP today at her lab draw.  Most recent blood pressure reading   02/09/22 125/84       Antihypertensive  Medications     Antihypertensive Combinations     Enalapril-hydroCHLOROthiazide 5-12.5 MG TABS              Last ECHO done 3/5/2021 but I do not see the results.  I will look into this.    Immunoprophylaxis:  PCP prophylaxis: None - May need to restart bactrim.  Antiviral prophylaxis: No  Antifungal prophylaxis: No    Patient Education: During this visit I discussed in detail the patient s symptoms, physical exam and evaluation results findings, tentative diagnosis as well as the treatment plan (Including but not limited to possible side effects and complications related to the disease, treatment modalities and intervention(s). Family expressed understanding and consent. Family was receptive and ready to learn; no apparent learning barriers were identified.  Live virus vaccines are contraindicated in this patient. Any new medications prescribed must be assessed for kidney toxicity and drug-interactions before use.    Health Maintenance: Live vaccines are contraindicated due to immunosuppression.    Anuradha must have all other vaccines updated in a timely fashion including an annual influenza vaccine.  Anuradha must be seen by the dentist annually.  Over the counter medications should be checked prior to use to ensure they are safe in patients with kidney disease.      PLAN FOR TODAY:  1. Restart bactrim (unclear why it was stopped)  2. Await results of urine pr/cr, DSAs, tacro level, plasma free EBV - she may need a biopsy, an increase in tacrolimus and an increase in her Imuran to 100mg BID  3. Follow-up results on most recent echo  4. Schedule follow-up with EBV clinic    Follow up: Return in about 3 months (around 9/22/2022). Please return sooner should Anuradha become symptomatic. For any questions or concerns, feel free to contact the transplant coordinators   at [phone].    Anuradha would like to transition to adult care later this year.    Sincerely,    Natalya Hartman MD   Pediatric Nephrology    CC:    Patient Care Team:  Gutierrez Wood MD as PCP - General (Family Practice)  Nesha Orta APRN CNP as Nurse Practitioner (Nurse Practitioner - Pediatrics)  Jian Alatorre MD as MD (Pediatrics)  Dorcas Samayoa APRN CNP as Nurse Practitioner (Nurse Practitioner - Pediatrics)  Ike Rosa MA as Medical Assistant (Transplant)  Natalya Hartman MD as Transplant Physician (Pediatric Nephrology)  Gauri Mckeon [facility] as Pharmacist (Pharmacist)  Isidoro Delgadillo MD as MD (INTERNAL MEDICINE - ENDOCRINOLOGY, DIABETES & METABOLISM)  Nesha Orta APRN CNP as Assigned Pediatric Specialist Provider  Ellie Krishnamurthy MD as MD (OB/Gyn)  Susanne Leon, RN as Transplant Coordinator (Transplant)  Sejal Webb MD as Assigned OBGYN Provider  PRAVEEN BELLA    Copy to patient  Yarit[place] Pearson [place]  [id number] [facility] [phone]

## 2022-06-27 DIAGNOSIS — Z94.0 KIDNEY REPLACED BY TRANSPLANT: ICD-10-CM

## 2022-06-27 RX ORDER — AZATHIOPRINE 50 MG/1
75 TABLET ORAL DAILY
Qty: 45 TABLET | Refills: 11 | Status: SHIPPED | OUTPATIENT
Start: 2022-06-27 | End: 2022-07-05

## 2022-07-05 DIAGNOSIS — Z94.0 KIDNEY REPLACED BY TRANSPLANT: ICD-10-CM

## 2022-07-05 RX ORDER — AZATHIOPRINE 50 MG/1
75 TABLET ORAL DAILY
Qty: 45 TABLET | Refills: 3 | Status: SHIPPED | OUTPATIENT
Start: 2022-07-05 | End: 2022-10-04

## 2022-07-05 NOTE — TELEPHONE ENCOUNTER
1. Refill request received from: [facility]  2. Medication Requested: Azathioprine 50mg tablets   3. Directions: Take 1.5 tablets by mouth daily   4. Quantity:135  5. Last Office Visit: 6/22/22                    Has it been over a year since the last appointment (6 months for diabetes)? no                    If No:     Move on to next question.                    If Yes:                      Change refill quantity to 1 month.                      Route to Provider or Pool & let them know its been over a year since patient has been seen.                      If they do not have an upcoming appointment- reach out to family to schedule or route to .  6. Next Appointment Scheduled for: 11/16/22  7. Last refill: n/a  8. Sent To: NEPHROLOGY POOL

## 2022-07-18 ENCOUNTER — TELEPHONE (OUTPATIENT)
Dept: TRANSPLANT | Facility: CLINIC | Age: 22
End: 2022-07-18

## 2022-07-18 NOTE — TELEPHONE ENCOUNTER
I left a VM with mom asking for more details on the [facility] they need [facility] [place].  Asked her to send mychart or call back with more information.

## 2022-07-19 DIAGNOSIS — Z94.0 KIDNEY REPLACED BY TRANSPLANT: Primary | ICD-10-CM

## 2022-07-19 RX ORDER — SULFAMETHOXAZOLE AND TRIMETHOPRIM 400; 80 MG/1; MG/1
1 TABLET ORAL
Qty: 13 TABLET | Refills: 11 | Status: SHIPPED | OUTPATIENT
Start: 2022-07-20 | End: 2024-03-11

## 2022-07-21 ENCOUNTER — TELEPHONE (OUTPATIENT)
Dept: TRANSPLANT | Facility: CLINIC | Age: 22
End: 2022-07-21

## 2022-07-21 NOTE — TELEPHONE ENCOUNTER
I talked with Dad.  Shraddha bui Tacro needs to be swithed to [facility], not sure how it got moved around.  Dad has it straightened out for now and is getting the med over the weekend.  I did not want to send in a new script and screw everything up.  So we need to double check with them on Monday to make sure they got the Tacro and then [facility].

## 2022-07-29 ENCOUNTER — TELEPHONE (OUTPATIENT)
Dept: TRANSPLANT | Facility: CLINIC | Age: 22
End: 2022-07-29

## 2022-07-29 ENCOUNTER — DOCUMENTATION ONLY (OUTPATIENT)
Dept: TRANSPLANT | Facility: CLINIC | Age: 22
End: 2022-07-29

## 2022-07-29 NOTE — TELEPHONE ENCOUNTER
Received a call from Anuradha's mom last night. Anuradha tested positive for Lyme's disease, also has cellulitis. She was prescribed antibiotics for cellulitis. Mom has questions regarding treatments for Lyme's.     Left message for Kodi to call back to discuss further.

## 2022-07-29 NOTE — TELEPHONE ENCOUNTER
Spoke to Anuradha's mom regarding her recent positive Lyme's disease test. She was tested at [place] ED. Also treated for cellulitis. For cellulitis she was prescribed Amoxi and Clindamycin. Mom unaware of any meds prescribed for lyme's. Told mom [facility] is responsible for treating Lyme's since they ordered the test.     I will check with our pharmacist about treatment and relay our recommendations to [facility].     Also talked with mom about transition to adults. Anuradha will be traveling for for the next few months for work. Rescheduled her transition date to November when [place] is done.     Confirmed with mom, Anuradha will have labs done while at home, including DSA.

## 2022-08-01 ENCOUNTER — LAB (OUTPATIENT)
Dept: LAB | Facility: CLINIC | Age: 22
End: 2022-08-01
Payer: COMMERCIAL

## 2022-08-01 ENCOUNTER — LAB (OUTPATIENT)
Dept: LAB | Facility: CLINIC | Age: 22
End: 2022-08-01

## 2022-08-01 DIAGNOSIS — Z94.0 KIDNEY TRANSPLANTED: ICD-10-CM

## 2022-08-01 PROCEDURE — 86833 HLA CLASS II HIGH DEFIN QUAL: CPT

## 2022-08-01 PROCEDURE — 86832 HLA CLASS I HIGH DEFIN QUAL: CPT

## 2022-08-01 PROCEDURE — 80197 ASSAY OF TACROLIMUS: CPT | Performed by: PEDIATRICS

## 2022-08-02 LAB
TACROLIMUS BLD-MCNC: 4.8 UG/L (ref 5–15)
TME LAST DOSE: ABNORMAL H
TME LAST DOSE: ABNORMAL H

## 2022-08-04 LAB
DONOR IDENTIFICATION: NORMAL
DSA COMMENTS: NORMAL
DSA PRESENT: NO
DSA TEST METHOD: NORMAL
ORGAN: NORMAL
SA 1 CELL: NORMAL
SA 1 TEST METHOD: NORMAL
SA 2 CELL: NORMAL
SA 2 TEST METHOD: NORMAL
SA1 HI RISK ABY: NORMAL
SA1 MOD RISK ABY: NORMAL
SA2 HI RISK ABY: NORMAL
SA2 MOD RISK ABY: NORMAL
UNACCEPTABLE ANTIGENS: NORMAL
UNOS CPRA: 51
ZZZSA 1  COMMENTS: NORMAL
ZZZSA 2 COMMENTS: NORMAL

## 2022-09-29 ENCOUNTER — TELEPHONE (OUTPATIENT)
Dept: PEDIATRIC HEMATOLOGY/ONCOLOGY | Facility: CLINIC | Age: 22
End: 2022-09-29

## 2022-09-29 NOTE — TELEPHONE ENCOUNTER
Reached out Anuradha to let her know there were some scheduling changes for her November schedule, due to both Minnie and Jennifer being out on 11/16.   Between myself and the other 2 departments we collectively were able to make it work for her all in one day on 11/15/22.  I did confirm that this date works for Anuradha and she was good with that plan.     She mentioned that the Cardiology visits on 11/16 could remain as is, and she would spend the night at her brother's home on 11/15/22.    I will get everything moved and get her an updated confirmation with the changes.

## 2022-10-03 DIAGNOSIS — I15.1 HYPERTENSION SECONDARY TO OTHER RENAL DISORDERS: ICD-10-CM

## 2022-10-03 RX ORDER — ENALAPRIL MALEATE 5 MG/1
5 TABLET ORAL DAILY
Qty: 90 TABLET | Refills: 11 | Status: SHIPPED | OUTPATIENT
Start: 2022-10-03 | End: 2024-03-11

## 2022-10-04 DIAGNOSIS — Z94.0 KIDNEY REPLACED BY TRANSPLANT: ICD-10-CM

## 2022-10-04 RX ORDER — AZATHIOPRINE 50 MG/1
75 TABLET ORAL DAILY
Qty: 45 TABLET | Refills: 11 | Status: SHIPPED | OUTPATIENT
Start: 2022-10-04 | End: 2023-04-10

## 2022-10-04 NOTE — TELEPHONE ENCOUNTER
1. [facility]  2. Medication Requested: azathioprine 50mg tab  3. Directions:as directd  4. Quantity:135  5. Last Office Visit: 6/22/22                    Has it been over a year since the last appointment (6 months for diabetes)? no                    If No:     Move on to next question.                    If Yes:                      Change refill quantity to 1 month.                      Route to Provider or Pool & let them know its been over a year since patient has been seen.                      If they do not have an upcoming appointment- reach out to family to schedule or route to .  6. Next Appointment Scheduled for: 11/18/2022  7. Last refill: not listed  8. Sent To: NEPHROLOGY POOL

## 2022-10-19 ENCOUNTER — LAB (OUTPATIENT)
Dept: LAB | Facility: CLINIC | Age: 22
End: 2022-10-19

## 2022-10-19 DIAGNOSIS — Z94.0 KIDNEY REPLACED BY TRANSPLANT: ICD-10-CM

## 2022-10-19 PROCEDURE — 86832 HLA CLASS I HIGH DEFIN QUAL: CPT | Performed by: PEDIATRICS

## 2022-10-19 PROCEDURE — 80197 ASSAY OF TACROLIMUS: CPT | Performed by: PEDIATRICS

## 2022-10-19 PROCEDURE — 86833 HLA CLASS II HIGH DEFIN QUAL: CPT | Performed by: PEDIATRICS

## 2022-10-20 DIAGNOSIS — Z94.0 KIDNEY REPLACED BY TRANSPLANT: Primary | ICD-10-CM

## 2022-10-20 LAB
TACROLIMUS BLD-MCNC: 5.1 UG/L (ref 5–15)
TME LAST DOSE: NORMAL H
TME LAST DOSE: NORMAL H

## 2022-10-21 ENCOUNTER — TELEPHONE (OUTPATIENT)
Dept: TRANSPLANT | Facility: CLINIC | Age: 22
End: 2022-10-21

## 2022-10-21 NOTE — TELEPHONE ENCOUNTER
Spoke to Anuradha regarding her labs. Her creatinine is 1.93. She has been feeling well. She drinks enough water. Will check with covering nephrologist but will most likely recommend biopsy. Anuradha is currently living in [place] but would fly home for a biopsy.

## 2022-10-24 ENCOUNTER — TELEPHONE (OUTPATIENT)
Dept: TRANSPLANT | Facility: CLINIC | Age: 22
End: 2022-10-24

## 2022-10-24 DIAGNOSIS — Z94.0 KIDNEY REPLACED BY TRANSPLANT: Primary | ICD-10-CM

## 2022-10-24 NOTE — TELEPHONE ENCOUNTER
Family aware that biopsy needs to be scheduled: Yes     Orders   Biopsy orders placed: Yes   Reason for biopsy: increase in creatinine   Time frame of when biopsy is requested to be scheduled: within two weeks   Biopsy to be scheduled in: Sedation/OR (either)   Biopsy to be performed by: IR/Peds nephrology (either)   Placement of kidney: Intra-abdominal/retroperitoneal   Does patient have hydronephrosis:  No   Ultrasound needed: Yes  Orders placed: Yes   CT guidance needed: No  Orders placed: No     Lab orders placed:  Yes   Location labs should be scheduled: sedation     Blood Thinners   Patient taking Aspirin/Coumadin/Plavix/Blood thinners? No   Coags normal: Yes   Platelet function closure: Yes     Labs (to be completed two days pre biopsy)   If history of UTI's urine culture to be scheduled one week prior to biopsy.   Negative urine culture within one week of schedule biopsy: Yes     Admission   Patient to be admitted post biopsy: No

## 2022-10-24 NOTE — LETTER
October 25, 2022    Dear Anuradha,    You have been scheduled for a kidney biopsy on Wednesday 11/2/22 at 11:00 am. Below are your pre-op instructions and itinerary. Please be aware that you will need a pre-op physical from his PCP fax over to [phone] by 10/31/22. Also, you will need to be tested for COVID-19 4 days prior to the procedure. If you are located outside the [place] and would like to be tested closer to home, results need to be fax to [phone] NO LATER THAN 24 HOURS PRIOR to your procedure. Please contact your child's Transplant Coordinator for testing site options. Be aware that your child will be monitored for 6 hours post-biopsy with the possibility of admission. At this time, we anticipate that your child will not be admitted after the procedure. If admitted, you should plan that your child will stay in the hospital until the biopsy results are back. Once biopsy results are back the physicians will let you know the expected length of stay. Currently, due to the COVID-19 pandemic, masks are always required to be worn by both parents, patients, and siblings over 2 years of age while on the hospital grounds. If you have any questions or concerns, please feel free to call me at the numbers listed below.    Your itinerary for your kidney biopsy on Wednesday 11/2/22 at 11:00 am.  7:30 am Check in at Pediatric Sedation  8:00 am Ultrasound  Labs will be drawn in Peds Sedation  11:00 am Biopsy    Pre-op instructions:   Anuradha, you have been scheduled for a liver biopsy on Wednesday 11/2/22 at 11:00 am.  Adults and Children over 2:    ON 11/2/22 AT:  1. 1:30 am Stop solid food, milk/milk products and may have clear liquids.  2. 5:30 am Begin NPO, patients my take medications with small sips of water up to 30 minutes prior to check-in.  Clear liquids: water, fruit juice without pulp (e.g. apple juice), Gatorade, Pedialyte, carbonated beverages, clear tea, black coffee, Jell-O without fruit or  particles, hard candies. NO milk or milk products, NO chewing gum, and NO alcohol.    MEDICATION INSTRUCTIONS:  1. Anuradha, you are to take all daily morning medications with small sips of water    Sincerely,    Annabelle Rosa MA  Pediatric SOT /Post-Transplant Associate  [facility]  Pediatric Solid Organ Transplant  [email or web address]  [place]: [phone]  Fax: [phone]

## 2022-10-24 NOTE — TELEPHONE ENCOUNTER
Spoke to Kodi. ALEJANDRO and bx scheduled for 11/2. Anuradha is unsure where she can get an H&P done since she is currently traveling for work. Will do at home covid test.

## 2022-10-25 NOTE — TELEPHONE ENCOUNTER
Notes to PAN/PRE-OP NURSING:  OKAY TO CONTACT PATIENT/FAMILY Yes  H&P: completed on/before 10/31/22 patient is to bring copy day of procedure     Written instructions sent to patient on 10/25/22 by Annabelle Rosa MA    Pre-op instructions:   Anuradha has been scheduled for a kidney biopsy on Wednesday 11/2/222 at 11:00 am.   Adults and Children over 2:    ON 11/2/22 AT:  1. 1:30 am Stop solid food, milk/milk products and may have clear liquids.  2. 5:30 am Begin NPO, patients my take medications with small sips of water up to 30 minutes prior to check-in.  3. 7:30 am Check in at Pediatric Sedation  4. 8:00 am Ultrasound  5. 11:00 am Biopsy    MEDICATION INSTRUCTIONS:  1. Anuradha is to take all daily morning medications with small sips of water

## 2022-11-01 ENCOUNTER — ANESTHESIA EVENT (OUTPATIENT)
Dept: PEDIATRICS | Facility: CLINIC | Age: 22
End: 2022-11-01
Payer: COMMERCIAL

## 2022-11-02 ENCOUNTER — APPOINTMENT (OUTPATIENT)
Dept: MRI IMAGING | Facility: CLINIC | Age: 22
End: 2022-11-02
Attending: PEDIATRICS
Payer: COMMERCIAL

## 2022-11-02 ENCOUNTER — HOSPITAL ENCOUNTER (OUTPATIENT)
Dept: INTERVENTIONAL RADIOLOGY/VASCULAR | Facility: CLINIC | Age: 22
Discharge: HOME | End: 2022-11-02
Attending: PEDIATRICS | Admitting: PEDIATRICS
Payer: COMMERCIAL

## 2022-11-02 ENCOUNTER — OFFICE VISIT (OUTPATIENT)
Dept: NEPHROLOGY | Facility: CLINIC | Age: 22
End: 2022-11-02
Attending: PEDIATRICS
Payer: COMMERCIAL

## 2022-11-02 ENCOUNTER — HOSPITAL ENCOUNTER (OUTPATIENT)
Dept: ULTRASOUND IMAGING | Facility: CLINIC | Age: 22
Discharge: HOME | End: 2022-11-02
Attending: PEDIATRICS | Admitting: PEDIATRICS
Payer: COMMERCIAL

## 2022-11-02 ENCOUNTER — HOSPITAL ENCOUNTER (OUTPATIENT)
Dept: ULTRASOUND IMAGING | Facility: CLINIC | Age: 22
Discharge: HOME | End: 2022-11-02
Attending: PEDIATRICS
Payer: COMMERCIAL

## 2022-11-02 ENCOUNTER — HOSPITAL ENCOUNTER (OUTPATIENT)
Facility: CLINIC | Age: 22
Discharge: HOME | End: 2022-11-02
Attending: PEDIATRICS | Admitting: PEDIATRICS
Payer: COMMERCIAL

## 2022-11-02 ENCOUNTER — ANESTHESIA (OUTPATIENT)
Dept: PEDIATRICS | Facility: CLINIC | Age: 22
End: 2022-11-02
Payer: COMMERCIAL

## 2022-11-02 VITALS
DIASTOLIC BLOOD PRESSURE: 83 MMHG | BODY MASS INDEX: 24.98 KG/M2 | WEIGHT: 123.68 LBS | RESPIRATION RATE: 18 BRPM | OXYGEN SATURATION: 99 % | TEMPERATURE: 98.2 F | SYSTOLIC BLOOD PRESSURE: 119 MMHG | HEART RATE: 84 BPM

## 2022-11-02 DIAGNOSIS — Z94.0 KIDNEY REPLACED BY TRANSPLANT: ICD-10-CM

## 2022-11-02 DIAGNOSIS — K76.9 LIVER LESION: ICD-10-CM

## 2022-11-02 DIAGNOSIS — Z94.0 KIDNEY REPLACED BY TRANSPLANT: Primary | ICD-10-CM

## 2022-11-02 DIAGNOSIS — K76.9 LIVER LESION: Primary | ICD-10-CM

## 2022-11-02 LAB
ABO/RH(D): NORMAL
ALBUMIN SERPL-MCNC: 3.6 G/DL (ref 3.4–5)
ALBUMIN SERPL-MCNC: 3.7 G/DL (ref 3.4–5)
ALBUMIN UR-MCNC: NEGATIVE MG/DL
ALP SERPL-CCNC: 61 U/L (ref 40–150)
ALT SERPL W P-5'-P-CCNC: 25 U/L (ref 0–50)
ANION GAP SERPL CALCULATED.3IONS-SCNC: 9 MMOL/L (ref 3–14)
ANTIBODY SCREEN: NEGATIVE
APPEARANCE UR: CLEAR
APTT PPP: 29 SECONDS (ref 22–38)
AST SERPL W P-5'-P-CCNC: 25 U/L (ref 0–45)
BASOPHILS # BLD AUTO: 0.1 10E3/UL (ref 0–0.2)
BASOPHILS NFR BLD AUTO: 1 %
BILIRUB DIRECT SERPL-MCNC: <0.1 MG/DL (ref 0–0.2)
BILIRUB SERPL-MCNC: 0.2 MG/DL (ref 0.2–1.3)
BILIRUB UR QL STRIP: NEGATIVE
BUN SERPL-MCNC: 29 MG/DL (ref 7–30)
CALCIUM SERPL-MCNC: 9 MG/DL (ref 8.5–10.1)
CHLORIDE BLD-SCNC: 110 MMOL/L (ref 94–109)
CMV DNA SPEC NAA+PROBE-ACNC: NOT DETECTED IU/ML
CO2 SERPL-SCNC: 17 MMOL/L (ref 20–32)
COLOR UR AUTO: ABNORMAL
CREAT SERPL-MCNC: 1.32 MG/DL (ref 0.52–1.04)
CRP SERPL-MCNC: 4.9 MG/L (ref 0–8)
EOSINOPHIL # BLD AUTO: 0.4 10E3/UL (ref 0–0.7)
EOSINOPHIL NFR BLD AUTO: 4 %
ERYTHROCYTE [DISTWIDTH] IN BLOOD BY AUTOMATED COUNT: 12.2 % (ref 10–15)
GFR SERPL CREATININE-BSD FRML MDRD: 58 ML/MIN/1.73M2
GLUCOSE BLD-MCNC: 87 MG/DL (ref 70–99)
GLUCOSE UR STRIP-MCNC: NEGATIVE MG/DL
HCT VFR BLD AUTO: 31.8 % (ref 35–47)
HGB BLD-MCNC: 10.5 G/DL (ref 11.7–15.7)
HGB UR QL STRIP: NEGATIVE
IMM GRANULOCYTES # BLD: 0 10E3/UL
IMM GRANULOCYTES NFR BLD: 0 %
INR PPP: 0.88 (ref 0.85–1.15)
KETONES UR STRIP-MCNC: NEGATIVE MG/DL
LDH SERPL L TO P-CCNC: 200 U/L (ref 81–234)
LEUKOCYTE ESTERASE UR QL STRIP: ABNORMAL
LYMPHOCYTES # BLD AUTO: 2.8 10E3/UL (ref 0.8–5.3)
LYMPHOCYTES NFR BLD AUTO: 29 %
MAGNESIUM SERPL-MCNC: 1.6 MG/DL (ref 1.6–2.3)
MCH RBC QN AUTO: 31.5 PG (ref 26.5–33)
MCHC RBC AUTO-ENTMCNC: 33 G/DL (ref 31.5–36.5)
MCV RBC AUTO: 96 FL (ref 78–100)
MONOCYTES # BLD AUTO: 0.5 10E3/UL (ref 0–1.3)
MONOCYTES NFR BLD AUTO: 5 %
NEUTROPHILS # BLD AUTO: 5.7 10E3/UL (ref 1.6–8.3)
NEUTROPHILS NFR BLD AUTO: 61 %
NITRATE UR QL: NEGATIVE
NRBC # BLD AUTO: 0 10E3/UL
NRBC BLD AUTO-RTO: 0 /100
PH UR STRIP: 5 [PH] (ref 5–7)
PHOSPHATE SERPL-MCNC: 3.4 MG/DL (ref 2.5–4.5)
PLATELET # BLD AUTO: 350 10E3/UL (ref 150–450)
POTASSIUM BLD-SCNC: 4.5 MMOL/L (ref 3.4–5.3)
PROT SERPL-MCNC: 8 G/DL (ref 6.8–8.8)
RBC # BLD AUTO: 3.33 10E6/UL (ref 3.8–5.2)
RBC URINE: 1 /HPF
RENAL TUB EPI: <1 /HPF
SODIUM SERPL-SCNC: 136 MMOL/L (ref 133–144)
SP GR UR STRIP: 1.01 (ref 1–1.03)
SPECIMEN EXPIRATION DATE: NORMAL
SQUAMOUS EPITHELIAL: 2 /HPF
TRANSITIONAL EPI: <1 /HPF
URATE SERPL-MCNC: 7.4 MG/DL (ref 2.6–6)
UROBILINOGEN UR STRIP-MCNC: NORMAL MG/DL
WBC # BLD AUTO: 9.4 10E3/UL (ref 4–11)
WBC URINE: 12 /HPF

## 2022-11-02 PROCEDURE — 81001 URINALYSIS AUTO W/SCOPE: CPT | Performed by: PEDIATRICS

## 2022-11-02 PROCEDURE — 80053 COMPREHEN METABOLIC PANEL: CPT | Performed by: PEDIATRICS

## 2022-11-02 PROCEDURE — 76942 ECHO GUIDE FOR BIOPSY: CPT

## 2022-11-02 PROCEDURE — 87799 DETECT AGENT NOS DNA QUANT: CPT | Performed by: PEDIATRICS

## 2022-11-02 PROCEDURE — 250N000011 HC RX IP 250 OP 636: Performed by: NURSE ANESTHETIST, CERTIFIED REGISTERED

## 2022-11-02 PROCEDURE — 250N000009 HC RX 250: Performed by: PHYSICIAN ASSISTANT

## 2022-11-02 PROCEDURE — 76776 US EXAM K TRANSPL W/DOPPLER: CPT | Mod: 26 | Performed by: RADIOLOGY

## 2022-11-02 PROCEDURE — 85610 PROTHROMBIN TIME: CPT | Performed by: PHYSICIAN ASSISTANT

## 2022-11-02 PROCEDURE — 80069 RENAL FUNCTION PANEL: CPT | Performed by: PEDIATRICS

## 2022-11-02 PROCEDURE — 86140 C-REACTIVE PROTEIN: CPT | Performed by: PEDIATRICS

## 2022-11-02 PROCEDURE — 36415 COLL VENOUS BLD VENIPUNCTURE: CPT | Performed by: PEDIATRICS

## 2022-11-02 PROCEDURE — 76705 ECHO EXAM OF ABDOMEN: CPT

## 2022-11-02 PROCEDURE — 50200 RENAL BIOPSY PERQ: CPT | Mod: RT | Performed by: PHYSICIAN ASSISTANT

## 2022-11-02 PROCEDURE — 370N000017 HC ANESTHESIA TECHNICAL FEE, PER MIN: Performed by: PHYSICIAN ASSISTANT

## 2022-11-02 PROCEDURE — 999N000141 HC STATISTIC PRE-PROCEDURE NURSING ASSESSMENT: Performed by: PHYSICIAN ASSISTANT

## 2022-11-02 PROCEDURE — 82248 BILIRUBIN DIRECT: CPT

## 2022-11-02 PROCEDURE — 85730 THROMBOPLASTIN TIME PARTIAL: CPT | Performed by: PEDIATRICS

## 2022-11-02 PROCEDURE — 85025 COMPLETE CBC W/AUTO DIFF WBC: CPT | Performed by: PEDIATRICS

## 2022-11-02 PROCEDURE — 86850 RBC ANTIBODY SCREEN: CPT | Performed by: PEDIATRICS

## 2022-11-02 PROCEDURE — 50200 RENAL BIOPSY PERQ: CPT

## 2022-11-02 PROCEDURE — 272N000505 HC NEEDLE CR5

## 2022-11-02 PROCEDURE — 76942 ECHO GUIDE FOR BIOPSY: CPT | Mod: 26 | Performed by: PHYSICIAN ASSISTANT

## 2022-11-02 PROCEDURE — 88348 ELECTRON MICROSCOPY DX: CPT | Mod: 26 | Performed by: PATHOLOGY

## 2022-11-02 PROCEDURE — 258N000003 HC RX IP 258 OP 636: Performed by: NURSE ANESTHETIST, CERTIFIED REGISTERED

## 2022-11-02 PROCEDURE — 83735 ASSAY OF MAGNESIUM: CPT | Performed by: PEDIATRICS

## 2022-11-02 PROCEDURE — 74181 MRI ABDOMEN W/O CONTRAST: CPT

## 2022-11-02 PROCEDURE — 76705 ECHO EXAM OF ABDOMEN: CPT | Mod: 26 | Performed by: RADIOLOGY

## 2022-11-02 PROCEDURE — 250N000013 HC RX MED GY IP 250 OP 250 PS 637

## 2022-11-02 PROCEDURE — 88313 SPECIAL STAINS GROUP 2: CPT | Mod: 26 | Performed by: PATHOLOGY

## 2022-11-02 PROCEDURE — 82105 ALPHA-FETOPROTEIN SERUM: CPT

## 2022-11-02 PROCEDURE — 999N000131 HC STATISTIC POST-PROCEDURE RECOVERY CARE: Performed by: PHYSICIAN ASSISTANT

## 2022-11-02 PROCEDURE — 74181 MRI ABDOMEN W/O CONTRAST: CPT | Mod: 26 | Performed by: RADIOLOGY

## 2022-11-02 PROCEDURE — 88305 TISSUE EXAM BY PATHOLOGIST: CPT | Mod: TC | Performed by: PHYSICIAN ASSISTANT

## 2022-11-02 PROCEDURE — 83615 LACTATE (LD) (LDH) ENZYME: CPT | Performed by: PEDIATRICS

## 2022-11-02 PROCEDURE — 88329 PATH CONSLTJ DRG SURG: CPT | Performed by: PEDIATRICS

## 2022-11-02 PROCEDURE — 76776 US EXAM K TRANSPL W/DOPPLER: CPT

## 2022-11-02 PROCEDURE — 88305 TISSUE EXAM BY PATHOLOGIST: CPT | Mod: 26 | Performed by: PATHOLOGY

## 2022-11-02 PROCEDURE — 88350 IMFLUOR EA ADDL 1ANTB STN PX: CPT | Mod: TC | Performed by: PHYSICIAN ASSISTANT

## 2022-11-02 PROCEDURE — 84550 ASSAY OF BLOOD/URIC ACID: CPT | Performed by: PEDIATRICS

## 2022-11-02 PROCEDURE — 86901 BLOOD TYPING SEROLOGIC RH(D): CPT | Performed by: PEDIATRICS

## 2022-11-02 PROCEDURE — 99214 OFFICE O/P EST MOD 30 MIN: CPT | Mod: 25 | Performed by: PEDIATRICS

## 2022-11-02 PROCEDURE — 87086 URINE CULTURE/COLONY COUNT: CPT | Performed by: PEDIATRICS

## 2022-11-02 RX ORDER — SODIUM CHLORIDE, SODIUM LACTATE, POTASSIUM CHLORIDE, CALCIUM CHLORIDE 600; 310; 30; 20 MG/100ML; MG/100ML; MG/100ML; MG/100ML
INJECTION, SOLUTION INTRAVENOUS CONTINUOUS PRN
Status: DISCONTINUED | OUTPATIENT
Start: 2022-11-02 | End: 2022-11-02

## 2022-11-02 RX ORDER — ACETAMINOPHEN 325 MG/1
TABLET ORAL
Status: COMPLETED
Start: 2022-11-02 | End: 2022-11-02

## 2022-11-02 RX ORDER — ACETAMINOPHEN 325 MG/1
975 TABLET ORAL ONCE
Status: DISCONTINUED | OUTPATIENT
Start: 2022-11-02 | End: 2022-11-02 | Stop reason: HOSPADM

## 2022-11-02 RX ADMIN — ACETAMINOPHEN 975 MG: 325 TABLET, FILM COATED ORAL at 12:18

## 2022-11-02 RX ADMIN — MIDAZOLAM 2 MG: 1 INJECTION INTRAMUSCULAR; INTRAVENOUS at 11:17

## 2022-11-02 RX ADMIN — LIDOCAINE HYDROCHLORIDE 7 ML: 10 INJECTION, SOLUTION EPIDURAL; INFILTRATION; INTRACAUDAL; PERINEURAL at 11:28

## 2022-11-02 RX ADMIN — MIDAZOLAM 2 MG: 1 INJECTION INTRAMUSCULAR; INTRAVENOUS at 11:23

## 2022-11-02 RX ADMIN — SODIUM CHLORIDE, POTASSIUM CHLORIDE, SODIUM LACTATE AND CALCIUM CHLORIDE: 600; 310; 30; 20 INJECTION, SOLUTION INTRAVENOUS at 11:09

## 2022-11-02 ASSESSMENT — ENCOUNTER SYMPTOMS: SEIZURES: 0

## 2022-11-02 ASSESSMENT — ACTIVITIES OF DAILY LIVING (ADL)
ADLS_ACUITY_SCORE: 35

## 2022-11-02 NOTE — ANESTHESIA PREPROCEDURE EVALUATION
Anesthesia Pre-Procedure Evaluation    Patient: Anuradha Pearson   MRN:     [id number] Gender:   female   Age:    22 year old :      2000        Procedure(s):  transplant kidney biopsy     LABS:  CBC:   Lab Results   Component Value Date    WBC 8.3 2021    WBC 15.9 (H) 2021    HGB 9.5 (L) 2021    HGB 10.9 (L) 2021    HCT 29.5 (L) 2021    HCT 33.2 (L) 2021     2021     2021     BMP:   Lab Results   Component Value Date     2021     2021    POTASSIUM 4.3 2021    POTASSIUM 4.4 2021    CHLORIDE 106 10/19/2022    CHLORIDE 109 2022    CO2 25 2021    CO2 25 2021    BUN 22 2021    BUN 29 2021    CR 1.27 (H) 2021    CR 1.48 (H) 2021    GLC 95 2021    GLC 92 2021     COAGS:   Lab Results   Component Value Date    PTT 35 2007    INR 0.94 2009     POC:   Lab Results   Component Value Date    HCG Negative 2021     OTHER:   Lab Results   Component Value Date    LACT 0.6 (L) 2007    DEE 8.5 2021    PHOS 3.7 2021    MAG 1.4 (L) 01/15/2019    ALBUMIN 2.6 (L) 2021    PROTTOTAL 7.8 01/15/2019    ALT 46 2021    AST 35 2021    GGT 73 (H) 2009    ALKPHOS 37 (L) 01/15/2019    BILITOTAL 0.2 01/15/2019    LIPASE 47 2013    AMYLASE 82 2009    TSH 1.68 01/15/2019    T4 1.08 01/15/2019    CRP 53.0 (H) 2021    SED 30 (H) 2012        Preop Vitals    BP Readings from Last 3 Encounters:   22 125/84   21 111/73   19 126/73    Pulse Readings from Last 3 Encounters:   22 95   21 88   19 105      Resp Readings from Last 3 Encounters:   21 16   19 18   18 20    SpO2 Readings from Last 3 Encounters:   21 98%   19 100%   18 100%      Temp Readings from Last 1 Encounters:   22 37.2  C (99  F) (Tympanic)    Ht Readings from Last 1  "Encounters:   06/22/22 1.499 m (4' 11\")      Wt Readings from Last 1 Encounters:   06/22/22 52.2 kg (115 lb)    Estimated body mass index is 23.23 kg/m  as calculated from the following:    Height as of 6/22/22: 1.499 m (4' 11\").    Weight as of 6/22/22: 52.2 kg (115 lb).     LDA:        Past Medical History:   Diagnosis Date     H/O kidney transplant      Hypertension      PONV (postoperative nausea and vomiting)      Urinary tract infection 11/04/2021     Wilm's tumor       Past Surgical History:   Procedure Laterality Date     APPENDECTOMY       CHOLECYSTECTOMY  05/20/2003     ENT SURGERY       OPEN REDUCTION INTERNAL FIXATION RODDING INTRAMEDULLARY FEMUR  8/20/2012    Procedure: OPEN REDUCTION INTERNAL FIXATION RODDING INTRAMEDULLARY FEMUR;  Closed Reduction Internal Fixation Right Femur;  Surgeon: Catracho Hook MD;  Location: [facility]     REVISE SCAR TRUNK Right 11/6/2015    Procedure: REVISE SCAR TRUNK;  Surgeon: COOPER Anderson MD;  Location: [facility] OR     right nephrectomy  05/19/2003    due to Wilms tumor; performed by Dr. Myles Velez at [facility]     TONSILLECTOMY  01/31/2005     TRANSPLANT KIDNEY RECIPIENT LIVING RELATED CHILD  11/09/2004    left nephrectomy performed at time of transplant      Allergies   Allergen Reactions     Ibuprofen Other (See Comments)     Avoid nephrotoxic medications as needed due to kidney transplant status     Shellfish-Derived Products Nausea and Vomiting     Vancomycin      Uses Benadryl Prior to administration        Anesthesia Evaluation    ROS/Med Hx    History of anesthetic complications (PONV)    Cardiovascular Findings   (+) hypertension (on amlodipine, enalapril, labetolol),     Neuro Findings   (-) seizures      Pulmonary Findings   (-) asthma and recent URI    HENT Findings   Comments: S/p adenoidectomy        GI/Hepatic/Renal Findings   (+) PONV and renal disease (h/o wilm's tumor s/p resection s/p renal transplant now w/ SHARON)  (-) GERD " and liver disease  Comments: Hyponatremia, Hyperkalemia      Endocrine/Metabolic Findings   (-) diabetes, hypothyroidism and adrenal disease        Hematology/Oncology Findings   (+) blood dyscrasia (anemia)    Additional Notes  Anti-rejection: tacrolimus          PHYSICAL EXAM:   Mental Status/Neuro: A/A/O   Airway: Facies: Feasible  Mallampati: I  Mouth/Opening: Full  TM distance: > 6 cm  Neck ROM: Full   Respiratory: Auscultation: CTAB     Resp. Rate: Normal     Resp. Effort: Normal      CV: Rhythm: Regular  Rate: Age appropriate  Heart: Normal Sounds   Comments:      Dental: Normal Dentition                Anesthesia Plan    ASA Status:  3   NPO Status:  NPO Appropriate    Anesthesia Type: MAC.     - Reason for MAC: straight local not clinically adequate   Induction: Intravenous.   Maintenance: TIVA.        Consents    Anesthesia Plan(s) and associated risks, benefits, and realistic alternatives discussed. Questions answered and patient/representative(s) expressed understanding.    - Discussed:     - Discussed with:  Parent (Mother and/or Father), Patient         Postoperative Care    Post procedure pain management: local.        Comments:    Other Comments: Risks and benefits of anesthesia/procedure explained including but not limited to somnolence, delirium, vocal cord/dental trauma, nausea/vomiting, arrhythmia, mycardial infarction, stroke, bleeding, need for blood transfusion, myocardial infarction, and death.            Lisbet Velez MD

## 2022-11-02 NOTE — PROGRESS NOTES
US demonstrated new liver lesions  MRI obtained today confirmed findings - likely to be benign  Discussed with GI  - will arrange for referral ASAP  Holding off on barrel racing for now    DIcsussed with marti and Kodi Hartman MD

## 2022-11-02 NOTE — DISCHARGE INSTRUCTIONS
Home Instructions for Your Child after Sedation  Today your child received (medicine):  Versed and Tylenol  Please keep this form with your health records  Your child may be more sleepy and irritable today than normal. Also, an adult should stay with your child for the rest of the day. The medicine may make the child dizzy. Avoid activities that require balance (bike riding, skating, climbing stairs, walking).  Remember:  When your child wants to eat again, start with liquids (juice, soda pop, Popsicles). If your child feels well enough, you may try a regular diet. It is best to offer light meals for the first 24 hours.  If your child has nausea (feels sick to the stomach) or vomiting (throws up), give small amounts of clear liquids (7-Up, Sprite, apple juice or broth). Fluids are more important than food until your child is feeling better.  Wait 24 hours before giving medicine that contains alcohol. This includes liquid cold, cough and allergy medicines (Robitussin, Vicks Formula 44 for children, Benadryl, Chlor-Trimeton).  If you will leave your child with a , give the sitter a copy of these instructions.  Call your doctor if:  You have questions about the test results.  Your child vomits (throws up) more than two times.  Your child is very fussy or irritable.  You have trouble waking your child.   If your child has trouble breathing, call 911.  If you have any questions or concerns, please call:  Pediatric Sedation Unit [phone]  Pediatric clinic  [phone]  [facility]  [phone] (ask for the Pediatric Anesthesiologist doctor on call)  Emergency department [phone]  [facility] toll-free number [phone] (Monday--Friday, 8 a.m. to 4:30 p.m.)  I understand these instructions. I have all of my personal belongings.     [facility]  Pediatric Interventional Radiology   Discharge Instructions for Kidney Biopsy    Date of Procedure:  11/2/2022      Today you had a KIDNEY BIOPSY done by Isra Still PA-C    Activity  No strenuous activity for 1 week  No heavy lifting (greater than 10 pounds) for 1 week   No contact sports for 2 weeks  No swimming, tub bath, or hot tub until scab has completely healed (about 1 week)    Diet  Resume your regular diet   Drink plenty of fluids, unless you are on a fluid restriction    Discomfort  DO NOT take any aspirin or ibuprofen (Advil) for 24 hours   Acetaminophen (Tylenol) is OK to use as needed for discomfort at biopsy site    Site Care  There should be minimal drainage from the biopsy site    If bleeding soaks the dressing, you should lie down and apply pressure to the site for a minimum of 10 minutes   Whether bleeding persists or not, you should report the occurrence to Pediatric Interventional Radiology       Keep the dressing dry and in place for 24 hours to prevent the site from re-opening and bleeding   May shower in 24 hours            If sedation was given:  DO NOT drive or operate heavy machinery for 24 hours  DO NOT drink alcoholic beverages for 24 hours             DO NOT make important legal decisions for 24 hours  You must have a responsible adult to drive you home and stay with you for 24 hours    Call your Doctor if:  Excessive bleeding or drainage  Excessive swelling, redness, or tenderness at the site  Drainage that is green, yellow, thick white, or has a bad odor  Fever above 100.5 degrees F (oral)  Severe pain in your back, side or abdomen  Passage of bloody urine or clots after you are discharged  Difficulty urinating or inability to void    If you have questions or concerns about this procedure:   Pediatric Interventional Radiology [phone]  Mon-Fri, 7am to 5pm    [phone]  After-hours, weekends, holidays   Ask for the Pediatric Interventional Radiologist on-call    [facility] / [facility]  [phone]  Ask for the Pediatric Nephrologist on-call

## 2022-11-02 NOTE — PROCEDURES
[facility]    Procedure: IR Procedure Note    Date/Time: 11/2/2022 11:57 AM  Performed by: Calvin Arnold PA-C  Authorized by: Calvin Arnold PA-C       UNIVERSAL PROTOCOL   Site Marked: NA  Prior Images Obtained and Reviewed:  Yes  Required items: Required blood products, implants, devices and special equipment available    Patient identity confirmed:  Verbally with patient, arm band, provided demographic data and hospital-assigned identification number  Patient was reevaluated immediately before administering moderate or deep sedation or anesthesia  Confirmation Checklist:  Patient's identity using two indicators, relevant allergies, procedure was appropriate and matched the consent or emergent situation and correct equipment/implants were available  Time out: Immediately prior to the procedure a time out was called    Universal Protocol: the Joint Commission Universal Protocol was followed    Preparation: Patient was prepped and draped in usual sterile fashion    ESBL (mL):  1     ANESTHESIA    Anesthesia: Local infiltration  Local Anesthetic:  Lidocaine 1% without epinephrine  Anesthetic Total (mL):  7      SEDATION  Patient Sedated: Yes    Sedation Type:  Moderate (conscious) sedation  Vital signs: Vital signs monitored during sedation    See dictated procedure note for full details.  Findings: U/S guided RLQ transplant kidney biopsy. Two 18 gauge cores taken. Adequate cortical tissue confirmed by nephrology staff.    Specimens: core needle biopsy specimens sent for pathological analysis    Complications: None    Condition: Stable    Plan: Follow up per primary team. Bedrest for 2-4 hours. No strenuous activity for 3 days.      PROCEDURE    Patient Tolerance:  Patient tolerated the procedure well with no immediate complications  Length of time physician/provider present for 1:1 monitoring during sedation: 0   Time of sedation: Per WB anesthesia  staff.

## 2022-11-02 NOTE — PROGRESS NOTES
Return Visit for Kidney Transplant, Immunosuppression Management, CKD, Stage III    Chief Complaint:  No chief complaint on file.      HPI:    I had the pleasure of seeing Anuradha Pearson in the Pediatric Nephrology Clinic today for follow-up of her kidney transplant (history of Wilms tumor complicated by inadvertent ligation of the contralateral renal vein, kidney transplant in 2004). Her post-transplant course has been complicated by infections (viral meningitis, EBV viremia, warts), pseudotumor cerebri, and fracture of her right femur.  Anuradha is a 22 year old female who is being seen in sedation for a pre-op H&P for her kidney biopsy today.    Since her last visit in June 2022, she has been doing well. She is feeling well.  No fevers or runny nose.  She has had a cough at night for 2 years due to what she feels are allergies.  She has not recently been ill.  She has not had any hematuria or dysuria.        Review of Systems:  A comprehensive review of systems was performed and found to be negative other than noted in the HPI.    Allergies:  Anuradha is allergic to ibuprofen, shellfish-derived products, and vancomycin..    Active Medications:  No current outpatient medications on file.        Immunizations:  Immunization History   Administered Date(s) Administered     HEPA 08/04/2004     HPV9 04/10/2018, 07/17/2018, 01/15/2019     HepB 2000, 2000, 06/21/2001     Hib (PRP-T) 2000, 2000, 06/21/2001     Historical DTP/aP 2000, 2000, 02/28/2001, 12/06/2001     Influenza (H1N1) 10/27/2009     Influenza (IIV3) PF 10/25/2005, 02/09/2007, 11/09/2007, 01/22/2009, 10/16/2009, 11/03/2010, 09/19/2011, 09/10/2012, 01/03/2014, 12/01/2014     Influenza Vaccine IM > 6 months Valent IIV4 (Alfuria,Fluzone) 10/26/2015, 01/09/2017, 01/29/2018, 11/13/2018     Influenza Vaccine Im 4yrs+ 4 Valent CCIIV4 12/20/2019     MMR 06/21/2001, 06/21/2018     Mantoux Tuberculin Skin Test 08/04/2004      Meningococcal (Bexsero ) 07/23/2019     Meningococcal (Menactra ) 11/13/2018     Pneumo Conj 13-V (2010&after) 04/10/2018     Pneumococcal (PCV 7) 2000, 2000, 02/28/2001, 06/21/2001     Pneumococcal 23 valent 01/15/2019     Poliovirus, inactivated (IPV) 2000, 2000, 12/06/2001     TDAP Vaccine (Adacel) 03/12/2017     Tdap (Adacel,Boostrix) 09/19/2011     Varicella 06/21/2001        PMHx:  Past Medical History:   Diagnosis Date     H/O kidney transplant      Hypertension      PONV (postoperative nausea and vomiting)      Urinary tract infection 11/04/2021     Wilm's tumor          Rejection History     Kidney Transplant - 11/9/2004  (#1)     No rejections noted for this transplant.            Infection History     Kidney Transplant - 11/9/2004  (#1)       POD Infections Treatments Organisms Resolved    10/24/2014 9 years 11 months EBV (Derrell-Barr virus) viremia         Recurrent pyelonephritis Antibiotics, Antibiotics, Antibiotics, Antibiotics, Antibiotics KLEBSIELLA, ENTEROCOCCUS, PSEUDOMONAS             Problems     Kidney Transplant - 11/9/2004  (#1)       POD Problem Resolved    11/9/2004 N/A Kidney replaced by transplant       Gingival hypertrophy 11/8/2018          Non-Transplant Related Problems       Problem Resolved    8/14/2015 Scar adherent     7/21/2015 Immunosuppression (H)     7/21/2015 HTN (hypertension)     11/7/2013 Dehydration 11/8/2018 6/6/2013 Primary ovarian failure     10/5/2012 Lack of expected normal physiological development     8/20/2012 Femur fracture, right (H) 11/8/2018 7/17/2012 Wilms' tumor (H)     7/17/2012 Ankle pain 11/8/2018 7/17/2012 Growth deceleration 7/21/2015 7/17/2012 S/P radiation therapy     7/17/2012 Status post chemotherapy                 PSHx:    Past Surgical History:   Procedure Laterality Date     APPENDECTOMY       CHOLECYSTECTOMY  05/20/2003     ENT SURGERY       OPEN REDUCTION INTERNAL FIXATION RODDING INTRAMEDULLARY FEMUR   8/20/2012    Procedure: OPEN REDUCTION INTERNAL FIXATION RODDING INTRAMEDULLARY FEMUR;  Closed Reduction Internal Fixation Right Femur;  Surgeon: Catracho Hook MD;  Location: [facility] OR     REVISE SCAR TRUNK Right 11/6/2015    Procedure: REVISE SCAR TRUNK;  Surgeon: COOPER Anderson MD;  Location: [facility] OR     right nephrectomy  05/19/2003    due to Wilms tumor; performed by Dr. Myles [facility]elez at [facility]     TONSILLECTOMY  01/31/2005     TRANSPLANT KIDNEY RECIPIENT LIVING RELATED CHILD  11/09/2004    left nephrectomy performed at time of transplant       FHx:  Family History   Problem Relation Age of Onset     Hypertension Father        SHx:  Social History     Tobacco Use     Smoking status: Never     Smokeless tobacco: Never   Substance Use Topics     Alcohol use: No     Drug use: No     Social History     Social History Narrative     Not on file       Physical Exam:    There were no vitals taken for this visit.  Growth percentile SmartLinks can only be used for patients less than 20 years old.    Exam:  Constitutional: healthy, alert and no distress  Head: Normocephalic. No masses, lesions, tenderness or abnormalities  Neck: Neck supple.   EYE: TOO, EOMI, no periorbital cellulitis  Cardiovascular: negative, PMI normal. No lifts, heaves, or thrills. RRR. No  clicks gallops or rub  Respiratory: negative, Percussion normal. Good diaphragmatic excursion. Lungs clear  Gastrointestinal: Abdomen soft, non-tender. BS normal. No masses, organomegaly  : Deferred  Musculoskeletal: extremities normal- no gross deformities noted, gait normal and normal muscle tone  Skin: no suspicious lesions or rashes  Neurologic: Gait normal. Sensation grossly WNL.  Psychiatric: mentation appears normal and affect normal/bright    Labs and Imaging:  Results for orders placed or performed during the hospital encounter of 11/02/22   ABO/Rh type and screen     Status: None ()    Narrative    The following orders  were created for panel order ABO/Rh type and screen.  Procedure                               Abnormality         Status                     ---------                               -----------         ------                     Adult Type and Screen[[id number]]                                                         Please view results for these tests on the individual orders.   CBC with Platelets & Differential     Status: None ()    Narrative    The following orders were created for panel order CBC with Platelets & Differential.  Procedure                               Abnormality         Status                     ---------                               -----------         ------                     CBC with platelets and d...[[id number]]                                                   Please view results for these tests on the individual orders.     I reviewed labs from 6/26/2020 and previous labs going back to 2018.      I personally reviewed results of laboratory evaluation, imaging studies and past medical records that were available during this outpatient visit.      Assessment and Plan:    No diagnosis found.    Immunosuppression: Anuradha Pearson is on individualized protocol due to EBV viremia, other infections. tacrolimus goal is 4-6.  Imuran dose is 50mg daily (now completely off prednisone)  Steroids No       Serology Results     Recipient (Pre-transplant Results)     Anti-HBcAb   No results on file    HBsAg   No results on file    HBsAb   No results on file           HBV DNA    No results on file    Anti-HCV   No results on file    Anti-HIV I/II   No results on file           Anti-CMV   No results on file    Anti-HTLV I/II   No results on file    RPR/VDRL   No results on file           EBV IgG   No results on file    EBV IgM   No results on file    EBNA   No results on file                      Left Kidney Donor (Pre-donation Results)     Anti-HBcAb   HBC Total:  Negative    HBsAg   HBsAg:   Negative    HBsAb   No results on file           HBV DNA    No results on file    Anti-HCV   No results on file    Anti-HIV I/II   No results on file           Anti-CMV   No results on file    Anti-HTLV I/II   No results on file    RPR/VDRL   No results on file           EBV IgG   No results on file    EBV IgM   No results on file    EBNA   No results on file                       CKD: Stage III.  Her creatinine fluctuates between 1.2-1.5.  Most recently, it has been 1.93 and 1.96 and she is scheduled for a biopsy today.  She does have a history of low level DSA, but her most recent DSA were negative.    HTN: BP was not obtained.  She will try to get a BP today at her lab draw.  Most recent blood pressure reading   11/02/22 (!) 128/92       Antihypertensive Medications     Antihypertensive Combinations     Enalapril-hydroCHLOROthiazide 5-12.5 MG TABS              Last ECHO done 3/5/2021 but I do not see the results.  I will look into this.    Immunoprophylaxis:  PCP prophylaxis: None - May need to restart bactrim.  Antiviral prophylaxis: No  Antifungal prophylaxis: No       Acidosis: Will likely need to add sodium bicarbonate. Will await results from labs today.    Liver lesion: Incidentally noted 3.2 cm liver lesion.  Will obtain further imaging/refer to GI.    Patient Education: During this visit I discussed in detail the patient s symptoms, physical exam and evaluation results findings, tentative diagnosis as well as the treatment plan (Including but not limited to possible side effects and complications related to the disease, treatment modalities and intervention(s). Family expressed understanding and consent. Family was receptive and ready to learn; no apparent learning barriers were identified.  Live virus vaccines are contraindicated in this patient. Any new medications prescribed must be assessed for kidney toxicity and drug-interactions before use.    Health Maintenance: Live vaccines are contraindicated  due to immunosuppression.    Anuradha must have all other vaccines updated in a timely fashion including an annual influenza vaccine.  Anuradha must be seen by the dentist annually.  Over the counter medications should be checked prior to use to ensure they are safe in patients with kidney disease.      PLAN FOR TODAY:  Kidney biopsy to rule out rejection  Follow-up labs - may need sodium bicarbonate  Plan for follow-up on 11/15/2022  Follow-up liver lesion    Follow up: No follow-ups on file. Please return sooner should Anuradha become symptomatic. For any questions or concerns, feel free to contact the transplant coordinators   at [phone].    Anuradha would like to transition to adult care later this year.    Sincerely,    Natalya Hartman MD   Pediatric Nephrology    CC:   Patient Care Team:  Gutierrez Wood MD as PCP - General (Family Practice)  Nesha Orta APRN CNP as Nurse Practitioner (Nurse Practitioner - Pediatrics)  Jian Alatorre MD as MD (Pediatrics)  Dorcas Samayoa APRN CNP as Nurse Practitioner (Nurse Practitioner - Pediatrics)  Ike Rosa MA as Medical Assistant (Transplant)  Natalya Hartman MD as Transplant Physician (Pediatric Nephrology)  Gauri Mckeon [facility] as Pharmacist (Pharmacist)  Isidoro Delgadillo MD as MD (INTERNAL MEDICINE - ENDOCRINOLOGY, DIABETES & METABOLISM)  Ellie Krishnamurthy MD as MD (OB/Gyn)  Susanne Leon, RN as Transplant Coordinator (Transplant)  Sejal Webb MD as Assigned OBGYN Provider  Natalya Hartman MD as Assigned Pediatric Specialist Provider  PRAVEEN BELLA    Copy to patient  LiliJovany Peña  [id number] [facility] [phone]

## 2022-11-02 NOTE — LETTER
11/2/2022      RE: Anuradha Pearson  [address]  [place] [address]     Dear Colleague,    Thank you for the opportunity to participate in the care of your patient, Anuradha Pearson, at the [facility] at [facility]. Please see a copy of my visit note below.    Return Visit for Kidney Transplant, Immunosuppression Management, CKD, Stage III    Chief Complaint:  No chief complaint on file.      HPI:    I had the pleasure of seeing Anuradha Pearson in the Pediatric Nephrology Clinic today for follow-up of her kidney transplant (history of Wilms tumor complicated by inadvertent ligation of the contralateral renal vein, kidney transplant in 2004). Her post-transplant course has been complicated by infections (viral meningitis, EBV viremia, warts), pseudotumor cerebri, and fracture of her right femur.  Anuradha is a 22 year old female who is being seen in sedation for a pre-op H&P for her kidney biopsy today.    Since her last visit in June 2022, she has been doing well. She is feeling well.  No fevers or runny nose.  She has had a cough at night for 2 years due to what she feels are allergies.  She has not recently been ill.  She has not had any hematuria or dysuria.        Review of Systems:  A comprehensive review of systems was performed and found to be negative other than noted in the HPI.    Allergies:  Anuradha is allergic to ibuprofen, shellfish-derived products, and vancomycin..    Active Medications:  No current outpatient medications on file.        Immunizations:  Immunization History   Administered Date(s) Administered     HEPA 08/04/2004     HPV9 04/10/2018, 07/17/2018, 01/15/2019     HepB 2000, 2000, 06/21/2001     Hib (PRP-T) 2000, 2000, 06/21/2001     Historical DTP/aP 2000, 2000, 02/28/2001, 12/06/2001     Influenza (H1N1) 10/27/2009     Influenza (IIV3) PF  10/25/2005, 02/09/2007, 11/09/2007, 01/22/2009, 10/16/2009, 11/03/2010, 09/19/2011, 09/10/2012, 01/03/2014, 12/01/2014     Influenza Vaccine IM > 6 months Valent IIV4 (Alfuria,Fluzone) 10/26/2015, 01/09/2017, 01/29/2018, 11/13/2018     Influenza Vaccine Im 4yrs+ 4 Valent CCIIV4 12/20/2019     MMR 06/21/2001, 06/21/2018     Mantoux Tuberculin Skin Test 08/04/2004     Meningococcal (Bexsero ) 07/23/2019     Meningococcal (Menactra ) 11/13/2018     Pneumo Conj 13-V (2010&after) 04/10/2018     Pneumococcal (PCV 7) 2000, 2000, 02/28/2001, 06/21/2001     Pneumococcal 23 valent 01/15/2019     Poliovirus, inactivated (IPV) 2000, 2000, 12/06/2001     TDAP Vaccine (Adacel) 03/12/2017     Tdap (Adacel,Boostrix) 09/19/2011     Varicella 06/21/2001        PMHx:  Past Medical History:   Diagnosis Date     H/O kidney transplant      Hypertension      PONV (postoperative nausea and vomiting)      Urinary tract infection 11/04/2021     Wilm's tumor          Rejection History     Kidney Transplant - 11/9/2004  (#1)     No rejections noted for this transplant.            Infection History     Kidney Transplant - 11/9/2004  (#1)       POD Infections Treatments Organisms Resolved    10/24/2014 9 years 11 months EBV (Derrell-Barr virus) viremia         Recurrent pyelonephritis Antibiotics, Antibiotics, Antibiotics, Antibiotics, Antibiotics KLEBSIELLA, ENTEROCOCCUS, PSEUDOMONAS             Problems     Kidney Transplant - 11/9/2004  (#1)       POD Problem Resolved    11/9/2004 N/A Kidney replaced by transplant       Gingival hypertrophy 11/8/2018          Non-Transplant Related Problems       Problem Resolved    8/14/2015 Scar adherent     7/21/2015 Immunosuppression (H)     7/21/2015 HTN (hypertension)     11/7/2013 Dehydration 11/8/2018 6/6/2013 Primary ovarian failure     10/5/2012 Lack of expected normal physiological development     8/20/2012 Femur fracture, right (H) 11/8/2018 7/17/2012 Wilms' tumor (H)      7/17/2012 Ankle pain 11/8/2018 7/17/2012 Growth deceleration 7/21/2015 7/17/2012 S/P radiation therapy     7/17/2012 Status post chemotherapy                 PSHx:    Past Surgical History:   Procedure Laterality Date     APPENDECTOMY       CHOLECYSTECTOMY  05/20/2003     ENT SURGERY       OPEN REDUCTION INTERNAL FIXATION RODDING INTRAMEDULLARY FEMUR  8/20/2012    Procedure: OPEN REDUCTION INTERNAL FIXATION RODDING INTRAMEDULLARY FEMUR;  Closed Reduction Internal Fixation Right Femur;  Surgeon: Catracho Hook MD;  Location: [facility] OR     REVISE SCAR TRUNK Right 11/6/2015    Procedure: REVISE SCAR TRUNK;  Surgeon: COOPER Anderson MD;  Location: [facility] OR     right nephrectomy  05/19/2003    due to Wilms tumor; performed by Dr. Myles Velez at [facility]     TONSILLECTOMY  01/31/2005     TRANSPLANT KIDNEY RECIPIENT LIVING RELATED CHILD  11/09/2004    left nephrectomy performed at time of transplant       FHx:  Family History   Problem Relation Age of Onset     Hypertension Father        SHx:  Social History     Tobacco Use     Smoking status: Never     Smokeless tobacco: Never   Substance Use Topics     Alcohol use: No     Drug use: No     Social History     Social History Narrative     Not on file       Physical Exam:    There were no vitals taken for this visit.  Growth percentile SmartLinks can only be used for patients less than 20 years old.    Exam:  Constitutional: healthy, alert and no distress  Head: Normocephalic. No masses, lesions, tenderness or abnormalities  Neck: Neck supple.   EYE: TOO, EOMI, no periorbital cellulitis  Cardiovascular: negative, PMI normal. No lifts, heaves, or thrills. RRR. No  clicks gallops or rub  Respiratory: negative, Percussion normal. Good diaphragmatic excursion. Lungs clear  Gastrointestinal: Abdomen soft, non-tender. BS normal. No masses, organomegaly  : Deferred  Musculoskeletal: extremities normal- no gross deformities noted, gait normal  and normal muscle tone  Skin: no suspicious lesions or rashes  Neurologic: Gait normal. Sensation grossly WNL.  Psychiatric: mentation appears normal and affect normal/bright    Labs and Imaging:  Results for orders placed or performed during the hospital encounter of 11/02/22   ABO/Rh type and screen     Status: None ()    Narrative    The following orders were created for panel order ABO/Rh type and screen.  Procedure                               Abnormality         Status                     ---------                               -----------         ------                     Adult Type and Screen[[id number]]                                                         Please view results for these tests on the individual orders.   CBC with Platelets & Differential     Status: None ()    Narrative    The following orders were created for panel order CBC with Platelets & Differential.  Procedure                               Abnormality         Status                     ---------                               -----------         ------                     CBC with platelets and d...[[id number]]                                                   Please view results for these tests on the individual orders.     I reviewed labs from 6/26/2020 and previous labs going back to 2018.      I personally reviewed results of laboratory evaluation, imaging studies and past medical records that were available during this outpatient visit.      Assessment and Plan:    No diagnosis found.    Immunosuppression: Anuradha Pearson is on individualized protocol due to EBV viremia, other infections. tacrolimus goal is 4-6.  Imuran dose is 50mg daily (now completely off prednisone)  Steroids No       Serology Results     Recipient (Pre-transplant Results)     Anti-HBcAb   No results on file    HBsAg   No results on file    HBsAb   No results on file           HBV DNA    No results on file    Anti-HCV   No results on file    Anti-HIV I/II    No results on file           Anti-CMV   No results on file    Anti-HTLV I/II   No results on file    RPR/VDRL   No results on file           EBV IgG   No results on file    EBV IgM   No results on file    EBNA   No results on file                      Left Kidney Donor (Pre-donation Results)     Anti-HBcAb   HBC Total:  Negative    HBsAg   HBsAg:  Negative    HBsAb   No results on file           HBV DNA    No results on file    Anti-HCV   No results on file    Anti-HIV I/II   No results on file           Anti-CMV   No results on file    Anti-HTLV I/II   No results on file    RPR/VDRL   No results on file           EBV IgG   No results on file    EBV IgM   No results on file    EBNA   No results on file                       CKD: Stage III.  Her creatinine fluctuates between 1.2-1.5.  Most recently, it has been 1.93 and 1.96 and she is scheduled for a biopsy today.  She does have a history of low level DSA, but her most recent DSA were negative.    HTN: BP was not obtained.  She will try to get a BP today at her lab draw.  Most recent blood pressure reading   11/02/22 (!) 128/92       Antihypertensive Medications     Antihypertensive Combinations     Enalapril-hydroCHLOROthiazide 5-12.5 MG TABS              Last ECHO done 3/5/2021 but I do not see the results.  I will look into this.    Immunoprophylaxis:  PCP prophylaxis: None - May need to restart bactrim.  Antiviral prophylaxis: No  Antifungal prophylaxis: No       Acidosis: Will likely need to add sodium bicarbonate. Will await results from labs today.    Liver lesion: Incidentally noted 3.2 cm liver lesion.  Will obtain further imaging/refer to GI.    Patient Education: During this visit I discussed in detail the patient s symptoms, physical exam and evaluation results findings, tentative diagnosis as well as the treatment plan (Including but not limited to possible side effects and complications related to the disease, treatment modalities and  intervention(s). Family expressed understanding and consent. Family was receptive and ready to learn; no apparent learning barriers were identified.  Live virus vaccines are contraindicated in this patient. Any new medications prescribed must be assessed for kidney toxicity and drug-interactions before use.    Health Maintenance: Live vaccines are contraindicated due to immunosuppression.    Anuradha must have all other vaccines updated in a timely fashion including an annual influenza vaccine.  Anuradha must be seen by the dentist annually.  Over the counter medications should be checked prior to use to ensure they are safe in patients with kidney disease.      PLAN FOR TODAY:  Kidney biopsy to rule out rejection  Follow-up labs - may need sodium bicarbonate  Plan for follow-up on 11/15/2022  Follow-up liver lesion    Follow up: No follow-ups on file. Please return sooner should Anuradha become symptomatic. For any questions or concerns, feel free to contact the transplant coordinators   at [phone].    Anuradha would like to transition to adult care later this year.    Sincerely,    Natalya Hartman MD   Pediatric Nephrology    CC:   Patient Care Team:  Gutierrez Wood MD as PCP - General (Family Practice)  Nesha Orta APRN CNP as Nurse Practitioner (Nurse Practitioner - Pediatrics)  Jian Alatrore MD as MD (Pediatrics)  Dorcas Samayoa APRN CNP as Nurse Practitioner (Nurse Practitioner - Pediatrics)  Ike Rosa MA as Medical Assistant (Transplant)  Natalya Hartman MD as Transplant Physician (Pediatric Nephrology)  Gauri Mckeon [facility] as Pharmacist (Pharmacist)  Isidoro Delgadillo MD as MD (INTERNAL MEDICINE - ENDOCRINOLOGY, DIABETES & METABOLISM)  Ellie Krishnamurthy MD as MD (OB/Gyn)  Susanne Leon, RN as Transplant Coordinator (Transplant)  Sejal Webb MD as Assigned OBGYN Provider  Natalya Hartman MD as Assigned Pediatric Specialist Provider  PRAVEEN BELLA    Copy to  patient  Parent(s) of Anuradha Neelyariana  [address]  [place] [address]

## 2022-11-02 NOTE — OR NURSING
Pt adequate to discharge per Anesthesia and IR following her Kidney biopsy. Pt ready to discharge at 1355 but waiting in the sedation suite until MRI is available. VSS, Tylenol given for abdominal discomfort with relief. Denies nausea. Kidney biopsy site dressing CDI. PIV saline locked. Mom at bedside and attentive to pt. Plan to discharge from Sedation and go for MRI scan.

## 2022-11-02 NOTE — PROCEDURES
"Intraoperative Pathology Consultation    I, Natalya Hartman MD, was called to the \"surgical suite\" by surgeon YAMILET Isaac to consult on the kidney biopsy specimen.     Gross and microscopic examination demonstrated that the specimen was obtained from the kidney: Yes    Microscopic examination demonstrated that the specimen contained adequate numbers of glomeruli for diagnostic evaluation: Yes    I directed Dr. Isaac to obtain a second specimen: Yes    Microscopic examination demonstrated that the second specimen contained adequate numbers of glomeruli for diagnostic evaluation: Yes  "

## 2022-11-02 NOTE — ANESTHESIA CARE TRANSFER NOTE
Patient: Anuradha Pearson    Procedure: Procedure(s):  transplant kidney biopsy       Diagnosis: Kidney replaced by transplant [Z94.0]  Diagnosis Additional Information: No value filed.    Anesthesia Type:   MAC     Note:    Oropharynx: oropharynx clear of all foreign objects and spontaneously breathing  Level of Consciousness: awake  Oxygen Supplementation: nasal cannula  Level of Supplemental Oxygen (L/min / FiO2): 2  Independent Airway: airway patency satisfactory and stable  Dentition: dentition unchanged  Vital Signs Stable: post-procedure vital signs reviewed and stable  Report to RN Given: handoff report given  Patient transferred to: PS Recovery  Comments: From IR to PS Recovery with 02, Monitors, Spont RR, VSS, IV/ariway Patent, transport uneventful, Report to RN all questions/concerns answered.    Handoff Report: Identifed the Patient, Identified the Reponsible Provider, Reviewed the pertinent medical history, Discussed the surgical course, Reviewed Intra-OP anesthesia mangement and issues during anesthesia, Set expectations for post-procedure period and Allowed opportunity for questions and acknowledgement of understanding      Vitals:  Vitals Value Taken Time   /87    Temp 37.3    Pulse 99    Resp 12    SpO2 100        Electronically Signed By: JASMYNE Michaud CRNA  November 2, 2022  12:03 PM

## 2022-11-02 NOTE — ANESTHESIA POSTPROCEDURE EVALUATION
Patient: Anuradha Pearson    Procedure: Procedure(s):  transplant kidney biopsy       Anesthesia Type:  MAC    Note:  Disposition: Outpatient   Postop Pain Control: Uneventful            Sign Out: Well controlled pain   PONV: No   Neuro/Psych: Uneventful            Sign Out: Acceptable/Baseline neuro status   Airway/Respiratory: Uneventful            Sign Out: Acceptable/Baseline resp. status   CV/Hemodynamics: Uneventful            Sign Out: Acceptable CV status; No obvious hypovolemia; No obvious fluid overload   Other NRE: NONE   DID A NON-ROUTINE EVENT OCCUR? No    Event details/Postop Comments:  Anuradha is recovering well from anesthesia. VSS on RA. Native airway unchanged from baseline. She has no PONV but has to remain NPO for MRI.    Kodi did very well w/ MAC with just midazolam and local anesthetic. In the future she is a candidate for adult conscious sedation.              Last vitals:  Vitals Value Taken Time   BP     Temp     Pulse     Resp     SpO2         Electronically Signed By: Lisbet Velez MD  November 2, 2022  1:23 PM

## 2022-11-03 ENCOUNTER — TELEPHONE (OUTPATIENT)
Dept: TRANSPLANT | Facility: CLINIC | Age: 22
End: 2022-11-03

## 2022-11-03 DIAGNOSIS — Z94.0 KIDNEY REPLACED BY TRANSPLANT: Primary | ICD-10-CM

## 2022-11-03 LAB
AFP SERPL-MCNC: 2.5 NG/ML
BACTERIA UR CULT: NORMAL
BKV DNA # SPEC NAA+PROBE: NOT DETECTED COPIES/ML
EBV DNA COPIES/ML, INSTRUMENT: 7112 COPIES/ML
EBV DNA SPEC NAA+PROBE-LOG#: 3.9 {LOG_COPIES}/ML

## 2022-11-03 NOTE — TELEPHONE ENCOUNTER
Spoke to Anuradha and her mom. Hepatic panel and AFP tumor marker were able to be added onto labs drawn yesterday. Results are normal. They asked about the plan with GI. 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 She has an important competition next week, 11/10-11/12.    Citizen of the [place]

## 2022-11-03 NOTE — TELEPHONE ENCOUNTER
Spoke to Lexy and Anuradha. Anuradha will need to see adult GI. Referral placed. Anuradha is wanting to know if she can still proceed with her competition next weekend. Told Anuradha it is difficult to determine if she can still compete without being seen by GI. I will work on getting an appointment set up.

## 2022-11-04 ENCOUNTER — TELEPHONE (OUTPATIENT)
Dept: TRANSPLANT | Facility: CLINIC | Age: 22
End: 2022-11-04

## 2022-11-04 LAB
PATH REPORT.COMMENTS IMP SPEC: NORMAL
PATH REPORT.COMMENTS IMP SPEC: NORMAL
PATH REPORT.FINAL DX SPEC: NORMAL
PATH REPORT.GROSS SPEC: NORMAL
PATH REPORT.MICROSCOPIC SPEC OTHER STN: NORMAL
PATH REPORT.RELEVANT HX SPEC: NORMAL
PHOTO IMAGE: NORMAL

## 2022-11-08 ENCOUNTER — TELEPHONE (OUTPATIENT)
Dept: TRANSPLANT | Facility: CLINIC | Age: 22
End: 2022-11-08

## 2022-11-08 DIAGNOSIS — Z94.0 KIDNEY REPLACED BY TRANSPLANT: Primary | ICD-10-CM

## 2022-11-08 NOTE — TELEPHONE ENCOUNTER
The team would like a MRI abdomen with contrast. Planning on scheduling on 11/14 or 11/15. Let me know if 11/14 would work.

## 2022-11-10 NOTE — TELEPHONE ENCOUNTER
DIAGNOSIS:  Kidney replaced by transplant    Appt Date: 11.30.2022   NOTES STATUS DETAILS   OFFICE NOTE from referring provider Internal 11.03.2022 Natalya Hartman MD   OFFICE NOTES from other specialists Internal 03.29.2022 Nesha Orta APRN CNP   DISCHARGE SUMMARY from hospital Internal 11.02.2022  Health   MEDICATION LIST Internal    LIVER BIOSPY (IF APPLICABLE)      PATHOLOGY REPORTS      IMAGING     ENDOSCOPY (IF AVAILABLE)     COLONOSCOPY (IF AVAILABLE)     ULTRASOUND LIVER     CT OF ABDOMEN     MRI OF LIVER     FIBROSCAN, US ELASTOGRAPHY, FIBROSIS SCAN, MR ELASTOGRAPHY     LABS     HEPATIC PANEL (LIVER PANEL) Internal 11.02.2022   BASIC METABOLIC PANEL Care Everywhere 07.18.2022   COMPLETE METABOLIC PANEL Internal 11.02.2022   COMPLETE BLOOD COUNT (CBC) Internal 11.02.2022   INTERNATIONAL NORMALIZED RATIO (INR) Internal 11.02.2022   HEPATITIS C ANTIBODY     HEPATITIS C VIRAL LOAD/PCR     HEPATITIS C GENOTYPE     HEPATITIS B SURFACE ANTIGEN     HEPATITIS B SURFACE ANTIBODY     HEPATITIS B DNA QUANT LEVEL     HEPATITIS B CORE ANTIBODY

## 2022-11-14 ENCOUNTER — TELEPHONE (OUTPATIENT)
Dept: TRANSPLANT | Facility: CLINIC | Age: 22
End: 2022-11-14

## 2022-11-14 NOTE — TELEPHONE ENCOUNTER
Spoke to Anuradha regarding Chichi Weinberg's message on the MRI with contrast. Anuradha agrees to proceed with MRI. Went over her appointment/schedule for tomorrow.

## 2022-11-15 ENCOUNTER — HOSPITAL ENCOUNTER (OUTPATIENT)
Dept: MRI IMAGING | Facility: CLINIC | Age: 22
Discharge: HOME | End: 2022-11-15
Attending: PEDIATRICS
Payer: COMMERCIAL

## 2022-11-15 ENCOUNTER — HOSPITAL ENCOUNTER (OUTPATIENT)
Dept: CARDIOLOGY | Facility: CLINIC | Age: 22
Discharge: HOME | End: 2022-11-15
Attending: NURSE PRACTITIONER
Payer: COMMERCIAL

## 2022-11-15 ENCOUNTER — OFFICE VISIT (OUTPATIENT)
Dept: PEDIATRIC HEMATOLOGY/ONCOLOGY | Facility: CLINIC | Age: 22
End: 2022-11-15
Attending: PEDIATRICS
Payer: COMMERCIAL

## 2022-11-15 ENCOUNTER — OFFICE VISIT (OUTPATIENT)
Dept: PEDIATRIC HEMATOLOGY/ONCOLOGY | Facility: CLINIC | Age: 22
End: 2022-11-15
Attending: NURSE PRACTITIONER
Payer: COMMERCIAL

## 2022-11-15 VITALS
WEIGHT: 127.21 LBS | TEMPERATURE: 98.2 F | SYSTOLIC BLOOD PRESSURE: 132 MMHG | HEART RATE: 102 BPM | BODY MASS INDEX: 25.64 KG/M2 | OXYGEN SATURATION: 99 % | HEIGHT: 59 IN | DIASTOLIC BLOOD PRESSURE: 84 MMHG | RESPIRATION RATE: 18 BRPM

## 2022-11-15 VITALS
DIASTOLIC BLOOD PRESSURE: 84 MMHG | BODY MASS INDEX: 25.64 KG/M2 | RESPIRATION RATE: 18 BRPM | HEART RATE: 102 BPM | TEMPERATURE: 98.2 F | SYSTOLIC BLOOD PRESSURE: 132 MMHG | OXYGEN SATURATION: 99 % | HEIGHT: 59 IN | WEIGHT: 127.21 LBS

## 2022-11-15 DIAGNOSIS — K76.9 LIVER LESION: ICD-10-CM

## 2022-11-15 DIAGNOSIS — B27.00 EBV (EPSTEIN-BARR VIRUS) VIREMIA: ICD-10-CM

## 2022-11-15 DIAGNOSIS — Z94.0 KIDNEY REPLACED BY TRANSPLANT: ICD-10-CM

## 2022-11-15 DIAGNOSIS — Z92.21 STATUS POST CHEMOTHERAPY: ICD-10-CM

## 2022-11-15 DIAGNOSIS — I15.1 HYPERTENSION SECONDARY TO OTHER RENAL DISORDERS: ICD-10-CM

## 2022-11-15 DIAGNOSIS — Z85.528 H/O WILMS' TUMOR: Primary | ICD-10-CM

## 2022-11-15 DIAGNOSIS — Z94.0 KIDNEY TRANSPLANTED: Primary | ICD-10-CM

## 2022-11-15 DIAGNOSIS — Z92.3 S/P RADIATION THERAPY: ICD-10-CM

## 2022-11-15 DIAGNOSIS — E28.39 PRIMARY OVARIAN FAILURE: ICD-10-CM

## 2022-11-15 DIAGNOSIS — Z94.0 KIDNEY TRANSPLANTED: ICD-10-CM

## 2022-11-15 LAB
ALBUMIN SERPL-MCNC: 3.7 G/DL (ref 3.4–5)
ANION GAP SERPL CALCULATED.3IONS-SCNC: 5 MMOL/L (ref 3–14)
BUN SERPL-MCNC: 15 MG/DL (ref 7–30)
CALCIUM SERPL-MCNC: 8.9 MG/DL (ref 8.5–10.1)
CHLORIDE BLD-SCNC: 109 MMOL/L (ref 94–109)
CO2 SERPL-SCNC: 22 MMOL/L (ref 20–32)
CREAT SERPL-MCNC: 1.15 MG/DL (ref 0.52–1.04)
ESTRADIOL SERPL-MCNC: <5 PG/ML
FERRITIN SERPL-MCNC: 274 NG/ML (ref 12–150)
FSH SERPL-ACNC: <0.3 IU/L
GFR SERPL CREATININE-BSD FRML MDRD: 69 ML/MIN/1.73M2
GLUCOSE BLD-MCNC: 88 MG/DL (ref 70–99)
MAGNESIUM SERPL-MCNC: 1.5 MG/DL (ref 1.6–2.3)
PHOSPHATE SERPL-MCNC: 2.8 MG/DL (ref 2.5–4.5)
POTASSIUM BLD-SCNC: 4.7 MMOL/L (ref 3.4–5.3)
SODIUM SERPL-SCNC: 136 MMOL/L (ref 133–144)
TACROLIMUS BLD-MCNC: 4.9 UG/L (ref 5–15)
TME LAST DOSE: ABNORMAL H
TME LAST DOSE: ABNORMAL H

## 2022-11-15 PROCEDURE — 86832 HLA CLASS I HIGH DEFIN QUAL: CPT | Performed by: NURSE PRACTITIONER

## 2022-11-15 PROCEDURE — 93306 TTE W/DOPPLER COMPLETE: CPT | Mod: 26 | Performed by: PEDIATRICS

## 2022-11-15 PROCEDURE — 74183 MRI ABD W/O CNTR FLWD CNTR: CPT | Mod: 26 | Performed by: RADIOLOGY

## 2022-11-15 PROCEDURE — 82670 ASSAY OF TOTAL ESTRADIOL: CPT | Performed by: NURSE PRACTITIONER

## 2022-11-15 PROCEDURE — 255N000002 HC RX 255 OP 636: Performed by: PEDIATRICS

## 2022-11-15 PROCEDURE — 82728 ASSAY OF FERRITIN: CPT | Performed by: NURSE PRACTITIONER

## 2022-11-15 PROCEDURE — 74183 MRI ABD W/O CNTR FLWD CNTR: CPT

## 2022-11-15 PROCEDURE — 90686 IIV4 VACC NO PRSV 0.5 ML IM: CPT | Performed by: NURSE PRACTITIONER

## 2022-11-15 PROCEDURE — 99213 OFFICE O/P EST LOW 20 MIN: CPT | Performed by: PEDIATRICS

## 2022-11-15 PROCEDURE — G0463 HOSPITAL OUTPT CLINIC VISIT: HCPCS

## 2022-11-15 PROCEDURE — 80069 RENAL FUNCTION PANEL: CPT | Performed by: NURSE PRACTITIONER

## 2022-11-15 PROCEDURE — 86833 HLA CLASS II HIGH DEFIN QUAL: CPT | Performed by: NURSE PRACTITIONER

## 2022-11-15 PROCEDURE — 93790 AMBL BP MNTR W/SW I&R: CPT | Performed by: PEDIATRICS

## 2022-11-15 PROCEDURE — A9581 GADOXETATE DISODIUM INJ: HCPCS | Performed by: PEDIATRICS

## 2022-11-15 PROCEDURE — 99215 OFFICE O/P EST HI 40 MIN: CPT | Performed by: NURSE PRACTITIONER

## 2022-11-15 PROCEDURE — 83735 ASSAY OF MAGNESIUM: CPT | Performed by: NURSE PRACTITIONER

## 2022-11-15 PROCEDURE — 93788 AMBL BP MNTR W/SW A/R: CPT

## 2022-11-15 PROCEDURE — 83001 ASSAY OF GONADOTROPIN (FSH): CPT | Performed by: NURSE PRACTITIONER

## 2022-11-15 PROCEDURE — 93306 TTE W/DOPPLER COMPLETE: CPT

## 2022-11-15 PROCEDURE — 87799 DETECT AGENT NOS DNA QUANT: CPT | Performed by: NURSE PRACTITIONER

## 2022-11-15 PROCEDURE — 36415 COLL VENOUS BLD VENIPUNCTURE: CPT | Performed by: NURSE PRACTITIONER

## 2022-11-15 PROCEDURE — 250N000011 HC RX IP 250 OP 636: Performed by: NURSE PRACTITIONER

## 2022-11-15 PROCEDURE — G0463 HOSPITAL OUTPT CLINIC VISIT: HCPCS | Mod: 25

## 2022-11-15 PROCEDURE — 93786 AMBL BP MNTR W/SW REC ONLY: CPT

## 2022-11-15 PROCEDURE — G0008 ADMIN INFLUENZA VIRUS VAC: HCPCS | Performed by: NURSE PRACTITIONER

## 2022-11-15 PROCEDURE — 80197 ASSAY OF TACROLIMUS: CPT | Performed by: NURSE PRACTITIONER

## 2022-11-15 RX ADMIN — GADOXETATE DISODIUM 10 ML: 181.43 INJECTION, SOLUTION INTRAVENOUS at 08:00

## 2022-11-15 RX ADMIN — INFLUENZA A VIRUS A/VICTORIA/2570/2019 IVR-215 (H1N1) ANTIGEN (FORMALDEHYDE INACTIVATED), INFLUENZA A VIRUS A/DARWIN/9/2021 SAN-010 (H3N2) ANTIGEN (FORMALDEHYDE INACTIVATED), INFLUENZA B VIRUS B/PHUKET/3073/2013 ANTIGEN (FORMALDEHYDE INACTIVATED), AND INFLUENZA B VIRUS B/MICHIGAN/01/2021 ANTIGEN (FORMALDEHYDE INACTIVATED) 0.5 ML: 15; 15; 15; 15 INJECTION, SUSPENSION INTRAMUSCULAR at 11:49

## 2022-11-15 ASSESSMENT — PAIN SCALES - GENERAL
PAINLEVEL: NO PAIN (0)
PAINLEVEL: NO PAIN (0)

## 2022-11-15 NOTE — NURSING NOTE
"Chief Complaint   Patient presents with     RECHECK     Pt here for Wilms' tumor LTFU     /84 (BP Location: Left arm, Patient Position: Sitting, Cuff Size: Adult Regular)   Pulse 102   Temp 98.2  F (36.8  C) (Oral)   Resp 18   Ht 1.492 m (4' 10.74\")   Wt 57.7 kg (127 lb 3.3 oz)   SpO2 99%   BMI 25.92 kg/m      No Pain (0)  Data Unavailable    I have reviewed the patients medications and allergies    Height/weight double check needed? No    Peds Outpatient BP  1) Rested for 5 minutes, BP taken on bare arm, patient sitting (or supine for infants) w/ legs uncrossed?   Yes  2) Right arm used?  Left arm   No- Patient requested left arm  3) Arm circumference of largest part of upper arm (in cm): 28cm  4) BP cuff sized used: Adult (25-32cm)   If used different size cuff then what was recommended why? N/A  5) First BP reading:machine   BP Readings from Last 1 Encounters:   11/15/22 132/84      Is reading >90%?No   (90% for <1 years is 90/50)  (90% for >18 years is 140/90)  *If a machine BP is at or above 90% take manual BP  6) Manual BP reading: N/A  7) Other comments: None          Doug Sommer, EMT  November 15, 2022  "

## 2022-11-15 NOTE — LETTER
"11/15/2022      RE: Anuradha Pearson  [address]  [place] [address]     Dear Colleague,    Thank you for the opportunity to participate in the care of your patient, Anuradha Pearson, at the [facility] at [facility]. Please see a copy of my visit note below.    PEDIATRIC INFECTIOUS DISEASES  [facility]  [address] [place] [address]  Office: [phone]  Fax: [phone]     Date: November 15, 2022     To: Gutierrez Wood MD  [facility]  [address] [place] [address]    Pt: Anuradha Pearson  MR: [id number]  : 2000  GILLIAN: Nov 15, 2022     Dear Dr. Wood     I had the pleasure of seeing Anuradha Pearson at the Pediatric Infectious Diseases Clinic at the [facility]. 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 Her post-transplant course has been complicated by infections (viral meningitis, EBV viremia, warts), pseudotumor cerebri, and fracture of her right femur. She reports that she was treated with rituximab early in her transplant course for EBV (not PTLD), and her EBV DNA was undetectable between early  and late . Following EBV DNA rebound she peaked at log 5.3 in 2021 (measurements done at [facility]); most recently an outside lab reported a log EBV copy number of 3.95 on 1/3/22. She had her adenoids removed in 2021 in [facility] (brief report in Care Everywhere: \"Tonsillar tissue with reactive follicular and paracortical lymphoid hyperplasia.\" No viral testing was reported).     Since my last visit with her 22 she has had a few medical interventions. In July she was in [place] and admitted for evaluation and management of STEC infection (no " antibiotics given). Later in July she was admitted in [place] for management of cellulitis of her right armafter returning from [place] (augmentin and clinda for 10 days).     She was last seen by Dr. Hartman from Nephrology 11/2/22. Her immunosuppression remains tacro (goal 4 to 6) and azathioprine. Prophylaxis remains thrice weekly Bactrim. A kidney biopxy was done 11/2, which was negative for rejection. New liver lesions were noted on imaging: thought to be benign, but evaluation by GI is pending (in the meantime she is unable to ride).     On the day of my evaluation she had no symptoms concerning for PTLD including rash, abdominal pain, vomiting, diarrhea, adenopathy, recurrent fevers, weight loss, cough, dysphagia, snoring, or neurologic changes.     Problem list:   Patient Active Problem List   Diagnosis     Kidney replaced by transplant     Wilms' tumor (H)     Status post chemotherapy     S/P radiation therapy     Lack of expected normal physiological development     Primary ovarian failure     EBV (Derrell-Barr virus) viremia     HTN (hypertension)     Immunosuppression (H)     Scar adherent     Recurrent pyelonephritis     Chronic kidney disease, stage 3 (H)     Status post kidney transplant     Fever, unspecified fever cause     COVID-19     Acute kidney injury (H)     Encounter for well woman exam     Urinary tract infection        ROS: 10 point ROS neg other than the symptoms noted above in the HPI.     Past Medical History:   Diagnosis Date     H/O kidney transplant      Hypertension      PONV (postoperative nausea and vomiting)      Urinary tract infection 11/04/2021     Wilm's tumor        Past Surgical History:   Procedure Laterality Date     APPENDECTOMY       CHOLECYSTECTOMY  05/20/2003     ENT SURGERY       IR RENAL BIOPSY RIGHT  11/2/2022     OPEN REDUCTION INTERNAL FIXATION RODDING INTRAMEDULLARY FEMUR  8/20/2012    Procedure: OPEN REDUCTION INTERNAL FIXATION RODDING INTRAMEDULLARY FEMUR;  Closed  Reduction Internal Fixation Right Femur;  Surgeon: Catracho Hook MD;  [facility]     PERCUTANEOUS BIOPSY KIDNEY N/A 11/2/2022    Procedure: transplant kidney biopsy;  Surgeon: Isra Still PA-C;  [facility] PEDS SED[facility]      REVISE SCAR TRUNK Right 11/6/2015    Procedure: REVISE SCAR TRUNK;  Surgeon: COOPER Anderson MD;  Location: [facility] OR     right nephrectomy  05/19/2003    due to Wilms tumor; performed by Dr. Myles Velez at [facility]     TONSILLECTOMY  01/31/2005     TRANSPLANT KIDNEY RECIPIENT LIVING RELATED CHILD  11/09/2004    left nephrectomy performed at time of transplant       Family History   Problem Relation Age of Onset     Hypertension Father        Social History     Tobacco Use     Smoking status: Never     Smokeless tobacco: Never   Substance Use Topics     Alcohol use: No         Immunization:   Immunization History   Administered Date(s) Administered     HEPA 08/04/2004     HPV9 04/10/2018, 07/17/2018, 01/15/2019     HepB 2000, 2000, 06/21/2001     Hib (PRP-T) 2000, 2000, 06/21/2001     Historical DTP/aP 2000, 2000, 02/28/2001, 12/06/2001     Influenza (H1N1) 10/27/2009     Influenza (IIV3) PF 10/25/2005, 02/09/2007, 11/09/2007, 01/22/2009, 10/16/2009, 11/03/2010, 09/19/2011, 09/10/2012, 01/03/2014, 12/01/2014     Influenza Vaccine IM > 6 months Valent IIV4 (Alfuria,Fluzone) 10/26/2015, 01/09/2017, 01/29/2018, 11/13/2018     Influenza Vaccine Im 4yrs+ 4 Valent CCIIV4 12/20/2019     MMR 06/21/2001, 06/21/2018     Mantoux Tuberculin Skin Test 08/04/2004     Meningococcal (Bexsero ) 07/23/2019     Meningococcal (Menactra ) 11/13/2018     Pneumo Conj 13-V (2010&after) 04/10/2018     Pneumococcal (PCV 7) 2000, 2000, 02/28/2001, 06/21/2001     Pneumococcal 23 valent 01/15/2019     Poliovirus, inactivated (IPV) 2000, 2000, 12/06/2001     TDAP Vaccine (Adacel) 03/12/2017     Tdap (Adacel,Boostrix)  09/19/2011     Varicella 06/21/2001     Allergies:    Allergies   Allergen Reactions     Ibuprofen Other (See Comments)     Avoid nephrotoxic medications as needed due to kidney transplant status     Shellfish-Derived Products Nausea and Vomiting     Vancomycin      Uses Benadryl Prior to administration        Current Outpatient Medications   Medication Sig Dispense Refill     amLODIPine (NORVASC) 10 MG tablet Take 1 tablet (10 mg) by mouth daily 30 tablet 11     azaTHIOprine (IMURAN) 50 MG tablet Take 1.5 tablets (75 mg) by mouth daily 45 tablet 11     enalapril (VASOTEC) 5 MG tablet Take 1 tablet (5 mg) by mouth daily 90 tablet 11     ferrous sulfate (FEROSUL) 325 (65 Fe) MG tablet Take 1 tablet (325 mg) by mouth 2 times daily 180 tablet 6     labetalol (NORMODYNE) 100 MG tablet Take 1 tablet (100 mg) by mouth 2 times daily 180 tablet 6     levonorgest-eth estrad 91-Day (SEASONIQUE) 0.15-0.03 &0.01 MG tablet Take 1 tablet by mouth daily 91 tablet 3     magnesium oxide (MAG-OX) 400 (241.3 Mg) MG tablet Take 1 tablet (400 mg) by mouth 2 times daily 60 tablet 11     Multiple Vitamins-Minerals (WOMENS MULTIVITAMIN PO) Take 1 tablet by mouth daily       sulfamethoxazole-trimethoprim (BACTRIM) 400-80 MG tablet Take 1 tablet by mouth Every Mon, Wed, Fri Morning 13 tablet 11     tacrolimus (GENERIC EQUIVALENT) 1 MG capsule Take 2 capsules (2 mg) by mouth 2 times daily 360 capsule 3        Vitals  Weight:    Height:    Head circumference:    BMI: There is no height or weight on file to calculate BMI. No height and weight on file for this encounter.    Physical Exam   GENERAL: Active, alert, in no acute distress.  SKIN: Clear. No significant rash, abnormal pigmentation or lesions  HEAD: Normocephalic  EYES: Pupils equal, round, reactive, Extraocular muscles intact. Normal conjunctivae.  NOSE: Normal without discharge.  MOUTH/THROAT: Clear. No oral lesions. Teeth without obvious abnormalities.  NECK: Supple, no masses.  No  thyromegaly.  LUNGS: Clear. No rales, rhonchi, wheezing or retractions  HEART: Regular rhythm. Normal S1/S2. No murmurs. Normal pulses.  ABDOMEN: Soft, non-tender, not distended, no masses or hepatosplenomegaly. Bowel sounds normal.   NEUROLOGIC: No focal findings.   EXTREMITIES: Full range of motion, no deformities       Lab:  EBV DNA whole blood:         Assessment: Anuradha is a 22 year old female with iatrogenic immunosuppression following kidney transplant in . Re-transplant has been discussed in the past, but more recently there has been no push to proceed. Her most recent biopsy is without rejection. New liver lesions noted, likely benign, but workup by GI is pending (will be seen by adult GI). I will be happy to continue to follow Anuradha until that transition has happened.      At this time I have a very low level of suspicion for PTLD.      Plan:      1. Follow up with me in three months, or sooner if concerning symptoms develop.     2. Will obtain plasma EBV level at next visit.    Follow up: I would like to see Anuradha in three monhts. If symptoms reoccur or any new issues arise I would be happy to see her earlier in clinic.     I have reviewed Anuradha s past medical history, family history, social history, medications and allergies as documented in the medical record. There were no additional findings except as noted.    I spent a total of 20 minutes face-to-face with Anuradha Pearson during today s office visit.  Over 50% of this time was spent counseling the patient and/or coordinating care on the same day of her clinic appointment.    Sincerely,     Cuauhtemoc Rodriguez MD, PhD  Division of Pediatric Infectious Diseases  [facility]  Clinic Coordinator: Ryanne West: [phone]  [phone]  Fax: [phone]

## 2022-11-15 NOTE — PROGRESS NOTES
"PEDIATRIC INFECTIOUS DISEASES  [facility]  [address] [place] [address]  Office: [phone]  Fax: [phone]     Date: November 15, 2022     To: Gutierrez Wood MD  [facility]  [address] [place] [address]    Pt: Anuradha Pearson  MR: [id number]  : 2000  GILLIAN: Nov 15, 2022     Dear Dr. Wood     I had the pleasure of seeing Anuradha Pearson at the Pediatric Infectious Diseases Clinic at the [facility]. 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 Her post-transplant course has been complicated by infections (viral meningitis, EBV viremia, warts), pseudotumor cerebri, and fracture of her right femur. She reports that she was treated with rituximab early in her transplant course for EBV (not PTLD), and her EBV DNA was undetectable between early  and late . Following EBV DNA rebound she peaked at log 5.3 in 2021 (measurements done at [facility]); most recently an outside lab reported a log EBV copy number of 3.95 on 1/3/22. She had her adenoids removed in 2021 in [facility] (brief report in Care Everywhere: \"Tonsillar tissue with reactive follicular and paracortical lymphoid hyperplasia.\" No viral testing was reported).     Since my last visit with her 22 she has had a few medical interventions. In July she was in [place] and admitted for evaluation and management of STEC infection (no antibiotics given). Later in July she was admitted in [place] for management of cellulitis of her right armafter returning from [place] (augmentin and clinda for 10 days).     She was last seen by Dr. Hartman from Nephrology 22. Her immunosuppression remains tacro (goal 4 to 6) and azathioprine. Prophylaxis remains thrice weekly Bactrim. A kidney biopxy was done , which " was negative for rejection. New liver lesions were noted on imaging: thought to be benign, but evaluation by GI is pending (in the meantime she is unable to ride).     On the day of my evaluation she had no symptoms concerning for PTLD including rash, abdominal pain, vomiting, diarrhea, adenopathy, recurrent fevers, weight loss, cough, dysphagia, snoring, or neurologic changes.     Problem list:   Patient Active Problem List   Diagnosis     Kidney replaced by transplant     Wilms' tumor (H)     Status post chemotherapy     S/P radiation therapy     Lack of expected normal physiological development     Primary ovarian failure     EBV (Derrell-Barr virus) viremia     HTN (hypertension)     Immunosuppression (H)     Scar adherent     Recurrent pyelonephritis     Chronic kidney disease, stage 3 (H)     Status post kidney transplant     Fever, unspecified fever cause     COVID-19     Acute kidney injury (H)     Encounter for well woman exam     Urinary tract infection        ROS: 10 point ROS neg other than the symptoms noted above in the HPI.     Past Medical History:   Diagnosis Date     H/O kidney transplant      Hypertension      PONV (postoperative nausea and vomiting)      Urinary tract infection 11/04/2021     Wilm's tumor        Past Surgical History:   Procedure Laterality Date     APPENDECTOMY       CHOLECYSTECTOMY  05/20/2003     ENT SURGERY       IR RENAL BIOPSY RIGHT  11/2/2022     OPEN REDUCTION INTERNAL FIXATION RODDING INTRAMEDULLARY FEMUR  8/20/2012    Procedure: OPEN REDUCTION INTERNAL FIXATION RODDING INTRAMEDULLARY FEMUR;  Closed Reduction Internal Fixation Right Femur;  Surgeon: Catracho Hook MD;  [facility]     PERCUTANEOUS BIOPSY KIDNEY N/A 11/2/2022    Procedure: transplant kidney biopsy;  Surgeon: Isra Still PA-C;  [facility] P[facility]      REVISE SCAR TRUNK Right 11/6/2015    Procedure: REVISE SCAR TRUNK;  Surgeon: COOPER Anderson MD;  Location: [facility] OR      right nephrectomy  05/19/2003    due to Wilms tumor; performed by Dr. Myles Velez at [facility]     TONSILLECTOMY  01/31/2005     TRANSPLANT KIDNEY RECIPIENT LIVING RELATED CHILD  11/09/2004    left nephrectomy performed at time of transplant       Family History   Problem Relation Age of Onset     Hypertension Father        Social History     Tobacco Use     Smoking status: Never     Smokeless tobacco: Never   Substance Use Topics     Alcohol use: No         Immunization:   Immunization History   Administered Date(s) Administered     HEPA 08/04/2004     HPV9 04/10/2018, 07/17/2018, 01/15/2019     HepB 2000, 2000, 06/21/2001     Hib (PRP-T) 2000, 2000, 06/21/2001     Historical DTP/aP 2000, 2000, 02/28/2001, 12/06/2001     Influenza (H1N1) 10/27/2009     Influenza (IIV3) PF 10/25/2005, 02/09/2007, 11/09/2007, 01/22/2009, 10/16/2009, 11/03/2010, 09/19/2011, 09/10/2012, 01/03/2014, 12/01/2014     Influenza Vaccine IM > 6 months Valent IIV4 (Alfuria,Fluzone) 10/26/2015, 01/09/2017, 01/29/2018, 11/13/2018     Influenza Vaccine Im 4yrs+ 4 Valent CCIIV4 12/20/2019     MMR 06/21/2001, 06/21/2018     Mantoux Tuberculin Skin Test 08/04/2004     Meningococcal (Bexsero ) 07/23/2019     Meningococcal (Menactra ) 11/13/2018     Pneumo Conj 13-V (2010&after) 04/10/2018     Pneumococcal (PCV 7) 2000, 2000, 02/28/2001, 06/21/2001     Pneumococcal 23 valent 01/15/2019     Poliovirus, inactivated (IPV) 2000, 2000, 12/06/2001     TDAP Vaccine (Adacel) 03/12/2017     Tdap (Adacel,Boostrix) 09/19/2011     Varicella 06/21/2001     Allergies:    Allergies   Allergen Reactions     Ibuprofen Other (See Comments)     Avoid nephrotoxic medications as needed due to kidney transplant status     Shellfish-Derived Products Nausea and Vomiting     Vancomycin      Uses Benadryl Prior to administration        Current Outpatient Medications   Medication Sig Dispense Refill      amLODIPine (NORVASC) 10 MG tablet Take 1 tablet (10 mg) by mouth daily 30 tablet 11     azaTHIOprine (IMURAN) 50 MG tablet Take 1.5 tablets (75 mg) by mouth daily 45 tablet 11     enalapril (VASOTEC) 5 MG tablet Take 1 tablet (5 mg) by mouth daily 90 tablet 11     ferrous sulfate (FEROSUL) 325 (65 Fe) MG tablet Take 1 tablet (325 mg) by mouth 2 times daily 180 tablet 6     labetalol (NORMODYNE) 100 MG tablet Take 1 tablet (100 mg) by mouth 2 times daily 180 tablet 6     levonorgest-eth estrad 91-Day (SEASONIQUE) 0.15-0.03 &0.01 MG tablet Take 1 tablet by mouth daily 91 tablet 3     magnesium oxide (MAG-OX) 400 (241.3 Mg) MG tablet Take 1 tablet (400 mg) by mouth 2 times daily 60 tablet 11     Multiple Vitamins-Minerals (WOMENS MULTIVITAMIN PO) Take 1 tablet by mouth daily       sulfamethoxazole-trimethoprim (BACTRIM) 400-80 MG tablet Take 1 tablet by mouth Every Mon, Wed, Fri Morning 13 tablet 11     tacrolimus (GENERIC EQUIVALENT) 1 MG capsule Take 2 capsules (2 mg) by mouth 2 times daily 360 capsule 3        Vitals  Weight:    Height:    Head circumference:    BMI: There is no height or weight on file to calculate BMI. No height and weight on file for this encounter.    Physical Exam   GENERAL: Active, alert, in no acute distress.  SKIN: Clear. No significant rash, abnormal pigmentation or lesions  HEAD: Normocephalic  EYES: Pupils equal, round, reactive, Extraocular muscles intact. Normal conjunctivae.  NOSE: Normal without discharge.  MOUTH/THROAT: Clear. No oral lesions. Teeth without obvious abnormalities.  NECK: Supple, no masses.  No thyromegaly.  LUNGS: Clear. No rales, rhonchi, wheezing or retractions  HEART: Regular rhythm. Normal S1/S2. No murmurs. Normal pulses.  ABDOMEN: Soft, non-tender, not distended, no masses or hepatosplenomegaly. Bowel sounds normal.   NEUROLOGIC: No focal findings.   EXTREMITIES: Full range of motion, no deformities       Lab:  EBV DNA whole blood:         Assessment:  Anuradha is a 22 year old female with iatrogenic immunosuppression following kidney transplant in . Re-transplant has been discussed in the past, but more recently there has been no push to proceed. Her most recent biopsy is without rejection. New liver lesions noted, likely benign, but workup by GI is pending (will be seen by adult GI). I will be happy to continue to follow Anuradha until that transition has happened.      At this time I have a very low level of suspicion for PTLD.      Plan:      1. Follow up with me in three months, or sooner if concerning symptoms develop.     2. Will obtain plasma EBV level at next visit.    Follow up: I would like to see Anuradha in three monhts. If symptoms reoccur or any new issues arise I would be happy to see her earlier in clinic.     I have reviewed Anuradha s past medical history, family history, social history, medications and allergies as documented in the medical record. There were no additional findings except as noted.    I spent a total of 20 minutes face-to-face with Anuradha Pearson during today s office visit.  Over 50% of this time was spent counseling the patient and/or coordinating care on the same day of her clinic appointment.    Sincerely,     Cuauhtemoc Rodriguez MD, PhD  Division of Pediatric Infectious Diseases  [facility]  Clinic Coordinator: Ryanne West: [phone]  [phone]  Fax: [phone]

## 2022-11-15 NOTE — LETTER
11/15/2022      RE: Shraddha Pearson  [address]  [place] [address]     Dear Colleague,    Thank you for the opportunity to participate in the care of your patient, Shraddha Pearson, at the [facility] P[facility] at [facility]. Please see a copy of my visit note below.    LONG-TERM FOLLOW-UP CLINIC PROGRESS NOTE     We had the pleasure of seeing your patient, Shraddha Pearson, at the [facility] Long-Term Follow-Up Clinic for childhood cancer survivors.  Shraddha was referred to us by Dr. Ingrid Constantino for ongoing management and long-term follow up of her Wilms tumor and associated chemotherapy.  The following is a summary of your patient's cancer, therapy, current history, and physical findings followed by assessment and long-term followup recommendations.      THERAPY ACCORDING TO AVAILABLE RECORDS:  Shraddha Pearson is a now 22-year-old  female with a history of stage IV favorable histology Wilms tumor that was diagnosed on 05/19/2003 at 2 1/2 years of age.  She initially presented with disease in her right kidney as well as her lungs.  She had chemotherapy, radiation, and surgery as part of her treatment.  Her initial surgery was on  05/19/2003 in which she had a right nephrectomy.  Unfortunately, she had some surgical complications which caused her to lose blood supply to the left kidney, and she also lost that one as well.  Her surgeries in detail are as follows:   1. Right nephrectomy on 05/19/2003 by Dr. Myles Velez at the [facility].   2. Cadaveric saphenous vein graft to IVC on 05/20/2003.   3. Cholecystectomy on 05/20/2003.   4. Left nephrectomy and living donor renal transplant from patient's father on 11/14/2004.   5. Tonsillectomy on 01/31/2005.   6. Liver biopsy on 01/31/2005.   7. Left renal biopsy on 11/07/2005.      Shraddha received  the following chemotherapies as part of her treatment:   1. Vincristine intravenously.   2. Actinomycin D intravenously.   3. Doxorubicin intravenously with a cumulative dose of 128 mg/m2.      Radiation therapy is as follows:   1. Whole abdomen radiation for a total dose of 1080 cGy.      Shraddha's acute and chronic late effects known to date include:   1. 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   2. 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   3. Idiopathic intracranial hypertension 05/01/2007, resolved.   4. Recurrent UTIs and pyelonephritis, resolved.   5. Hemorrhagic gastritis 04/2007, resolved.   6. Viral meningitis 10/05/2008, resolved.   7. Elevated EBV titers requiring rituximab infusion for 2 doses in March and August 2005.   8.  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  Growth deceleration       HISTORY OF PRESENT ILLNESS:  Shraddha report to the visit today in person.  Recently had some incidental liver lesions and will be seeing GI soon.  She was told to hold off on horse back riding til more investigation could be completed.  Lesions thought to be likely FNH.  Had an MRI with contrast today.  She has been sick a few times over the last year.  She was seen in the ED here on 11/20/21 for fever, dehydration.  She was put on antibiotics for an infection.  In addition she was also see on 11/4/21 for a likely bacterial infection in her upper airway.  She said that several of her friends also had the same illness.  She did have COVID-19 diagnosed on 9/24/21.  She had to be hospitalized due to her immunocompromised status and fever.  She did get IV antibiotics for a few days as well for prophylaxis.  She has since recovered from all 3 of these recent infections.  She has completed her last dose of antibiotics after her 11/20/21 ED trip.  She is currently in [place] visiting family but she did buy a house in [place] close to [place] that is more convenient for her horse shows  and rodeo in the winter.  She is planning to be in [place] until after [place].  She completed college with a degree in Equine Science.  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 No issues with stomach pain.  Urination is normal.  No N/V/D/C.  No issues with breathing.  She is wondering about her BP.  She did have low BP around the time of her illness so she talked with nephrology about holding her enalapril.  She did start it back when her BP increased but now she is wondering if she should hold it again.  She has been getting some muscle cramping since restarting.  She has a visit with nephrology tomorrow.  She also has a visit with women's health tomorrow as well to discuss her hormone replacement therapy.   She has ongoing dental follow up and will be seeing them soon.   Her gingival hyperplasia resolved now that she has been on tacrolimus. No more issues with sleeping.  Completed gardasil vaccines and got Bexsero in past.  Considering the COVID vaccine but hasn't gotten it yet.  Had an echo in 2019.  Has yearly eye exam.       REVIEW OF SYSTEMS:   General:  No acute distress  Skin: negative  Eyes: glasses  Ears/Nose/Throat: gingival hyperplasia- resolved;   Respiratory: No shortness of breath, dyspnea on exertion, cough, or hemoptysis  Cardiovascular: negative  Gastrointestinal: negative  Genitourinary: s/p kidney transplant  Musculoskeletal: left broken clavicle 4/2019  Neurologic: negative  Psychiatric: negative  Hematologic/Lymphatic/Immunologic: negative  Endocrine: ovarian failure and growth deceleration       IMMUNIZATIONS:  Up to date per parents      SOCIAL HISTORY:  Shraddha has completed her 2 year degree in Equine Science.  She is very active,  riding horses. Parents report in the past they have cleared her horseback riding with the renal transplant team.   Lives in [place] most of the time and comes for extended trips to [place].     FAMILY HISTORY:  Maternal grandmother with a history of  "heart valve disease, carotid artery obstruction, and congestive heart failure in her 50s.  Maternal grandfather with cardiac bypass in his 60s.  Paternal grandmother with history of hypertension in her 50s.  Maternal aunt with history of diabetes in her 50s.  Paternal great-grandfather with colon cancer at age 68.  Maternal  great-grandfather with history of colon cancer at 60.  Father with hypertension. No changes.      CURRENT MEDICATIONS:     Current Outpatient Medications   Medication     amLODIPine (NORVASC) 10 MG tablet     azaTHIOprine (IMURAN) 50 MG tablet     enalapril (VASOTEC) 5 MG tablet     ferrous sulfate (FEROSUL) 325 (65 Fe) MG tablet     labetalol (NORMODYNE) 100 MG tablet     levonorgest-eth estrad 91-Day (SEASONIQUE) 0.15-0.03 &0.01 MG tablet     magnesium oxide (MAG-OX) 400 (241.3 Mg) MG tablet     Multiple Vitamins-Minerals (WOMENS MULTIVITAMIN PO)     sulfamethoxazole-trimethoprim (BACTRIM) 400-80 MG tablet     tacrolimus (GENERIC EQUIVALENT) 1 MG capsule     No current facility-administered medications for this visit.        ALLERGIES:  Vancomycin which causes Ramin syndrome.      PHYSICAL EXAMINATION:   VITAL SIGNS: /84 (BP Location: Left arm, Patient Position: Sitting, Cuff Size: Adult Regular)   Pulse 102   Temp 98.2  F (36.8  C) (Oral)   Resp 18   Ht 1.492 m (4' 10.74\")   Wt 57.7 kg (127 lb 3.3 oz)   SpO2 99%   BMI 25.92 kg/m         Physical Exam   GENERAL: Healthy, alert and no distress  EYES: Eyes grossly normal to inspection.  No discharge or erythema, or obvious scleral/conjunctival abnormalities.  HENT: Normal cephalic/atraumatic.  External ears, nose and mouth without ulcers or lesions.  No nasal drainage visible. Bilateral TMs WNL.  NECK: No asymmetry, visible masses or scars  RESP: No audible wheeze, cough, or visible cyanosis.  No visible retractions or increased work of breathing.  Lungs CTAB.  MS: No gross musculoskeletal defects noted.  Normal range of motion.  " "No visible edema.  SKIN: Visible skin clear. No significant rash, abnormal pigmentation or lesions.  NEURO: Cranial nerves grossly intact.  Mentation and speech appropriate for age.  PSYCH: Mentation appears normal, affect normal/bright, judgement and insight intact, normal speech and appearance well-groomed.        Wt Readings from Last 3 Encounters:   11/15/22 57.7 kg (127 lb 3.3 oz)   11/15/22 57.7 kg (127 lb 3.3 oz)   22 56.1 kg (123 lb 10.9 oz)     Ht Readings from Last 2 Encounters:   11/15/22 1.492 m (4' 10.74\")   11/15/22 1.492 m (4' 10.74\")     Facility age limit for growth percentiles is 20 years.       LABORATORY AND DIAGNOSTIC STUDIES:   Results for orders placed or performed during the hospital encounter of 11/15/22   Echo Pediatric (TTE) Complete     Status: None    Narrative    [id number]  [id number]  [id number]  [id number]^JAGUAR^CLAUDINE^AYE                                                               Study ID: [id number]                                                 [facility] [place] [address]                                                Phone: [phone]                                Pediatric Echocardiogram  ______________________________________________________________________________  Name: JOCELYN JAQUEZ  Study Date: 11/15/2022 02:23 PM                   Patient Location: [facility]  MRN: [id number]                                   Age: 22 yrs  : 2000  Gender: Female  Patient Class: Outpatient                         Height: 149 cm  Ordering Provider: CLAUDINE MONTESINOS                   Weight: 58 kg  Referring Provider: CLAUDINE MONTESINOS                  BSA: 1.5 m2  Performed By: Almaz Barrow  Report approved by: Yuri Soto MD  Reason For Study: Kidney replaced by transplant, Hypertension " secondary to  other r  ______________________________________________________________________________  ##### CONCLUSIONS #####  Normal intracardiac connections. There is normal appearance and motion of the  tricuspid, mitral, pulmonary and aortic valves. There is no left ventricular  hypertrophy. Normal ventricular septum and left ventricular wall end-diastolic  thickness by MMODE Z-scores. Normal left ventricular mass index. LV mass index  27 g/m^2.7. The upper limit of normal is 40 g/m^2.7. The left ventricular  relative wall thickness is 0.41 (the upper limit of normal is 0.42). Normal  left and right ventricular systolic function. The calculated single plane left  ventricular ejection fraction from the 4 chamber view is 70%. No pericardial  effusion.  No significant change from last echocardiogram.  ______________________________________________________________________________  Technical information:  A complete two dimensional, MMODE, spectral and color Doppler transthoracic  echocardiogram is performed. The study quality is good. Images are obtained  from parasternal, apical, subcostal and suprasternal notch views. ECG tracing  shows regular rhythm.     Segmental Anatomy:  There is normal atrial arrangement, with concordant atrioventricular and  ventriculoarterial connections.     Systemic and pulmonary veins:  The systemic venous return is normal. Normal coronary sinus. Color flow  demonstrates flow from two pulmonary veins entering the left atrium.     Atria and atrial septum:  Normal right atrial size. The left atrium is normal in size. There is no  atrial level shunting.     Atrioventricular valves:  The tricuspid valve is normal in appearance and motion. Trivial tricuspid  valve insufficiency. Insufficient jet to estimate right ventricular systolic  pressure. The mitral valve is normal in appearance and motion. Trivial mitral  valve insufficiency.     Ventricles and Ventricular Septum:  Normal right  ventricular size. Normal right ventricular systolic function.  Normal left ventricular size. Normal left ventricular systolic function. The  calculated single plane left ventricular ejection fraction from the 4 chamber  view is 70 %. There is no left ventricular hypertrophy. Normal ventricular  septum and left ventricular wall end-diastolic thickness by MMODE Z-scores.  Normal left ventricular mass index. LV mass index 27 g/m^2.7. The upper limit  of normal is 40 g/m^2.7. The left ventricular relative wall thickness is 0.41  (the upper limit of normal is 0.42). There is no ventricular level shunting.     Outflow tracts:  Normal great artery relationship. There is unobstructed flow through the right  ventricular outflow tract. The pulmonary valve motion is normal. There is  normal flow across the pulmonary valve. Trivial pulmonary valve insufficiency.  There is unobstructed flow through the left ventricular outflow tract.  Tricuspid aortic valve with normal appearance and motion. There is normal flow  across the aortic valve.     Great arteries:  The main pulmonary artery has normal appearance. There is unobstructed flow in  the main pulmonary artery. The pulmonary artery bifurcation is normal. There  is unobstructed flow in both branch pulmonary arteries. Normal ascending  aorta. The aortic arch appears normal. There is unobstructed antegrade flow in  the ascending, transverse arch, descending thoracic and abdominal aorta.     Arterial Shunts:  The ductal region is not imaged with this study.     Coronaries:  The coronary arteries are not well visualized.     Effusions, catheters, cannulas and leads:  No pericardial effusion.     MMode/2D Measurements & Calculations  2 Chamber EF: 74.0 %                4 Chamber EF: 70.0 %  EF Biplane: 72.8 %                  LVMI(BSA): 50.7 grams/m2  LVMI(Height): 27.0                  RWT(MM): 0.41     Doppler Measurements & Calculations  MV E max poornima: 76.5 cm/sec                 Ao V2 max: 132.4 cm/sec  MV A max poornima: 68.0 cm/sec                Ao max P.0 mmHg  MV E/A: 1.1  PA V2 max: 150.6 cm/sec                  RV V1 max: 98.1 cm/sec  PA max P.1 mmHg                      RV V1 max PG: 3.8 mmHg     desc Ao max poornima: 99.4 cm/sec  desc Ao max P.0 mmHg     Matheny 2D Z-SCORE VALUES  Measurement Name Value Z-ScorePredictedNormal Range  Ao sinus diam(2D)2.4 cm-0.95  2.6      2.1 - 3.2  AoV yordan diam(2D)1.6 cm-1.8   2.0      1.6 - 2.3     Hardaway Z-Scores (Measurements & Calculations)  Measurement NameValue     Z-ScorePredictedNormal Range  IVSd(MM)        0.81 cm   -0.62  0.89     0.63 - 1.16  LVIDd(MM)       3.7 cm    -3.1   4.7      4.1 - 5.4  LVIDs(MM)       2.1 cm    -2.9   3.1      2.4 - 3.7  LVPWd(MM)       0.76 cm   -0.72  0.84     0.62 - 1.06  LV mass(C)d(MM) 79.1 grams-2.7   133.7    91.0 - 196.3  FS(MM)          41.8 %    1.8    34.8     28.4 - 42.7     Report approved by: Fritz Lovell 11/15/2022 01:46 PM         Results for orders placed or performed during the hospital encounter of 11/15/22   MR Abdomen w/o & w Contrast     Status: None    Narrative    MRI ABDOMEN    CLINICAL HISTORY: Status post renal transplant, secondary to Wilms  tumor, complicated by inadvertent ligation of the contralateral renal  lesion. Surgery performed in . Posttransplant course was  complicated by multiple infections. Previous MRI performed 2022  without contrast demonstrated multiple lesions within the liver.  Current study is being performed for further characterization of these  lesions with contrast.    TECHNIQUE: Images were acquired with and without intravenous contrast  through the abdomen. The following MR images were acquired: TrueFISP,  multiplanar T2 weighted, axial T1 in/out of phase, axial fat-saturated  T1, diffusion-weighted. Multiplanar T1-weighted images with fat  saturation were before contrast administration and at multiple time  points following the administration  of intravenous contrast.     Contrast dose: 10 mL EOVIST    FINDINGS:    Comparison study: MRI abdomen 11/2/2020    Liver: Loss of signal intensity on in phase imaging. Noncirrhotic  liver morphology without evidence of fibrosis. There are 3  well-defined enhancing lesion seen within the liver as noted on prior  noncontrast MRI:    Lesion 1:  This is an arterially enhancing T2 hyperintense, slightly T1  hypointense lesion within segment 6 measuring 3.2 cm x 3.0 cm which  shows central washout and peripheral contrast enhancement on the  delayed phases. There is an enhancing central T2 hyperintense portion  of the lesion (series 15, image 23). It has irregular well-defined  margins and also demonstrates mild heterogeneous diffusion  restriction. There is peripheral enhancement on hepatobiliary phase  imaging.    Lesion #2.  The second arterially enhancing lesion in segment 7 measures 2.4 cm x  3.0 cm. This also demonstrates central washout on the delayed venous  phase and mild heterogeneous diffusion restriction. This also contains  a central T2 hyperintense enhancing portion.    Lesion #3:  A third T2 hyperintense, slightly T1 hypointense arterially enhancing  lesion in segment 2 measuring 5.4 cm x 4.6 cm which does not  demonstrate significant washout on the delayed images however there  are nonenhancing septations. There is no significant diffusion  restriction. This lesion demonstrates avid internal enhancement on  hepatobiliary phase imaging, and abuts the left hepatic vein and a  branch of the left portal vein.    Gallbladder: Surgically absent.    Spleen: No evidence of splenomegaly. Loss of signal intensity on in  phase imaging.    Kidneys: Kidneys are absent consistent with prior surgery. Right lower  quadrant transplant kidney is again noted with multiple simple  cortical cysts. Largest one seen along the superior pole measuring 11  mm. Small 4 mm hemorrhagic/proteinaceous cyst in the mid  transplant  kidney.    Adrenal glands: The right adrenal gland appears unremarkable. Left  adrenal gland is not visualized, status postsurgical resection.    Pancreas: The pancreas appears normal. Intrapancreatic ducts are not  dilated. No mass seen within the head body and tail of pancreas.    Bowel: The visualized bowel structures are unremarkable. Moderate  stool burden. No evidence of bowel obstruction. No mucosal thickening  or abnormal wall enhancement.    Lymph nodes: No definite evidence of lymphadenopathy in the visualized  sections through the abdomen.    Blood vessels: Narrowed appearance of an infrahepatic IVC anastomosis,  with prominent collateral veins along the spine which empty into a  dilated azygos vein. This is unchanged since 2009.    Lung bases: The visualized lung fields are clear.    Bones and soft tissues: Both soft tissues appear unremarkable.    Mesentery and abdominal wall: Mesentery and bowel also appear normal.  Sequelae of prior renal transplant biopsy in the right flank.    Ascites: No evidence of free fluid in the abdomen.        Impression    IMPRESSION:  1. The lesions with similar imaging characteristics in hepatic  segments 6 and 7 are technically indeterminate and appear benign, most  likely representing hepatic adenomas. Recommend follow up MRI with  Eovist in 3-6 months.   2. The lesion in the left hepatic lobe is most consistent with benign  focal nodular hyperplasia. The differential includes atypical adenoma  with Eovist retention (given additional suspected adenomas).   3. Iron deposition in the liver and spleen.        I have personally reviewed the examination and initial interpretation  and I agree with the findings.    DEREK LUCIO MD         SYSTEM ID:  [id number]   Results for orders placed or performed in visit on 11/15/22   Magnesium     Status: Abnormal   Result Value Ref Range    Magnesium 1.5 (L) 1.6 - 2.3 mg/dL   Renal panel     Status: Abnormal   Result Value  Ref Range    Sodium 136 133 - 144 mmol/L    Potassium 4.7 3.4 - 5.3 mmol/L    Chloride 109 94 - 109 mmol/L    Carbon Dioxide (CO2) 22 20 - 32 mmol/L    Anion Gap 5 3 - 14 mmol/L    Urea Nitrogen 15 7 - 30 mg/dL    Creatinine 1.15 (H) 0.52 - 1.04 mg/dL    Calcium 8.9 8.5 - 10.1 mg/dL    Glucose 88 70 - 99 mg/dL    Albumin 3.7 3.4 - 5.0 g/dL    Phosphorus 2.8 2.5 - 4.5 mg/dL    GFR Estimate 69 >60 mL/min/1.73m2   Tacrolimus level     Status: Abnormal   Result Value Ref Range    Tacrolimus by Tandem Mass Spectrometry 4.9 (L) 5.0 - 15.0 ug/L    Tacrolimus Last Dose Date      Tacrolimus Last Dose Time      Narrative    This test was developed and its performance characteristics determined by the [facility],  Special Chemistry Laboratory. It has not been cleared or approved by the FDA. The laboratory is regulated under CLIA as qualified to perform high-complexity testing. This test is used for clinical purposes. It should not be regarded as investigational or for research.   EBV DNA PCR Quantitative Whole Blood     Status: Abnormal   Result Value Ref Range    EBV DNA Copies/mL 13,425 (H) <=0 copies/mL    EBV log 4.1     Narrative    The real-time quantitative EBV assay was developed and its performance characteristics determined by the [facility] Laboratory at [facility]. The primers and probes for each analyte are Analyte Specific Reagents (ASRs) manufactured by [employer]. ASRs are used in many laboratory tests necessary for standard medical care and generally do not require U.S. Food and Drug Administration approval. The FDA has determined that such clearance or approval is not necessary. The test is used for clincal purposes. It should not be regarded as investigational or for research. This laboratory is certified under the Clinical Laboratory Improvement Amendments of 1988 (CLIA-88) as qualified to perform high complexity clinical laboratory testing.   Estradiol      Status: None   Result Value Ref Range    Estradiol <5 pg/mL   Follicle Stimulating Hormone     Status: None   Result Value Ref Range    FSH <0.3 IU/L   Ferritin     Status: Abnormal   Result Value Ref Range    Ferritin 274 (H) 12 - 150 ng/mL   HLA Donor Specific Antibody     Status: None   Result Value Ref Range    Donor Identification 11/10/2004     Organ Left Kidney     DSA Present NO     DSA Comments        Flow Single Antigen Beads assays are intended for detection/identification of IgG anti-HLA antibodies. Mfi values may not accurately quantify donor-specific antibody levels in all instances.    DSA Test Method SA EDTA FCS    HLA Alisha Class I, Single Antigen     Status: None   Result Value Ref Range    SA 1 TEST METHOD SA EDTA FCS     SA 1 CELL Class I     SA1 HI RISK ALISHA None     SA1 MOD RISK ALISHA A:24 29 80     SA 1  COMMENTS        HLA PRA Test performed by modified testing procedure that may also include pretreatment of serum. Pretreatment may be the addition of fetal calf serum, EDTA, and/or adsorption.  High-risk, MFI > 3,000.  Mod-risk, -3,000.   HLA Alisha Class II, Single Antigen     Status: None   Result Value Ref Range    SA 2 TEST METHOD SA EDTA FCS     SA 2 CELL Class II     SA2 HI RISK ALISHA No Specificity Defined     SA2 MOD RISK ALISHA None     SA 2 COMMENTS        HLA PRA Test performed by modified testing procedure that may also include pretreatment of serum. Pretreatment may be the addition of fetal calf serum, EDTA, and/or adsorption.  High-risk, MFI > 3,000.  Mod-risk, -3,000.   HLA Alisha, CPRA     Status: None   Result Value Ref Range    UNOS CPRA 51     UNACCEPTABLE ANTIGENS A:23 24 29 43 80 DRw:51    PRA Donor Specific Antibody     Status: None    Narrative    The following orders were created for panel order PRA Donor Specific Antibody.  Procedure                               Abnormality         Status                     ---------                               -----------          ------                     PRA Donor Specific Antibody[[id number]]                      Final result               HLA Nathalia Class I, Single ...[[id number]]                      Final result               HLA Nathalia Class II, Single...[[id number]]                      Final result               HLA Donor Specific Antibody[[id number]]                      Final result               HLA Nathalia, CPRA[[id number]]                                    Final result                 Please view results for these tests on the individual orders.         ASSESSMENT, PLAN, AND LONG-TERM FOLLOWUP RECOMMENDATIONS:  Shraddha Pearson is a now 22-year-old  female with a history of stage IV favorable histology Wilms tumor who is status post renal transplant approximately 18 years ago.  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  She also has some macrocytosis that is likely related to her antirejection medications, work up completed a few years ago.  She is mildly anemic and taking iron.  She has new liver lesions that are likely benign and she is seeing GI soon.  She will hold off on riding til she hears from GI.   She is on OCP for premature ovarian failure and they may need to be adjusted with her liver lesions.  AFP is normal which is reassuring.  The following are our late effects recommendations:   1. 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  Given the distance from the diagnosis of her primary malignancy, the likelihood of recurrence at this point would be extremely low.   2. Psychosocial effects:  Shraddha appears to be doing very well socially, as well as with her school performance.  We do not believe she is having any difficulties in this area.  3. Renal transplant:  Shraddha is currently followed by  Pediatric Nephrology, and we recommend continued follow up and management of her renal transplant.  Continue to report any signs of bowel  obstruction related to her previous surgeries and new medications. Drink plenty of water.  Any medications the patient decides to take should be cleared with her renal transplant team.  Additionally, Shraddha should clear any major activity changes with her renal transplant team as well.    4. Risk for cardiac toxicity:  Shraddha has a history of 128 mg/m2 of doxorubicin, as well as whole abdomen radiation, which can predispose her to late cardiac difficulties, including dilated cardiomyopathy.  Shraddha had an echocardiogram completed in 2019 which was within normal limits. She needs to have surveillance echos from an oncology perspective every 5 years based on the new updated guidelines.  Next due in 2024.  May be sooner for nephrology. Done 2022 and no change.   5. Female issues related to fertility:  Shraddha has a history of whole abdomen radiation in which her ovaries were included in the field.  This may potentially have an impact on her future fertility.  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  Fertility potential would be low given this diagnosis.  She will have continued follow up with adult GYN.  They will discuss her OCP in the setting of new liver lesions.  6. Risk for peripheral neuropathy:  Shraddha has a history of vincristine exposure which can predispose her to having issues with ongoing peripheral neuropathy.  She did have problems with foot drop during treatment.  However, this is resolved, and she is doing well.   7. Growth and development:  Shraddha's height has drifted slightly down to below the 5th percentile over the past several years.  She is following up with endocrine regularly.   She has reached her final adult height.   8. Risk for secondary malignancies:  We asked that Shraddha wear sunscreen when she is outdoors due to her increased risk of skin cancer from radiation.  Additionally, should she noted any new mass, lump or area of concern,  particularly in the radiation field, we would recommend prompt evaluation. New guidelines recommend starting early colon cancer screening when she is 30 years old due to her abdominal radiation.  9.  EBV viremia:  Shraddha has been having rising EBV levels since 2013.  Last assessment was improved . Continue to monitor EBV level monthly given her risk for PTLD.  She will have continued EBV labs followed by nephrology.    11.  Macrocytosis:  Shraddha has been having a rising MCV.  She has had some mild chronic anemia.  Work up in 2016 and this is likely related to her antirejection meds.  12.  Insomnia:  Resolved  13.  Immunizations:  Not able to get live virus vaccines due to immunosuppression.  Considering the covid vaccine and I would recommend that she get this.  Will get the flu vaccine today.  Had 3 doses of HPV vaccine.  14.  Liver lesions:  Likely benign.  MRI done by nephrology.  AFP normal.  Seeing GI soon to discuss horse back riding and further management.    It was a pleasure to see Shraddha in our Long-Term Follow-Up Clinic today.  We certainly appreciate the opportunity to participate in your patient's care.  If you have any questions or concerns, please do not hesitate to contact us at [phone].   We ask that Shraddha return to our clinic in one year for followup.  We will try to coordinate this on a day that she is seeing nephrology.  She        Dorcas Samayoa MSN, APRN, CPNP-AC, CPON  Department of Pediatrics  Division of Hematology/Oncology    Review of the result(s) of each unique test - ferritin  Total time spent on the following services on the date of the encounter:  Preparing to see patient, chart review, review of outside records, Ordering medications, test, procedures, chemotherapy, Referring or communicating with other healthcare professionals, Interpretation of labs, imaging and other tests, Performing a medically appropriate examination , Counseling and educating the patient/family/caregiver ,  Documenting clinical information in the electronic or other health record , Communicating results to the patient/family/caregiver , Care coordination  and Total time spent: 40 min

## 2022-11-15 NOTE — PROGRESS NOTES
LONG-TERM FOLLOW-UP CLINIC PROGRESS NOTE     We had the pleasure of seeing your patient, Shraddha Pearson, at the [facility]Term [facility]Up [facility] for childhood cancer survivors.  Shraddha was referred to us by Dr. Ingrid Constantino for ongoing management and long-term follow up of her Wilms tumor and associated chemotherapy.  The following is a summary of your patient's cancer, therapy, current history, and physical findings followed by assessment and long-term followup recommendations.      THERAPY ACCORDING TO AVAILABLE RECORDS:  Shraddha Pearson is a now 22-year-old  female with a history of stage IV favorable histology Wilms tumor that was diagnosed on 05/19/2003 at 2 1/2 years of age.  She initially presented with disease in her right kidney as well as her lungs.  She had chemotherapy, radiation, and surgery as part of her treatment.  Her initial surgery was on  05/19/2003 in which she had a right nephrectomy.  Unfortunately, she had some surgical complications which caused her to lose blood supply to the left kidney, and she also lost that one as well.  Her surgeries in detail are as follows:   1. Right nephrectomy on 05/19/2003 by Dr. Myles Velez at the [facility].   2. Cadaveric saphenous vein graft to IVC on 05/20/2003.   3. Cholecystectomy on 05/20/2003.   4. Left nephrectomy and living donor renal transplant from patient's father on 11/14/2004.   5. Tonsillectomy on 01/31/2005.   6. Liver biopsy on 01/31/2005.   7. Left renal biopsy on 11/07/2005.      Shraddha received the following chemotherapies as part of her treatment:   1. Vincristine intravenously.   2. Actinomycin D intravenously.   3. Doxorubicin intravenously with a cumulative dose of 128 mg/m2.      Radiation therapy is as follows:   1. Whole abdomen radiation for a total dose of 1080 cGy.      Shraddha's acute and chronic late effects known to date include:   1. 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   2. 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   3. Idiopathic intracranial hypertension 05/01/2007, resolved.   4. Recurrent UTIs and pyelonephritis, resolved.   5. Hemorrhagic gastritis 04/2007, resolved.   6. Viral meningitis 10/05/2008, resolved.   7. Elevated EBV titers requiring rituximab infusion for 2 doses in March and August 2005.   8.  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  Growth deceleration       HISTORY OF PRESENT ILLNESS:  Shraddha report to the visit today in person.  Recently had some incidental liver lesions and will be seeing GI soon.  She was told to hold off on horse back riding til more investigation could be completed.  Lesions thought to be likely FNH.  Had an MRI with contrast today.  She has been sick a few times over the last year.  She was seen in the ED here on 11/20/21 for fever, dehydration.  She was put on antibiotics for an infection.  In addition she was also see on 11/4/21 for a likely bacterial infection in her upper airway.  She said that several of her friends also had the same illness.  She did have COVID-19 diagnosed on 9/24/21.  She had to be hospitalized due to her immunocompromised status and fever.  She did get IV antibiotics for a few days as well for prophylaxis.  She has since recovered from all 3 of these recent infections.  She has completed her last dose of antibiotics after her 11/20/21 ED trip.  She is currently in [place] visiting family but she did buy a house in [place] close to [place] that is more convenient for her horse shows and rodeo in the winter.  She is planning to be in [place] until after [place].  She completed college with a degree in Equine Science.  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 No issues with stomach pain.  Urination is normal.  No N/V/D/C.  No issues with breathing.  She is wondering about her BP.  She did have low BP around the time of her  illness so she talked with nephrology about holding her enalapril.  She did start it back when her BP increased but now she is wondering if she should hold it again.  She has been getting some muscle cramping since restarting.  She has a visit with nephrology tomorrow.  She also has a visit with women's health tomorrow as well to discuss her hormone replacement therapy.   She has ongoing dental follow up and will be seeing them soon.   Her gingival hyperplasia resolved now that she has been on tacrolimus. No more issues with sleeping.  Completed gardasil vaccines and got Bexsero in past.  Considering the COVID vaccine but hasn't gotten it yet.  Had an echo in 2019.  Has yearly eye exam.       REVIEW OF SYSTEMS:   General:  No acute distress  Skin: negative  Eyes: glasses  Ears/Nose/Throat: gingival hyperplasia- resolved;   Respiratory: No shortness of breath, dyspnea on exertion, cough, or hemoptysis  Cardiovascular: negative  Gastrointestinal: negative  Genitourinary: s/p kidney transplant  Musculoskeletal: left broken clavicle 4/2019  Neurologic: negative  Psychiatric: negative  Hematologic/Lymphatic/Immunologic: negative  Endocrine: ovarian failure and growth deceleration       IMMUNIZATIONS:  Up to date per parents      SOCIAL HISTORY:  Shraddha has completed her 2 year degree in Equine Science.  She is very active,  riding horses. Parents report in the past they have cleared her horseback riding with the renal transplant team.   Lives in [place] most of the time and comes for extended trips to [place].     FAMILY HISTORY:  Maternal grandmother with a history of heart valve disease, carotid artery obstruction, and congestive heart failure in her 50s.  Maternal grandfather with cardiac bypass in his 60s.  Paternal grandmother with history of hypertension in her 50s.  Maternal aunt with history of diabetes in her 50s.  Paternal great-grandfather with colon cancer at age 68.  Maternal  great-grandfather with history of  "colon cancer at 60.  Father with hypertension. No changes.      CURRENT MEDICATIONS:     Current Outpatient Medications   Medication     amLODIPine (NORVASC) 10 MG tablet     azaTHIOprine (IMURAN) 50 MG tablet     enalapril (VASOTEC) 5 MG tablet     ferrous sulfate (FEROSUL) 325 (65 Fe) MG tablet     labetalol (NORMODYNE) 100 MG tablet     levonorgest-eth estrad 91-Day (SEASONIQUE) 0.15-0.03 &0.01 MG tablet     magnesium oxide (MAG-OX) 400 (241.3 Mg) MG tablet     Multiple Vitamins-Minerals (WOMENS MULTIVITAMIN PO)     sulfamethoxazole-trimethoprim (BACTRIM) 400-80 MG tablet     tacrolimus (GENERIC EQUIVALENT) 1 MG capsule     No current facility-administered medications for this visit.        ALLERGIES:  Vancomycin which causes Ramin syndrome.      PHYSICAL EXAMINATION:   VITAL SIGNS: /84 (BP Location: Left arm, Patient Position: Sitting, Cuff Size: Adult Regular)   Pulse 102   Temp 98.2  F (36.8  C) (Oral)   Resp 18   Ht 1.492 m (4' 10.74\")   Wt 57.7 kg (127 lb 3.3 oz)   SpO2 99%   BMI 25.92 kg/m         Physical Exam   GENERAL: Healthy, alert and no distress  EYES: Eyes grossly normal to inspection.  No discharge or erythema, or obvious scleral/conjunctival abnormalities.  HENT: Normal cephalic/atraumatic.  External ears, nose and mouth without ulcers or lesions.  No nasal drainage visible. Bilateral TMs WNL.  NECK: No asymmetry, visible masses or scars  RESP: No audible wheeze, cough, or visible cyanosis.  No visible retractions or increased work of breathing.  Lungs CTAB.  MS: No gross musculoskeletal defects noted.  Normal range of motion.  No visible edema.  SKIN: Visible skin clear. No significant rash, abnormal pigmentation or lesions.  NEURO: Cranial nerves grossly intact.  Mentation and speech appropriate for age.  PSYCH: Mentation appears normal, affect normal/bright, judgement and insight intact, normal speech and appearance well-groomed.        Wt Readings from Last 3 Encounters: " "  11/15/22 57.7 kg (127 lb 3.3 oz)   11/15/22 57.7 kg (127 lb 3.3 oz)   22 56.1 kg (123 lb 10.9 oz)     Ht Readings from Last 2 Encounters:   11/15/22 1.492 m (4' 10.74\")   11/15/22 1.492 m (4' 10.74\")     Facility age limit for growth percentiles is 20 years.       LABORATORY AND DIAGNOSTIC STUDIES:   Results for orders placed or performed during the hospital encounter of 11/15/22   Echo Pediatric (TTE) Complete     Status: None    Narrative    [id number]  [id number]  [id number]  [id number]^JAGUAR^CLAUDINE^AYE                                                               Study ID: [id number]                                                 [place] [address]                                                Phone: [phone]                                Pediatric Echocardiogram  ______________________________________________________________________________  Name: JOCELYN JAQUEZ  Study Date: 11/15/2022 02:23 PM                   Patient Location: [facility]  MRN: [id number]                                   Age: 22 yrs  : 2000  Gender: Female  Patient Class: Outpatient                         Height: 149 cm  Ordering Provider: CLAUDINE MONTESINOS                   Weight: 58 kg  Referring Provider: CLAUDINE MONTESINOS                  BSA: 1.5 m2  Performed By: Almaz Barrow  Report approved by: Yuri Soto MD  Reason For Study: Kidney replaced by transplant, Hypertension secondary to  other r  ______________________________________________________________________________  ##### CONCLUSIONS #####  Normal intracardiac connections. There is normal appearance and motion of the  tricuspid, mitral, pulmonary and aortic valves. There is no left ventricular  hypertrophy. Normal ventricular septum and left ventricular wall " end-diastolic  thickness by MMODE Z-scores. Normal left ventricular mass index. LV mass index  27 g/m^2.7. The upper limit of normal is 40 g/m^2.7. The left ventricular  relative wall thickness is 0.41 (the upper limit of normal is 0.42). Normal  left and right ventricular systolic function. The calculated single plane left  ventricular ejection fraction from the 4 chamber view is 70%. No pericardial  effusion.  No significant change from last echocardiogram.  ______________________________________________________________________________  Technical information:  A complete two dimensional, MMODE, spectral and color Doppler transthoracic  echocardiogram is performed. The study quality is good. Images are obtained  from parasternal, apical, subcostal and suprasternal notch views. ECG tracing  shows regular rhythm.     Segmental Anatomy:  There is normal atrial arrangement, with concordant atrioventricular and  ventriculoarterial connections.     Systemic and pulmonary veins:  The systemic venous return is normal. Normal coronary sinus. Color flow  demonstrates flow from two pulmonary veins entering the left atrium.     Atria and atrial septum:  Normal right atrial size. The left atrium is normal in size. There is no  atrial level shunting.     Atrioventricular valves:  The tricuspid valve is normal in appearance and motion. Trivial tricuspid  valve insufficiency. Insufficient jet to estimate right ventricular systolic  pressure. The mitral valve is normal in appearance and motion. Trivial mitral  valve insufficiency.     Ventricles and Ventricular Septum:  Normal right ventricular size. Normal right ventricular systolic function.  Normal left ventricular size. Normal left ventricular systolic function. The  calculated single plane left ventricular ejection fraction from the 4 chamber  view is 70 %. There is no left ventricular hypertrophy. Normal ventricular  septum and left ventricular wall end-diastolic thickness by  MMODE Z-scores.  Normal left ventricular mass index. LV mass index 27 g/m^2.7. The upper limit  of normal is 40 g/m^2.7. The left ventricular relative wall thickness is 0.41  (the upper limit of normal is 0.42). There is no ventricular level shunting.     Outflow tracts:  Normal great artery relationship. There is unobstructed flow through the right  ventricular outflow tract. The pulmonary valve motion is normal. There is  normal flow across the pulmonary valve. Trivial pulmonary valve insufficiency.  There is unobstructed flow through the left ventricular outflow tract.  Tricuspid aortic valve with normal appearance and motion. There is normal flow  across the aortic valve.     Great arteries:  The main pulmonary artery has normal appearance. There is unobstructed flow in  the main pulmonary artery. The pulmonary artery bifurcation is normal. There  is unobstructed flow in both branch pulmonary arteries. Normal ascending  aorta. The aortic arch appears normal. There is unobstructed antegrade flow in  the ascending, transverse arch, descending thoracic and abdominal aorta.     Arterial Shunts:  The ductal region is not imaged with this study.     Coronaries:  The coronary arteries are not well visualized.     Effusions, catheters, cannulas and leads:  No pericardial effusion.     MMode/2D Measurements & Calculations  2 Chamber EF: 74.0 %                4 Chamber EF: 70.0 %  EF Biplane: 72.8 %                  LVMI(BSA): 50.7 grams/m2  LVMI(Height): 27.0                  RWT(MM): 0.41     Doppler Measurements & Calculations  MV E max poornima: 76.5 cm/sec                Ao V2 max: 132.4 cm/sec  MV A max poornima: 68.0 cm/sec                Ao max P.0 mmHg  MV E/A: 1.1  PA V2 max: 150.6 cm/sec                  RV V1 max: 98.1 cm/sec  PA max P.1 mmHg                      RV V1 max PG: 3.8 mmHg     desc Ao max poornima: 99.4 cm/sec  desc Ao max P.0 mmHg     [place] 2D Z-SCORE VALUES  Measurement Name Value  Z-ScorePredictedNormal Range  Ao sinus diam(2D)2.4 cm-0.95  2.6      2.1 - 3.2  AoV yordan diam(2D)1.6 cm-1.8   2.0      1.6 - 2.3     [place] Z-Scores (Measurements & Calculations)  Measurement NameValue     Z-ScorePredictedNormal Range  IVSd(MM)        0.81 cm   -0.62  0.89     0.63 - 1.16  LVIDd(MM)       3.7 cm    -3.1   4.7      4.1 - 5.4  LVIDs(MM)       2.1 cm    -2.9   3.1      2.4 - 3.7  LVPWd(MM)       0.76 cm   -0.72  0.84     0.62 - 1.06  LV mass(C)d(MM) 79.1 grams-2.7   133.7    91.0 - 196.3  FS(MM)          41.8 %    1.8    34.8     28.4 - 42.7     Report approved by: Fritz Lovell 11/15/2022 01:46 PM         Results for orders placed or performed during the hospital encounter of 11/15/22   MR Abdomen w/o & w Contrast     Status: None    Narrative    MRI ABDOMEN    CLINICAL HISTORY: Status post renal transplant, secondary to Wilms  tumor, complicated by inadvertent ligation of the contralateral renal  lesion. Surgery performed in 2004. Posttransplant course was  complicated by multiple infections. Previous MRI performed 11/2/2022  without contrast demonstrated multiple lesions within the liver.  Current study is being performed for further characterization of these  lesions with contrast.    TECHNIQUE: Images were acquired with and without intravenous contrast  through the abdomen. The following MR images were acquired: TrueFISP,  multiplanar T2 weighted, axial T1 in/out of phase, axial fat-saturated  T1, diffusion-weighted. Multiplanar T1-weighted images with fat  saturation were before contrast administration and at multiple time  points following the administration of intravenous contrast.     Contrast dose: 10 mL EOVIST    FINDINGS:    Comparison study: MRI abdomen 11/2/2020    Liver: Loss of signal intensity on in phase imaging. Noncirrhotic  liver morphology without evidence of fibrosis. There are 3  well-defined enhancing lesion seen within the liver as noted on prior  noncontrast MRI:    Lesion  1:  This is an arterially enhancing T2 hyperintense, slightly T1  hypointense lesion within segment 6 measuring 3.2 cm x 3.0 cm which  shows central washout and peripheral contrast enhancement on the  delayed phases. There is an enhancing central T2 hyperintense portion  of the lesion (series 15, image 23). It has irregular well-defined  margins and also demonstrates mild heterogeneous diffusion  restriction. There is peripheral enhancement on hepatobiliary phase  imaging.    Lesion #2.  The second arterially enhancing lesion in segment 7 measures 2.4 cm x  3.0 cm. This also demonstrates central washout on the delayed venous  phase and mild heterogeneous diffusion restriction. This also contains  a central T2 hyperintense enhancing portion.    Lesion #3:  A third T2 hyperintense, slightly T1 hypointense arterially enhancing  lesion in segment 2 measuring 5.4 cm x 4.6 cm which does not  demonstrate significant washout on the delayed images however there  are nonenhancing septations. There is no significant diffusion  restriction. This lesion demonstrates avid internal enhancement on  hepatobiliary phase imaging, and abuts the left hepatic vein and a  branch of the left portal vein.    Gallbladder: Surgically absent.    Spleen: No evidence of splenomegaly. Loss of signal intensity on in  phase imaging.    Kidneys: Kidneys are absent consistent with prior surgery. Right lower  quadrant transplant kidney is again noted with multiple simple  cortical cysts. Largest one seen along the superior pole measuring 11  mm. Small 4 mm hemorrhagic/proteinaceous cyst in the mid transplant  kidney.    Adrenal glands: The right adrenal gland appears unremarkable. Left  adrenal gland is not visualized, status postsurgical resection.    Pancreas: The pancreas appears normal. Intrapancreatic ducts are not  dilated. No mass seen within the head body and tail of pancreas.    Bowel: The visualized bowel structures are unremarkable.  Moderate  stool burden. No evidence of bowel obstruction. No mucosal thickening  or abnormal wall enhancement.    Lymph nodes: No definite evidence of lymphadenopathy in the visualized  sections through the abdomen.    Blood vessels: Narrowed appearance of an infrahepatic IVC anastomosis,  with prominent collateral veins along the spine which empty into a  dilated azygos vein. This is unchanged since 2009.    Lung bases: The visualized lung fields are clear.    Bones and soft tissues: Both soft tissues appear unremarkable.    Mesentery and abdominal wall: Mesentery and bowel also appear normal.  Sequelae of prior renal transplant biopsy in the right flank.    Ascites: No evidence of free fluid in the abdomen.        Impression    IMPRESSION:  1. The lesions with similar imaging characteristics in hepatic  segments 6 and 7 are technically indeterminate and appear benign, most  likely representing hepatic adenomas. Recommend follow up MRI with  Eovist in 3-6 months.   2. The lesion in the left hepatic lobe is most consistent with benign  focal nodular hyperplasia. The differential includes atypical adenoma  with Eovist retention (given additional suspected adenomas).   3. Iron deposition in the liver and spleen.        I have personally reviewed the examination and initial interpretation  and I agree with the findings.    DEREK LUCIO MD         SYSTEM ID:  [id number]   Results for orders placed or performed in visit on 11/15/22   Magnesium     Status: Abnormal   Result Value Ref Range    Magnesium 1.5 (L) 1.6 - 2.3 mg/dL   Renal panel     Status: Abnormal   Result Value Ref Range    Sodium 136 133 - 144 mmol/L    Potassium 4.7 3.4 - 5.3 mmol/L    Chloride 109 94 - 109 mmol/L    Carbon Dioxide (CO2) 22 20 - 32 mmol/L    Anion Gap 5 3 - 14 mmol/L    Urea Nitrogen 15 7 - 30 mg/dL    Creatinine 1.15 (H) 0.52 - 1.04 mg/dL    Calcium 8.9 8.5 - 10.1 mg/dL    Glucose 88 70 - 99 mg/dL    Albumin 3.7 3.4 - 5.0 g/dL     Phosphorus 2.8 2.5 - 4.5 mg/dL    GFR Estimate 69 >60 mL/min/1.73m2   Tacrolimus level     Status: Abnormal   Result Value Ref Range    Tacrolimus by Tandem Mass Spectrometry 4.9 (L) 5.0 - 15.0 ug/L    Tacrolimus Last Dose Date      Tacrolimus Last Dose Time      Narrative    This test was developed and its performance characteristics determined by the [facility],  Special Chemistry Laboratory. It has not been cleared or approved by the FDA. The laboratory is regulated under CLIA as qualified to perform high-complexity testing. This test is used for clinical purposes. It should not be regarded as investigational or for research.   EBV DNA PCR Quantitative Whole Blood     Status: Abnormal   Result Value Ref Range    EBV DNA Copies/mL 13,425 (H) <=0 copies/mL    EBV log 4.1     Narrative    The real-time quantitative EBV assay was developed and its performance characteristics determined by the [facility] Diagnostic Laboratory at [facility]. The primers and probes for each analyte are Analyte Specific Reagents (ASRs) manufactured by [employer]. ASRs are used in many laboratory tests necessary for standard medical care and generally do not require U.S. Food and Drug Administration approval. The FDA has determined that such clearance or approval is not necessary. The test is used for clincal purposes. It should not be regarded as investigational or for research. This laboratory is certified under the Clinical Laboratory Improvement Amendments of 1988 (CLIA-88) as qualified to perform high complexity clinical laboratory testing.   Estradiol     Status: None   Result Value Ref Range    Estradiol <5 pg/mL   Follicle Stimulating Hormone     Status: None   Result Value Ref Range    FSH <0.3 IU/L   Ferritin     Status: Abnormal   Result Value Ref Range    Ferritin 274 (H) 12 - 150 ng/mL   HLA Donor Specific Antibody     Status: None   Result Value Ref Range    Donor Identification  11/10/2004     Organ Left Kidney     DSA Present NO     DSA Comments        Flow Single Antigen Beads assays are intended for detection/identification of IgG anti-HLA antibodies. Mfi values may not accurately quantify donor-specific antibody levels in all instances.    DSA Test Method SA EDTA FCS    HLA Alisha Class I, Single Antigen     Status: None   Result Value Ref Range    SA 1 TEST METHOD SA EDTA FCS     SA 1 CELL Class I     SA1 HI RISK ALISHA None     SA1 MOD RISK ALISHA A:24 29 80     SA 1  COMMENTS        HLA PRA Test performed by modified testing procedure that may also include pretreatment of serum. Pretreatment may be the addition of fetal calf serum, EDTA, and/or adsorption.  High-risk, MFI > 3,000.  Mod-risk, -3,000.   HLA Alisha Class II, Single Antigen     Status: None   Result Value Ref Range    SA 2 TEST METHOD SA EDTA FCS     SA 2 CELL Class II     SA2 HI RISK ALISHA No Specificity Defined     SA2 MOD RISK ALISHA None     SA 2 COMMENTS        HLA PRA Test performed by modified testing procedure that may also include pretreatment of serum. Pretreatment may be the addition of fetal calf serum, EDTA, and/or adsorption.  High-risk, MFI > 3,000.  Mod-risk, -3,000.   HLA Alisha, CPRA     Status: None   Result Value Ref Range    UNOS CPRA 51     UNACCEPTABLE ANTIGENS A:23 24 29 43 80 DRw:51    PRA Donor Specific Antibody     Status: None    Narrative    The following orders were created for panel order PRA Donor Specific Antibody.  Procedure                               Abnormality         Status                     ---------                               -----------         ------                     PRA Donor Specific Antibody[[id number]]                      Final result               HLA Alisha Class I, Single ...[[id number]]                      Final result               HLA Alisha Class II, Single...[[id number]]                      Final result               HLA Donor Specific Antibody[[id number]]                       Final result               [facility][id number]                                    Final result                 Please view results for these tests on the individual orders.         ASSESSMENT, PLAN, AND LONG-TERM FOLLOWUP RECOMMENDATIONS:  Shraddha Pearson is a now 22-year-old  female with a history of stage IV favorable histology Wilms tumor who is status post renal transplant approximately 18 years ago.  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  She also has some macrocytosis that is likely related to her antirejection medications, work up completed a few years ago.  She is mildly anemic and taking iron.  She has new liver lesions that are likely benign and she is seeing GI soon.  She will hold off on riding til she hears from GI.   She is on OCP for premature ovarian failure and they may need to be adjusted with her liver lesions.  AFP is normal which is reassuring.  The following are our late effects recommendations:   1. 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  Given the distance from the diagnosis of her primary malignancy, the likelihood of recurrence at this point would be extremely low.   2. Psychosocial effects:  Shraddha appears to be doing very well socially, as well as with her school performance.  We do not believe she is having any difficulties in this area.  3. Renal transplant:  Shraddha is currently followed by  Pediatric Nephrology, and we recommend continued follow up and management of her renal transplant.  Continue to report any signs of bowel obstruction related to her previous surgeries and new medications. Drink plenty of water.  Any medications the patient decides to take should be cleared with her renal transplant team.  Additionally, Shraddha should clear any major activity changes with her renal transplant team as well.    4. Risk for cardiac toxicity:  Shraddha has a history of 128  mg/m2 of doxorubicin, as well as whole abdomen radiation, which can predispose her to late cardiac difficulties, including dilated cardiomyopathy.  Shraddha had an echocardiogram completed in 2019 which was within normal limits. She needs to have surveillance echos from an oncology perspective every 5 years based on the new updated guidelines.  Next due in 2024.  May be sooner for nephrology. Done 2022 and no change.   5. Female issues related to fertility:  Shraddha has a history of whole abdomen radiation in which her ovaries were included in the field.  This may potentially have an impact on her future fertility.  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  Fertility potential would be low given this diagnosis.  She will have continued follow up with adult GYN.  They will discuss her OCP in the setting of new liver lesions.  6. Risk for peripheral neuropathy:  Shraddha has a history of vincristine exposure which can predispose her to having issues with ongoing peripheral neuropathy.  She did have problems with foot drop during treatment.  However, this is resolved, and she is doing well.   7. Growth and development:  Shraddha's height has drifted slightly down to below the 5th percentile over the past several years.  She is following up with endocrine regularly.   She has reached her final adult height.   8. Risk for secondary malignancies:  We asked that Shraddha wear sunscreen when she is outdoors due to her increased risk of skin cancer from radiation.  Additionally, should she noted any new mass, lump or area of concern, particularly in the radiation field, we would recommend prompt evaluation. New guidelines recommend starting early colon cancer screening when she is 30 years old due to her abdominal radiation.  9.  EBV viremia:  Shraddha has been having rising EBV levels since 2013.  Last assessment was improved . Continue to monitor EBV level monthly given her risk for  PTLD.  She will have continued EBV labs followed by nephrology.    11.  Macrocytosis:  Shraddha has been having a rising MCV.  She has had some mild chronic anemia.  Work up in 2016 and this is likely related to her antirejection meds.  12.  Insomnia:  Resolved  13.  Immunizations:  Not able to get live virus vaccines due to immunosuppression.  Considering the covid vaccine and I would recommend that she get this.  Will get the flu vaccine today.  Had 3 doses of HPV vaccine.  14.  Liver lesions:  Likely benign.  MRI done by nephrology.  AFP normal.  Seeing GI soon to discuss horse back riding and further management.    It was a pleasure to see Shraddha in our Long-Term Follow-Up Clinic today.  We certainly appreciate the opportunity to participate in your patient's care.  If you have any questions or concerns, please do not hesitate to contact us at [phone].   We ask that Shraddha return to our clinic in one year for followup.  We will try to coordinate this on a day that she is seeing nephrology.  She             Dorcas Samayoa MSN, APRN, CPNP-AC, CPON  Department of Pediatrics  Division of Hematology/Oncology    Review of the result(s) of each unique test - ferritin  Total time spent on the following services on the date of the encounter:  Preparing to see patient, chart review, review of outside records, Ordering medications, test, procedures, chemotherapy, Referring or communicating with other healthcare professionals, Interpretation of labs, imaging and other tests, Performing a medically appropriate examination , Counseling and educating the patient/family/caregiver , Documenting clinical information in the electronic or other health record , Communicating results to the patient/family/caregiver , Care coordination  and Total time spent: 40 min

## 2022-11-16 LAB
EBV DNA COPIES/ML, INSTRUMENT: ABNORMAL COPIES/ML
EBV DNA SPEC NAA+PROBE-LOG#: 4.1 {LOG_COPIES}/ML

## 2022-11-29 ENCOUNTER — OFFICE VISIT (OUTPATIENT)
Dept: NEPHROLOGY | Facility: CLINIC | Age: 22
End: 2022-11-29
Attending: INTERNAL MEDICINE
Payer: COMMERCIAL

## 2022-11-29 ENCOUNTER — TELEPHONE (OUTPATIENT)
Dept: TRANSPLANT | Facility: CLINIC | Age: 22
End: 2022-11-29

## 2022-11-29 VITALS
HEART RATE: 103 BPM | SYSTOLIC BLOOD PRESSURE: 128 MMHG | OXYGEN SATURATION: 99 % | DIASTOLIC BLOOD PRESSURE: 84 MMHG | WEIGHT: 129.9 LBS | TEMPERATURE: 98.5 F | BODY MASS INDEX: 26.47 KG/M2

## 2022-11-29 DIAGNOSIS — D84.9 IMMUNOSUPPRESSION (H): ICD-10-CM

## 2022-11-29 DIAGNOSIS — Z86.2 HISTORY OF ANEMIA DUE TO CKD: ICD-10-CM

## 2022-11-29 DIAGNOSIS — Z48.298 AFTERCARE FOLLOWING ORGAN TRANSPLANT: Primary | ICD-10-CM

## 2022-11-29 DIAGNOSIS — N18.9 HISTORY OF ANEMIA DUE TO CKD: ICD-10-CM

## 2022-11-29 DIAGNOSIS — I15.1 HYPERTENSION SECONDARY TO OTHER RENAL DISORDERS: ICD-10-CM

## 2022-11-29 DIAGNOSIS — B27.00 EBV (EPSTEIN-BARR VIRUS) VIREMIA: ICD-10-CM

## 2022-11-29 DIAGNOSIS — N18.30 STAGE 3 CHRONIC KIDNEY DISEASE, UNSPECIFIED WHETHER STAGE 3A OR 3B CKD (H): ICD-10-CM

## 2022-11-29 DIAGNOSIS — Z94.0 KIDNEY REPLACED BY TRANSPLANT: ICD-10-CM

## 2022-11-29 DIAGNOSIS — K76.9 LIVER LESION: ICD-10-CM

## 2022-11-29 PROCEDURE — G0463 HOSPITAL OUTPT CLINIC VISIT: HCPCS

## 2022-11-29 PROCEDURE — 99205 OFFICE O/P NEW HI 60 MIN: CPT | Performed by: INTERNAL MEDICINE

## 2022-11-29 PROCEDURE — 99417 PROLNG OP E/M EACH 15 MIN: CPT | Performed by: INTERNAL MEDICINE

## 2022-11-29 ASSESSMENT — PAIN SCALES - GENERAL: PAINLEVEL: NO PAIN (0)

## 2022-11-29 NOTE — LETTER
OUTPATIENT LABORATORY TEST ORDER     Patient Name: Anuradha Pearson   YOB: 2000     [facility] MR# [if applicable]: [id number]   Date & Time: November 29, 2022  10:00 AM  Expiration Date: 1 year after date issued      Diagnoses: Kidney Transplant (ICD-10 Z94.0)   Long term use of medications (ICD-10 Z79.899)      We ask your assistance in obtaining the following laboratory tests, which are part of our routine surveillance program for Solid Organ Transplant patients.       Please fax each result to [phone], same day as resulted/available      Critical lab results page [phone]  Monday - Friday 8 am to 5 pm    Evening/Weekend/Holiday communicate Critical labs results [phone]      Monthly    CBC with platelets    Basic Metabolic Panel (Sodium, Potassium, Chloride, Creatinine, CO2, Urea Nitrogen, glucose, Calcium)    Tacrolimus drug level - 12-hour trough, please document time of last dose      Every Other Month    EBV PCR Quantitative/Plasma    Every 6 Months     Urine for protein/creatinine       If you have any questions, please call [facility]- [phone] or [phone], Fax- [phone].      Vikash Titus MD

## 2022-11-29 NOTE — LETTER
OUTPATIENT LABORATORY TEST ORDER     Patient Name: Anuradha Pearson   YOB: 2000     [facility] MR# [if applicable]: [id number]   Date & Time: November 29, 2022  10:00 AM  Expiration Date: 1 year after date issued      Diagnoses: Kidney Transplant (ICD-10 Z94.0)   Long term use of medications (ICD-10 Z79.899)      We ask your assistance in obtaining the following laboratory tests, which are part of our routine surveillance program for Solid Organ Transplant patients.       Please fax each result to [phone], same day as resulted/available      Critical lab results page [phone]  Monday - Friday 8 am to 5 pm    Evening/Weekend/Holiday communicate Critical labs results [phone]      Monthly    CBC with platelets    Basic Metabolic Panel (Sodium, Potassium, Chloride, Creatinine, CO2, Urea Nitrogen, glucose, Calcium)    Tacrolimus drug level - 12-hour trough, please document time of last dose      Every Other Month    EBV PCR Quantitative/Plasma    Every 6 Months     Urine for protein/creatinine       If you have any questions, please call [facility]- [phone] or [phone], Fax- [phone].      Vikash Titus MD

## 2022-11-29 NOTE — LETTER
OUTPATIENT LABORATORY TEST ORDER     Patient Name: Anuradha Pearson   YOB: 2000     [facility] MR# [if applicable]: [id number]   Date & Time: November 29, 2022  10:00 AM  Expiration Date: 1 year after date issued      Diagnoses: Kidney Transplant (ICD-10 Z94.0)   Long term use of medications (ICD-10 Z79.899)      We ask your assistance in obtaining the following laboratory tests, which are part of our routine surveillance program for Solid Organ Transplant patients.       Please fax each result to [phone], same day as resulted/available      Critical lab results page [phone]  Monday - Friday 8 am to 5 pm    Evening/Weekend/Holiday communicate Critical labs results [phone]      Monthly    CBC with platelets    Basic Metabolic Panel (Sodium, Potassium, Chloride, Creatinine, CO2, Urea Nitrogen, glucose, Calcium)    Tacrolimus drug level - 12-hour trough, please document time of last dose      Every Other Month    EBV PCR Quantitative/Plasma    Every 6 Months     Urine for protein/creatinine       If you have any questions, please call [facility]- [phone] or [phone], Fax- [phone].      Vikash Titus MD

## 2022-11-29 NOTE — PROGRESS NOTES
TRANSPLANT NEPHROLOGY EARLY POST TRANSPLANT VISIT    Assessment & Plan   # LDKT: Trend down   - Baseline Creatinine: ~ 1.2-1.5, recently lower to 1.1; recent SHARON Cr up to 1.9, suboptimal kidney bx neg for rejection; etiology unclear   - Proteinuria: Normal (<0.2 grams)   - Date DSA Last Checked: Nov 2022      Latest DSA: No   - BK Viremia: No   - Kidney Tx Biopsy: 11/2022  ; Result: sub-optimal kidney biopsy, no rejection    # Immunosuppression: Tacrolimus immediate release (goal 4-6) and Azathioprine (dose 75 mg daily)    - induction: thymoglobulin   - Continue with intensive monitoring of immunosuppression for efficacy and toxicity.   - Changes: Not at this time    # Infection Prophylaxis:   - PJP: Sulfa/TMP (Bactrim)    # Hypertension: Controlled;  Goal BP: < 130/80   - Volume status: Euvolemic     - Changes: Not at this time (recent 24 ABP read pending)    # Liver cysts:   - f/up adult GI    # Wilms tumor:   - follows with Peds Hem-Onc, last visit Nov 2022, low recurrence risk    # Anemia in Chronic Renal Disease: Hgb: Stable      LEVY: No   - Iron studies: Replete   - stopped iron supplements, monitor Hb and repeat iron studies in 3 months    # Transplant History:  Etiology of Kidney Failure: Wilms tumor in R kidney s//p nephrectomy and L renal vein ligation and subsequent atrophy (IVC injury)  Tx: LDKT  From father  Transplant: 11/9/2004 (Kidney) LDKTX from father  Donor Type: Living Donor Class:   Crossmatch at time of Tx: negative  DSA at time of Tx: No  Significant changes in immunosuppression: None  CMV IgG Ab High Risk Discordance (D+/R-):   EBV IgG Ab High Risk Discordance (D+/R-): Yes  Significant transplant-related complications: EBV Viremia    Transplant Office Phone Number: [phone]    Assessment and plan was discussed with the patient and she voiced her understanding and agreement.    Return visit: 3 months    Vikash Titus MD    Chief Complaint   Ms. Pearson is a 22 year old here for kidney  transplant and immunosuppression management.     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  Her post-transplant course has been complicated by infections (viral meningitis, EBV viremia, warts), pseudotumor cerebri.  She has been maintained on tacrolimus/AZA. She has been slowly weaned off prednisone in 2020 to allow for steroid free regimen and avoid long term steroid related complications. Baseline CR~1.3-15 with recent worsening and uptrend to 1.9 in Aug 2022. She underwent kidney bx, sample was inadequate but didn't show signs of rejection. susequent labs show improvement in renal function CR down to 1.1 on most recent check. During the ultrasound for the biopsy, a liver mass was noted.  Upon further imaging, there are three lesions that appear to be benign.  She has adult GI follow-up on 11/30/22.   Given her history of EBV, she also follows with Cuauhtemoc Rodriguez in the EBV clinic.  She had her tonsils removed earlier this year and there was no evidence of PTLD.     She feels good overall.  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  She notes some itchy throat and cough at night for about a year-started after stopping prednisone. No fevers, chills, shortness of breath.     Current IS: FK 2/2 AZA 75 (off pred since 2020)  BP meds: amlodipine 10, labetalol, enalapril, (used to be on HCZ combo); last 24 ABP Nov 2022  Cancer screen: saw Dermatology in 2021, pap smear 2022 -due in 3 years  Home BP: ~not checked frequently ~110-130s/70s  Vaccines: UTD Flu , reluctant to get COVID (COVID infection in 2021, 2 day hospitalization)    Review of systems:  10 point  review of systems negative except as in HPI    Problem List   Patient Active Problem List   Diagnosis     Kidney replaced by transplant     Wilms' tumor (H)     Status post chemotherapy     S/P radiation therapy     Lack of expected normal physiological development     Primary ovarian failure     EBV (Derrell-Barr virus) viremia     HTN (hypertension)     Immunosuppression (H)     Scar adherent     Recurrent pyelonephritis     Chronic kidney disease, stage 3 (H)     Status post kidney transplant     Fever, unspecified fever cause     COVID-19     Acute kidney injury (H)     Encounter for well woman exam     Urinary tract infection    bacteremia in 2013 (after horse injury)  - 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   - Idiopathic intracranial hypertension 05/01/2007, resolved.   -Recurrent UTIs and pyelonephritis, resolved.   - Hemorrhagic gastritis 04/2007, resolved.   - Viral meningitis 10/05/2008, resolved.   - Elevated EBV titers requiring rituximab infusion for 2 doses in March and August 2005.   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 Right nephrectomy on 05/19/2003 by Dr. Myles Velez at the [facility].   2. Cadaveric saphenous vein graft to IVC on 05/20/2003.   3. Cholecystectomy on 05/20/2003.   4. Left nephrectomy and living donor renal transplant from patient's father on 11/14/2004.   5. Tonsillectomy on 01/31/20056.   6. 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    No smoking, alcohol, illicits   Lives in [place], stays with parents in [facility] holidays   Has a brother, 23 yo      Allergies   Allergies   Allergen Reactions     Ibuprofen Other (See Comments)     Avoid nephrotoxic medications as needed due to kidney transplant status     Shellfish-Derived Products Nausea and Vomiting     Vancomycin      Uses Benadryl Prior to  administration       Medications   Current Outpatient Medications   Medication Sig     amLODIPine (NORVASC) 10 MG tablet Take 1 tablet (10 mg) by mouth daily     azaTHIOprine (IMURAN) 50 MG tablet Take 1.5 tablets (75 mg) by mouth daily     enalapril (VASOTEC) 5 MG tablet Take 1 tablet (5 mg) by mouth daily     ferrous sulfate (FEROSUL) 325 (65 Fe) MG tablet Take 1 tablet (325 mg) by mouth 2 times daily     labetalol (NORMODYNE) 100 MG tablet Take 1 tablet (100 mg) by mouth 2 times daily     levonorgest-eth estrad 91-Day (SEASONIQUE) 0.15-0.03 &0.01 MG tablet Take 1 tablet by mouth daily     magnesium oxide (MAG-OX) 400 (241.3 Mg) MG tablet Take 1 tablet (400 mg) by mouth 2 times daily     Multiple Vitamins-Minerals (WOMENS MULTIVITAMIN PO) Take 1 tablet by mouth daily     sulfamethoxazole-trimethoprim (BACTRIM) 400-80 MG tablet Take 1 tablet by mouth Every Mon, Wed, Fri Morning     tacrolimus (GENERIC EQUIVALENT) 1 MG capsule Take 2 capsules (2 mg) by mouth 2 times daily     No current facility-administered medications for this visit.     There are no discontinued medications.    Physical Exam   Vital Signs: There were no vitals taken for this visit.    GENERAL APPEARANCE: alert and no distress  HENT: mouth without ulcers or lesions  LYMPHATICS: no cervical or supraclavicular nodes  RESP: lungs clear to auscultation - no rales, rhonchi or wheezes  CV: regular rhythm, normal rate, no rub, no murmur  EDEMA: no LE edema bilaterally  ABDOMEN: soft, nondistended, nontender, bowel sounds normal  MS: extremities normal - no gross deformities noted, no evidence of inflammation in joints, no muscle tenderness  SKIN: no rash    Data     Renal Latest Ref Rng & Units 11/15/2022 11/2/2022 10/19/2022   Na 133 - 144 mmol/L 136 136 -   Na (external) 136 - 145 meq/L - - 132(L)   K 3.4 - 5.3 mmol/L 4.7 4.5 -   K (external) 3.5 - 5.1 meq/L - - 5.3(H)   Cl 94 - 109 mmol/L 109 110(H) 106   CO2 20 - 32 mmol/L 22 17(L) -   CO2 (external)  22 - 31 meq/L - - 18(L)   BUN 7 - 30 mg/dL 15 29 -   BUN (external) 6 - 22 mg/dL - - 31(H)   Cr 0.52 - 1.04 mg/dL 1.15(H) 1.32(H) -   Cr (external) 0.55 - 1.02 mg/dL - - 1.93(H)   Glucose 70 - 99 mg/dL 88 87 -   Glucose (external) 70 - 99 mg/dL - - 86   Ca  8.5 - 10.1 mg/dL 8.9 9.0 -   Ca (external) 8.5 - 10.5 mg/dL - - 8.8   Mg 1.6 - 2.3 mg/dL 1.5(L) 1.6 -   Mg (external) 1.6 - 2.6 mg/dL - - 1.3(L)     Bone Health Latest Ref Rng & Units 11/15/2022 11/2/2022 10/19/2022   Phos 2.5 - 4.5 mg/dL 2.8 3.4 -   Phos (external) 2.3 - 4.7 mg/dL - - 3.3   PTHi 18 - 80 pg/mL - - -   PTHi (external) 14 - 95 pg/mL - - -   Vit D Def 30 - 75 ug/L - - -   Vit D Def (external) 20 - 50 ng/mL - - -     Heme Latest Ref Rng & Units 11/2/2022 10/19/2022 8/1/2022   WBC 4.0 - 11.0 10e3/uL 9.4 - -   WBC (external) 4.0 - 11.0 K/uL - 8.9 6.4   Hgb 11.7 - 15.7 g/dL 10.5(L) - -   Hgb (external) 11.5 - 15.8 g/dL - 10.0(L) 10.5(L)   Plt 150 - 450 10e3/uL 350 - -   Plt (external) 140 - 400 K/uL - 343 373   ABSOLUTE NEUTROPHIL 1.6 - 8.3 10e3/uL - - -   ABSOLUTE NEUTROPHILS (EXTERNAL) 1.8 - 8.0 K/uL - 5.4 3.4   ABSOLUTE LYMPHOCYTES 0.8 - 5.3 10e3/uL - - -   ABSOLUTE LYMPHOCYTES (EXTERNAL) 0.8 - 4.1 K/uL - 2.5 2.3   ABSOLUTE MONOCYTES 0.0 - 1.3 10e3/uL - - -   ABSOLUTE MONOCYTES (EXTERNAL) 0.0 - 1.0 K/uL - 0.5 0.3   ABSOLUTE EOSINOPHILS 0.0 - 0.7 10e3/uL - - -   ABSOLUTE EOSINOPHILS (EXTERNAL) 0.0 - 0.7 K/uL - 0.5 0.3   ABSOLUTE BASOPHILS 0.0 - 0.2 10e9/L - - -   ABSOLUTE BASOPHILS (EXTERNAL) 0.0 - 0.2 K/uL - 0.1 0.1   ABS IMMATURE GRANULOCYTES 0 - 0.4 10e9/L - - -   ABSOLUTE NUCLEATED RBC - - - -     Liver Latest Ref Rng & Units 11/15/2022 11/2/2022 11/2/2022   AP 40 - 150 U/L - 61 -   AP (external) 31 - 125 U/L - - -   TBili 0.2 - 1.3 mg/dL - 0.2 -   TBili (external) 0.2 - 1.2 mg/dL - - -   DBili 0.0 - 0.2 mg/dL - <0.1 -   DBili (external) <=0.2 mg/dL - - -   ALT 0 - 50 U/L - 25 -   ALT (external) 6 - 29 U/L - - -   AST 0 - 45 U/L - 25 -   AST  (external) 10 - 30 U/L - - -   Tot Protein 6.8 - 8.8 g/dL - 8.0 -   Tot Protein (external) 6.1 - 8.1 g/dL - - -   Albumin 3.4 - 5.0 g/dL 3.7 3.7 3.6   Albumin (external) 3.5 - 5.2 g/dL - - -     Pancreas Latest Ref Rng & Units 11/6/2013 6/22/2009 10/22/2007   Amylase 30 - 110 U/L - 82 68   Lipase 20 - 250 U/L 47 67 55     Iron studies Latest Ref Rng & Units 11/15/2022 7/20/2020 4/10/2018   Iron 35 - 180 ug/dL - - 113   Iron sat 15 - 46 % - - 28   Ferritin 12 - 150 ng/mL 274(H) - 311(H)   Ferritin (external) 5 - 200 ng/mL - 270(H) -     [facility] Txp Virology Latest Ref Rng & Units 11/15/2022 11/2/2022 10/19/2022   CMV IgG EU/mL - - -   CVM DNA Quant - - - -   CMV Quant <100 Copies/mL - - -   CMV QT Log <2.0 Log copies/mL - - -   CMV QUANT IU/ML Not Detected IU/mL - Not Detected -   LOG IU/ML OF CMVQNT <2.1 [Log:IU]/mL - - -   BK Spec - - - -   BK Res BKNEG:BK Virus DNA Not Detected copies/mL - - -   BK Log <2.7 Log copies/mL - - -   BK Quant Log Ext log copies/mL - - -   BK Quant Result Ext Not Detected Copies/mL - - -   BK Quant Spec Ext - - - -   EBV IgG - - - -   EBV CAPSID ANTIBODY IGG 0.0 - 0.8 AI - - -   EBV DNA QUANT (EXTERNAL) Undetected - - Undetected   EBV DNA COPIES/ML EBVNEG:EBV DNA Not Detected [Copies]/mL - - -   EBV INTERP DNA QUANT (EXTERNAL) <200 copies/mL - - -   EBV DNA LOG OF COPIES - 4.1 3.9 -   EBV DNA LOG OF COPIES (EXTERNAL) Undetected - - Undetected   Hep B Core NEG - - -   Hep B Surf 0.0 - 4.9 mIU/mL - - -   HIV 1&2 NEG - - -        Recent Labs   Lab Test 08/28/21  1609 09/03/21  2251 08/01/22  1010 10/19/22  0920 11/15/22  1159   DOSTAC  --   --  7/31/2022 10/18/2022  --    TACROL 3.7*   < > 4.8* 5.1 4.9*    < > = values in this interval not displayed.     I spent a total of 104 minutes on the date of the encounter doing chart review, performing a history and physical exam, completing documentation and any further activities as noted above.

## 2022-11-29 NOTE — NURSING NOTE
Chief Complaint   Patient presents with     RECHECK     Return visit.     Blood pressure 128/84, pulse 103, temperature 98.5  F (36.9  C), weight 58.9 kg (129 lb 14.4 oz), SpO2 99 %.    DIANA WEIR

## 2022-11-29 NOTE — PATIENT INSTRUCTIONS
Patient Recommendations:    Labs monthly    Transplant Patient Information  Your Post Transplant Coordinator is: Denia Garduno  You and your care team can contact your transplant coordinator Monday - Friday, 8am - 5pm at [phone] (Option 2 to reach the coordinator or Option 4 to schedule an appointment).  You can also reach your care team online via [employer].  After hours for urgent matters, please call [facility] at [phone].

## 2022-11-29 NOTE — TELEPHONE ENCOUNTER
Peds to Adult Transition   Post Transplant Coordinator Meeting      Date of meetin2022     Transplant Organ: Kidney, LDRT (Wilms tumor)  Transplant Date: 2004      DSA: No  EBV: Yes (plasma) - check every other month  CMV: No  BK: No      Education:  Medication list reviewed with patient. Patient able to articulate medication regimen and doses. Lab orders and recent results reviewed with patient. Patient voices understanding of lab schedule. Contact information for SOT office given to patient.       Post Kidney and Pancreas Transplant Coordinator    FOLLOW UP ITEMS:  Hard copy lab orders provided to Anuradha in person, sent via [facility].  Return to clinic: 3 months (ok for virtual)

## 2022-11-29 NOTE — LETTER
OUTPATIENT LABORATORY TEST ORDER     Patient Name: Anuradha Pearson   YOB: 2000     [facility] MR# [if applicable]: [id number]   Date & Time: November 29, 2022  10:00 AM  Expiration Date: 1 year after date issued      Diagnoses: Kidney Transplant (ICD-10 Z94.0)   Long term use of medications (ICD-10 Z79.899)      We ask your assistance in obtaining the following laboratory tests, which are part of our routine surveillance program for Solid Organ Transplant patients.       Please fax each result to [phone], same day as resulted/available      Critical lab results page [phone]  Monday - Friday 8 am to 5 pm    Evening/Weekend/Holiday communicate Critical labs results [phone]      Monthly    CBC with platelets    Basic Metabolic Panel (Sodium, Potassium, Chloride, Creatinine, CO2, Urea Nitrogen, glucose, Calcium)    Tacrolimus drug level - 12-hour trough, please document time of last dose      Every Other Month    EBV PCR Quantitative/Plasma    Every 6 Months     Urine for protein/creatinine       If you have any questions, please call [facility]- [phone] or [phone], Fax- [phone].      Vikash Titus MD

## 2022-11-29 NOTE — LETTER
11/29/2022       RE: Anuradha Pearson  [address]  [place] [address]     Dear Colleague,    Thank you for referring your patient, Anuradha Pearson, to the [facility] [place] [facility]. Please see a copy of my visit note below.    TRANSPLANT NEPHROLOGY EARLY POST TRANSPLANT VISIT    Assessment & Plan   # LDKT: Trend down   - Baseline Creatinine: ~ 1.2-1.5, recently lower to 1.1; recent SHARON Cr up to 1.9, suboptimal kidney bx neg for rejection; etiology unclear   - Proteinuria: Normal (<0.2 grams)   - Date DSA Last Checked: Nov 2022      Latest DSA: No   - BK Viremia: No   - Kidney Tx Biopsy: 11/2022  ; Result: sub-optimal kidney biopsy, no rejection    # Immunosuppression: Tacrolimus immediate release (goal 4-6) and Azathioprine (dose 75 mg daily)    - induction: thymoglobulin   - Continue with intensive monitoring of immunosuppression for efficacy and toxicity.   - Changes: Not at this time    # Infection Prophylaxis:   - PJP: Sulfa/TMP (Bactrim)    # Hypertension: Controlled;  Goal BP: < 130/80   - Volume status: Euvolemic     - Changes: Not at this time (recent 24 ABP read pending)    # Liver cysts:   - f/up adult GI    # Wilms tumor:   - follows with Peds Hem-Onc, last visit Nov 2022, low recurrence risk    # Anemia in Chronic Renal Disease: Hgb: Stable      LEVY: No   - Iron studies: Replete   - stopped iron supplements, monitor Hb and repeat iron studies in 3 months    # Transplant History:  Etiology of Kidney Failure: Wilms tumor in R kidney s//p nephrectomy and L renal vein ligation and subsequent atrophy (IVC injury)  Tx: LDKT  From father  Transplant: 11/9/2004 (Kidney) LDKTX from father  Donor Type: Living Donor Class:   Crossmatch at time of Tx: negative  DSA at time of Tx: No  Significant changes in immunosuppression: None  CMV IgG Ab High Risk Discordance (D+/R-):   EBV IgG Ab High Risk Discordance  (D+/R-): Yes  Significant transplant-related complications: EBV Viremia    Transplant Office Phone Number: [phone]    Assessment and plan was discussed with the patient and she voiced her understanding and agreement.    Return visit: 3 months    Vikash Titus MD    Chief Complaint   Ms. Pearson is a 22 year old here for kidney transplant and immunosuppression management.     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  Her post-transplant course has been complicated by infections (viral meningitis, EBV viremia, warts), pseudotumor cerebri.  She has been maintained on tacrolimus/AZA. She has been slowly weaned off prednisone in 2020 to allow for steroid free regimen and avoid long term steroid related complications. Baseline CR~1.3-15 with recent worsening and uptrend to 1.9 in Aug 2022. She underwent kidney bx, sample was inadequate but didn't show signs of rejection. susequent labs show improvement in renal function CR down to 1.1 on most recent check. During the ultrasound for the biopsy, a liver mass was noted.  Upon further imaging, there are three lesions that appear to be benign.  She has adult GI follow-up on 11/30/22.   Given her history of EBV, she also follows with Cuauhtemoc Rodriguez in the EBV clinic.  She had her tonsils removed earlier this year and there was no evidence of PTLD.     She feels good overall.  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  She notes some itchy throat and cough at night for about a year-started after stopping prednisone. No fevers, chills, shortness of breath.     Current IS: FK 2/2 AZA 75 (off pred since 2020)  BP  meds: amlodipine 10, labetalol, enalapril, (used to be on HCZ combo); last 24 ABP Nov 2022  Cancer screen: saw Dermatology in 2021, pap smear 2022 -due in 3 years  Home BP: ~not checked frequently ~110-130s/70s  Vaccines: UTD Flu , reluctant to get COVID (COVID infection in 2021, 2 day hospitalization)    Review of systems:  10 point review of systems negative except as in HPI    Problem List   Patient Active Problem List   Diagnosis     Kidney replaced by transplant     Wilms' tumor (H)     Status post chemotherapy     S/P radiation therapy     Lack of expected normal physiological development     Primary ovarian failure     EBV (Derrell-Barr virus) viremia     HTN (hypertension)     Immunosuppression (H)     Scar adherent     Recurrent pyelonephritis     Chronic kidney disease, stage 3 (H)     Status post kidney transplant     Fever, unspecified fever cause     COVID-19     Acute kidney injury (H)     Encounter for well woman exam     Urinary tract infection    bacteremia in 2013 (after horse injury)  - 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   - Idiopathic intracranial hypertension 05/01/2007, resolved.   -Recurrent UTIs and pyelonephritis, resolved.   - Hemorrhagic gastritis 04/2007, resolved.   - Viral meningitis 10/05/2008, resolved.   - Elevated EBV titers requiring rituximab infusion for 2 doses in March and August 2005.   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 Right nephrectomy on 05/19/2003 by Dr. Myles Velez at the [facility].   2. Cadaveric saphenous vein graft to IVC on 05/20/2003.   3. Cholecystectomy on 05/20/2003.   4. Left nephrectomy and living donor renal transplant from patient's father on 11/14/2004.   5. Tonsillectomy on 01/31/20056.   6. 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    No smoking, alcohol,  illicits   Lives in [place], [place]   Has a brother, 23 yo      Allergies   Allergies   Allergen Reactions     Ibuprofen Other (See Comments)     Avoid nephrotoxic medications as needed due to kidney transplant status     Shellfish-Derived Products Nausea and Vomiting     Vancomycin      Uses Benadryl Prior to administration       Medications   Current Outpatient Medications   Medication Sig     amLODIPine (NORVASC) 10 MG tablet Take 1 tablet (10 mg) by mouth daily     azaTHIOprine (IMURAN) 50 MG tablet Take 1.5 tablets (75 mg) by mouth daily     enalapril (VASOTEC) 5 MG tablet Take 1 tablet (5 mg) by mouth daily     ferrous sulfate (FEROSUL) 325 (65 Fe) MG tablet Take 1 tablet (325 mg) by mouth 2 times daily     labetalol (NORMODYNE) 100 MG tablet Take 1 tablet (100 mg) by mouth 2 times daily     levonorgest-eth estrad 91-Day (SEASONIQUE) 0.15-0.03 &0.01 MG tablet Take 1 tablet by mouth daily     magnesium oxide (MAG-OX) 400 (241.3 Mg) MG tablet Take 1 tablet (400 mg) by mouth 2 times daily     Multiple Vitamins-Minerals (WOMENS MULTIVITAMIN PO) Take 1 tablet by mouth daily     sulfamethoxazole-trimethoprim (BACTRIM) 400-80 MG tablet Take 1 tablet by mouth Every Mon, Wed, Fri Morning     tacrolimus (GENERIC EQUIVALENT) 1 MG capsule Take 2 capsules (2 mg) by mouth 2 times daily     No current facility-administered medications for this visit.     There are no discontinued medications.    Physical Exam   Vital Signs: There were no vitals taken for this visit.    GENERAL APPEARANCE: alert and no distress  HENT: mouth without ulcers or lesions  LYMPHATICS: no cervical or supraclavicular nodes  RESP: lungs clear to auscultation - no rales, rhonchi or wheezes  CV: regular rhythm, normal rate, no rub, no murmur  EDEMA: no LE edema bilaterally  ABDOMEN: soft, nondistended, nontender, bowel sounds normal  MS: extremities normal - no gross deformities noted, no evidence of inflammation in joints,  no muscle tenderness  SKIN: no rash    Data     Renal Latest Ref Rng & Units 11/15/2022 11/2/2022 10/19/2022   Na 133 - 144 mmol/L 136 136 -   Na (external) 136 - 145 meq/L - - 132(L)   K 3.4 - 5.3 mmol/L 4.7 4.5 -   K (external) 3.5 - 5.1 meq/L - - 5.3(H)   Cl 94 - 109 mmol/L 109 110(H) 106   CO2 20 - 32 mmol/L 22 17(L) -   CO2 (external) 22 - 31 meq/L - - 18(L)   BUN 7 - 30 mg/dL 15 29 -   BUN (external) 6 - 22 mg/dL - - 31(H)   Cr 0.52 - 1.04 mg/dL 1.15(H) 1.32(H) -   Cr (external) 0.55 - 1.02 mg/dL - - 1.93(H)   Glucose 70 - 99 mg/dL 88 87 -   Glucose (external) 70 - 99 mg/dL - - 86   Ca  8.5 - 10.1 mg/dL 8.9 9.0 -   Ca (external) 8.5 - 10.5 mg/dL - - 8.8   Mg 1.6 - 2.3 mg/dL 1.5(L) 1.6 -   Mg (external) 1.6 - 2.6 mg/dL - - 1.3(L)     Bone Health Latest Ref Rng & Units 11/15/2022 11/2/2022 10/19/2022   Phos 2.5 - 4.5 mg/dL 2.8 3.4 -   Phos (external) 2.3 - 4.7 mg/dL - - 3.3   PTHi 18 - 80 pg/mL - - -   PTHi (external) 14 - 95 pg/mL - - -   Vit D Def 30 - 75 ug/L - - -   Vit D Def (external) 20 - 50 ng/mL - - -     Heme Latest Ref Rng & Units 11/2/2022 10/19/2022 8/1/2022   WBC 4.0 - 11.0 10e3/uL 9.4 - -   WBC (external) 4.0 - 11.0 K/uL - 8.9 6.4   Hgb 11.7 - 15.7 g/dL 10.5(L) - -   Hgb (external) 11.5 - 15.8 g/dL - 10.0(L) 10.5(L)   Plt 150 - 450 10e3/uL 350 - -   Plt (external) 140 - 400 K/uL - 343 373   ABSOLUTE NEUTROPHIL 1.6 - 8.3 10e3/uL - - -   ABSOLUTE NEUTROPHILS (EXTERNAL) 1.8 - 8.0 K/uL - 5.4 3.4   ABSOLUTE LYMPHOCYTES 0.8 - 5.3 10e3/uL - - -   ABSOLUTE LYMPHOCYTES (EXTERNAL) 0.8 - 4.1 K/uL - 2.5 2.3   ABSOLUTE MONOCYTES 0.0 - 1.3 10e3/uL - - -   ABSOLUTE MONOCYTES (EXTERNAL) 0.0 - 1.0 K/uL - 0.5 0.3   ABSOLUTE EOSINOPHILS 0.0 - 0.7 10e3/uL - - -   ABSOLUTE EOSINOPHILS (EXTERNAL) 0.0 - 0.7 K/uL - 0.5 0.3   ABSOLUTE BASOPHILS 0.0 - 0.2 10e9/L - - -   ABSOLUTE BASOPHILS (EXTERNAL) 0.0 - 0.2 K/uL - 0.1 0.1   ABS IMMATURE GRANULOCYTES 0 - 0.4 10e9/L - - -   ABSOLUTE NUCLEATED RBC - - - -     Liver Latest  Ref Rng & Units 11/15/2022 11/2/2022 11/2/2022   AP 40 - 150 U/L - 61 -   AP (external) 31 - 125 U/L - - -   TBili 0.2 - 1.3 mg/dL - 0.2 -   TBili (external) 0.2 - 1.2 mg/dL - - -   DBili 0.0 - 0.2 mg/dL - <0.1 -   DBili (external) <=0.2 mg/dL - - -   ALT 0 - 50 U/L - 25 -   ALT (external) 6 - 29 U/L - - -   AST 0 - 45 U/L - 25 -   AST (external) 10 - 30 U/L - - -   Tot Protein 6.8 - 8.8 g/dL - 8.0 -   Tot Protein (external) 6.1 - 8.1 g/dL - - -   Albumin 3.4 - 5.0 g/dL 3.7 3.7 3.6   Albumin (external) 3.5 - 5.2 g/dL - - -     Pancreas Latest Ref Rng & Units 11/6/2013 6/22/2009 10/22/2007   Amylase 30 - 110 U/L - 82 68   Lipase 20 - 250 U/L 47 67 55     Iron studies Latest Ref Rng & Units 11/15/2022 7/20/2020 4/10/2018   Iron 35 - 180 ug/dL - - 113   Iron sat 15 - 46 % - - 28   Ferritin 12 - 150 ng/mL 274(H) - 311(H)   Ferritin (external) 5 - 200 ng/mL - 270(H) -     [facility] Txp Virology Latest Ref Rng & Units 11/15/2022 11/2/2022 10/19/2022   CMV IgG EU/mL - - -   CVM DNA Quant - - - -   CMV Quant <100 Copies/mL - - -   CMV QT Log <2.0 Log copies/mL - - -   CMV QUANT IU/ML Not Detected IU/mL - Not Detected -   LOG IU/ML OF CMVQNT <2.1 [Log:IU]/mL - - -   BK Spec - - - -   BK Res BKNEG:BK Virus DNA Not Detected copies/mL - - -   BK Log <2.7 Log copies/mL - - -   BK Quant Log Ext log copies/mL - - -   BK Quant Result Ext Not Detected Copies/mL - - -   BK Quant Spec Ext - - - -   EBV IgG - - - -   EBV CAPSID ANTIBODY IGG 0.0 - 0.8 AI - - -   EBV DNA QUANT (EXTERNAL) Undetected - - Undetected   EBV DNA COPIES/ML EBVNEG:EBV DNA Not Detected [Copies]/mL - - -   EBV INTERP DNA QUANT (EXTERNAL) <200 copies/mL - - -   EBV DNA LOG OF COPIES - 4.1 3.9 -   EBV DNA LOG OF COPIES (EXTERNAL) Undetected - - Undetected   Hep B Core NEG - - -   Hep B Surf 0.0 - 4.9 mIU/mL - - -   HIV 1&2 NEG - - -        Recent Labs   Lab Test 08/28/21  1609 09/03/21  2251 08/01/22  1010 10/19/22  0920 11/15/22  1159   DOSTAC  --   --  7/31/2022  10/18/2022  --    TACROL 3.7*   < > 4.8* 5.1 4.9*    < > = values in this interval not displayed.     I spent a total of 104 minutes on the date of the encounter doing chart review, performing a history and physical exam, completing documentation and any further activities as noted above.       Again, thank you for allowing me to participate in the care of your patient.      Sincerely,    Vikash Titus MD

## 2022-11-30 ENCOUNTER — PRE VISIT (OUTPATIENT)
Dept: GASTROENTEROLOGY | Facility: CLINIC | Age: 22
End: 2022-11-30

## 2022-11-30 ENCOUNTER — VIRTUAL VISIT (OUTPATIENT)
Dept: GASTROENTEROLOGY | Facility: CLINIC | Age: 22
End: 2022-11-30
Attending: PHYSICIAN ASSISTANT
Payer: COMMERCIAL

## 2022-11-30 DIAGNOSIS — Z94.0 KIDNEY REPLACED BY TRANSPLANT: ICD-10-CM

## 2022-11-30 DIAGNOSIS — E28.39 PRIMARY OVARIAN FAILURE: Primary | ICD-10-CM

## 2022-11-30 DIAGNOSIS — D13.4 HEPATIC ADENOMA: ICD-10-CM

## 2022-11-30 PROCEDURE — 99205 OFFICE O/P NEW HI 60 MIN: CPT | Mod: GT | Performed by: PHYSICIAN ASSISTANT

## 2022-11-30 NOTE — LETTER
11/30/2022         RE: Anuradha Pearson  [address]  [place] [address]        Dear Colleague,    Thank you for referring your patient, Anuradha Pearson, to the [facility] [employer]. Please see a copy of my visit note below.    Hepatology Clinic note  Anuradha Pearson   Date of Birth 2000  Date of Service 11/30/2022    REASON FOR CONSULTATION: Hepatic adenomas  REFERRING PROVIDER: Natalya Hartman MD          Assessment/plan:   Anuradha Pearson is a 22 year old female with complex medical history of Wilms tumor at age 2.5 s/p  chemotherapy, radiation and surgery with a right amd left nephrectomy S/p living donor renal transplant in 2004 maintained on tacrolimus/azathioprine. She was incidentally found to have liver adenomas on recent imaging (Segment 6: 3.2 by 3 cm adenoma, Segment 7: 2.4 cm x 3 cm adenoma and Segment 2: 5.4 cm x 4.6 cm - thought likely benign FNH versus atypical adenoma). Last renal transplant ultrasound in 2019 did not note an liver abnormalities. Due to history of primary ovarian failure, she has been on OCPs for the last 10 years.  We discussed the impact of estrogen on adenomas. Liver function is normal. No evidence of chronic liver disease otherwise. She/family wish to discuss more definitive treatment, especially given her career (professional ) means frequent travel away from tertiary medical care.     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     # Follow-up in clinic in 6 months    Chichi Weinberg PA-C   [facility]     Total time for E/M services performed on the date of the encounter 75 minutes.  This included review of previous: clinic visits, hospital records, lab results, imaging studies, and procedural documentation. Time also includes patient visit, documentation and discussion with other providers.  The findings from this review are summarized in the above note.   -----------------------------------------------------       HPI:   Anuradha Pearson is a 22 year old female  presenting for the evaluation of adenomas. She is accompanied by her mother today.     Was seen by pediatric renal transplant recently at which time she a renal ultrasound which showed an incidental 3.2 cm liver lesion. She then subsequently had follow up liver ultrasound and MRI imaging.     Recent MRI abdomen with contrast. Discussed at Liver tumor conference on 11/18/2022.    - Segment 6: 3.2 by 3 cm- thought likely adenoma  - Segment 7: 2.4 cm x 3 cm - thought likely adenoma  - Segment 2: 5.4 cm x 4.6 cm - thought likely benign FNH versus atypical adenoma    Last renal transplant ultrasound in 2019 did not note an liver abnormalities.     Appetite is good. Weight is stable. Regular bowel movements. 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      Patient denies jaundice, lower extremity edema, abdominal distension or confusion.    Patient also denies melena, hematochezia or hematemesis. Patient denies weight loss, fevers, sweats or chills.    PMH/SMH: : Wilms tumor at age 2 1/2 with chemotherapy (vincristine, actinomycin D, doxorubicin), radiation (whole abdomen radiation total dose of 1080 cGy.) and surgery with a right nephrectomy complicated by surgical complications to left kidney and blood supply. S/p living donor renal transplant in 2004 maintained on Azathioprine  and Tac.   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 Has been on estrogen OCPs for past 5 years.    Medications:   See below     No previous tobacco use. No history of significant alcohol. No previous IV/IN drug use. Currently professional barrell racer. Within the past year, has hard falls 4 times. Patient currently lives with parent for part of the year. [place] for six months of year (January through June). No known family history of liver disease or liver cancer.       Previous work-up:  HCV antibody nonractive  HBV SAb non-immune  HBV SAg nonreactive  HBV CAb: nonreactive      Medical hx Surgical hx   Past Medical History:   Diagnosis Date     H/O kidney transplant      Hypertension      PONV (postoperative nausea and vomiting)      Urinary tract infection 11/04/2021     Wilm's tumor     Past Surgical History:   Procedure Laterality Date     APPENDECTOMY       CHOLECYSTECTOMY  05/20/2003     ENT SURGERY       IR RENAL BIOPSY RIGHT  11/2/2022     OPEN REDUCTION INTERNAL FIXATION RODDING INTRAMEDULLARY FEMUR  8/20/2012    Procedure: OPEN REDUCTION INTERNAL FIXATION RODDING INTRAMEDULLARY FEMUR;  Closed Reduction Internal Fixation Right Femur;  Surgeon: Catracho Hook MD;  Location: [facility] OR     PERCUTANEOUS BIOPSY KIDNEY N/A 11/2/2022    Procedure: transplant kidney biopsy;  Surgeon: Isra Still PA-C;  Location: [facility] PEDS SEDATION      REVISE SCAR TRUNK Right 11/6/2015    Procedure: REVISE SCAR TRUNK;  Surgeon: COOPER Anderson MD;  Location: [facility] OR     right nephrectomy  05/19/2003    due to Wilms tumor; performed by Dr. Myles Velez at [facility]     TONSILLECTOMY  01/31/2005     TRANSPLANT KIDNEY RECIPIENT LIVING RELATED CHILD  11/09/2004    left nephrectomy performed at time of transplant                 Medications:     Current Outpatient Medications   Medication     amLODIPine (NORVASC) 10 MG tablet     azaTHIOprine (IMURAN) 50 MG tablet     enalapril (VASOTEC) 5  MG tablet     labetalol (NORMODYNE) 100 MG tablet     levonorgest-eth estrad 91-Day (SEASONIQUE) 0.15-0.03 &0.01 MG tablet     magnesium oxide (MAG-OX) 400 (241.3 Mg) MG tablet     Multiple Vitamins-Minerals (WOMENS MULTIVITAMIN PO)     sulfamethoxazole-trimethoprim (BACTRIM) 400-80 MG tablet     tacrolimus (GENERIC EQUIVALENT) 1 MG capsule     No current facility-administered medications for this visit.            Allergies:     Allergies   Allergen Reactions     Ibuprofen Other (See Comments)     Avoid nephrotoxic medications as needed due to kidney transplant status     Shellfish-Derived Products Nausea and Vomiting     Vancomycin      Uses Benadryl Prior to administration            Social History:     Social History     Socioeconomic History     Marital status: Single     Spouse name: Not on file     Number of children: Not on file     Years of education: Not on file     Highest education level: Not on file   Occupational History     Not on file   Tobacco Use     Smoking status: Never     Smokeless tobacco: Never   Substance and Sexual Activity     Alcohol use: No     Drug use: No     Sexual activity: Never   Other Topics Concern     Not on file   Social History Narrative     Not on file     Social Determinants of Health     Financial Resource Strain: Not on file   Food Insecurity: Not on file   Transportation Needs: Not on file   Physical Activity: Not on file   Stress: Not on file   Social Connections: Not on file   Intimate Partner Violence: Not on file   Housing Stability: Not on file            Family History:     Family History   Problem Relation Age of Onset     Hypertension Father               Review of Systems:   Gen: See HPI     HEENT: No change in vision or hearing, mouth sores, dysphagia, lymph nodes  Resp: No shortness of breath, coughing, hx of asthma  CV: No chest pain, palpitations, syncope   GI: See HPI  : No dysuria, history of stones, urine color    Skin: No rash; no pruritus or  psoriasis  MS: No arthralgias, myalgias, joint swelling  Neuro: No memory changes, confusion, numbness    Heme: No difficulty clotting, bruising, bleeding  Psych:  No anxiety, depression, agitation          Physical Exam:     GENERAL: healthy, alert and no distress  EYES: Eyes grossly normal to inspection, conjunctivae and sclerae normal  RESP: no audible wheeze, cough, or visible cyanosis.  No visible retractions or increased work of breathing.  Able to speak fully in complete sentences  NEURO: Cranial nerves grossly intact, mentation intact and speech normal  PSYCH: mentation appears normal, affect normal/bright, judgement and insight intact, normal speech and appearance well-groomed         Data:   Reviewed in person and significant for:    Lab Results   Component Value Date     11/15/2022     01/15/2019      Lab Results   Component Value Date    POTASSIUM 4.7 11/15/2022    POTASSIUM 4.1 01/15/2019     Lab Results   Component Value Date    CHLORIDE 109 11/15/2022    CHLORIDE 106 10/19/2022    CHLORIDE 105 01/15/2019     Lab Results   Component Value Date    CO2 22 11/15/2022    CO2 19 01/15/2019     Lab Results   Component Value Date    BUN 15 11/15/2022    BUN 30 01/15/2019     Lab Results   Component Value Date    CR 1.15 11/15/2022    CR 1.19 01/15/2019       Lab Results   Component Value Date    WBC 9.4 11/02/2022    WBC 11.7 01/15/2019     Lab Results   Component Value Date    HGB 10.5 11/02/2022    HGB 12.3 01/15/2019     Lab Results   Component Value Date    HCT 31.8 11/02/2022    HCT 36.6 01/15/2019     Lab Results   Component Value Date    MCV 96 11/02/2022     01/15/2019     Lab Results   Component Value Date     11/02/2022     01/15/2019       Lab Results   Component Value Date    AST 25 11/02/2022    AST 18 01/15/2019     Lab Results   Component Value Date    ALT 25 11/02/2022    ALT 23 01/15/2019     Lab Results   Component Value Date    BILICONJ 0.0 06/22/2009       Lab Results   Component Value Date    BILITOTAL 0.2 11/02/2022    BILITOTAL 0.2 01/15/2019       Lab Results   Component Value Date    ALBUMIN 3.7 11/15/2022    ALBUMIN 3.5 01/15/2019     Lab Results   Component Value Date    PROTTOTAL 8.0 11/02/2022    PROTTOTAL 7.8 01/15/2019      Lab Results   Component Value Date    ALKPHOS 61 11/02/2022    ALKPHOS 37 01/15/2019       Lab Results   Component Value Date    INR 0.88 11/02/2022    INR 0.94 06/22/2009         Imaging:        MRI ABDOMEN  11/15/2022:     CLINICAL HISTORY: Status post renal transplant, secondary to Wilms  tumor, complicated by inadvertent ligation of the contralateral renal  lesion. Surgery performed in 2004. Posttransplant course was  complicated by multiple infections. Previous MRI performed 11/2/2022  without contrast demonstrated multiple lesions within the liver.  Current study is being performed for further characterization of these  lesions with contrast.     TECHNIQUE: Images were acquired with and without intravenous contrast  through the abdomen. The following MR images were acquired: TrueFISP,  multiplanar T2 weighted, axial T1 in/out of phase, axial fat-saturated  T1, diffusion-weighted. Multiplanar T1-weighted images with fat  saturation were before contrast administration and at multiple time  points following the administration of intravenous contrast.      Contrast dose: 10 mL EOVIST     FINDINGS:     Comparison study: MRI abdomen 11/2/2020     Liver: Loss of signal intensity on in phase imaging. Noncirrhotic  liver morphology without evidence of fibrosis. There are 3  well-defined enhancing lesion seen within the liver as noted on prior  noncontrast MRI:     Lesion 1:  This is an arterially enhancing T2 hyperintense, slightly T1  hypointense lesion within segment 6 measuring 3.2 cm x 3.0 cm which  shows central washout and peripheral contrast enhancement on the  delayed phases. There is an enhancing central T2 hyperintense  portion  of the lesion (series 15, image 23). It has irregular well-defined  margins and also demonstrates mild heterogeneous diffusion  restriction. There is peripheral enhancement on hepatobiliary phase  imaging.     Lesion #2.  The second arterially enhancing lesion in segment 7 measures 2.4 cm x  3.0 cm. This also demonstrates central washout on the delayed venous  phase and mild heterogeneous diffusion restriction. This also contains  a central T2 hyperintense enhancing portion.     Lesion #3:  A third T2 hyperintense, slightly T1 hypointense arterially enhancing  lesion in segment 2 measuring 5.4 cm x 4.6 cm which does not  demonstrate significant washout on the delayed images however there  are nonenhancing septations. There is no significant diffusion  restriction. This lesion demonstrates avid internal enhancement on  hepatobiliary phase imaging, and abuts the left hepatic vein and a  branch of the left portal vein.     Gallbladder: Surgically absent.     Spleen: No evidence of splenomegaly. Loss of signal intensity on in  phase imaging.     Kidneys: Kidneys are absent consistent with prior surgery. Right lower  quadrant transplant kidney is again noted with multiple simple  cortical cysts. Largest one seen along the superior pole measuring 11  mm. Small 4 mm hemorrhagic/proteinaceous cyst in the mid transplant  kidney.     Adrenal glands: The right adrenal gland appears unremarkable. Left  adrenal gland is not visualized, status postsurgical resection.     Pancreas: The pancreas appears normal. Intrapancreatic ducts are not  dilated. No mass seen within the head body and tail of pancreas.     Bowel: The visualized bowel structures are unremarkable. Moderate  stool burden. No evidence of bowel obstruction. No mucosal thickening  or abnormal wall enhancement.     Lymph nodes: No definite evidence of lymphadenopathy in the visualized  sections through the abdomen.     Blood vessels: Narrowed appearance of an  infrahepatic IVC anastomosis,  with prominent collateral veins along the spine which empty into a  dilated azygos vein. This is unchanged since 2009.     Lung bases: The visualized lung fields are clear.     Bones and soft tissues: Both soft tissues appear unremarkable.     Mesentery and abdominal wall: Mesentery and bowel also appear normal.  Sequelae of prior renal transplant biopsy in the right flank.     Ascites: No evidence of free fluid in the abdomen.                                                                         IMPRESSION:  1. The lesions with similar imaging characteristics in hepatic  segments 6 and 7 are technically indeterminate and appear benign, most  likely representing hepatic adenomas. Recommend follow up MRI with  Eovist in 3-6 months.   2. The lesion in the left hepatic lobe is most consistent with benign  focal nodular hyperplasia. The differential includes atypical adenoma  with Eovist retention (given additional suspected adenomas).   3. Iron deposition in the liver and spleen.          Anuradha is a 22 year old who is being evaluated via a billable video visit.      How would you like to obtain your AVS? MyChart  If the video visit is dropped, the invitation should be resent by: Text to cell phone: [phone]  Will anyone else be joining your video visit? No    Video-Visit Details      Video Start Time: 12:32 pm     Type of service:  Video Visit    Video End Time: 1:10 pm     Originating Location (pt. Location): Home     Distant Location (provider location): Off-site    Platform used for Video Visit: Noel        Again, thank you for allowing me to participate in the care of your patient.        Sincerely,        Chichi Weinberg PA-C

## 2022-11-30 NOTE — PROGRESS NOTES
Hepatology Clinic note  Anuradha Pearson   Date of Birth 2000  Date of Service 11/30/2022    REASON FOR CONSULTATION: Hepatic adenomas  REFERRING PROVIDER: Natalya Hartman MD          Assessment/plan:   Anuradha Pearson is a 22 year old female with complex medical history of Wilms tumor at age 2.5 s/p  chemotherapy, radiation and surgery with a right amd left nephrectomy S/p living donor renal transplant in 2004 maintained on tacrolimus/azathioprine. She was incidentally found to have liver adenomas on recent imaging (Segment 6: 3.2 by 3 cm adenoma, Segment 7: 2.4 cm x 3 cm adenoma and Segment 2: 5.4 cm x 4.6 cm - thought likely benign FNH versus atypical adenoma). Last renal transplant ultrasound in 2019 did not note an liver abnormalities. Due to history of primary ovarian failure, she has been on OCPs for the last 10 years.  We discussed the impact of estrogen on adenomas. Liver function is normal. No evidence of chronic liver disease otherwise. She/family wish to discuss more definitive treatment, especially given her career (professional ) means frequent travel away from tertiary medical care.     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     # Follow-up in clinic in 6 months    Chichi Weinberg PA-C   [facility] Hepatology     Total time for E/M services performed on the date of the encounter 75 minutes.  This included review of previous: clinic visits, hospital records, lab results, imaging  studies, and procedural documentation. Time also includes patient visit, documentation and discussion with other providers.  The findings from this review are summarized in the above note.   -----------------------------------------------------       HPI:   Anuradha Pearson is a 22 year old female  presenting for the evaluation of adenomas. She is accompanied by her mother today.     Was seen by pediatric renal transplant recently at which time she a renal ultrasound which showed an incidental 3.2 cm liver lesion. She then subsequently had follow up liver ultrasound and MRI imaging.     Recent MRI abdomen with contrast. Discussed at Liver tumor conference on 11/18/2022.    - Segment 6: 3.2 by 3 cm- thought likely adenoma  - Segment 7: 2.4 cm x 3 cm - thought likely adenoma  - Segment 2: 5.4 cm x 4.6 cm - thought likely benign FNH versus atypical adenoma    Last renal transplant ultrasound in 2019 did not note an liver abnormalities.     Appetite is good. Weight is stable. Regular bowel movements. 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      Patient denies jaundice, lower extremity edema, abdominal distension or confusion.    Patient also denies melena, hematochezia or hematemesis. Patient denies weight loss, fevers, sweats or chills.    PMH/SMH: : Wilms tumor at age 2 1/2 with chemotherapy (vincristine, actinomycin D, doxorubicin), radiation (whole abdomen radiation total dose of 1080 cGy.) and surgery with a right nephrectomy complicated by surgical complications to left kidney and blood supply. S/p living donor renal transplant in 2004 maintained on Azathioprine and Tac.   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 Has been on estrogen OCPs for past 5 years.    Medications:   See below     No previous tobacco use. No history of significant alcohol. No previous IV/IN drug use. Currently professional barrell racer. Within the  past year, has hard falls 4 times. Patient currently lives with parent for part of the year. [place] for six months of year (January through June). No known family history of liver disease or liver cancer.       Previous work-up:  HCV antibody nonractive  HBV SAb non-immune  HBV SAg nonreactive  HBV CAb: nonreactive      Medical hx Surgical hx   Past Medical History:   Diagnosis Date     H/O kidney transplant      Hypertension      PONV (postoperative nausea and vomiting)      Urinary tract infection 11/04/2021     Wilm's tumor     Past Surgical History:   Procedure Laterality Date     APPENDECTOMY       CHOLECYSTECTOMY  05/20/2003     ENT SURGERY       IR RENAL BIOPSY RIGHT  11/2/2022     OPEN REDUCTION INTERNAL FIXATION RODDING INTRAMEDULLARY FEMUR  8/20/2012    Procedure: OPEN REDUCTION INTERNAL FIXATION RODDING INTRAMEDULLARY FEMUR;  Closed Reduction Internal Fixation Right Femur;  Surgeon: Catracho Hook MD;  [facility]     PERCUTANEOUS BIOPSY KIDNEY N/A 11/2/2022    Procedure: transplant kidney biopsy;  Surgeon: Isra Still PA-C;  [facility] P[facility]      REVISE SCAR TRUNK Right 11/6/2015    Procedure: REVISE SCAR TRUNK;  Surgeon: COOPER Anderson MD;  Location: [facility] OR     right nephrectomy  05/19/2003    due to Wilms tumor; performed by Dr. Myles Velez at [facility]     TONSILLECTOMY  01/31/2005     TRANSPLANT KIDNEY RECIPIENT LIVING RELATED CHILD  11/09/2004    left nephrectomy performed at time of transplant                 Medications:     Current Outpatient Medications   Medication     amLODIPine (NORVASC) 10 MG tablet     azaTHIOprine (IMURAN) 50 MG tablet     enalapril (VASOTEC) 5 MG tablet     labetalol (NORMODYNE) 100 MG tablet     levonorgest-eth estrad 91-Day (SEASONIQUE) 0.15-0.03 &0.01 MG tablet     magnesium oxide (MAG-OX) 400 (241.3 Mg) MG tablet     Multiple Vitamins-Minerals (WOMENS MULTIVITAMIN PO)     sulfamethoxazole-trimethoprim (BACTRIM)  400-80 MG tablet     tacrolimus (GENERIC EQUIVALENT) 1 MG capsule     No current facility-administered medications for this visit.            Allergies:     Allergies   Allergen Reactions     Ibuprofen Other (See Comments)     Avoid nephrotoxic medications as needed due to kidney transplant status     Shellfish-Derived Products Nausea and Vomiting     Vancomycin      Uses Benadryl Prior to administration            Social History:     Social History     Socioeconomic History     Marital status: Single     Spouse name: Not on file     Number of children: Not on file     Years of education: Not on file     Highest education level: Not on file   Occupational History     Not on file   Tobacco Use     Smoking status: Never     Smokeless tobacco: Never   Substance and Sexual Activity     Alcohol use: No     Drug use: No     Sexual activity: Never   Other Topics Concern     Not on file   Social History Narrative     Not on file     Social Determinants of Health     Financial Resource Strain: Not on file   Food Insecurity: Not on file   Transportation Needs: Not on file   Physical Activity: Not on file   Stress: Not on file   Social Connections: Not on file   Intimate Partner Violence: Not on file   Housing Stability: Not on file            Family History:     Family History   Problem Relation Age of Onset     Hypertension Father               Review of Systems:   Gen: See HPI     HEENT: No change in vision or hearing, mouth sores, dysphagia, lymph nodes  Resp: No shortness of breath, coughing, hx of asthma  CV: No chest pain, palpitations, syncope   GI: See HPI  : No dysuria, history of stones, urine color    Skin: No rash; no pruritus or psoriasis  MS: No arthralgias, myalgias, joint swelling  Neuro: No memory changes, confusion, numbness    Heme: No difficulty clotting, bruising, bleeding  Psych:  No anxiety, depression, agitation          Physical Exam:     GENERAL: healthy, alert and no distress  EYES: Eyes grossly  normal to inspection, conjunctivae and sclerae normal  RESP: no audible wheeze, cough, or visible cyanosis.  No visible retractions or increased work of breathing.  Able to speak fully in complete sentences  NEURO: Cranial nerves grossly intact, mentation intact and speech normal  PSYCH: mentation appears normal, affect normal/bright, judgement and insight intact, normal speech and appearance well-groomed         Data:   Reviewed in person and significant for:    Lab Results   Component Value Date     11/15/2022     01/15/2019      Lab Results   Component Value Date    POTASSIUM 4.7 11/15/2022    POTASSIUM 4.1 01/15/2019     Lab Results   Component Value Date    CHLORIDE 109 11/15/2022    CHLORIDE 106 10/19/2022    CHLORIDE 105 01/15/2019     Lab Results   Component Value Date    CO2 22 11/15/2022    CO2 19 01/15/2019     Lab Results   Component Value Date    BUN 15 11/15/2022    BUN 30 01/15/2019     Lab Results   Component Value Date    CR 1.15 11/15/2022    CR 1.19 01/15/2019       Lab Results   Component Value Date    WBC 9.4 11/02/2022    WBC 11.7 01/15/2019     Lab Results   Component Value Date    HGB 10.5 11/02/2022    HGB 12.3 01/15/2019     Lab Results   Component Value Date    HCT 31.8 11/02/2022    HCT 36.6 01/15/2019     Lab Results   Component Value Date    MCV 96 11/02/2022     01/15/2019     Lab Results   Component Value Date     11/02/2022     01/15/2019       Lab Results   Component Value Date    AST 25 11/02/2022    AST 18 01/15/2019     Lab Results   Component Value Date    ALT 25 11/02/2022    ALT 23 01/15/2019     Lab Results   Component Value Date    BILICONJ 0.0 06/22/2009      Lab Results   Component Value Date    BILITOTAL 0.2 11/02/2022    BILITOTAL 0.2 01/15/2019       Lab Results   Component Value Date    ALBUMIN 3.7 11/15/2022    ALBUMIN 3.5 01/15/2019     Lab Results   Component Value Date    PROTTOTAL 8.0 11/02/2022    PROTTOTAL 7.8 01/15/2019      Lab  Results   Component Value Date    ALKPHOS 61 11/02/2022    ALKPHOS 37 01/15/2019       Lab Results   Component Value Date    INR 0.88 11/02/2022    INR 0.94 06/22/2009         Imaging:        MRI ABDOMEN  11/15/2022:     CLINICAL HISTORY: Status post renal transplant, secondary to Wilms  tumor, complicated by inadvertent ligation of the contralateral renal  lesion. Surgery performed in 2004. Posttransplant course was  complicated by multiple infections. Previous MRI performed 11/2/2022  without contrast demonstrated multiple lesions within the liver.  Current study is being performed for further characterization of these  lesions with contrast.     TECHNIQUE: Images were acquired with and without intravenous contrast  through the abdomen. The following MR images were acquired: TrueFISP,  multiplanar T2 weighted, axial T1 in/out of phase, axial fat-saturated  T1, diffusion-weighted. Multiplanar T1-weighted images with fat  saturation were before contrast administration and at multiple time  points following the administration of intravenous contrast.      Contrast dose: 10 mL EOVIST     FINDINGS:     Comparison study: MRI abdomen 11/2/2020     Liver: Loss of signal intensity on in phase imaging. Noncirrhotic  liver morphology without evidence of fibrosis. There are 3  well-defined enhancing lesion seen within the liver as noted on prior  noncontrast MRI:     Lesion 1:  This is an arterially enhancing T2 hyperintense, slightly T1  hypointense lesion within segment 6 measuring 3.2 cm x 3.0 cm which  shows central washout and peripheral contrast enhancement on the  delayed phases. There is an enhancing central T2 hyperintense portion  of the lesion (series 15, image 23). It has irregular well-defined  margins and also demonstrates mild heterogeneous diffusion  restriction. There is peripheral enhancement on hepatobiliary phase  imaging.     Lesion #2.  The second arterially enhancing lesion in segment 7 measures 2.4 cm  x  3.0 cm. This also demonstrates central washout on the delayed venous  phase and mild heterogeneous diffusion restriction. This also contains  a central T2 hyperintense enhancing portion.     Lesion #3:  A third T2 hyperintense, slightly T1 hypointense arterially enhancing  lesion in segment 2 measuring 5.4 cm x 4.6 cm which does not  demonstrate significant washout on the delayed images however there  are nonenhancing septations. There is no significant diffusion  restriction. This lesion demonstrates avid internal enhancement on  hepatobiliary phase imaging, and abuts the left hepatic vein and a  branch of the left portal vein.     Gallbladder: Surgically absent.     Spleen: No evidence of splenomegaly. Loss of signal intensity on in  phase imaging.     Kidneys: Kidneys are absent consistent with prior surgery. Right lower  quadrant transplant kidney is again noted with multiple simple  cortical cysts. Largest one seen along the superior pole measuring 11  mm. Small 4 mm hemorrhagic/proteinaceous cyst in the mid transplant  kidney.     Adrenal glands: The right adrenal gland appears unremarkable. Left  adrenal gland is not visualized, status postsurgical resection.     Pancreas: The pancreas appears normal. Intrapancreatic ducts are not  dilated. No mass seen within the head body and tail of pancreas.     Bowel: The visualized bowel structures are unremarkable. Moderate  stool burden. No evidence of bowel obstruction. No mucosal thickening  or abnormal wall enhancement.     Lymph nodes: No definite evidence of lymphadenopathy in the visualized  sections through the abdomen.     Blood vessels: Narrowed appearance of an infrahepatic IVC anastomosis,  with prominent collateral veins along the spine which empty into a  dilated azygos vein. This is unchanged since 2009.     Lung bases: The visualized lung fields are clear.     Bones and soft tissues: Both soft tissues appear unremarkable.     Mesentery and abdominal  wall: Mesentery and bowel also appear normal.  Sequelae of prior renal transplant biopsy in the right flank.     Ascites: No evidence of free fluid in the abdomen.                                                                         IMPRESSION:  1. The lesions with similar imaging characteristics in hepatic  segments 6 and 7 are technically indeterminate and appear benign, most  likely representing hepatic adenomas. Recommend follow up MRI with  Eovist in 3-6 months.   2. The lesion in the left hepatic lobe is most consistent with benign  focal nodular hyperplasia. The differential includes atypical adenoma  with Eovist retention (given additional suspected adenomas).   3. Iron deposition in the liver and spleen.

## 2022-11-30 NOTE — PROGRESS NOTES
Anuradha is a 22 year old who is being evaluated via a billable video visit.      How would you like to obtain your AVS? MIG Chinahart  If the video visit is dropped, the invitation should be resent by: Text to cell phone: [phone]  Will anyone else be joining your video visit? No    Video-Visit Details      Video Start Time: 12:32 pm     Type of service:  Video Visit    Video End Time: 1:10 pm     Originating Location (pt. Location): Home     Distant Location (provider location): Off-site    Platform used for Video Visit: Noel

## 2022-12-02 ENCOUNTER — PATIENT OUTREACH (OUTPATIENT)
Dept: SURGERY | Facility: CLINIC | Age: 22
End: 2022-12-02

## 2022-12-02 NOTE — PROGRESS NOTES
New Patient Oncology Nurse Navigator Note     Referring provider: YAMILET Swann     Referring Clinic/Organization: [facility]     Referred to: Hepatobiliary Surgery      Requested provider (if applicable): Dr. Cooper Mckeon    Referral Received: 12/02/22       Evaluation for : Hepatic Adenoma      Clinical History (per Nurse review of records provided):      See book marked documents:       Referring MD office note    Imaging reports       Lab Results   Component Value Date/Time    ALBUMIN 3.7 11/15/2022 11:59 AM    ALBUMIN 3.5 01/15/2019 12:37 PM    AST 25 11/02/2022 08:33 AM    AST 18 01/15/2019 12:37 PM    ALT 25 11/02/2022 08:33 AM    ALT 23 01/15/2019 12:37 PM    ALKPHOS 61 11/02/2022 08:33 AM    ALKPHOS 37 (L) 01/15/2019 12:37 PM    BILITOTAL 0.2 11/02/2022 08:33 AM    BILITOTAL 0.2 01/15/2019 12:37 PM    WBC 9.4 11/02/2022 08:33 AM    WBC 11.7 (H) 01/15/2019 12:37 PM          Past Medical History:   Diagnosis Date     H/O kidney transplant      Hypertension      PONV (postoperative nausea and vomiting)      Urinary tract infection 11/04/2021     Wilm's tumor        Past Surgical History:   Procedure Laterality Date     APPENDECTOMY       CHOLECYSTECTOMY  05/20/2003     ENT SURGERY       IR RENAL BIOPSY RIGHT  11/2/2022     OPEN REDUCTION INTERNAL FIXATION RODDING INTRAMEDULLARY FEMUR  8/20/2012    Procedure: OPEN REDUCTION INTERNAL FIXATION RODDING INTRAMEDULLARY FEMUR;  Closed Reduction Internal Fixation Right Femur;  Surgeon: Catracho Hook MD;  [facility]     PERCUTANEOUS BIOPSY KIDNEY N/A 11/2/2022    Procedure: transplant kidney biopsy;  Surgeon: Isra Still PA-C;  [facility]      REVISE SCAR TRUNK Right 11/6/2015    Procedure: REVISE SCAR TRUNK;  Surgeon: COOPER Anderson MD;  Location: [facility] OR     right nephrectomy  05/19/2003    due to Wilms tumor; performed by Dr. Myles Velez at [facility]     TONSILLECTOMY  01/31/2005     TRANSPLANT  KIDNEY RECIPIENT LIVING RELATED CHILD  11/09/2004    left nephrectomy performed at time of transplant       Current Outpatient Medications   Medication Sig Dispense Refill     amLODIPine (NORVASC) 10 MG tablet Take 1 tablet (10 mg) by mouth daily 30 tablet 11     azaTHIOprine (IMURAN) 50 MG tablet Take 1.5 tablets (75 mg) by mouth daily 45 tablet 11     enalapril (VASOTEC) 5 MG tablet Take 1 tablet (5 mg) by mouth daily 90 tablet 11     labetalol (NORMODYNE) 100 MG tablet Take 1 tablet (100 mg) by mouth 2 times daily 180 tablet 6     levonorgest-eth estrad 91-Day (SEASONIQUE) 0.15-0.03 &0.01 MG tablet Take 1 tablet by mouth daily 91 tablet 3     magnesium oxide (MAG-OX) 400 (241.3 Mg) MG tablet Take 1 tablet (400 mg) by mouth 2 times daily 60 tablet 11     Multiple Vitamins-Minerals (WOMENS MULTIVITAMIN PO) Take 1 tablet by mouth daily       sulfamethoxazole-trimethoprim (BACTRIM) 400-80 MG tablet Take 1 tablet by mouth Every Mon, Wed, Fri Morning 13 tablet 11     tacrolimus (GENERIC EQUIVALENT) 1 MG capsule Take 2 capsules (2 mg) by mouth 2 times daily 360 capsule 3           Allergies   Allergen Reactions     Ibuprofen Other (See Comments)     Avoid nephrotoxic medications as needed due to kidney transplant status     Shellfish-Derived Products Nausea and Vomiting     Vancomycin      Uses Benadryl Prior to administration          Patient Active Problem List   Diagnosis     Kidney replaced by transplant     Wilms' tumor (H)     Status post chemotherapy     S/P radiation therapy     Lack of expected normal physiological development     Primary ovarian failure     EBV (Derrell-Barr virus) viremia     HTN (hypertension)     Immunosuppression (H)     Scar adherent     Recurrent pyelonephritis     Chronic kidney disease, stage 3 (H)     Status post kidney transplant     Fever, unspecified fever cause     COVID-19     Acute kidney injury (H)     Encounter for well woman exam     Urinary tract infection         Clinical  Assessment / Barriers to Care (Per Nurse):    None at this time.     Records Location:     [facility]     Records Needed:     NONE AT THIS TIME      Additional testing needed prior to consult:     NONE AT THIS TIME    Referral updates and Plan:       Consult with Surgical Oncology     Annette Reed RN, BSN   Surgical Oncology New Patient Nurse Navigator  [facility]  [phone]

## 2022-12-04 ENCOUNTER — MYC MEDICAL ADVICE (OUTPATIENT)
Dept: OBGYN | Facility: CLINIC | Age: 22
End: 2022-12-04

## 2022-12-04 DIAGNOSIS — Z30.09 GENERAL COUNSELING FOR PRESCRIPTION OF ORAL CONTRACEPTIVES: ICD-10-CM

## 2022-12-05 RX ORDER — LEVONORGESTREL / ETHINYL ESTRADIOL AND ETHINYL ESTRADIOL 150-30(84)
1 KIT ORAL DAILY
Qty: 91 TABLET | Refills: 3 | Status: SHIPPED | OUTPATIENT
Start: 2022-12-05 | End: 2024-03-11

## 2022-12-05 NOTE — TELEPHONE ENCOUNTER
Refill sent over for her birth control. Last PAP 2-22 and was normal.     Refill sent per protocol.

## 2022-12-08 NOTE — TELEPHONE ENCOUNTER
RECORDS STATUS - ALL OTHER DIAGNOSIS      RECORDS RECEIVED FROM: [facility]   DATE RECEIVED: 12/08/22   NOTES STATUS DETAILS   OFFICE NOTE from referring provider EPIC 11/30/22: Chichi Weinberg PA-C   OFFICE NOTE from medical oncologist Epic 11/15/22: JASMYNE Trevino CNP   DISCHARGE SUMMARY from hospital [facility] 11/02/22, 09/03/21, 11/06/13, 08/20/12, 05/16/12, 02/09/12 09/09/06: [facility]    07/17/22: [facility]   [facility]: [place] [facility]  11/04/21: [facility]   OPERATIVE REPORT Epic 11/02/22: Transplant kidney biopsy   MEDICATION LIST [facility]    LABS     PATHOLOGY REPORTS Report in EPIC 11/02/22: [id number]   ANYTHING RELATED TO DIAGNOSIS Epic Most recent 11/15/22   IMAGING (NEED IMAGES & REPORT)     CT SCANS PACS 02/16/09: CT Chest Abd Pel  02/10/12-01/25/05: CT Chest   MRI PACS 11/15/22-10/11/04: MR Abd   XRAYS PACS 11/07/13-09/11/06: XR Abd  07/01/10-11/13/04: XR Chest   ULTRASOUND PACS 11/02/22-01/25/05: US Abd  11/02/22-04/28/05: US Renal

## 2022-12-09 NOTE — PROGRESS NOTES
Department of Surgery  Division of Surgical Oncology    New Patient Consultation  Dec 12, 2022    Anuradha Pearson is a 22 year old female who presents with hepatic masses.  She was referred by Massiel WOODARD.    History of Present Illness     Anuradha has an extensive medical history. As a child, she was diagnosed with Wilms tumor s/p chemotherapy (vincristine, actinomycin, doxorubicin) and abdominal radiation (1080Gy) and R nephrectomy complicated by perfusion issues to the L kidney eventually requiring kidney transplant in 2004. She is on azathioprine and tacro for immunosuppression.     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    She underwent an US of her transplanted kidney on 11/2/22. A hepatic mas was noted and a dedicated RUQ US was done same-day. This showed a =3cm lesion in the R hepatic lobe and additional lesions in the left hepatic lobe.    MRI without contrast was done on 11/2. MRI was repeated with contrast on 11/15. This showed 3 lesions, the largest measuring 5.4cm in segment 2. This was felt to represent FNH vs atypical adenoma. Two lesions, segments 6 and 7, appeared consistent with adenomas.    She did see Chichi Weinberg in Hepatology 11/30 and was referred here for an additional opinion.    She denies any known history of liver lesions or liver disease, and has no known family history of such issues.    She is an active  and participates in [employer], so she has a regular risk of falling.      Past Medical History:   Diagnosis Date     H/O kidney transplant      Hypertension      PONV (postoperative nausea and vomiting)      Urinary tract infection 11/04/2021     Wilm's tumor        Past Surgical History:   Procedure Laterality Date     APPENDECTOMY       CHOLECYSTECTOMY  05/20/2003     ENT SURGERY       IR RENAL BIOPSY RIGHT  11/2/2022     OPEN REDUCTION INTERNAL FIXATION RODDING INTRAMEDULLARY FEMUR  8/20/2012    Procedure: OPEN  REDUCTION INTERNAL FIXATION RODDING INTRAMEDULLARY FEMUR;  Closed Reduction Internal Fixation Right Femur;  Surgeon: Catracho Hook MD;  [facility]     PERCUTANEOUS BIOPSY KIDNEY N/A 11/2/2022    Procedure: transplant kidney biopsy;  Surgeon: Isra Still PA-C;  [facility]: [facility]      REVISE SCAR TRUNK Right 11/6/2015    Procedure: REVISE SCAR TRUNK;  Surgeon: COOPER Anderson MD;  Location: [facility]     right nephrectomy  05/19/2003    due to Wilms tumor; performed by Dr. Myles Velez at [facility]     TONSILLECTOMY  01/31/2005     TRANSPLANT KIDNEY RECIPIENT LIVING RELATED CHILD  11/09/2004    left nephrectomy performed at time of transplant       Current Outpatient Medications   Medication Sig Dispense Refill     amLODIPine (NORVASC) 10 MG tablet Take 1 tablet (10 mg) by mouth daily 30 tablet 11     azaTHIOprine (IMURAN) 50 MG tablet Take 1.5 tablets (75 mg) by mouth daily 45 tablet 11     enalapril (VASOTEC) 5 MG tablet Take 1 tablet (5 mg) by mouth daily 90 tablet 11     labetalol (NORMODYNE) 100 MG tablet Take 1 tablet (100 mg) by mouth 2 times daily 180 tablet 6     levonorgest-eth estrad 91-Day (SEASONIQUE) 0.15-0.03 &0.01 MG tablet Take 1 tablet by mouth daily 91 tablet 3     magnesium oxide (MAG-OX) 400 (241.3 Mg) MG tablet Take 1 tablet (400 mg) by mouth 2 times daily 60 tablet 11     Multiple Vitamins-Minerals (WOMENS MULTIVITAMIN PO) Take 1 tablet by mouth daily       sulfamethoxazole-trimethoprim (BACTRIM) 400-80 MG tablet Take 1 tablet by mouth Every Mon, Wed, Fri Morning 13 tablet 11     tacrolimus (GENERIC EQUIVALENT) 1 MG capsule Take 2 capsules (2 mg) by mouth 2 times daily 360 capsule 3        Allergies   Allergen Reactions     Ibuprofen Other (See Comments)     Avoid nephrotoxic medications as needed due to kidney transplant status     Shellfish-Derived Products Nausea and Vomiting     Vancomycin      Uses Benadryl Prior to administration     "    Social History:   reports that she has never smoked. She has never used smokeless tobacco. She reports that she does not drink alcohol and does not use drugs.    Family History:  Family History   Problem Relation Age of Onset     Hypertension Father      Cancer No family hx of        Review of Systems:  A 14-point review of systems was performed, with no additional pertinent positives or negatives beyond those mentioned in the history of present illness.      Physical Exam     /87 (BP Location: Right arm, Patient Position: Chair, Cuff Size: Adult Large)   Pulse 102   Temp 98.1  F (36.7  C) (Oral)   Resp 16   Ht 1.492 m (4' 10.74\")   Wt 57.8 kg (127 lb 8 oz)   SpO2 98%   BMI 25.98 kg/m       Abdomen: midline and bilateral subcostal incisions    Labs:  AFP 2.5  Liver Function Studies - Recent Labs   Lab Test 11/15/22  1159 11/02/22  0833   PROTTOTAL  --  8.0   ALBUMIN 3.7 3.7  3.6   BILITOTAL  --  0.2   ALKPHOS  --  61   AST  --  25   ALT  --  25         Imaging:  See HPI  MRI 11/15/22, excerpt:                                                                   IMPRESSION:  1. The lesions with similar imaging characteristics in hepatic  segments 6 and 7 are technically indeterminate and appear benign, most  likely representing hepatic adenomas. Recommend follow up MRI with  Eovist in 3-6 months.   2. The lesion in the left hepatic lobe is most consistent with benign  focal nodular hyperplasia. The differential includes atypical adenoma  with Eovist retention (given additional suspected adenomas).   3. Iron deposition in the liver and spleen.        Assessment & Plan     Anuradha Pearson is a 22 year old female with hepatic lesions in the setting of kidney transplant, prior chemotherapy/radiation, and currently taking OCPs.    I agree with the radiology interpretations that these are likely FNH on L and adenomas on the R. The natural history of FNH and adenomas was discussed with the patient. There " are a few case reports of hepatic adenomatosis in the setting of solid organ transplants, although it is possible that these are unrelated. Her OCP use certainly complicates the picture; if she continues to take this, I do expect growth of the adenomas and possibly new adenomas to arise. Chichi Weinberg was going to review the case with the patient's Gyn and discuss what could be done to limit the OCP dose, but it is clear she cannot stop these.    Her case was presented at our tumor board and the team was in agreement with the plan for treating these due to the risk of growth and rupture since she cannot come off OCPs and due to her risk related to her activity in [place]. We discussed several intervention options, including embolization, ablation, and surgical resection. Due to her transplant history and goals of resuming activity as soon as safely possible, she is not interested in resection unless there are no further options.    For ablation, we discussed the risks, benefits, and alternatives. I anticipate this to be a challenging case due to her immunosuppression and multiple prior abdominal surgeries; it may not be possible to do this laparoscopically. We may attempt surgical ablation - and would biopsy her L sided FNH - but, if not possible, we would consider referring her to IR for percutaneous ablation. I would not start with that approach due to the size, but it is an option. We discussed the possibility of incomplete ablations, needing additional ablations to complete the ablation zone, post-ablation recurrence, and the possibility of new adenomas arising. We also discussed the low but non-zero malignant potential and will consider biopsy of these if feasible during any future procedure but that biopsy of any of these lesions will not change the current treatment plan. I reviewed the anticipated recovery which would include the potential for a few days in the hospital, likely at the discretion of her  transplant team, and needing to avoid high impact activities for at least 1 month after surgery. Her risk of post-procedure complications will be higher than usual due to her immunosuppression.    All questions were answered to their apparent satisfaction, and contact information was provided should additional questions or concerns arise.    Plan Summary:  -Review case with Chichi Weinberg / Gyn  -Patient wishes to review with her parents and will call our office - we can arrange a phone visit Friday to discuss further  -Pending laparoscopic / possible open ablation of R sided liver lesions x2 with biopsy of at least one liver lesion (?FNH on L side)      Jaydon Masterson MD MPHS      Today's visit was centered around new hepatic masses, likely benign, in the setting of multiple medical comorbidities.  The risk of additional morbidity or mortality with or without further treatment is moderate. Total time spent was 60 minutes, including but not limited to patient-facing time, chart review, review of tests/studies as noted above, and care coordination.

## 2022-12-12 ENCOUNTER — PRE VISIT (OUTPATIENT)
Dept: SURGERY | Facility: CLINIC | Age: 22
End: 2022-12-12

## 2022-12-12 ENCOUNTER — OFFICE VISIT (OUTPATIENT)
Dept: SURGERY | Facility: CLINIC | Age: 22
End: 2022-12-12
Attending: PHYSICIAN ASSISTANT
Payer: COMMERCIAL

## 2022-12-12 VITALS
HEIGHT: 59 IN | HEART RATE: 102 BPM | TEMPERATURE: 98.1 F | OXYGEN SATURATION: 98 % | WEIGHT: 127.5 LBS | SYSTOLIC BLOOD PRESSURE: 126 MMHG | BODY MASS INDEX: 25.7 KG/M2 | DIASTOLIC BLOOD PRESSURE: 87 MMHG | RESPIRATION RATE: 16 BRPM

## 2022-12-12 DIAGNOSIS — E28.39 PRIMARY OVARIAN FAILURE: ICD-10-CM

## 2022-12-12 DIAGNOSIS — Z94.0 KIDNEY REPLACED BY TRANSPLANT: ICD-10-CM

## 2022-12-12 DIAGNOSIS — D13.4 HEPATIC ADENOMA: ICD-10-CM

## 2022-12-12 PROCEDURE — G0463 HOSPITAL OUTPT CLINIC VISIT: HCPCS

## 2022-12-12 PROCEDURE — 99215 OFFICE O/P EST HI 40 MIN: CPT | Performed by: SURGERY

## 2022-12-12 ASSESSMENT — PAIN SCALES - GENERAL: PAINLEVEL: NO PAIN (0)

## 2022-12-12 NOTE — NURSING NOTE
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    Weight as of this encounter: 57.8 kg (127 lb 8 oz). Body surface area is 1.55 meters squared.  No Pain (0) Comment: Data Unavailable   No LMP recorded.  Allergies reviewed: Yes  Medications reviewed: Yes    Medications: Medication refills not needed today.  Pharmacy name entered into [facility]:    [facility] [id number] [facility]  [facility] SPECIAL[facility] [place] - [place] - [address]  [facility]ACY #[id number] - [place], [place] - [address]            "

## 2022-12-12 NOTE — LETTER
12/12/2022         RE: Anuradha Pearson  [id number]  [place] [address]        Dear Colleague,    Thank you for referring your patient, Anuradha Pearson, to the [facility]. Please see a copy of my visit note below.      Department of Surgery  Division of Surgical Oncology    New Patient Consultation  Dec 12, 2022    Anuradha Pearson is a 22 year old female who presents with hepatic masses.  She was referred by Massiel WOODARD.    History of Present Illness     Anuradha has an extensive medical history. As a child, she was diagnosed with Wilms tumor s/p chemotherapy (vincristine, actinomycin, doxorubicin) and abdominal radiation (1080Gy) and R nephrectomy complicated by perfusion issues to the L kidney eventually requiring kidney transplant in 2004. She is on azathioprine and tacro for immunosuppression.     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    She underwent an US of her transplanted kidney on 11/2/22. A hepatic mas was noted and a dedicated RUQ US was done same-day. This showed a =3cm lesion in the R hepatic lobe and additional lesions in the left hepatic lobe.    MRI without contrast was done on 11/2. MRI was repeated with contrast on 11/15. This showed 3 lesions, the largest measuring 5.4cm in segment 2. This was felt to represent FNH vs atypical adenoma. Two lesions, segments 6 and 7, appeared consistent with adenomas.    She did see Chichi Weinberg in Hepatology 11/30 and was referred here for an additional opinion.    She denies any known history of liver lesions or liver disease, and has no known family history of such issues.    She is an active  and participates in [employer], so she has a regular risk of falling.      Past Medical History:   Diagnosis Date     H/O kidney transplant      Hypertension      PONV (postoperative nausea and vomiting)      Urinary tract infection 11/04/2021      Wilm's tumor        Past Surgical History:   Procedure Laterality Date     APPENDECTOMY       CHOLECYSTECTOMY  05/20/2003     ENT SURGERY       IR RENAL BIOPSY RIGHT  11/2/2022     OPEN REDUCTION INTERNAL FIXATION RODDING INTRAMEDULLARY FEMUR  8/20/2012    Procedure: OPEN REDUCTION INTERNAL FIXATION RODDING INTRAMEDULLARY FEMUR;  Closed Reduction Internal Fixation Right Femur;  Surgeon: Catracho Hook MD;  [facility]     PERCUTANEOUS BIOPSY KIDNEY N/A 11/2/2022    Procedure: transplant kidney biopsy;  Surgeon: Isra Still PA-C;  [facility]      REVISE SCAR TRUNK Right 11/6/2015    Procedure: REVISE SCAR TRUNK;  Surgeon: COOPER Anderson MD;  Location: [facility] OR     right nephrectomy  05/19/2003    due to Wilms tumor; performed by Dr. Myles Velez at [facility]     TONSILLECTOMY  01/31/2005     TRANSPLANT KIDNEY RECIPIENT LIVING RELATED CHILD  11/09/2004    left nephrectomy performed at time of transplant       Current Outpatient Medications   Medication Sig Dispense Refill     amLODIPine (NORVASC) 10 MG tablet Take 1 tablet (10 mg) by mouth daily 30 tablet 11     azaTHIOprine (IMURAN) 50 MG tablet Take 1.5 tablets (75 mg) by mouth daily 45 tablet 11     enalapril (VASOTEC) 5 MG tablet Take 1 tablet (5 mg) by mouth daily 90 tablet 11     labetalol (NORMODYNE) 100 MG tablet Take 1 tablet (100 mg) by mouth 2 times daily 180 tablet 6     levonorgest-eth estrad 91-Day (SEASONIQUE) 0.15-0.03 &0.01 MG tablet Take 1 tablet by mouth daily 91 tablet 3     magnesium oxide (MAG-OX) 400 (241.3 Mg) MG tablet Take 1 tablet (400 mg) by mouth 2 times daily 60 tablet 11     Multiple Vitamins-Minerals (WOMENS MULTIVITAMIN PO) Take 1 tablet by mouth daily       sulfamethoxazole-trimethoprim (BACTRIM) 400-80 MG tablet Take 1 tablet by mouth Every Mon, Wed, Fri Morning 13 tablet 11     tacrolimus (GENERIC EQUIVALENT) 1 MG capsule Take 2 capsules (2 mg) by mouth 2 times daily 360  "capsule 3        Allergies   Allergen Reactions     Ibuprofen Other (See Comments)     Avoid nephrotoxic medications as needed due to kidney transplant status     Shellfish-Derived Products Nausea and Vomiting     Vancomycin      Uses Benadryl Prior to administration        Social History:   reports that she has never smoked. She has never used smokeless tobacco. She reports that she does not drink alcohol and does not use drugs.    Family History:  Family History   Problem Relation Age of Onset     Hypertension Father      Cancer No family hx of        Review of Systems:  A 14-point review of systems was performed, with no additional pertinent positives or negatives beyond those mentioned in the history of present illness.      Physical Exam     /87 (BP Location: Right arm, Patient Position: Chair, Cuff Size: Adult Large)   Pulse 102   Temp 98.1  F (36.7  C) (Oral)   Resp 16   Ht 1.492 m (4' 10.74\")   Wt 57.8 kg (127 lb 8 oz)   SpO2 98%   BMI 25.98 kg/m       Abdomen: midline and bilateral subcostal incisions    Labs:  AFP 2.5  Liver Function Studies - Recent Labs   Lab Test 11/15/22  1159 11/02/22  0833   PROTTOTAL  --  8.0   ALBUMIN 3.7 3.7  3.6   BILITOTAL  --  0.2   ALKPHOS  --  61   AST  --  25   ALT  --  25         Imaging:  See HPI  MRI 11/15/22, excerpt:                                                                   IMPRESSION:  1. The lesions with similar imaging characteristics in hepatic  segments 6 and 7 are technically indeterminate and appear benign, most  likely representing hepatic adenomas. Recommend follow up MRI with  Eovist in 3-6 months.   2. The lesion in the left hepatic lobe is most consistent with benign  focal nodular hyperplasia. The differential includes atypical adenoma  with Eovist retention (given additional suspected adenomas).   3. Iron deposition in the liver and spleen.        Assessment & Plan     Anuradha Pearson is a 22 year old female with hepatic lesions " in the setting of kidney transplant, prior chemotherapy/radiation, and currently taking OCPs.    I agree with the radiology interpretations that these are likely FNH on L and adenomas on the R. The natural history of FNH and adenomas was discussed with the patient. There are a few case reports of hepatic adenomatosis in the setting of solid organ transplants, although it is possible that these are unrelated. Her OCP use certainly complicates the picture; if she continues to take this, I do expect growth of the adenomas and possibly new adenomas to arise. Chichi Weinberg was going to review the case with the patient's Gyn and discuss what could be done to limit the OCP dose, but it is clear she cannot stop these.    Her case was presented at our tumor board and the team was in agreement with the plan for treating these due to the risk of growth and rupture since she cannot come off OCPs and due to her risk related to her activity in [place]. We discussed several intervention options, including embolization, ablation, and surgical resection. Due to her transplant history and goals of resuming activity as soon as safely possible, she is not interested in resection unless there are no further options.    For ablation, we discussed the risks, benefits, and alternatives. I anticipate this to be a challenging case due to her immunosuppression and multiple prior abdominal surgeries; it may not be possible to do this laparoscopically. We may attempt surgical ablation - and would biopsy her L sided FNH - but, if not possible, we would consider referring her to IR for percutaneous ablation. I would not start with that approach due to the size, but it is an option. We discussed the possibility of incomplete ablations, needing additional ablations to complete the ablation zone, post-ablation recurrence, and the possibility of new adenomas arising. We also discussed the low but non-zero malignant potential and will consider biopsy of  these if feasible during any future procedure but that biopsy of any of these lesions will not change the current treatment plan. I reviewed the anticipated recovery which would include the potential for a few days in the hospital, likely at the discretion of her transplant team, and needing to avoid high impact activities for at least 1 month after surgery. Her risk of post-procedure complications will be higher than usual due to her immunosuppression.    All questions were answered to their apparent satisfaction, and contact information was provided should additional questions or concerns arise.    Plan Summary:  -Review case with Chichi Weinberg / Gyn  -Patient wishes to review with her parents and will call our office - we can arrange a phone visit Friday to discuss further  -Pending laparoscopic / possible open ablation of R sided liver lesions x2 with biopsy of at least one liver lesion (?FNH on L side)      Jaydon Masterson MD MPHS      Today's visit was centered around new hepatic masses, likely benign, in the setting of multiple medical comorbidities.  The risk of additional morbidity or mortality with or without further treatment is moderate. Total time spent was 60 minutes, including but not limited to patient-facing time, chart review, review of tests/studies as noted above, and care coordination.

## 2022-12-12 NOTE — PATIENT INSTRUCTIONS
Surgical Oncology RN Care Coordination Note:     - please call us have discussing with your family or if you have any questions.     - you can reach us directly at [phone].     Annette Reed RN, BSN  Care Coordinator

## 2022-12-16 ENCOUNTER — VIRTUAL VISIT (OUTPATIENT)
Dept: SURGERY | Facility: HOSPITAL | Age: 22
End: 2022-12-16
Attending: SURGERY
Payer: COMMERCIAL

## 2022-12-16 ENCOUNTER — TELEPHONE (OUTPATIENT)
Dept: OBGYN | Facility: CLINIC | Age: 22
End: 2022-12-16

## 2022-12-16 DIAGNOSIS — D13.4 HEPATIC ADENOMA: Primary | ICD-10-CM

## 2022-12-16 DIAGNOSIS — E28.39 PRIMARY OVARIAN FAILURE: Primary | ICD-10-CM

## 2022-12-16 PROCEDURE — 99212 OFFICE O/P EST SF 10 MIN: CPT | Mod: TEL | Performed by: SURGERY

## 2022-12-16 NOTE — TELEPHONE ENCOUNTER
Called Anuradha to discuss change in estrogen replacement given development of hepatic adenomas. Discussed the typical treatment would be to discontinue estrogen but given her POF this would put her at increased risk of osteoporosis and heart disease. I recommend switching to HRT dosing and to administer transdermally to avoid first pass effect. Discussed use of combipatch 0.05/0.25. Discussed she may experience hot flashes as we are reducing her estrogen dose. Will plan followup in 3 months to see how she is doing. Discussed that she can call sooner if she is not doing well with the decreased estrogen dose.     Sejal Webb MD, MSCI, FACOG    [facility]  December 16, 2022

## 2022-12-16 NOTE — PROGRESS NOTES
Anuradha is a 22 year old who is being evaluated via a billable Telephone visit.      How would you like to obtain your AVS? MyChart  If the telephone visit is dropped, the invitation should be resent by: Text to cell phone: [phone]  Will anyone else be joining your telephone visit? Yes: Yaritza and Jovany . How would they like to receive their invitation? Text to cell phone: [phone]        Telephone-Visit Details    Telephone Start Time: 931    Telephone of service:  Video Visit    Telephone End Time: 948    Originating Location (pt. Location): Home      Distant Location (provider location):  On-site    Platform used for Telephone Visit: Noel Richards LPN on 12/16/2022 at 9:12 AM        Department of Surgery  Division of Surgical Oncology    Post-Operative Follow-Up  Dec 16, 2022    Anuradha Pearson is a 22 year old female who I saw earlier this week for hepatic masses.     She is joined today by her parents via phone.    History of Present Illness   Earlier this week, Anuradha and I discussed multiple options for treating the two right sided adenomas. She is on hormone replacement due to prior abdominal radiation resulting in ovarian insufficieny (hx of Wilms tumor) so she cannot stop her hormone therapy as a way to decrease the growth risk associated with these adenomas. Her case was discussed at our tumor board and I presented the options of IR embolization vs surgical ablation, and she opted for surgical ablation. However, I reviewed her case in depth with my  and we then reached out to radiology and IR. We subsequently identified additional lesions in her liver - they are sub-centimeter, so too small to further characterize, but the raise the possibility of more diffuse hepatic adenomatosis. The team thought that we should consider less invasive options first in this setting, so I asked to speak with her - this time with her parents - to review our latest findings and  recommendations.    She reports no new health issues and remains active in [place], which was also felt to increase the potential risk (rupture) associated with these adenomas.        Results Reviewed:  MRI from 11/15/22, report in my previous note, but addendum added here:     Addendum    Addendum to include several additional subcentimeter lesions as  follows:     -9 mm focus of arterial hyperenhancement in segment 1 (series 6, image  27) with mild T2 hyperintense signal, absent diffusion restriction,  and no enhancement on hepatobiliary phase.  -9 mm focus of subcapsular arterial hyperenhancement in hepatic  segment 7 (series 6, image 19) with mild T2 hyperintense signal,  absent diffusion restriction, and nonenhancement on hepatobiliary  phase images.  -Two 6 mm foci of arterial hyperenhancement in segment 3 (series 6,  image 36) with mild T2 hyperintense signal, absent diffusion  restriction, and apparent enhancement on hepatobiliary phase imaging  although small size limits evaluation.  -7 mm focus of arterial hyperenhancement in the lateral aspect of  segment 5/6 (series 6, image 40) without definite abnormality on any  other sequence.     These lesions are too small to characterize and indeterminate,  although favoring similar etiology to the larger previously described  lesions. Recommend attention on follow-up.     DEREK LUCIO MD          I, Jaydon Masterson, personally reviewed the above studies.      Assessment & Plan     Anuradha Pearson is a 22 year old female with hepatic adenomatosis.    After an extensive discussion of the events in my HPI, I outlined for Anuradha and her parents how it came to be that we have changed our recommendation. Surgical ablation would be quite challenging due to her prior surgery, has higher risks than typical due to immunosuppression and prior surgery, and might be a fairly aggressive intervention in someone who has the potential to have new sites of disease pop up and  grow over time, if these indeterminate lesions indeed represent early adenomas. We also briefly discussed the low but non-zero malignant potential of these lesions. We do not know how these have changed over time since no prior complete abdominal imaging is immediately available.    Massiel Weinberg is coordinating decreasing her OCP dose.    Dr. Hawkins has offered to see Anuradha to discuss embolization of these two right-sided adenomas. I briefly mentioned that percutaneous ablation might also be considered but likely only if embolization is not successful. I also discussed that Anuradha will need regular surveillance of her adenomas and will as Massiel Weinberg to help coordinate this. I will make these referrals today.    All questions were answered to their apparent satisfaction, and contact information was provided should additional questions or concerns arise.        Jaydon Masterson MD MPHS

## 2022-12-19 ENCOUNTER — TELEPHONE (OUTPATIENT)
Dept: RADIOLOGY | Facility: CLINIC | Age: 22
End: 2022-12-19

## 2022-12-19 NOTE — TELEPHONE ENCOUNTER
Called and spoke with Anuradha, regarding her referral to IR. Offered her the next available clinic spot for Dr. Hawkins on 1/13 at 9:30 AM for a new patient video visit. Patient accepted appointment but requesting to be seen sooner if something opens up. I informed her I would call her if I am able to get her in any sooner.     Radha CHRISTIE RN, BSN   Interventional Radiology RNCC   [phone]

## 2022-12-27 DIAGNOSIS — D13.4 HEPATIC ADENOMA: Primary | ICD-10-CM

## 2023-01-03 ENCOUNTER — VIRTUAL VISIT (OUTPATIENT)
Dept: VASCULAR SURGERY | Facility: CLINIC | Age: 23
End: 2023-01-03
Payer: COMMERCIAL

## 2023-01-03 DIAGNOSIS — D13.4 HEPATIC ADENOMA: ICD-10-CM

## 2023-01-03 PROCEDURE — 99203 OFFICE O/P NEW LOW 30 MIN: CPT | Mod: GT | Performed by: RADIOLOGY

## 2023-01-03 NOTE — PROGRESS NOTES
"[facility] Vascular      Type of Visit: Virtual: Noel    Patient is here for a new visit to discuss hepatic adenopathy consult.     Vitals - Patient Reported  Weight (Patient Reported): 52.2 kg (115 lb)  Height (Patient Reported): 149.9 cm (4' 11\")  BMI (Based on Pt Reported Ht/Wt): 23.23  Pain Score: No Pain (0)      Patient states is currently in the [facility]: Yes      Questions patient would like addressed today are: Patient verbalized no questions/concerns, this has been communicated to the provider.     Refills are needed: No    How would you like to obtain your AVS? Zachary Pina Colling       Video call:   Noel/Home  Start: 9:39  End: 9:47    History of Present Illness      Anuradha is a pleasant 22 year-old patient with liver adenomas. MRI demonstrates the presence of at least three lesions. She is on hormone replacement due to prior abdominal radiation resulting in ovarian insufficieny (hx of Wilms tumor) to decrease the growth risk associated with these adenomas.   She is doing well otherwise. No pain.   The tumor board suggested to treat with embolization.  I have looked at the imaging and labs.       Past Medical History:   Diagnosis Date     H/O kidney transplant      Hypertension      PONV (postoperative nausea and vomiting)      Urinary tract infection 11/04/2021     Wilm's tumor        Past Surgical History:   Procedure Laterality Date     APPENDECTOMY       CHOLECYSTECTOMY  05/20/2003     ENT SURGERY       IR RENAL BIOPSY RIGHT  11/2/2022     OPEN REDUCTION INTERNAL FIXATION RODDING INTRAMEDULLARY FEMUR  8/20/2012    Procedure: OPEN REDUCTION INTERNAL FIXATION RODDING INTRAMEDULLARY FEMUR;  Closed Reduction Internal Fixation Right Femur;  Surgeon: Catracho Hook MD;  [facility]: [facility] OR     PERCUTANEOUS BIOPSY KIDNEY N/A 11/2/2022    Procedure: transplant kidney biopsy;  Surgeon: Isra Still PA-C;  Location: [facility] PEDS SEDATION      REVISE SCAR TRUNK Right 11/6/2015 "    Procedure: REVISE SCAR TRUNK;  Surgeon: COOPER Anderson MD;  Location: [facility] OR     right nephrectomy  05/19/2003    due to Wilms tumor; performed by Dr. Myles Velez at [facility]     TONSILLECTOMY  01/31/2005     TRANSPLANT KIDNEY RECIPIENT LIVING RELATED CHILD  11/09/2004    left nephrectomy performed at time of transplant       Family History   Problem Relation Age of Onset     Hypertension Father      Cancer No family hx of        Social History     Tobacco Use     Smoking status: Never     Smokeless tobacco: Never   Substance Use Topics     Alcohol use: No     Current Outpatient Medications   Medication     amLODIPine (NORVASC) 10 MG tablet     azaTHIOprine (IMURAN) 50 MG tablet     enalapril (VASOTEC) 5 MG tablet     estradiol-norethindrone (COMBIPATCH) 0.05-0.25 MG/DAY bi-weekly patch     labetalol (NORMODYNE) 100 MG tablet     magnesium oxide (MAG-OX) 400 (241.3 Mg) MG tablet     Multiple Vitamins-Minerals (WOMENS MULTIVITAMIN PO)     sulfamethoxazole-trimethoprim (BACTRIM) 400-80 MG tablet     tacrolimus (GENERIC EQUIVALENT) 1 MG capsule     levonorgest-eth estrad 91-Day (SEASONIQUE) 0.15-0.03 &0.01 MG tablet     No current facility-administered medications for this visit.       Impression and plan:  At least three liver adenomas. I discussed the procedure, risks and benefits.  We will plan for at least 2 sessions of embolization but will do our best to treat all of them if possible in one setting. We will start with the largest lesion in the left lobe.    The patient understands and is willing to proceed.     Total time: 15 min

## 2023-01-03 NOTE — NURSING NOTE
Chief Complaint   Patient presents with     Consult       Patient confirms medications and allergies are accurate via patients echeck in completion, and or denies any changes since last reviewed/verified.       Silvana Caldwell, Virtual Facilitator

## 2023-01-03 NOTE — LETTER
"1/3/2023       RE: Anuradha Pearson  [address]  [place] [address]     Dear Colleague,    Thank you for referring your patient, Anuradha Pearson, to the [facility] at [facility]. Please see a copy of my visit note below.    [facility] Vascular      Type of Visit: Virtual: Randall    Patient is here for a new visit to discuss hepatic adenopathy consult.     Vitals - Patient Reported  Weight (Patient Reported): 52.2 kg (115 lb)  Height (Patient Reported): 149.9 cm (4' 11\")  BMI (Based on Pt Reported Ht/Wt): 23.23  Pain Score: No Pain (0)      Patient states is currently in the [facility]: Yes      Questions patient would like addressed today are: Patient verbalized no questions/concerns, this has been communicated to the provider.     Refills are needed: No    How would you like to obtain your AVS? Zachary Pina Colling       Video call:   Randall/Home  Start: 9:39  End: 9:47    History of Present Illness      Anuradha is a pleasant 22 year-old patient with liver adenomas. MRI demonstrates the presence of at least three lesions. She is on hormone replacement due to prior abdominal radiation resulting in ovarian insufficieny (hx of Wilms tumor) to decrease the growth risk associated with these adenomas.   She is doing well otherwise. No pain.   The tumor board suggested to treat with embolization.  I have looked at the imaging and labs.       Past Medical History:   Diagnosis Date     H/O kidney transplant      Hypertension      PONV (postoperative nausea and vomiting)      Urinary tract infection 11/04/2021     Wilm's tumor        Past Surgical History:   Procedure Laterality Date     APPENDECTOMY       CHOLECYSTECTOMY  05/20/2003     ENT SURGERY       IR RENAL BIOPSY RIGHT  11/2/2022     OPEN REDUCTION INTERNAL FIXATION RODDING INTRAMEDULLARY FEMUR  8/20/2012    Procedure: OPEN REDUCTION INTERNAL " FIXATION RODDING INTRAMEDULLARY FEMUR;  Closed Reduction Internal Fixation Right Femur;  Surgeon: Catracho Hook MD;  [facility] OR     PERCUTANEOUS BIOPSY KIDNEY N/A 11/2/2022    Procedure: transplant kidney biopsy;  Surgeon: Isra Still PA-C;  [facility] PED[facility] SED[facility]      REVISE SCAR TRUNK Right 11/6/2015    Procedure: REVISE SCAR TRUNK;  Surgeon: COOPER Anderson MD;  Location: [facility] OR     right nephrectomy  05/19/2003    due to Wilms tumor; performed by Dr. [facility]les [facility]elez at [facility]     TONSILLECTOMY  01/31/2005     TRANSPLANT KIDNEY RECIPIENT LIVING RELATED CHILD  11/09/2004    left nephrectomy performed at time of transplant       Family History   Problem Relation Age of Onset     Hypertension Father      Cancer No family hx of        Social History     Tobacco Use     Smoking status: Never     Smokeless tobacco: Never   Substance Use Topics     Alcohol use: No     Current Outpatient Medications   Medication     amLODIPine (NORVASC) 10 MG tablet     azaTHIOprine (IMURAN) 50 MG tablet     enalapril (VASOTEC) 5 MG tablet     estradiol-norethindrone (COMBIPATCH) 0.05-0.25 MG/DAY bi-weekly patch     labetalol (NORMODYNE) 100 MG tablet     magnesium oxide (MAG-OX) 400 (241.3 Mg) MG tablet     Multiple Vitamins-Minerals (WOMENS MULTIVITAMIN PO)     sulfamethoxazole-trimethoprim (BACTRIM) 400-80 MG tablet     tacrolimus (GENERIC EQUIVALENT) 1 MG capsule     levonorgest-eth estrad 91-Day (SEASONIQUE) 0.15-0.03 &0.01 MG tablet     No current facility-administered medications for this visit.       Impression and plan:  At least three liver adenomas. I discussed the procedure, risks and benefits.  We will plan for at least 2 sessions of embolization but will do our best to treat all of them if possible in one setting. We will start with the largest lesion in the left lobe.    The patient understands and is willing to proceed.     Total time: 15 min        Sincerely,    Beck  MD Justyn

## 2023-01-05 ENCOUNTER — HOSPITAL ENCOUNTER (OUTPATIENT)
Facility: CLINIC | Age: 23
Discharge: HOME | End: 2023-01-05
Attending: RADIOLOGY | Admitting: RADIOLOGY
Payer: COMMERCIAL

## 2023-01-05 ENCOUNTER — APPOINTMENT (OUTPATIENT)
Dept: INTERVENTIONAL RADIOLOGY/VASCULAR | Facility: CLINIC | Age: 23
End: 2023-01-05
Attending: RADIOLOGY
Payer: COMMERCIAL

## 2023-01-05 ENCOUNTER — APPOINTMENT (OUTPATIENT)
Dept: MEDSURG UNIT | Facility: CLINIC | Age: 23
End: 2023-01-05
Attending: RADIOLOGY
Payer: COMMERCIAL

## 2023-01-05 ENCOUNTER — MYC MEDICAL ADVICE (OUTPATIENT)
Dept: OTHER | Age: 23
End: 2023-01-05

## 2023-01-05 VITALS
SYSTOLIC BLOOD PRESSURE: 116 MMHG | BODY MASS INDEX: 24.98 KG/M2 | WEIGHT: 123.9 LBS | RESPIRATION RATE: 16 BRPM | OXYGEN SATURATION: 98 % | TEMPERATURE: 97.7 F | DIASTOLIC BLOOD PRESSURE: 77 MMHG | HEART RATE: 83 BPM | HEIGHT: 59 IN

## 2023-01-05 DIAGNOSIS — I15.1 HYPERTENSION SECONDARY TO OTHER RENAL DISORDERS: ICD-10-CM

## 2023-01-05 DIAGNOSIS — D13.4 HEPATIC ADENOMA: ICD-10-CM

## 2023-01-05 LAB
ALBUMIN SERPL BCG-MCNC: 4.6 G/DL (ref 3.5–5.2)
ALP SERPL-CCNC: 89 U/L (ref 35–104)
ALT SERPL W P-5'-P-CCNC: 25 U/L (ref 10–35)
ANION GAP SERPL CALCULATED.3IONS-SCNC: 14 MMOL/L (ref 7–15)
AST SERPL W P-5'-P-CCNC: 25 U/L (ref 10–35)
BILIRUB DIRECT SERPL-MCNC: <0.2 MG/DL (ref 0–0.3)
BILIRUB SERPL-MCNC: <0.2 MG/DL
BUN SERPL-MCNC: 28 MG/DL (ref 6–20)
CALCIUM SERPL-MCNC: 10 MG/DL (ref 8.6–10)
CHLORIDE SERPL-SCNC: 107 MMOL/L (ref 98–107)
CREAT SERPL-MCNC: 1.39 MG/DL (ref 0.51–0.95)
DEPRECATED HCO3 PLAS-SCNC: 18 MMOL/L (ref 22–29)
ERYTHROCYTE [DISTWIDTH] IN BLOOD BY AUTOMATED COUNT: 11.9 % (ref 10–15)
GFR SERPL CREATININE-BSD FRML MDRD: 55 ML/MIN/1.73M2
GLUCOSE SERPL-MCNC: 92 MG/DL (ref 70–99)
HCG INTACT+B SERPL-ACNC: 1 MIU/ML
HCT VFR BLD AUTO: 36.5 % (ref 35–47)
HGB BLD-MCNC: 11.4 G/DL (ref 11.7–15.7)
INR PPP: 0.9 (ref 0.85–1.15)
MCH RBC QN AUTO: 31 PG (ref 26.5–33)
MCHC RBC AUTO-ENTMCNC: 31.2 G/DL (ref 31.5–36.5)
MCV RBC AUTO: 99 FL (ref 78–100)
PLATELET # BLD AUTO: 312 10E3/UL (ref 150–450)
POTASSIUM SERPL-SCNC: 4.7 MMOL/L (ref 3.4–5.3)
PROT SERPL-MCNC: 7.8 G/DL (ref 6.4–8.3)
RBC # BLD AUTO: 3.68 10E6/UL (ref 3.8–5.2)
SODIUM SERPL-SCNC: 139 MMOL/L (ref 136–145)
WBC # BLD AUTO: 9 10E3/UL (ref 4–11)

## 2023-01-05 PROCEDURE — 36248 INS CATH ABD/L-EXT ART ADDL: CPT

## 2023-01-05 PROCEDURE — 250N000011 HC RX IP 250 OP 636: Performed by: NURSE PRACTITIONER

## 2023-01-05 PROCEDURE — C1887 CATHETER, GUIDING: HCPCS

## 2023-01-05 PROCEDURE — 36247 INS CATH ABD/L-EXT ART 3RD: CPT

## 2023-01-05 PROCEDURE — 76937 US GUIDE VASCULAR ACCESS: CPT | Mod: 26 | Performed by: RADIOLOGY

## 2023-01-05 PROCEDURE — 99152 MOD SED SAME PHYS/QHP 5/>YRS: CPT | Mod: GC | Performed by: RADIOLOGY

## 2023-01-05 PROCEDURE — 258N000003 HC RX IP 258 OP 636: Performed by: NURSE PRACTITIONER

## 2023-01-05 PROCEDURE — 36415 COLL VENOUS BLD VENIPUNCTURE: CPT | Performed by: NURSE PRACTITIONER

## 2023-01-05 PROCEDURE — 999N000142 HC STATISTIC PROCEDURE PREP ONLY

## 2023-01-05 PROCEDURE — 272N000566 HC SHEATH CR3: Mod: GT,95

## 2023-01-05 PROCEDURE — 76380 CAT SCAN FOLLOW-UP STUDY: CPT | Mod: 26 | Performed by: RADIOLOGY

## 2023-01-05 PROCEDURE — 85610 PROTHROMBIN TIME: CPT | Performed by: NURSE PRACTITIONER

## 2023-01-05 PROCEDURE — 272N000638 HC COIL/EMBOLIC DEVICE CR21

## 2023-01-05 PROCEDURE — 250N000011 HC RX IP 250 OP 636

## 2023-01-05 PROCEDURE — C1769 GUIDE WIRE: HCPCS

## 2023-01-05 PROCEDURE — 99153 MOD SED SAME PHYS/QHP EA: CPT

## 2023-01-05 PROCEDURE — 37243 VASC EMBOLIZE/OCCLUDE ORGAN: CPT | Mod: GC | Performed by: RADIOLOGY

## 2023-01-05 PROCEDURE — 272N000143 HC KIT CR3: Mod: GT,95

## 2023-01-05 PROCEDURE — 250N000009 HC RX 250

## 2023-01-05 PROCEDURE — C1769 GUIDE WIRE: HCPCS | Mod: GT,95

## 2023-01-05 PROCEDURE — 75726 ARTERY X-RAYS ABDOMEN: CPT | Mod: XU

## 2023-01-05 PROCEDURE — C1760 CLOSURE DEV, VASC: HCPCS

## 2023-01-05 PROCEDURE — 84702 CHORIONIC GONADOTROPIN TEST: CPT | Performed by: NURSE PRACTITIONER

## 2023-01-05 PROCEDURE — 999N000134 HC STATISTIC PP CARE STAGE 3

## 2023-01-05 PROCEDURE — 255N000002 HC RX 255 OP 636: Performed by: RADIOLOGY

## 2023-01-05 PROCEDURE — 250N000011 HC RX IP 250 OP 636: Performed by: RADIOLOGY

## 2023-01-05 PROCEDURE — 99152 MOD SED SAME PHYS/QHP 5/>YRS: CPT

## 2023-01-05 PROCEDURE — 75726 ARTERY X-RAYS ABDOMEN: CPT | Mod: 26 | Performed by: RADIOLOGY

## 2023-01-05 PROCEDURE — 36248 INS CATH ABD/L-EXT ART ADDL: CPT | Mod: GC | Performed by: RADIOLOGY

## 2023-01-05 PROCEDURE — 85027 COMPLETE CBC AUTOMATED: CPT | Performed by: NURSE PRACTITIONER

## 2023-01-05 PROCEDURE — 82248 BILIRUBIN DIRECT: CPT | Performed by: NURSE PRACTITIONER

## 2023-01-05 PROCEDURE — 272N000631 HC COIL/EMBOLIC DEVICE CR12

## 2023-01-05 PROCEDURE — 36247 INS CATH ABD/L-EXT ART 3RD: CPT | Mod: GC | Performed by: RADIOLOGY

## 2023-01-05 PROCEDURE — 272N000504 HC NEEDLE CR4: Mod: GT,95

## 2023-01-05 PROCEDURE — 37243 VASC EMBOLIZE/OCCLUDE ORGAN: CPT

## 2023-01-05 PROCEDURE — 80053 COMPREHEN METABOLIC PANEL: CPT | Performed by: NURSE PRACTITIONER

## 2023-01-05 RX ORDER — PROCHLORPERAZINE 25 MG
25 SUPPOSITORY, RECTAL RECTAL EVERY 12 HOURS PRN
Status: DISCONTINUED | OUTPATIENT
Start: 2023-01-05 | End: 2023-01-05 | Stop reason: HOSPADM

## 2023-01-05 RX ORDER — SODIUM CHLORIDE 9 MG/ML
INJECTION, SOLUTION INTRAVENOUS CONTINUOUS
Status: DISCONTINUED | OUTPATIENT
Start: 2023-01-05 | End: 2023-01-05 | Stop reason: HOSPADM

## 2023-01-05 RX ORDER — SODIUM CHLORIDE 9 MG/ML
INJECTION, SOLUTION INTRAVENOUS CONTINUOUS
Status: DISCONTINUED | OUTPATIENT
Start: 2023-01-05 | End: 2023-01-05

## 2023-01-05 RX ORDER — IODIXANOL 320 MG/ML
100 INJECTION, SOLUTION INTRAVASCULAR ONCE
Status: COMPLETED | OUTPATIENT
Start: 2023-01-05 | End: 2023-01-05

## 2023-01-05 RX ORDER — ONDANSETRON 2 MG/ML
4 INJECTION INTRAMUSCULAR; INTRAVENOUS
Status: COMPLETED | OUTPATIENT
Start: 2023-01-05 | End: 2023-01-05

## 2023-01-05 RX ORDER — HEPARIN SODIUM 200 [USP'U]/100ML
1 INJECTION, SOLUTION INTRAVENOUS CONTINUOUS PRN
Status: DISCONTINUED | OUTPATIENT
Start: 2023-01-05 | End: 2023-01-05

## 2023-01-05 RX ORDER — AMPICILLIN AND SULBACTAM 2; 1 G/1; G/1
3 INJECTION, POWDER, FOR SOLUTION INTRAMUSCULAR; INTRAVENOUS
Status: COMPLETED | OUTPATIENT
Start: 2023-01-05 | End: 2023-01-05

## 2023-01-05 RX ORDER — DOCUSATE SODIUM 100 MG/1
100 CAPSULE, LIQUID FILLED ORAL 2 TIMES DAILY
Status: DISCONTINUED | OUTPATIENT
Start: 2023-01-05 | End: 2023-01-05 | Stop reason: HOSPADM

## 2023-01-05 RX ORDER — NALOXONE HYDROCHLORIDE 0.4 MG/ML
0.4 INJECTION, SOLUTION INTRAMUSCULAR; INTRAVENOUS; SUBCUTANEOUS
Status: DISCONTINUED | OUTPATIENT
Start: 2023-01-05 | End: 2023-01-05

## 2023-01-05 RX ORDER — LIDOCAINE 40 MG/G
CREAM TOPICAL
Status: DISCONTINUED | OUTPATIENT
Start: 2023-01-05 | End: 2023-01-05

## 2023-01-05 RX ORDER — NALOXONE HYDROCHLORIDE 0.4 MG/ML
0.2 INJECTION, SOLUTION INTRAMUSCULAR; INTRAVENOUS; SUBCUTANEOUS
Status: DISCONTINUED | OUTPATIENT
Start: 2023-01-05 | End: 2023-01-05

## 2023-01-05 RX ORDER — FENTANYL CITRATE 50 UG/ML
25-50 INJECTION, SOLUTION INTRAMUSCULAR; INTRAVENOUS EVERY 5 MIN PRN
Status: DISCONTINUED | OUTPATIENT
Start: 2023-01-05 | End: 2023-01-05

## 2023-01-05 RX ORDER — ONDANSETRON 2 MG/ML
4 INJECTION INTRAMUSCULAR; INTRAVENOUS EVERY 6 HOURS PRN
Status: DISCONTINUED | OUTPATIENT
Start: 2023-01-05 | End: 2023-01-05 | Stop reason: HOSPADM

## 2023-01-05 RX ORDER — FLUMAZENIL 0.1 MG/ML
0.2 INJECTION, SOLUTION INTRAVENOUS
Status: DISCONTINUED | OUTPATIENT
Start: 2023-01-05 | End: 2023-01-05

## 2023-01-05 RX ORDER — ONDANSETRON 4 MG/1
4 TABLET, ORALLY DISINTEGRATING ORAL EVERY 6 HOURS PRN
Status: DISCONTINUED | OUTPATIENT
Start: 2023-01-05 | End: 2023-01-05 | Stop reason: HOSPADM

## 2023-01-05 RX ORDER — PROCHLORPERAZINE MALEATE 10 MG
10 TABLET ORAL EVERY 6 HOURS PRN
Status: DISCONTINUED | OUTPATIENT
Start: 2023-01-05 | End: 2023-01-05 | Stop reason: HOSPADM

## 2023-01-05 RX ADMIN — ONDANSETRON HYDROCHLORIDE 4 MG: 2 INJECTION, SOLUTION INTRAMUSCULAR; INTRAVENOUS at 12:09

## 2023-01-05 RX ADMIN — FENTANYL CITRATE 25 MCG: 50 INJECTION, SOLUTION INTRAMUSCULAR; INTRAVENOUS at 13:45

## 2023-01-05 RX ADMIN — AMPICILLIN SODIUM AND SULBACTAM SODIUM 3 G: 2; 1 INJECTION, POWDER, FOR SOLUTION INTRAMUSCULAR; INTRAVENOUS at 10:05

## 2023-01-05 RX ADMIN — IODIXANOL 265 ML: 320 INJECTION, SOLUTION INTRAVASCULAR at 14:27

## 2023-01-05 RX ADMIN — FENTANYL CITRATE 50 MCG: 50 INJECTION, SOLUTION INTRAMUSCULAR; INTRAVENOUS at 13:14

## 2023-01-05 RX ADMIN — MIDAZOLAM HYDROCHLORIDE 1 MG: 1 INJECTION, SOLUTION INTRAMUSCULAR; INTRAVENOUS at 12:09

## 2023-01-05 RX ADMIN — FENTANYL CITRATE 50 MCG: 50 INJECTION, SOLUTION INTRAMUSCULAR; INTRAVENOUS at 12:20

## 2023-01-05 RX ADMIN — FENTANYL CITRATE 50 MCG: 50 INJECTION, SOLUTION INTRAMUSCULAR; INTRAVENOUS at 12:53

## 2023-01-05 RX ADMIN — MIDAZOLAM HYDROCHLORIDE 0.5 MG: 1 INJECTION, SOLUTION INTRAMUSCULAR; INTRAVENOUS at 14:03

## 2023-01-05 RX ADMIN — MIDAZOLAM HYDROCHLORIDE 0.5 MG: 1 INJECTION, SOLUTION INTRAMUSCULAR; INTRAVENOUS at 13:45

## 2023-01-05 RX ADMIN — MIDAZOLAM HYDROCHLORIDE 1 MG: 1 INJECTION, SOLUTION INTRAMUSCULAR; INTRAVENOUS at 13:14

## 2023-01-05 RX ADMIN — FENTANYL CITRATE 50 MCG: 50 INJECTION, SOLUTION INTRAMUSCULAR; INTRAVENOUS at 14:15

## 2023-01-05 RX ADMIN — LIDOCAINE HYDROCHLORIDE 6 ML: 10 INJECTION, SOLUTION EPIDURAL; INFILTRATION; INTRACAUDAL; PERINEURAL at 12:26

## 2023-01-05 RX ADMIN — MIDAZOLAM HYDROCHLORIDE 1 MG: 1 INJECTION, SOLUTION INTRAMUSCULAR; INTRAVENOUS at 12:21

## 2023-01-05 RX ADMIN — FENTANYL CITRATE 50 MCG: 50 INJECTION, SOLUTION INTRAMUSCULAR; INTRAVENOUS at 12:09

## 2023-01-05 RX ADMIN — FENTANYL CITRATE 25 MCG: 50 INJECTION, SOLUTION INTRAMUSCULAR; INTRAVENOUS at 14:02

## 2023-01-05 RX ADMIN — SODIUM CHLORIDE: 9 INJECTION, SOLUTION INTRAVENOUS at 10:05

## 2023-01-05 RX ADMIN — HEPARIN SODIUM 2 BAG: 200 INJECTION, SOLUTION INTRAVENOUS at 12:22

## 2023-01-05 RX ADMIN — MIDAZOLAM HYDROCHLORIDE 1 MG: 1 INJECTION, SOLUTION INTRAMUSCULAR; INTRAVENOUS at 12:53

## 2023-01-05 ASSESSMENT — ACTIVITIES OF DAILY LIVING (ADL)
ADLS_ACUITY_SCORE: 35

## 2023-01-05 NOTE — PRE-PROCEDURE
GENERAL PRE-PROCEDURE:   Procedure:  Liver embolization  Date/Time:  1/5/2023 11:38 AM    Verbal consent obtained?: Yes    Written consent obtained?: Yes    Risks and benefits: Risks, benefits and alternatives were discussed    Consent given by:  Patient  Patient states understanding of procedure being performed: Yes    Patient's understanding of procedure matches consent: Yes    Procedure consent matches procedure scheduled: Yes    Expected level of sedation:  Moderate  Appropriately NPO:  Yes  ASA Class:  2  Mallampati  :  Grade 2- soft palate, base of uvula, tonsillar pillars, and portion of posterior pharyngeal wall visible  Lungs:  Lungs clear with good breath sounds bilaterally  Heart:  Normal heart sounds and rate  History & Physical reviewed:  History and physical reviewed and no updates needed  Statement of review:  I have reviewed the lab findings, diagnostic data, medications, and the plan for sedation

## 2023-01-05 NOTE — PROGRESS NOTES
Pt arrived to 2A from home for visceral embolization. VSS. Denies pain, awaiting consent. Lab results pending. H&P current. Allergies reviewed with pt. Appropriately NPO. Prep completed. PIV infusing antibiotic and NS, groin prep complete, pedal pulses marked. Mom Lexy and Bill Manzo at bedside; will be transporting patient to home

## 2023-01-05 NOTE — PROGRESS NOTES
Patient arrived to room via stretcher with Duane RN s/p visceral embolization. VSS. Denies/report pain. Pt alert and oriented x4. R groin site CDI.

## 2023-01-05 NOTE — IR NOTE
Patient Name: Anuradha Pearson  Medical Record Number: [id number]  Today's Date: 1/5/2023    Procedure: Liver embolization  Proceduralist: Dr. Alejandra, Dr. Rodgers  Pathology present: NA    Procedure Start: 1220  Procedure end: 1420  Sedation medications administered: 300 mcg fentanyl and 5 mg versed    Report given to: 2A RN  : JOE    Other Notes: Pt arrived to IR room 1 from . Consent reviewed. Pt denies any questions or concerns regarding procedure. Pt positioned supine and monitored per protocol. Pt tolerated procedure without any noted complications.  4 mg iv zofran given for history of nausea with sedation. Pt transferred back to .    AngioSeal closure device to right groin at 1418.  Bedrest for 2 hours until 1618.

## 2023-01-05 NOTE — DISCHARGE INSTRUCTIONS
[facility]   Interventional Radiology  Discharge Instructions Post Visceral Embolization    AFTER YOU GO HOME          Relax and take it easy for 24 hours.       Drink plenty of fluids.       Resume your regular diet, unless otherwise instructed by your Primary Physician.       DO NOT smoke for at least 24 hours, if you were given any sedation.       DO NOT drink alcoholic beverages the day of your procedure.       DO NOT drive or operate machinery at home or at work for 24 hours.          DO NOT make any important or legal decisions for 24 hours following your procedure.       DO NOT take a shower for at least 12 hours following your procedure. No tub bath, hot tubs, or swimming for 5 days      Care of groin site  It is normal to have a small bruise or lump at the site.  For the first 2 days, when you cough, sneeze or move your bowels, hold your hand over the puncture site and press gently.  Do NOT lift more than 10 pounds or do any strenuous exercise for at least 3 to 5 days.  Do not use lotion or powder near the puncture site for 3 days.  If you start bleeding from the site in your groin: lie down flat and press firmly on the site. Call your doctor as soon as you can.   Call 911 right away if you have: Heavy bleeding, bleeding that does not stop.    CALL THE PHYSICIAN IF:       - You start bleeding from the procedure site. A small lump or bruise is common at the puncture site. Your physician will tell you if you need to return to the hospital.      - You develop numbness, coolness or a change in color of the leg that was punctured.      - You experience increased pain or redness at the puncture site.      - You develop hives or a rash or unexplained itching.      - You develop a temperature of 101 degrees F or greater.    Additional Information:      Closure Device: Angioseal            [facility] INTERVENTIONAL RADIOLOGY DEPARTMENT         Procedure Physician:  Beck Alejandra MD & Niles Desai  Date of Procedure:January 5, 2023       Telephone Numbers:   [phone]      Monday-Friday 7:30 am to 4:00 pm                                        [phone]     After 4:00 pm Monday-Friday, Weekend and Holidays. Ask for the Interventional Radiologist on call. Someone is on call 24 hrs/day.

## 2023-01-05 NOTE — PROCEDURES
[facility]    Procedure: IR Procedure Note    Date/Time: 1/5/2023 2:33 PM  Performed by: Beck Alejandra MD  Authorized by: Beck Alejandra MD   IR Fellow Physician: Niles Desai  Radiology Resident Physician: Gerard Sanz        UNIVERSAL PROTOCOL   Site Marked: NA  Prior Images Obtained and Reviewed:  Yes  Required items: Required blood products, implants, devices and special equipment available    Patient identity confirmed:  Verbally with patient, arm band, provided demographic data and hospital-assigned identification number  Patient was reevaluated immediately before administering moderate or deep sedation or anesthesia  Confirmation Checklist:  Patient's identity using two indicators, relevant allergies, procedure was appropriate and matched the consent or emergent situation and correct equipment/implants were available  Time out: Immediately prior to the procedure a time out was called    Universal Protocol: the Joint Commission Universal Protocol was followed    Preparation: Patient was prepped and draped in usual sterile fashion    ESBL (mL):  10     ANESTHESIA    Anesthesia: Local infiltration  Local Anesthetic:  Lidocaine 1% without epinephrine  Anesthetic Total (mL):  10      SEDATION  Patient Sedated: Yes    Sedation Type:  Moderate (conscious) sedation  Sedation:  Fentanyl and midazolam  Vital signs: Vital signs monitored during sedation    See dictated procedure note for full details.  Findings: Hepatic adenomas in segment 6 and segment 7    Specimens: none    Complications: None    Condition: Stable    Plan: Arterial embolization of hepatic segment 6 and hepatic segment 7 lesions.       PROCEDURE  Describe Procedure: Arterial embolization of hepatic segment 6 and hepatic segment 7 lesions using spheres and coils. Right groin arterial access. Patient tolerated the procedure without complications. Right groin access closed with angio rodo  device.   Patient Tolerance:  Patient tolerated the procedure well with no immediate complications  Length of time physician/provider present for 1:1 monitoring during sedation: 120

## 2023-01-05 NOTE — PROGRESS NOTES
Pt is tolerating liquids and foods, ambulating, urinating, puncture sites are stable (no bleeding and no hematoma). Pt has a  - mom and dad.  A/O x4 and making needs known.  VSS.  IV access removed.  Discharge education completed by previous RN.  All questions answered and addressed.  Belongings returned to patient at discharge.  Discharged to self care.  Pt brought out to front lobby via wheelchair, accompanied by RN.

## 2023-01-08 ENCOUNTER — MYC MEDICAL ADVICE (OUTPATIENT)
Dept: OBGYN | Facility: CLINIC | Age: 23
End: 2023-01-08

## 2023-01-09 ENCOUNTER — MYC REFILL (OUTPATIENT)
Dept: TRANSPLANT | Facility: CLINIC | Age: 23
End: 2023-01-09

## 2023-01-09 ENCOUNTER — TELEPHONE (OUTPATIENT)
Dept: OBGYN | Facility: CLINIC | Age: 23
End: 2023-01-09

## 2023-01-09 ENCOUNTER — TELEPHONE (OUTPATIENT)
Dept: TRANSPLANT | Facility: CLINIC | Age: 23
End: 2023-01-09

## 2023-01-09 DIAGNOSIS — I15.1 HYPERTENSION SECONDARY TO OTHER RENAL DISORDERS: ICD-10-CM

## 2023-01-09 RX ORDER — LABETALOL 100 MG/1
100 TABLET, FILM COATED ORAL 2 TIMES DAILY
Qty: 180 TABLET | Refills: 3 | Status: SHIPPED | OUTPATIENT
Start: 2023-01-09 | End: 2024-03-11

## 2023-01-09 NOTE — TELEPHONE ENCOUNTER
Patient Call: General  Route to LPN    Reason for call: mother called in regards of touch base on a current situation. Call back number is [phone].     Call back needed? Yes    Return Call Needed  Same as documented in contacts section  When to return call?: Same day: Route High Priority

## 2023-01-09 NOTE — TELEPHONE ENCOUNTER
Called patient to try and schedule on 1/24 during [facility] clinic, patient not wanting to be seen. She is requesting a different birth control prescription.

## 2023-01-10 ENCOUNTER — TELEPHONE (OUTPATIENT)
Dept: SURGERY | Facility: CLINIC | Age: 23
End: 2023-01-10

## 2023-01-10 DIAGNOSIS — D13.4 HEPATIC ADENOMA: Primary | ICD-10-CM

## 2023-01-10 NOTE — TELEPHONE ENCOUNTER
Patient and patient's mother would like for SOT to continue filling BP meds as patient is on the road most of the time for work.  Patient travels for barrel racing. [place].    RNCC discuss the recent attempts at a collaborative model with PCP or primary nephrology.  RNCC did discuss situation with RN supervisor, however ultimately patient will need to have non transplant specific medications to be refilled and managed by PCP or nephrology.    Discuss via phone,  My chart message also sent with further explanation.    Denia Garduno RN   Transplant Coordinator  [phone]

## 2023-01-10 NOTE — TELEPHONE ENCOUNTER
Called and spoke with Anuradha following her procedure on 1/5. She reports she is feeling well post procedure, mild abdominal pain that is controlled with Tylenol. Access site remains CDI. Discussed plan for follow up MRI and visit with Dr. Alejandra. She was in agreement of this plan.     Radha CHRISTIE RN, BSN   Interventional Radiology RNCC   [phone]

## 2023-01-11 ENCOUNTER — TELEPHONE (OUTPATIENT)
Dept: SURGERY | Facility: CLINIC | Age: 23
End: 2023-01-11

## 2023-01-11 ENCOUNTER — VIRTUAL VISIT (OUTPATIENT)
Dept: OBGYN | Facility: CLINIC | Age: 23
End: 2023-01-11
Attending: OBSTETRICS & GYNECOLOGY
Payer: COMMERCIAL

## 2023-01-11 DIAGNOSIS — E28.39 PRIMARY OVARIAN FAILURE: Primary | ICD-10-CM

## 2023-01-11 PROCEDURE — 99213 OFFICE O/P EST LOW 20 MIN: CPT | Mod: TEL | Performed by: OBSTETRICS & GYNECOLOGY

## 2023-01-11 NOTE — PROGRESS NOTES
[facility]  Gynecology Problem Telephone Visit    Anuradha Pearson is a 22 year old female who is being evaluated via a billable telephone visit.    Patient opted to conduct today's return visit via telephone secondary to the COVID-19 pandemic vs. an in person visit to the clinic.    I spoke with: Anuradha Pearson    SUBJECTIVE    Anuradha Pearson is a 22 year old G0 who is here for followup after a change in hormone replacement. She started the combipatch 2 weeks ago. She placed the patch on her lower abdomen, and developed pain at the application site. When she was due to place a new patch, she put it on a different spot, and in the morning, developed upper abdominal pain. It was severe enough that she was worried about the adenoma rupturing, so she went to the ER. Her evaluation was normal. The pain became worse over the next several days, and she developed spotting and bloating, so she decided to take the patch off. Her symptoms have since resolved.     Her LMP is: No LMP recorded.    PAST MEDICAL HISTORY  Past Medical History:   Diagnosis Date     H/O kidney transplant      Hypertension      PONV (postoperative nausea and vomiting)      Urinary tract infection 11/04/2021     Wilm's tumor        MEDICATIONS  Current Outpatient Medications   Medication     amLODIPine (NORVASC) 10 MG tablet     azaTHIOprine (IMURAN) 50 MG tablet     enalapril (VASOTEC) 5 MG tablet     estradiol-norethindrone (COMBIPATCH) 0.05-0.25 MG/DAY bi-weekly patch     labetalol (NORMODYNE) 100 MG tablet     levonorgest-eth estrad 91-Day (SEASONIQUE) 0.15-0.03 &0.01 MG tablet     magnesium oxide (MAG-OX) 400 (241.3 Mg) MG tablet     Multiple Vitamins-Minerals (WOMENS MULTIVITAMIN PO)     sulfamethoxazole-trimethoprim (BACTRIM) 400-80 MG tablet     tacrolimus (GENERIC EQUIVALENT) 1 MG capsule     No current facility-administered medications for this visit.       ALLERGIES  Allergies   Allergen Reactions     Ibuprofen  "Other (See Comments)     Avoid nephrotoxic medications as needed due to kidney transplant status     Shellfish-Derived Products Nausea and Vomiting     Vancomycin      Uses Benadryl Prior to administration       REVIEW OF SYSTEMS  A 10 point review of systems including Constitutional, Eyes, Respiratory, Cardiovascular, Gastroenterology, Genitourinary, Integumentary, Musculoskeletal, and Psychiatric, were all negative, except for pertinent positives noted in the above HPI.    OBJECTIVE    No exam was performed during this telephone encounter     ASSESSMENT  Anuradha Pearson is a 22 year old G0 with premature ovarian failure, evaluated via telephone visit.    -Anuradha has a history of POF after treatment for a Wilm's tumor. Previously, she used an oral contraceptive for hormone replacement to protect her heart and bone health. However, she was recently discovered to have several hepatic adenomas, so after discussion with her care team, we decided to try transdermal estrogen replacement with combipatch.   -given Anuradha's new symptoms, will try a different medication. Prefer to continue with transdermal estrogen to avoid first pass effect. Will switch to vivelle-dot 0.025mg and progesterone 100mg daily.   -Anuradha expressed understanding. Questions answered. Will arrange short interval followup to see if Anuradha tolerates this combination of HRT better. Encouraged her to reach out before then if she develops pain or other worrisome symptoms.     The patient was notified of following prior to conducting the telephone visit:   \"This telephone visit will be conducted via a call between you and your physician/provider. We have found that certain health care needs can be provided without the need for a physical exam. This service lets us provide the care you need with a short phone conversation. If a prescription is necessary we can send it directly to your pharmacy. If lab work is needed we can place an order for that " "and you can then stop by our lab to have the test done at a later time. If during the course of the call the physician/provider feels a telephone visit is not appropriate, you will not be charged for this service.\"     Phone call start: 1445  Phone call end: 1452  Phone call duration:  7 minutes    Sejal Webb MD, MSCI    [facility]/OB[facility]N  1/11/23  "

## 2023-01-11 NOTE — LETTER
1/11/2023       RE: Anuradha Pearson  [address]  [place] [address]     Dear Colleague,    Thank you for referring your patient, Anuradha Pearson, to the [facility] [place] at [facility]. Please see a copy of my visit note below.    [facility]  Gynecology Problem Telephone Visit    Anuradha Pearson is a 22 year old female who is being evaluated via a billable telephone visit.    Patient opted to conduct today's return visit via telephone secondary to the COVID-19 pandemic vs. an in person visit to the clinic.    I spoke with: Anuradha Pearson    SUBJECTIVE    Anuradha Pearson is a 22 year old G0 who is here for followup after a change in hormone replacement. She started the combipatch 2 weeks ago. She placed the patch on her lower abdomen, and developed pain at the application site. When she was due to place a new patch, she put it on a different spot, and in the morning, developed upper abdominal pain. It was severe enough that she was worried about the adenoma rupturing, so she went to the ER. Her evaluation was normal. The pain became worse over the next several days, and she developed spotting and bloating, so she decided to take the patch off. Her symptoms have since resolved.     Her LMP is: No LMP recorded.    PAST MEDICAL HISTORY  Past Medical History:   Diagnosis Date     H/O kidney transplant      Hypertension      PONV (postoperative nausea and vomiting)      Urinary tract infection 11/04/2021     Wilm's tumor        MEDICATIONS  Current Outpatient Medications   Medication     amLODIPine (NORVASC) 10 MG tablet     azaTHIOprine (IMURAN) 50 MG tablet     enalapril (VASOTEC) 5 MG tablet     estradiol-norethindrone (COMBIPATCH) 0.05-0.25 MG/DAY bi-weekly patch     labetalol (NORMODYNE) 100 MG tablet     levonorgest-eth estrad 91-Day (SEASONIQUE) 0.15-0.03 &0.01 MG tablet     magnesium oxide  "(MAG-OX) 400 (241.3 Mg) MG tablet     Multiple Vitamins-Minerals (WOMENS MULTIVITAMIN PO)     sulfamethoxazole-trimethoprim (BACTRIM) 400-80 MG tablet     tacrolimus (GENERIC EQUIVALENT) 1 MG capsule     No current facility-administered medications for this visit.       ALLERGIES  Allergies   Allergen Reactions     Ibuprofen Other (See Comments)     Avoid nephrotoxic medications as needed due to kidney transplant status     Shellfish-Derived Products Nausea and Vomiting     Vancomycin      Uses Benadryl Prior to administration       REVIEW OF SYSTEMS  A 10 point review of systems including Constitutional, Eyes, Respiratory, Cardiovascular, Gastroenterology, Genitourinary, Integumentary, Musculoskeletal, and Psychiatric, were all negative, except for pertinent positives noted in the above HPI.    OBJECTIVE    No exam was performed during this telephone encounter     ASSESSMENT  Anuradha Pearson is a 22 year old G0 with premature ovarian failure, evaluated via telephone visit.    -Anuradha has a history of POF after treatment for a Wilm's tumor. Previously, she used an oral contraceptive for hormone replacement to protect her heart and bone health. However, she was recently discovered to have several hepatic adenomas, so after discussion with her care team, we decided to try transdermal estrogen replacement with combipatch.   -given Anuradha's new symptoms, will try a different medication. Prefer to continue with transdermal estrogen to avoid first pass effect. Will switch to vivelle-dot 0.025mg and progesterone 100mg daily.   -Anuradha expressed understanding. Questions answered. Will arrange short interval followup to see if Anuradha tolerates this combination of HRT better. Encouraged her to reach out before then if she develops pain or other worrisome symptoms.     The patient was notified of following prior to conducting the telephone visit:   \"This telephone visit will be conducted via a call between you and your " "physician/provider. We have found that certain health care needs can be provided without the need for a physical exam. This service lets us provide the care you need with a short phone conversation. If a prescription is necessary we can send it directly to your pharmacy. If lab work is needed we can place an order for that and you can then stop by our lab to have the test done at a later time. If during the course of the call the physician/provider feels a telephone visit is not appropriate, you will not be charged for this service.\"     Phone call start: 1445  Phone call end: 1452  Phone call duration:  7 minutes    Sejal Webb MD, MSCI    [facility]  1/11/23    "

## 2023-01-12 NOTE — TELEPHONE ENCOUNTER
The pt is scheduled on 2/28/23 at the [facility] for imaging and and for a video vist with .   The pt was on the phone during scheduling of the above appointments and agreed to the dates times and locations of the appointments.  The pt is stating that she would like to speak with the nurse concerning having to have contrast for imaging. The pt states that she has had contrast a few times in the past couple of months and she is concerned about her kidneys.  Please call the pt concerning this subject.  Adrián Luna on 1/11/2023 at 6:27 PM

## 2023-01-12 NOTE — TELEPHONE ENCOUNTER
----- Message from Radha Rushing RN sent at 1/10/2023 12:27 PM CST -----  Hi,     Can you please call this patient and help her schedule the ordered Dr. Alejandra MRI and then please help her schedule a follow up video visit with Dr. Alejandra. Patient will be out of the state until early March but will be coming into town for these appointments. This is for follow up from her hepatic adenoma procedure with Dr. Alejandra. Thank you! Let me know if you have questions!     Radha

## 2023-01-13 RX ORDER — ESTRADIOL 0.03 MG/D
1 FILM, EXTENDED RELEASE TRANSDERMAL
Qty: 8 PATCH | Refills: 11 | Status: SHIPPED | OUTPATIENT
Start: 2023-01-16

## 2023-01-13 RX ORDER — PROGESTERONE 100 MG/1
100 CAPSULE ORAL DAILY
Qty: 30 CAPSULE | Refills: 11 | Status: SHIPPED | OUTPATIENT
Start: 2023-01-13 | End: 2024-01-18

## 2023-02-14 ENCOUNTER — MYC MEDICAL ADVICE (OUTPATIENT)
Dept: TRANSPLANT | Facility: CLINIC | Age: 23
End: 2023-02-14
Payer: COMMERCIAL

## 2023-02-14 DIAGNOSIS — Z94.0 KIDNEY TRANSPLANTED: ICD-10-CM

## 2023-02-14 RX ORDER — TACROLIMUS 1 MG/1
2 CAPSULE ORAL 2 TIMES DAILY
Qty: 360 CAPSULE | Refills: 3 | Status: SHIPPED | OUTPATIENT
Start: 2023-02-14 | End: 2023-10-02

## 2023-02-28 ENCOUNTER — ANCILLARY PROCEDURE (OUTPATIENT)
Dept: MRI IMAGING | Facility: CLINIC | Age: 23
End: 2023-02-28
Attending: PHYSICIAN ASSISTANT
Payer: COMMERCIAL

## 2023-02-28 ENCOUNTER — OFFICE VISIT (OUTPATIENT)
Dept: OBGYN | Facility: CLINIC | Age: 23
End: 2023-02-28
Attending: OBSTETRICS & GYNECOLOGY
Payer: COMMERCIAL

## 2023-02-28 ENCOUNTER — NURSE TRIAGE (OUTPATIENT)
Dept: NURSING | Facility: CLINIC | Age: 23
End: 2023-02-28

## 2023-02-28 ENCOUNTER — VIRTUAL VISIT (OUTPATIENT)
Dept: VASCULAR SURGERY | Facility: CLINIC | Age: 23
End: 2023-02-28
Payer: COMMERCIAL

## 2023-02-28 VITALS
BODY MASS INDEX: 25.68 KG/M2 | SYSTOLIC BLOOD PRESSURE: 128 MMHG | WEIGHT: 127.4 LBS | HEART RATE: 94 BPM | DIASTOLIC BLOOD PRESSURE: 80 MMHG | HEIGHT: 59 IN

## 2023-02-28 DIAGNOSIS — D13.4 ADENOMA OF LIVER: ICD-10-CM

## 2023-02-28 DIAGNOSIS — Z94.0 KIDNEY REPLACED BY TRANSPLANT: ICD-10-CM

## 2023-02-28 DIAGNOSIS — D13.4 HEPATIC ADENOMA: ICD-10-CM

## 2023-02-28 DIAGNOSIS — D13.4 ADENOMA OF LIVER: Primary | ICD-10-CM

## 2023-02-28 DIAGNOSIS — E28.39 PRIMARY OVARIAN FAILURE: ICD-10-CM

## 2023-02-28 DIAGNOSIS — Z79.890 HORMONE REPLACEMENT THERAPY: ICD-10-CM

## 2023-02-28 DIAGNOSIS — E28.39 PRIMARY OVARIAN FAILURE: Primary | ICD-10-CM

## 2023-02-28 PROCEDURE — 99213 OFFICE O/P EST LOW 20 MIN: CPT | Mod: VID | Performed by: RADIOLOGY

## 2023-02-28 PROCEDURE — 74183 MRI ABD W/O CNTR FLWD CNTR: CPT | Performed by: RADIOLOGY

## 2023-02-28 PROCEDURE — 99213 OFFICE O/P EST LOW 20 MIN: CPT | Mod: GC | Performed by: OBSTETRICS & GYNECOLOGY

## 2023-02-28 PROCEDURE — G0463 HOSPITAL OUTPT CLINIC VISIT: HCPCS | Performed by: OBSTETRICS & GYNECOLOGY

## 2023-02-28 PROCEDURE — A9581 GADOXETATE DISODIUM INJ: HCPCS | Performed by: RADIOLOGY

## 2023-02-28 NOTE — LETTER
"2023       RE: Anuradha Pearson  [address]  [place] [address]     Dear Colleague,    Thank you for referring your patient, Anuradha Pearson, to the [facility] [place] at [facility]. Please see a copy of my visit note below.    [facility]  2023  Reason For Visit: HRT follow up     S: Anuradha Pearson is a 22 year old  here for HRT follow up. Doing well on new PO progesterone and estradiol patch. She is follow closely by transplant and has appointment with hepatology today for follow up on MRI for hepatic adenomas.     Denie hot flashes, vaginal dryness, mood changes, skin changes.     Denies fevers, chills, headaches, chest pain, SOB, abdominal pain, n/v, constipation, diarrhea, difficulties with urination.     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 No si/sx of insufficient hormones. Following with hepatology and transplant. MRI today for hepatic adenomas, no sx currently.   -continue combined trans dermal estradiol and progesterone replacement  -recommended barrier methods for HPV/STI prevention in setting orf immunosuppression, s/p PAP    -discussed fertility and options for referral to [facility] if desiring pregnancy    Patient seen with Dr. Jon GARCIA PGY-1  10:23 AM 2023           Attestation signed by Sejal Webb MD at 2023  3:52 PM:  OBGYN Attending Addendum     I, Sejal Webb, saw " Anuradha Pearson with the resident, Dr. Chapman, and agree with their findings and plan of care as documented in the above note.    I personally reviewed vitals, meds.     Key findings: 23 y/o G0 with POF here for hormone replacement management in setting of liver adenoma. Doing well with vivelle dot/progesterone combination.     I agree with the plan for continuation of HRT, barrier methods as needed for STI prevention, and referral PRN to [facility] if she were to desire pregnancy with egg donor.    Sejal Webb MD, MSCI  Date of Service: 2/28/2023

## 2023-02-28 NOTE — PATIENT INSTRUCTIONS
Thank you for trusting us with your care!     If you need to contact us for questions about:  Symptoms, Scheduling & Medical Questions; Non-urgent (2-3 day response) Deanna message, Urgent (needing response today) [phone] (if after 3:30pm next day response)   Prescriptions: Please call your Pharmacy   Billing: [facility] [phone] or [facility]:[phone]

## 2023-02-28 NOTE — TELEPHONE ENCOUNTER
Patient is calling to speak with [facility] direct.  No triage.  FNA transferred caller through to clinic.      Reason for Disposition    Information only question and nurse able to answer    Additional Information    Negative: Nursing judgment    Negative: Nursing judgment    Negative: Nursing judgment    Negative: Nursing judgment    Protocols used: INFORMATION ONLY CALL - NO TRIAGE-A-OH

## 2023-02-28 NOTE — LETTER
2/28/2023       RE: Anuradha Pearson  [address]  [place] [address]     Dear Colleague,    Thank you for referring your patient, Anuradha Pearson, to the [facility] [place] at [facility]. Please see a copy of my visit note below.    Video call:   [facility]  Start: 11:17  End:  11:26       History of Present Illness      Anuradha is a pleasant 22 year-old patient with liver adenomas.She is coming for one-month follow up visit post embolization.She did well post procedure. She has no complaints.  I looked at her MRI and compared with the one prior to the procedure. Overall, the treated lesions are shrinking and don't enhance. The left lobe lesion is also smaller than before.   She mentioned that she is receiving Estrogen replacement and this may have helped with the reduction of the left lobe lesion. At this point, it seems that the adenoma in the left lobe can be watched as it started to shrink.    Current Outpatient Medications   Medication     amLODIPine (NORVASC) 10 MG tablet     azaTHIOprine (IMURAN) 50 MG tablet     enalapril (VASOTEC) 5 MG tablet     estradiol (VIVELLE-DOT) 0.025 MG/24HR bi-weekly patch     labetalol (NORMODYNE) 100 MG tablet     magnesium oxide (MAG-OX) 400 (241.3 Mg) MG tablet     Multiple Vitamins-Minerals (WOMENS MULTIVITAMIN PO)     progesterone (PROMETRIUM) 100 MG capsule     tacrolimus (GENERIC EQUIVALENT) 1 MG capsule     estradiol-norethindrone (COMBIPATCH) 0.05-0.25 MG/DAY bi-weekly patch     levonorgest-eth estrad 91-Day (SEASONIQUE) 0.15-0.03 &0.01 MG tablet     sulfamethoxazole-trimethoprim (BACTRIM) 400-80 MG tablet     No current facility-administered medications for this visit.     Past Medical History:   Diagnosis Date     H/O kidney transplant      Hypertension      PONV (postoperative nausea and vomiting)      Urinary tract infection 11/04/2021     Wilm's tumor        Past Surgical  History:   Procedure Laterality Date     APPENDECTOMY       CHOLECYSTECTOMY  05/20/2003     ENT SURGERY       IR RENAL BIOPSY RIGHT  11/2/2022     IR VISCERAL EMBOLIZATION  1/5/2023     OPEN REDUCTION INTERNAL FIXATION RODDING INTRAMEDULLARY FEMUR  8/20/2012    Procedure: OPEN REDUCTION INTERNAL FIXATION RODDING INTRAMEDULLARY FEMUR;  Closed Reduction Internal Fixation Right Femur;  Surgeon: Catracho Hook MD;  Location: [facility]     PERCUTANEOUS BIOPSY KIDNEY N/A 11/2/2022    Procedure: transplant kidney biopsy;  Surgeon: Isra Still PA-C;  [facility]: [facility]      REVISE SCAR TRUNK Right 11/6/2015    Procedure: REVISE SCAR TRUNK;  Surgeon: [facility] Anderson MD;  Location: [facility] OR     right nephrectomy  05/19/2003    due to Wilms tumor; performed by Dr. Myles Velez at [facility]     TONSILLECTOMY  01/31/2005     TRANSPLANT KIDNEY RECIPIENT LIVING RELATED CHILD  11/09/2004    left nephrectomy performed at time of transplant       Family History   Problem Relation Age of Onset     Hypertension Father      Cancer No family hx of        Social History     Tobacco Use     Smoking status: Never     Smokeless tobacco: Never   Substance Use Topics     Alcohol use: No     Impression and plan:  Good response to bland embolization of the right lobe adenomas. The lesions don't enhance and are smaller.  Under new hormonal therapy, her left lobe lesion is also smaller.  I recommend a new MRI and clinic visit in 2 months.   For the MRI, I would like to have a post gadolinium, IV hydration at the site to protect her kidney as much as possible.      Sincerely,    Beck Alejandra MD

## 2023-02-28 NOTE — PROGRESS NOTES
"[facility]  2023  Reason For Visit: HRT follow up     S: Anuradha Pearson is a 22 year old  here for HRT follow up. Doing well on new PO progesterone and estradiol patch. She is follow closely by transplant and has appointment with hepatology today for follow up on MRI for hepatic adenomas.     Denie hot flashes, vaginal dryness, mood changes, skin changes.     Denies fevers, chills, headaches, chest pain, SOB, abdominal pain, n/v, constipation, diarrhea, difficulties with urination.     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 No si/sx of insufficient hormones. Following with hepatology and transplant. MRI today for hepatic adenomas, no sx currently.   -continue combined trans dermal estradiol and progesterone replacement  -recommended barrier methods for HPV/STI prevention in setting orf immunosuppression, s/p PAP    -discussed fertility and options for referral to [facility] if desiring pregnancy    Patient seen with Dr. Jon GARCIA PGY-1  10:23 AM 2023         "

## 2023-02-28 NOTE — PROGRESS NOTES
Video call:   [facility]/Home  Start: 11:17  End:  11:26       History of Present Illness      Anuradha is a pleasant 22 year-old patient with liver adenomas.She is coming for one-month follow up visit post embolization.She did well post procedure. She has no complaints.  I looked at her MRI and compared with the one prior to the procedure. Overall, the treated lesions are shrinking and don't enhance. The left lobe lesion is also smaller than before.   She mentioned that she is receiving Estrogen replacement and this may have helped with the reduction of the left lobe lesion. At this point, it seems that the adenoma in the left lobe can be watched as it started to shrink.    Current Outpatient Medications   Medication     amLODIPine (NORVASC) 10 MG tablet     azaTHIOprine (IMURAN) 50 MG tablet     enalapril (VASOTEC) 5 MG tablet     estradiol (VIVELLE-DOT) 0.025 MG/24HR bi-weekly patch     labetalol (NORMODYNE) 100 MG tablet     magnesium oxide (MAG-OX) 400 (241.3 Mg) MG tablet     Multiple Vitamins-Minerals (WOMENS MULTIVITAMIN PO)     progesterone (PROMETRIUM) 100 MG capsule     tacrolimus (GENERIC EQUIVALENT) 1 MG capsule     estradiol-norethindrone (COMBIPATCH) 0.05-0.25 MG/DAY bi-weekly patch     levonorgest-eth estrad 91-Day (SEASONIQUE) 0.15-0.03 &0.01 MG tablet     sulfamethoxazole-trimethoprim (BACTRIM) 400-80 MG tablet     No current facility-administered medications for this visit.     Past Medical History:   Diagnosis Date     H/O kidney transplant      Hypertension      PONV (postoperative nausea and vomiting)      Urinary tract infection 11/04/2021     Wilm's tumor        Past Surgical History:   Procedure Laterality Date     APPENDECTOMY       CHOLECYSTECTOMY  05/20/2003     ENT SURGERY       IR RENAL BIOPSY RIGHT  11/2/2022     IR VISCERAL EMBOLIZATION  1/5/2023     OPEN REDUCTION INTERNAL FIXATION RODDING INTRAMEDULLARY FEMUR  8/20/2012    Procedure: OPEN REDUCTION INTERNAL FIXATION RODDING  INTRAMEDULLARY FEMUR;  Closed Reduction Internal Fixation Right Femur;  Surgeon: Catracho Hook MD;  [facility]     PERCUTANEOUS BIOPSY KIDNEY N/A 11/2/2022    Procedure: transplant kidney biopsy;  Surgeon: Isra Still PA-C;  [facility]      REVISE SCAR TRUNK Right 11/6/2015    Procedure: REVISE SCAR TRUNK;  Surgeon: [facility] Anderson MD;  Location: [facility]     right nephrectomy  05/19/2003    due to Wilms tumor; performed by Dr. Myles Velez at [facility]     TONSILLECTOMY  01/31/2005     TRANSPLANT KIDNEY RECIPIENT LIVING RELATED CHILD  11/09/2004    left nephrectomy performed at time of transplant       Family History   Problem Relation Age of Onset     Hypertension Father      Cancer No family hx of        Social History     Tobacco Use     Smoking status: Never     Smokeless tobacco: Never   Substance Use Topics     Alcohol use: No     Impression and plan:  Good response to bland embolization of the right lobe adenomas. The lesions don't enhance and are smaller.  Under new hormonal therapy, her left lobe lesion is also smaller.  I recommend a new MRI and clinic visit in 2 months.   For the MRI, I would like to have a post gadolinium, IV hydration at the site to protect her kidney as much as possible.

## 2023-02-28 NOTE — DISCHARGE INSTRUCTIONS
MRI Contrast Discharge Instructions    The IV contrast you received today will pass out of your body in your  urine. This will happen in the next 24 hours. You will not feel this process.  Your urine will not change color.    Drink at least 4 extra glasses of water or juice today (unless your doctor  has restricted your fluids). This reduces the stress on your kidneys.  You may take your regular medicines.    If you are on dialysis: It is best to have dialysis today.    If you have a reaction: Most reactions happen right away. If you have  any new symptoms after leaving the hospital (such as hives or swelling),  call your hospital at the correct number below. Or call your family doctor.  If you have breathing distress or wheezing, call 911.    Special instructions: ***    I have read and understand the above information.    Signature:______________________________________ Date:___________    Staff:__________________________________________ Date:___________     Time:__________    [facility] Departments:    ___[place]: [phone]  ___[facility]: [phone]  ___[place]: [phone] ___[facility]: [phone]  ___[facility]: [phone]  ___[facility]: [phone]  ___[phone]  ___[facility]: [phone]  ___[phone]

## 2023-02-28 NOTE — NURSING NOTE
Chief Complaint   Patient presents with     Follow Up       Patient confirms medications and allergies are accurate via patients echeck in completion, and or denies any changes since last reviewed/verified.       Is the patient currently in the state of [place]? YES    Visit mode:VIDEO    If the visit is dropped, the patient can be reconnected by: VIDEO VISIT: Text to cell phone: [phone]    Will anyone else be joining the visit? NO      How would you like to obtain your AVS? MyChart    Are changes needed to the allergy or medication list? NO    Reason for visit: follow up

## 2023-04-10 ENCOUNTER — MYC REFILL (OUTPATIENT)
Dept: TRANSPLANT | Facility: CLINIC | Age: 23
End: 2023-04-10
Payer: COMMERCIAL

## 2023-04-10 ENCOUNTER — TELEPHONE (OUTPATIENT)
Dept: TRANSPLANT | Facility: CLINIC | Age: 23
End: 2023-04-10
Payer: COMMERCIAL

## 2023-04-10 DIAGNOSIS — E87.5 HYPERKALEMIA: Primary | ICD-10-CM

## 2023-04-10 DIAGNOSIS — Z94.0 KIDNEY REPLACED BY TRANSPLANT: ICD-10-CM

## 2023-04-10 RX ORDER — AZATHIOPRINE 50 MG/1
75 TABLET ORAL DAILY
Qty: 45 TABLET | Refills: 11 | Status: SHIPPED | OUTPATIENT
Start: 2023-04-10 | End: 2023-06-29

## 2023-04-10 RX ORDER — SODIUM POLYSTYRENE SULFONATE 15 G/60ML
30 SUSPENSION ORAL; RECTAL ONCE
Qty: 120 ML | Refills: 0 | Status: SHIPPED | OUTPATIENT
Start: 2023-04-10 | End: 2023-04-10

## 2023-04-10 NOTE — LETTER
OUTPATIENT LABORATORY TEST ORDER     Patient Name: Anuradha Pearson   YOB: 2000     [facility] MR# [if applicable]: [id number]   Date & Time: 4/10/2023 at 1315  Expiration Date: 1 year after date issued      Diagnoses: Kidney Transplant (ICD-10 Z94.0)   Long term use of medications (ICD-10 Z79.899)      We ask your assistance in obtaining the following laboratory tests, which are part of our routine surveillance program for Solid Organ Transplant patients.     Please fax each result to [phone], same day as resulted/available    Critical lab results page [phone]  Monday - Friday 8 am to 5 pm  Evening/Weekend/Holiday communicate Critical labs results [phone]      Monthly- please obtain this week  CBC with platelets  Basic Metabolic Panel (Sodium, Potassium, Chloride, Creatinine, CO2, Urea Nitrogen, glucose, Calcium)  Tacrolimus drug level - 12-hour trough, please document time of last dose             If you have any questions, please call [facility]- [phone] or [phone], Fax- [phone].      Vikash Titus MD

## 2023-04-10 NOTE — TELEPHONE ENCOUNTER
Vikash Koch MD Sveiven, Sara, RN  can give lokelma 10g or veltassa 8.4 g x1 or kayexalate 30g and hold bactrim   Repeat labs this week        OUTCOME:  Kayexalate 30 g sent to pharmacy near patient location currently.  Patient v/u    Denia Garduno RN   Transplant Coordinator  [phone]

## 2023-04-10 NOTE — TELEPHONE ENCOUNTER
ISSUE: K+ 5.8 on 4/6/2023, labs reviewed today 4/10/2023 from outside labratory    PLAN:  Procedure:  I. For potassium ?5.5  A. Review with Transplant Nephrologist for PRN dose of Lokelma/Kayexalate  B. Review diet with patient and have them remove any potassium rich foods from their diet. Pay particular attention to dried fruit, bananas, nuts, potatoes, and orange juice.  C. Assess patient for edema, review blood pressure readings, review meds list and other lab tests such as glucose and bicarb levels and refer the patient to their primary care physician or the transplant nephrologist.    OUTCOME:  Patient states she has been eating more bananas than usual.  Patient v/u of reducing banana intake.  Discussed Low K+ diet.    Denies Chest pain or palpitations.    Will repeat labs this week.  Will also send Dr Boo Bardales and FRANK    Low K+ diet information sent via my chart    Denia Garduno RN   Transplant Coordinator  [phone]

## 2023-04-17 ENCOUNTER — TELEPHONE (OUTPATIENT)
Dept: TRANSPLANT | Facility: CLINIC | Age: 23
End: 2023-04-17
Payer: COMMERCIAL

## 2023-04-17 NOTE — LETTER
OUTPATIENT LABORATORY TEST ORDER     Patient Name: Anuradha Pearson   YOB: 2000     [facility] MR# [if applicable]: [id number]   Date & Time: 4/17/2023 1550  Expiration Date: 1 year after date issued      Diagnoses: Kidney Transplant (ICD-10 Z94.0)   Long term use of medications (ICD-10 Z79.899)      We ask your assistance in obtaining the following laboratory tests, which are part of our routine surveillance program for Solid Organ Transplant patients.     Please fax each result to [phone], same day as resulted/available    Critical lab results page [phone]  Monday - Friday 8 am to 5 pm  Evening/Weekend/Holiday communicate Critical labs results [phone]      Please obtain the following labs in one month ( Mid May 2023 )  CBC with platelets  Basic Metabolic Panel (Sodium, Potassium, Chloride, Creatinine, CO2, Urea Nitrogen, glucose, Calcium)  Tacrolimus drug level - 12-hour trough, please document time of last dose      EBV PCR Quantitative/Plasma         If you have any questions, please call [facility]- [phone] or [phone], Fax- [phone].      Vikash Titus MD

## 2023-04-17 NOTE — TELEPHONE ENCOUNTER
ISSUE: intermittent low level EBV: 3.43 on 4/6/2023 which is consistent w/patient's baseline historically although EBV has been undetected since 6/2022    OUTCOME: Patient states she is feeling well. Denies increased fatigue, night sweats, decreased appetite or weight loss.    Will update transplant provider and will let patient know if provider would like to repeat EBV sooner scheduled every other month.    Denia Garduno RN   Transplant Coordinator  [phone]    Vikash Koch MD Sveiven, Sara, RN  In 1 month           Previous Messages    ----- Message -----   From: Denia Garduno RN   Sent: 4/17/2023  10:49 AM CDT   To: MD Dr Boo Lucas,  EBV has been undetected the past 10 months.  Scheduled to repeat EBV June 2023,  would you like to repeat sooner?     Patient denies increased fatigue, denies night sweats, decreased appetite or weight loss     Denia Garduno RN   Transplant Coordinator   [phone]     Orders Faxed,  My chart sent.    Denia Garduno RN   Transplant Coordinator  [phone]

## 2023-04-25 ENCOUNTER — TELEPHONE (OUTPATIENT)
Dept: TRANSPLANT | Facility: CLINIC | Age: 23
End: 2023-04-25
Payer: COMMERCIAL

## 2023-04-26 NOTE — TELEPHONE ENCOUNTER
"Marshal Goyal MD Sveiven, Sara, RN  Please hold enalapril for now      ----- Message -----   From: Denia Garduno RN   Sent: 4/25/2023   2:08 PM CDT   To: MD Dr Jay Jay Fernández   Continued hyperkalemia after Kayexalate 30g once on ~4/11,  would you like another dose or wait for repeat labs next month?     Denia Garduno RN   Transplant Coordinator   [phone]       Update: tac not a true trough level, had taken tac prior to lab draw     Creatinine at baseline 1.2-1.5,  Patient recovering from a \"cold\" Will repeat labs in one month     OUTCOME:  Patient v/u of holding the Enalapril for now.  Will monitor daily BP and HR and repeat labs next week.    Denia Garduno RN   Transplant Coordinator  [phone]    "

## 2023-04-27 ENCOUNTER — MYC MEDICAL ADVICE (OUTPATIENT)
Dept: TRANSPLANT | Facility: CLINIC | Age: 23
End: 2023-04-27
Payer: COMMERCIAL

## 2023-04-27 NOTE — TELEPHONE ENCOUNTER
Vikash Koch MD Sveiven, Sara, RN  Please have her monitor BP and pulse this week, it was a small dose, if avergae BP readings>130/80 there is room to adjust/increase labetalol           Previous Messages       ----- Message -----   From: Denia Garduno, MIHAELA   Sent: 4/26/2023   9:46 AM CDT   To: Vikash Bardales MD   Subject: Hyperkalemia                                     Dr Boo Bardales,     We had Anuradha hold her Enalapril for continued hyperkalemia,  K+: 5.5 on 4/19, down from 5.8 on 4/6 after 30g of kayexalate.     She is concerned about her BP with stopping the enalapril. I did ask for her to monitor her BP and HR daily the next week and repeat labs next week.     She is currently taking for BP   Amlodipine 10 mg daily   Labetalol 100 mg bid     Would you like to see how her BP does this week without the enalapril? She is asking if we need to replace the enalapril right away.     Denia Garduno RN   Transplant Coordinator   [phone]       OUTCOME: see my chart

## 2023-05-12 ENCOUNTER — TELEPHONE (OUTPATIENT)
Dept: TRANSPLANT | Facility: CLINIC | Age: 23
End: 2023-05-12
Payer: COMMERCIAL

## 2023-05-12 NOTE — TELEPHONE ENCOUNTER
"ISSUE: follow up call on my chart message sent last night regarding low grade fever, chills, adenopathy.  Patient is concerned about possible \" kidney infection\"    Patient is currently out of states at [place].  RNCC advised patient to present to ED for evaluation for acute symptoms.  She v/u.      Discussed in depth that transplant is following her renal function,  Immunosuppression levels,  EBV levels etc.  Transplant specific issues  She v/u    Will send order to obtain EBV PCR level this month,  Have been monitoring every other month, last drawn April 2023.    Patient will obtain labs this week.    Will keep RNCC updated.    Denia Garduno RN   Transplant Coordinator  [phone]    "

## 2023-05-12 NOTE — LETTER
OUTPATIENT LABORATORY TEST ORDER     Patient Name: Anuradha Pearson   YOB: 2000     [facility] MR# [if applicable]: [id number]   Date & Time: 5/12/2023  Expiration Date: 1 year after date issued      Diagnoses: Kidney Transplant (ICD-10 Z94.0)   Long term use of medications (ICD-10 Z79.899)      We ask your assistance in obtaining the following laboratory tests, which are part of our routine surveillance program for Solid Organ Transplant patients.     Please fax each result to [phone], same day as resulted/available    Critical lab results page [phone]  Monday - Friday 8 am to 5 pm  Evening/Weekend/Holiday communicate Critical labs results [phone]        Please obtain the following this month  CBC with platelets  Basic Metabolic Panel (Sodium, Potassium, Chloride, Creatinine, CO2, Urea Nitrogen, glucose, Calcium)  Tacrolimus drug level - 12-hour trough, please document time of last dose    EBV PCR Quantitative/Plasma         If you have any questions, please call [facility]- [phone] or [phone], Fax- [phone].      Vikash Titus MD

## 2023-05-13 ENCOUNTER — TELEPHONE (OUTPATIENT)
Dept: TRANSPLANT | Facility: CLINIC | Age: 23
End: 2023-05-13
Payer: COMMERCIAL

## 2023-05-14 NOTE — TELEPHONE ENCOUNTER
TRIAGE CALL    Patient called to report that she tested positive for mono at urgent care this afternoon.  Patient states she has been feeling fatigued, did have a low grade fevers and chills X 1 day.    Patient will obtain full set of transplant labs this weekend along with IS trough level and EBV PCR,  Lab orders were sent earlier this afternoon.  Will assess labs and review with provider as needed.    Will follow up with patient Monday 5/15.  Patient aware she is to present to ED should fevers continue longer than 48 hours, she become unable to tolerate PO intake or take IS.    Denia Garduno RN   Transplant Coordinator  [phone]

## 2023-05-17 ENCOUNTER — TELEPHONE (OUTPATIENT)
Dept: TRANSPLANT | Facility: CLINIC | Age: 23
End: 2023-05-17
Payer: COMMERCIAL

## 2023-05-17 NOTE — TELEPHONE ENCOUNTER
ISSUE: calling to inquire if patient has repeated her labs including her EBV PCR this week as discussed     OUTCOME: left detailed message with a reminder to please have labs done this week.  Asked for CB to confirm message    Denia Garduno RN   Transplant Coordinator  [phone]

## 2023-05-17 NOTE — LETTER
OUTPATIENT LABORATORY TEST ORDER     Patient Name: Anuradha Pearson   YOB: 2000     [facility] MR# [if applicable]: [id number]   Date & Time: 5/17/2023  Expiration Date: 1 year after date issued      Diagnoses: Kidney Transplant (ICD-10 Z94.0)   Long term use of medications (ICD-10 Z79.899)      We ask your assistance in obtaining the following laboratory tests, which are part of our routine surveillance program for Solid Organ Transplant patients.     Please fax each result to [phone], same day as resulted/available    Critical lab results page [phone]  Monday - Friday 8 am to 5 pm  Evening/Weekend/Holiday communicate Critical labs results [phone]      Please obtain the following with next labs ( this week)   UA  CBC with platelets  Basic Metabolic Panel (Sodium, Potassium, Chloride, Creatinine, CO2, Urea Nitrogen, glucose, Calcium)  Tacrolimus drug level - 12-hour trough, please document time of last dose    EBV PCR Quantitative/Plasma         If you have any questions, please call [facility]- [phone] or [phone], Fax- [phone].      Vikash Titus MD

## 2023-05-18 ENCOUNTER — TRANSFERRED RECORDS (OUTPATIENT)
Dept: HEALTH INFORMATION MANAGEMENT | Facility: CLINIC | Age: 23
End: 2023-05-18

## 2023-05-19 ENCOUNTER — TELEPHONE (OUTPATIENT)
Dept: TRANSPLANT | Facility: CLINIC | Age: 23
End: 2023-05-19
Payer: COMMERCIAL

## 2023-05-21 ENCOUNTER — TELEPHONE (OUTPATIENT)
Dept: TRANSPLANT | Facility: CLINIC | Age: 23
End: 2023-05-21
Payer: COMMERCIAL

## 2023-05-21 NOTE — TELEPHONE ENCOUNTER
Shraddha paged that shes in the ER in [place]. Woke up with chest pain, SOB and feeling of unable to catch her breath.  EKG normal. Chest xray normal and urine normal.  Potassium 5.1  Creat 1.56   WBC 16.4  Want to do chest CT with contrast. Shraddha remembers someone telling her no contrast at all.    Discussed with dr Titus. If suspicion high for PE then can do 500 ml NS before contrast and 500 ml after CT scan.    Encouraged to call back with any changes and with results.

## 2023-06-07 DIAGNOSIS — E28.39 PRIMARY OVARIAN FAILURE: Primary | ICD-10-CM

## 2023-06-07 RX ORDER — ESTRADIOL 0.03 MG/D
1 PATCH TRANSDERMAL WEEKLY
Qty: 12 PATCH | Refills: 4 | Status: SHIPPED | OUTPATIENT
Start: 2023-06-07 | End: 2023-06-07

## 2023-06-07 RX ORDER — ESTRADIOL 0.03 MG/D
1 PATCH TRANSDERMAL WEEKLY
Qty: 12 PATCH | Refills: 4 | Status: SHIPPED | OUTPATIENT
Start: 2023-06-07 | End: 2024-02-23

## 2023-06-26 ENCOUNTER — TELEPHONE (OUTPATIENT)
Dept: TRANSPLANT | Facility: CLINIC | Age: 23
End: 2023-06-26
Payer: COMMERCIAL

## 2023-06-26 NOTE — TELEPHONE ENCOUNTER
Called back Rachel ([facility]).  Confirmed Transplant office needs a CMV PCR Quantitative.

## 2023-06-29 DIAGNOSIS — Z94.0 KIDNEY REPLACED BY TRANSPLANT: ICD-10-CM

## 2023-06-29 RX ORDER — AZATHIOPRINE 50 MG/1
75 TABLET ORAL DAILY
Qty: 45 TABLET | Refills: 11 | Status: SHIPPED | OUTPATIENT
Start: 2023-06-29 | End: 2024-07-26

## 2023-06-29 NOTE — TELEPHONE ENCOUNTER
1. Refill request received from: [facility]   2. Medication Requested: Azathioprine 50 mg tablet   3. Directions:Take 1.5 tablets by mouth daily  4. Quantity:135  5. Last Office Visit: 11/29/2022                    Has it been over a year since the last appointment (6 months for diabetes)? No                     If No:     Move on to next question.                    If Yes:                      Change refill quantity to 1 month.                      Route to Provider or Pool & let them know its been over a year since patient has been seen.                      If they do not have an upcoming appointment- reach out to family to schedule or route to .  6. Next Appointment Scheduled for: N/A  7. Last refill: N/A  8. Sent To: NEPHROLOGY POOL

## 2023-10-02 DIAGNOSIS — Z94.0 KIDNEY TRANSPLANTED: ICD-10-CM

## 2023-10-02 RX ORDER — TACROLIMUS 1 MG/1
2 CAPSULE ORAL 2 TIMES DAILY
Qty: 360 CAPSULE | Refills: 3 | Status: SHIPPED | OUTPATIENT
Start: 2023-10-02 | End: 2024-09-18

## 2023-10-02 NOTE — TELEPHONE ENCOUNTER
[facility] Call Center    Phone Message    May a detailed message be left on voicemail: yes     Reason for Call: Medication Question or concern regarding medication   Prescription Clarification  Name of Medication: tacrolimus (GENERIC EQUIVALENT) 1 MG capsule   Prescribing Provider: Vikash Bardales   Pharmacy: [facility]    What on the order needs clarification? Quantity needs clarification. Pharmacy states it says 12. Pt takes 4/day making it only a 3 day supply. Please contact pharmacy to confirm. Thank you!      Action Taken: Message routed to:  [facility] Neph    Travel Screening: Not Applicable

## 2023-11-21 ENCOUNTER — OFFICE VISIT (OUTPATIENT)
Dept: PEDIATRIC HEMATOLOGY/ONCOLOGY | Facility: CLINIC | Age: 23
End: 2023-11-21
Attending: NURSE PRACTITIONER
Payer: COMMERCIAL

## 2023-11-21 VITALS
TEMPERATURE: 98.2 F | WEIGHT: 121.25 LBS | SYSTOLIC BLOOD PRESSURE: 123 MMHG | RESPIRATION RATE: 16 BRPM | HEIGHT: 59 IN | BODY MASS INDEX: 24.44 KG/M2 | DIASTOLIC BLOOD PRESSURE: 85 MMHG | HEART RATE: 101 BPM | OXYGEN SATURATION: 99 %

## 2023-11-21 DIAGNOSIS — Z92.3 S/P RADIATION THERAPY: ICD-10-CM

## 2023-11-21 DIAGNOSIS — Z85.528 H/O WILMS' TUMOR: Primary | ICD-10-CM

## 2023-11-21 DIAGNOSIS — K76.9 LIVER LESION: ICD-10-CM

## 2023-11-21 DIAGNOSIS — Z23 FLU VACCINE NEED: ICD-10-CM

## 2023-11-21 DIAGNOSIS — Z92.21 STATUS POST CHEMOTHERAPY: ICD-10-CM

## 2023-11-21 LAB
AFP SERPL-MCNC: 2.3 NG/ML
ALBUMIN SERPL BCG-MCNC: 4.5 G/DL (ref 3.5–5.2)
ALP SERPL-CCNC: 76 U/L (ref 40–150)
ALT SERPL W P-5'-P-CCNC: 17 U/L (ref 0–50)
ANION GAP SERPL CALCULATED.3IONS-SCNC: 10 MMOL/L (ref 7–15)
AST SERPL W P-5'-P-CCNC: 23 U/L (ref 0–45)
BASOPHILS # BLD AUTO: 0.1 10E3/UL (ref 0–0.2)
BASOPHILS NFR BLD AUTO: 1 %
BILIRUB SERPL-MCNC: 0.2 MG/DL
BUN SERPL-MCNC: 25.1 MG/DL (ref 6–20)
CALCIUM SERPL-MCNC: 9.4 MG/DL (ref 8.6–10)
CHLORIDE SERPL-SCNC: 104 MMOL/L (ref 98–107)
CREAT SERPL-MCNC: 1.48 MG/DL (ref 0.51–0.95)
DEPRECATED HCO3 PLAS-SCNC: 26 MMOL/L (ref 22–29)
EGFRCR SERPLBLD CKD-EPI 2021: 50 ML/MIN/1.73M2
EOSINOPHIL # BLD AUTO: 0.1 10E3/UL (ref 0–0.7)
EOSINOPHIL NFR BLD AUTO: 1 %
ERYTHROCYTE [DISTWIDTH] IN BLOOD BY AUTOMATED COUNT: 12.5 % (ref 10–15)
FERRITIN SERPL-MCNC: 291 NG/ML (ref 6–175)
GLUCOSE SERPL-MCNC: 128 MG/DL (ref 70–99)
HCT VFR BLD AUTO: 34.6 % (ref 35–47)
HGB BLD-MCNC: 11.4 G/DL (ref 11.7–15.7)
IMM GRANULOCYTES # BLD: 0 10E3/UL
IMM GRANULOCYTES NFR BLD: 0 %
IRON BINDING CAPACITY (ROCHE): 261 UG/DL (ref 240–430)
IRON SATN MFR SERPL: 26 % (ref 15–46)
IRON SERPL-MCNC: 68 UG/DL (ref 37–145)
LYMPHOCYTES # BLD AUTO: 2 10E3/UL (ref 0.8–5.3)
LYMPHOCYTES NFR BLD AUTO: 24 %
MCH RBC QN AUTO: 32.7 PG (ref 26.5–33)
MCHC RBC AUTO-ENTMCNC: 32.9 G/DL (ref 31.5–36.5)
MCV RBC AUTO: 99 FL (ref 78–100)
MONOCYTES # BLD AUTO: 0.5 10E3/UL (ref 0–1.3)
MONOCYTES NFR BLD AUTO: 6 %
NEUTROPHILS # BLD AUTO: 5.9 10E3/UL (ref 1.6–8.3)
NEUTROPHILS NFR BLD AUTO: 68 %
NRBC # BLD AUTO: 0 10E3/UL
NRBC BLD AUTO-RTO: 0 /100
PLATELET # BLD AUTO: 275 10E3/UL (ref 150–450)
POTASSIUM SERPL-SCNC: 4.8 MMOL/L (ref 3.4–5.3)
PROT SERPL-MCNC: 7.6 G/DL (ref 6.4–8.3)
RBC # BLD AUTO: 3.49 10E6/UL (ref 3.8–5.2)
SODIUM SERPL-SCNC: 140 MMOL/L (ref 135–145)
WBC # BLD AUTO: 8.7 10E3/UL (ref 4–11)

## 2023-11-21 PROCEDURE — 82105 ALPHA-FETOPROTEIN SERUM: CPT | Performed by: NURSE PRACTITIONER

## 2023-11-21 PROCEDURE — 99213 OFFICE O/P EST LOW 20 MIN: CPT | Mod: 25 | Performed by: NURSE PRACTITIONER

## 2023-11-21 PROCEDURE — 90686 IIV4 VACC NO PRSV 0.5 ML IM: CPT | Performed by: NURSE PRACTITIONER

## 2023-11-21 PROCEDURE — 80053 COMPREHEN METABOLIC PANEL: CPT | Performed by: NURSE PRACTITIONER

## 2023-11-21 PROCEDURE — 85025 COMPLETE CBC W/AUTO DIFF WBC: CPT | Performed by: NURSE PRACTITIONER

## 2023-11-21 PROCEDURE — 82728 ASSAY OF FERRITIN: CPT | Performed by: NURSE PRACTITIONER

## 2023-11-21 PROCEDURE — 99215 OFFICE O/P EST HI 40 MIN: CPT | Performed by: NURSE PRACTITIONER

## 2023-11-21 PROCEDURE — G0008 ADMIN INFLUENZA VIRUS VAC: HCPCS | Performed by: NURSE PRACTITIONER

## 2023-11-21 PROCEDURE — 83550 IRON BINDING TEST: CPT | Performed by: NURSE PRACTITIONER

## 2023-11-21 PROCEDURE — 250N000011 HC RX IP 250 OP 636: Performed by: NURSE PRACTITIONER

## 2023-11-21 PROCEDURE — 36415 COLL VENOUS BLD VENIPUNCTURE: CPT | Performed by: NURSE PRACTITIONER

## 2023-11-21 RX ADMIN — INFLUENZA A VIRUS A/VICTORIA/4897/2022 IVR-238 (H1N1) ANTIGEN (FORMALDEHYDE INACTIVATED), INFLUENZA A VIRUS A/DARWIN/9/2021 SAN-010 (H3N2) ANTIGEN (FORMALDEHYDE INACTIVATED), INFLUENZA B VIRUS B/PHUKET/3073/2013 ANTIGEN (FORMALDEHYDE INACTIVATED), AND INFLUENZA B VIRUS B/MICHIGAN/01/2021 ANTIGEN (FORMALDEHYDE INACTIVATED) 0.5 ML: 15; 15; 15; 15 INJECTION, SUSPENSION INTRAMUSCULAR at 14:23

## 2023-11-21 ASSESSMENT — PAIN SCALES - GENERAL: PAINLEVEL: NO PAIN (0)

## 2023-11-21 NOTE — LETTER
11/21/2023      RE: Shraddha Pearson  [id number]  [facility] [id number]           LONG-TERM FOLLOW-UP CLINIC PROGRESS NOTE     We had the pleasure of seeing your patient, Shraddha Pearson, at the [facility]Term Follow[facility]Up Clinic for childhood cancer survivors.  Shraddha was referred to us by Dr. Ingrid Constantino for ongoing management and long-term follow up of her Wilms tumor and associated chemotherapy.  The following is a summary of your patient's cancer, therapy, current history, and physical findings followed by assessment and long-term followup recommendations.      THERAPY ACCORDING TO AVAILABLE RECORDS:  Shraddha Pearson is a now 23-year-old  female with a history of stage IV favorable histology Wilms tumor that was diagnosed on 05/19/2003 at 2 1/2 years of age.  She initially presented with disease in her right kidney as well as her lungs.  She had chemotherapy, radiation, and surgery as part of her treatment.  Her initial surgery was on  05/19/2003 in which she had a right nephrectomy.  Unfortunately, she had some surgical complications which caused her to lose blood supply to the left kidney, and she also lost that one as well.  Her surgeries in detail are as follows:   1. Right nephrectomy on 05/19/2003 by Dr. Myles Velez at the [facility].   2. Cadaveric saphenous vein graft to IVC on 05/20/2003.   3. Cholecystectomy on 05/20/2003.   4. Left nephrectomy and living donor renal transplant from patient's father on 11/14/2004.   5. Tonsillectomy on 01/31/2005.   6. Liver biopsy on 01/31/2005.   7. Left renal biopsy on 11/07/2005.      Shraddha received the following chemotherapies as part of her treatment:   1. Vincristine intravenously.   2. Actinomycin D intravenously.   3. Doxorubicin intravenously with a cumulative dose of 128 mg/m2.      Radiation therapy is as follows:   1. Whole abdomen radiation for a total dose  of 1080 cGy.      Shraddha's acute and chronic late effects known to date include:   1. 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   2. 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   3. Idiopathic intracranial hypertension 05/01/2007, resolved.   4. Recurrent UTIs and pyelonephritis, resolved.   5. Hemorrhagic gastritis 04/2007, resolved.   6. Viral meningitis 10/05/2008, resolved.   7. Elevated EBV titers requiring rituximab infusion for 2 doses in March and August 2005.   8.  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  Growth deceleration       HISTORY OF PRESENT ILLNESS:  Shraddha report to the visit today in person.  Recently had some incidental liver lesions and saw GI last year.  They were thought to be large hepatic adenomas vs FNH.  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  She last saw vascular surgery and had an MRI in February 2023.  She is due for imaging and follow up with them.  She will need IVF with any contrast medium due to her renal function issues.  She has transitioned her nephrology care to the adult team.   She has been seeing adult GI and GYN as well.   She has been sick a few times over the last year.  She was sick earlier in the year for 2 weeks and had chest pain.  She went in and had labs and was found to have strep bacteremia.  She was in the hospital in [place].  She has since recovered.    She is currently in [place] visiting family but she did buy a house in [place] close to [place] that is more convenient for her horse shows and rodeo in the winter.  She is planning to be in [place] until after [place].  She will travel back to [place] in January.   She completed college with a degree in Equine Science.  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 No issues with stomach pain.  Urination is normal.  No N/V/D/C.  No issues with breathing.     She has ongoing dental follow  up and will be seeing them soon.   Her gingival hyperplasia resolved now that she has been on tacrolimus. No more issues with sleeping.  Completed gardasil vaccines and got Bexsero in past.  Considering the COVID vaccine but hasn't gotten it yet.  Had an echo in 2022 and we will plan to reimage in 2-3 years (next 2025).  Has yearly eye exam.  Was being followed by peds ID for elevated EBV but they have since signed off.      REVIEW OF SYSTEMS:   General:  No acute distress  Skin: negative  Eyes: glasses  Ears/Nose/Throat: gingival hyperplasia- resolved;   Respiratory: No shortness of breath, dyspnea on exertion, cough, or hemoptysis  Cardiovascular: negative  Gastrointestinal: negative  Genitourinary: s/p kidney transplant  Musculoskeletal: left broken clavicle 4/2019  Neurologic: negative  Psychiatric: negative  Hematologic/Lymphatic/Immunologic: negative  Endocrine: ovarian failure and growth deceleration       IMMUNIZATIONS:  Up to date per parents      SOCIAL HISTORY:  Shraddha has completed her 2 year degree in Equine Science.  She is very active,  riding horses. Parents report in the past they have cleared her horseback riding with the renal transplant team.   Lives in [place] most of the time and comes for extended trips to [place].     FAMILY HISTORY:  Maternal grandmother with a history of heart valve disease, carotid artery obstruction, and congestive heart failure in her 50s.  Maternal grandfather with cardiac bypass in his 60s.  Paternal grandmother with history of hypertension in her 50s.  Maternal aunt with history of diabetes in her 50s.  Paternal great-grandfather with colon cancer at age 68.  Maternal  great-grandfather with history of colon cancer at 60.  Father with hypertension. No changes.      CURRENT MEDICATIONS:     Current Outpatient Medications   Medication     amLODIPine (NORVASC) 10 MG tablet     azaTHIOprine (IMURAN) 50 MG tablet     estradiol (FEMPATCH) 0.025 MG/24HR weekly patch      "labetalol (NORMODYNE) 100 MG tablet     magnesium oxide (MAG-OX) 400 (241.3 Mg) MG tablet     Multiple Vitamins-Minerals (WOMENS MULTIVITAMIN PO)     progesterone (PROMETRIUM) 100 MG capsule     tacrolimus (GENERIC) 1 MG capsule     enalapril (VASOTEC) 5 MG tablet     estradiol (VIVELLE-DOT) 0.025 MG/24HR bi-weekly patch     estradiol-norethindrone (COMBIPATCH) 0.05-0.25 MG/DAY bi-weekly patch     levonorgest-eth estrad 91-Day (SEASONIQUE) 0.15-0.03 &0.01 MG tablet     sulfamethoxazole-trimethoprim (BACTRIM) 400-80 MG tablet     No current facility-administered medications for this visit.        ALLERGIES:  Vancomycin which causes Ramin syndrome.      PHYSICAL EXAMINATION:   VITAL SIGNS: /85 (BP Location: Right arm, Patient Position: Sitting, Cuff Size: Adult Regular)   Pulse 101   Temp 98.2  F (36.8  C) (Oral)   Resp 16   Ht 1.494 m (4' 10.82\")   Wt 55 kg (121 lb 4.1 oz)   SpO2 99%   BMI 24.64 kg/m         Physical Exam   GENERAL: Healthy, alert and no distress  EYES: Eyes grossly normal to inspection.  No discharge or erythema, or obvious scleral/conjunctival abnormalities.  HENT: Normal cephalic/atraumatic.  External ears, nose and mouth without ulcers or lesions.  No nasal drainage visible. Bilateral TMs WNL.  NECK: No asymmetry, visible masses or scars  RESP: No audible wheeze, cough, or visible cyanosis.  No visible retractions or increased work of breathing.  Lungs CTAB.  MS: No gross musculoskeletal defects noted.  Normal range of motion.  No visible edema.  SKIN: Visible skin clear. No significant rash, abnormal pigmentation or lesions.  NEURO: Cranial nerves grossly intact.  Mentation and speech appropriate for age.  PSYCH: Mentation appears normal, affect normal/bright, judgement and insight intact, normal speech and appearance well-groomed.        Wt Readings from Last 3 Encounters:   11/21/23 55 kg (121 lb 4.1 oz)   02/28/23 57.8 kg (127 lb 6.4 oz)   01/05/23 56.2 kg (123 lb 14.4 oz) " "    Ht Readings from Last 2 Encounters:   11/21/23 1.494 m (4' 10.82\")   02/28/23 1.499 m (4' 11\")     Facility age limit for growth %erik is 20 years.       LABORATORY AND DIAGNOSTIC STUDIES:   Results for orders placed or performed in visit on 11/21/23   Comprehensive metabolic panel     Status: Abnormal   Result Value Ref Range    Sodium 140 135 - 145 mmol/L    Potassium 4.8 3.4 - 5.3 mmol/L    Carbon Dioxide (CO2) 26 22 - 29 mmol/L    Anion Gap 10 7 - 15 mmol/L    Urea Nitrogen 25.1 (H) 6.0 - 20.0 mg/dL    Creatinine 1.48 (H) 0.51 - 0.95 mg/dL    GFR Estimate 50 (L) >60 mL/min/1.73m2    Calcium 9.4 8.6 - 10.0 mg/dL    Chloride 104 98 - 107 mmol/L    Glucose 128 (H) 70 - 99 mg/dL    Alkaline Phosphatase 76 40 - 150 U/L    AST 23 0 - 45 U/L    ALT 17 0 - 50 U/L    Protein Total 7.6 6.4 - 8.3 g/dL    Albumin 4.5 3.5 - 5.2 g/dL    Bilirubin Total 0.2 <=1.2 mg/dL   Ferritin     Status: Abnormal   Result Value Ref Range    Ferritin 291 (H) 6 - 175 ng/mL   Iron and iron binding capacity     Status: Normal   Result Value Ref Range    Iron 68 37 - 145 ug/dL    Iron Binding Capacity 261 240 - 430 ug/dL    Iron Sat Index 26 15 - 46 %   AFP tumor marker     Status: Normal   Result Value Ref Range    AFP tumor marker 2.3 <=8.3 ng/mL    Narrative    This result is obtained using the Roche Elecsys AFP method on  the clinton e801 immunoassay analyzer. Results obtained with different assay methods or kits cannot be used interchangeably.  Reference ranges apply to non-pregnant females only.   CBC with platelets and differential     Status: Abnormal   Result Value Ref Range    WBC Count 8.7 4.0 - 11.0 10e3/uL    RBC Count 3.49 (L) 3.80 - 5.20 10e6/uL    Hemoglobin 11.4 (L) 11.7 - 15.7 g/dL    Hematocrit 34.6 (L) 35.0 - 47.0 %    MCV 99 78 - 100 fL    MCH 32.7 26.5 - 33.0 pg    MCHC 32.9 31.5 - 36.5 g/dL    RDW 12.5 10.0 - 15.0 %    Platelet Count 275 150 - 450 10e3/uL    % Neutrophils 68 %    % Lymphocytes 24 %    % Monocytes 6 %    " % Eosinophils 1 %    % Basophils 1 %    % Immature Granulocytes 0 %    NRBCs per 100 WBC 0 <1 /100    Absolute Neutrophils 5.9 1.6 - 8.3 10e3/uL    Absolute Lymphocytes 2.0 0.8 - 5.3 10e3/uL    Absolute Monocytes 0.5 0.0 - 1.3 10e3/uL    Absolute Eosinophils 0.1 0.0 - 0.7 10e3/uL    Absolute Basophils 0.1 0.0 - 0.2 10e3/uL    Absolute Immature Granulocytes 0.0 <=0.4 10e3/uL    Absolute NRBCs 0.0 10e3/uL   CBC with platelets differential     Status: Abnormal    Narrative    The following orders were created for panel order CBC with platelets differential.  Procedure                               Abnormality         Status                     ---------                               -----------         ------                     CBC with platelets and d...[[id number]]  Abnormal            Final result                 Please view results for these tests on the individual orders.         ASSESSMENT, PLAN, AND LONG-TERM FOLLOWUP RECOMMENDATIONS:  Shraddha Pearson is a now 23-year-old  female with a history of stage IV favorable histology Wilms tumor who is status post renal transplant approximately 19 years ago.  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  She also has some macrocytosis that is likely related to her antirejection medications, work up completed a few years ago.  She is mildly anemic and off iron.  She has new liver lesions that are likely benign and she is seeing GI.  She needs an MRI with IVF pre and post due to the contrast.     She is on OCP for premature ovarian failure and they may need to be adjusted with her liver lesions.  AFP is normal which is reassuring.  The following are our late effects recommendations:     1. Risk of recurrence:  Shraddha is currently 19 years status post end of her treatment from her Wilms tumor.  Given the distance from the diagnosis of her primary malignancy, the likelihood of recurrence at this point would be extremely low.   2. Psychosocial  effects:  Shraddha appears to be doing very well socially, as well as with her school performance.  We do not believe she is having any difficulties in this area.  3. Renal transplant:  Shraddha is currently followed by  Pediatric Nephrology, and we recommend continued follow up and management of her renal transplant.  Continue to report any signs of bowel obstruction related to her previous surgeries and new medications. Drink plenty of water.  Any medications the patient decides to take should be cleared with her renal transplant team.  Additionally, Shraddha should clear any major activity changes with her renal transplant team as well.    4. Risk for cardiac toxicity:  Shraddha has a history of 128 mg/m2 of doxorubicin, as well as whole abdomen radiation, which can predispose her to late cardiac difficulties, including dilated cardiomyopathy.  Shraddha had an echocardiogram completed in 2019 which was within normal limits. She needs to have surveillance echos from an oncology perspective every 5 years based on the new updated guidelines.  May be sooner for nephrology. Done 2022 and no change. Will plan to get a little sooner due to her mild HTN and will repeat in 2025.  5. Female issues related to fertility:  Shraddha has a history of whole abdomen radiation in which her ovaries were included in the field.  This may potentially have an impact on her future fertility.  She was found to have ovarian failure in 2012 and has follow up with GYN now that she is an adult.   Fertility potential would be low given this diagnosis.  She will have continued follow up with adult GYN.  They will discuss her OCP in the setting of new liver lesions.  6. Risk for peripheral neuropathy:  Shraddha has a history of vincristine exposure which can predispose her to having issues with ongoing peripheral neuropathy.  She did have problems with foot drop during treatment.  However, this is resolved, and she is doing well.   7. Growth and  development:  Shraddha's height has drifted slightly down to below the 5th percentile over the past several years.  She was following up with endocrine regularly.   She has reached her final adult height.   8. Risk for secondary malignancies:  We asked that Shraddha wear sunscreen when she is outdoors due to her increased risk of skin cancer from radiation.  Additionally, should she noted any new mass, lump or area of concern, particularly in the radiation field, we would recommend prompt evaluation. New guidelines recommend starting early colon cancer screening when she is 30 years old due to her abdominal radiation.  9.  EBV viremia:  Shraddha has been having rising EBV levels since 2013.  Last assessment was improved . Continue to monitor EBV level with nephrology given her risk for PTLD.  She will have continued EBV labs followed by nephrology.    11.  Macrocytosis:  Shraddha has been having a rising MCV.  She has had some mild chronic anemia.  Work up in 2016 and this is likely related to her antirejection meds.  12.  Insomnia:  Resolved  13.  Immunizations:  Not able to get live virus vaccines due to immunosuppression.  Considering the covid vaccine and I would recommend that she get this.  Will get the flu vaccine today.  Had 3 doses of HPV vaccine.  14.  Liver lesions:  Likely benign.  MRI done by nephrology.  AFP normal.  Saw GI and needs more follow up.  We will help try to facilitate.  She is s/p lesion embolization.    It was a pleasure to see Shraddha in our Long-Term Follow-Up Clinic today.  We certainly appreciate the opportunity to participate in your patient's care.  If you have any questions or concerns, please do not hesitate to contact us at [phone].   We ask that Shraddha return to our clinic in one year for followup.  We will try to coordinate this on a day that she is seeing nephrology.              Dorcas Samayoa MSN, APRN, CPNP-AC, CPON  Department of Pediatrics  Division of  Hematology/Oncology    Review of the result(s) of each unique test - CBC, CMP, ferritin  Total time spent on the following services on the date of the encounter:  Preparing to see patient, chart review, review of outside records, Ordering medications, test, procedures, chemotherapy, Referring or communicating with other healthcare professionals, Interpretation of labs, imaging and other tests, Performing a medically appropriate examination , Counseling and educating the patient/family/caregiver , Documenting clinical information in the electronic or other health record , Communicating results to the patient/family/caregiver , Care coordination  and Total time spent: 40 min                                JASMYNE Trevino CNP

## 2023-11-21 NOTE — LETTER
11/21/2023      RE: Shraddha Pearson  [address]  [place] [address]     Dear Colleague,    Thank you for the opportunity to participate in the care of your patient, Shraddha Pearson, at the [facility] at [facility]. Please see a copy of my visit note below.        LONG-TERM FOLLOW-UP CLINIC PROGRESS NOTE     We had the pleasure of seeing your patient, Shraddha Pearson, at the [facility]Up [facility] for childhood cancer survivors.  Shraddha was referred to us by Dr. Ingrid Constantino for ongoing management and long-term follow up of her Wilms tumor and associated chemotherapy.  The following is a summary of your patient's cancer, therapy, current history, and physical findings followed by assessment and long-term followup recommendations.      THERAPY ACCORDING TO AVAILABLE RECORDS:  Shraddha Pearson is a now 23-year-old  female with a history of stage IV favorable histology Wilms tumor that was diagnosed on 05/19/2003 at 2 1/2 years of age.  She initially presented with disease in her right kidney as well as her lungs.  She had chemotherapy, radiation, and surgery as part of her treatment.  Her initial surgery was on  05/19/2003 in which she had a right nephrectomy.  Unfortunately, she had some surgical complications which caused her to lose blood supply to the left kidney, and she also lost that one as well.  Her surgeries in detail are as follows:   1. Right nephrectomy on 05/19/2003 by Dr. Myles Velez at the [facility].   2. Cadaveric saphenous vein graft to IVC on 05/20/2003.   3. Cholecystectomy on 05/20/2003.   4. Left nephrectomy and living donor renal transplant from patient's father on 11/14/2004.   5. Tonsillectomy on 01/31/2005.   6. Liver biopsy on 01/31/2005.   7. Left renal biopsy on 11/07/2005.      Shraddha  received the following chemotherapies as part of her treatment:   1. Vincristine intravenously.   2. Actinomycin D intravenously.   3. Doxorubicin intravenously with a cumulative dose of 128 mg/m2.      Radiation therapy is as follows:   1. Whole abdomen radiation for a total dose of 1080 cGy.      Shraddha's acute and chronic late effects known to date include:   1. 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   2. 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   3. Idiopathic intracranial hypertension 05/01/2007, resolved.   4. Recurrent UTIs and pyelonephritis, resolved.   5. Hemorrhagic gastritis 04/2007, resolved.   6. Viral meningitis 10/05/2008, resolved.   7. Elevated EBV titers requiring rituximab infusion for 2 doses in March and August 2005.   8.  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  Growth deceleration       HISTORY OF PRESENT ILLNESS:  Shraddha report to the visit today in person.  Recently had some incidental liver lesions and saw GI last year.  They were thought to be large hepatic adenomas vs FNH.  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  She last saw vascular surgery and had an MRI in February 2023.  She is due for imaging and follow up with them.  She will need IVF with any contrast medium due to her renal function issues.  She has transitioned her nephrology care to the adult team.   She has been seeing adult GI and GYN as well.   She has been sick a few times over the last year.  She was sick earlier in the year for 2 weeks and had chest pain.  She went in and had labs and was found to have strep bacteremia.  She was in the hospital in [place].  She has since recovered.    She is currently in [place] visiting family but she did buy a house in [place] close to [place] that is more convenient for her horse shows and rodeo in the winter.  She is planning to be in [place] until after [place].   She will travel back to [place] in January.   She completed college with a degree in Equine Science.  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 No issues with stomach pain.  Urination is normal.  No N/V/D/C.  No issues with breathing.     She has ongoing dental follow up and will be seeing them soon.   Her gingival hyperplasia resolved now that she has been on tacrolimus. No more issues with sleeping.  Completed gardasil vaccines and got Bexsero in past.  Considering the COVID vaccine but hasn't gotten it yet.  Had an echo in 2022 and we will plan to reimage in 2-3 years (next 2025).  Has yearly eye exam.  Was being followed by peds ID for elevated EBV but they have since signed off.      REVIEW OF SYSTEMS:   General:  No acute distress  Skin: negative  Eyes: glasses  Ears/Nose/Throat: gingival hyperplasia- resolved;   Respiratory: No shortness of breath, dyspnea on exertion, cough, or hemoptysis  Cardiovascular: negative  Gastrointestinal: negative  Genitourinary: s/p kidney transplant  Musculoskeletal: left broken clavicle 4/2019  Neurologic: negative  Psychiatric: negative  Hematologic/Lymphatic/Immunologic: negative  Endocrine: ovarian failure and growth deceleration       IMMUNIZATIONS:  Up to date per parents      SOCIAL HISTORY:  Shraddha has completed her 2 year degree in Equine Science.  She is very active,  riding horses. Parents report in the past they have cleared her horseback riding with the renal transplant team.   Lives in [place] most of the time and comes for extended trips to [place].     FAMILY HISTORY:  Maternal grandmother with a history of heart valve disease, carotid artery obstruction, and congestive heart failure in her 50s.  Maternal grandfather with cardiac bypass in his 60s.  Paternal grandmother with history of hypertension in her 50s.  Maternal aunt with history of diabetes in her 50s.  Paternal great-grandfather with colon cancer at age 68.  Maternal  great-grandfather with  "history of colon cancer at 60.  Father with hypertension. No changes.      CURRENT MEDICATIONS:     Current Outpatient Medications   Medication    amLODIPine (NORVASC) 10 MG tablet    azaTHIOprine (IMURAN) 50 MG tablet    estradiol (FEMPATCH) 0.025 MG/24HR weekly patch    labetalol (NORMODYNE) 100 MG tablet    magnesium oxide (MAG-OX) 400 (241.3 Mg) MG tablet    Multiple Vitamins-Minerals (WOMENS MULTIVITAMIN PO)    progesterone (PROMETRIUM) 100 MG capsule    tacrolimus (GENERIC) 1 MG capsule    enalapril (VASOTEC) 5 MG tablet    estradiol (VIVELLE-DOT) 0.025 MG/24HR bi-weekly patch    estradiol-norethindrone (COMBIPATCH) 0.05-0.25 MG/DAY bi-weekly patch    levonorgest-eth estrad 91-Day (SEASONIQUE) 0.15-0.03 &0.01 MG tablet    sulfamethoxazole-trimethoprim (BACTRIM) 400-80 MG tablet     No current facility-administered medications for this visit.        ALLERGIES:  Vancomycin which causes Ramin syndrome.      PHYSICAL EXAMINATION:   VITAL SIGNS: /85 (BP Location: Right arm, Patient Position: Sitting, Cuff Size: Adult Regular)   Pulse 101   Temp 98.2  F (36.8  C) (Oral)   Resp 16   Ht 1.494 m (4' 10.82\")   Wt 55 kg (121 lb 4.1 oz)   SpO2 99%   BMI 24.64 kg/m         Physical Exam   GENERAL: Healthy, alert and no distress  EYES: Eyes grossly normal to inspection.  No discharge or erythema, or obvious scleral/conjunctival abnormalities.  HENT: Normal cephalic/atraumatic.  External ears, nose and mouth without ulcers or lesions.  No nasal drainage visible. Bilateral TMs WNL.  NECK: No asymmetry, visible masses or scars  RESP: No audible wheeze, cough, or visible cyanosis.  No visible retractions or increased work of breathing.  Lungs CTAB.  MS: No gross musculoskeletal defects noted.  Normal range of motion.  No visible edema.  SKIN: Visible skin clear. No significant rash, abnormal pigmentation or lesions.  NEURO: Cranial nerves grossly intact.  Mentation and speech appropriate for age.  PSYCH: Mentation " "appears normal, affect normal/bright, judgement and insight intact, normal speech and appearance well-groomed.        Wt Readings from Last 3 Encounters:   11/21/23 55 kg (121 lb 4.1 oz)   02/28/23 57.8 kg (127 lb 6.4 oz)   01/05/23 56.2 kg (123 lb 14.4 oz)     Ht Readings from Last 2 Encounters:   11/21/23 1.494 m (4' 10.82\")   02/28/23 1.499 m (4' 11\")     Facility age limit for growth %erik is 20 years.       LABORATORY AND DIAGNOSTIC STUDIES:   Results for orders placed or performed in visit on 11/21/23   Comprehensive metabolic panel     Status: Abnormal   Result Value Ref Range    Sodium 140 135 - 145 mmol/L    Potassium 4.8 3.4 - 5.3 mmol/L    Carbon Dioxide (CO2) 26 22 - 29 mmol/L    Anion Gap 10 7 - 15 mmol/L    Urea Nitrogen 25.1 (H) 6.0 - 20.0 mg/dL    Creatinine 1.48 (H) 0.51 - 0.95 mg/dL    GFR Estimate 50 (L) >60 mL/min/1.73m2    Calcium 9.4 8.6 - 10.0 mg/dL    Chloride 104 98 - 107 mmol/L    Glucose 128 (H) 70 - 99 mg/dL    Alkaline Phosphatase 76 40 - 150 U/L    AST 23 0 - 45 U/L    ALT 17 0 - 50 U/L    Protein Total 7.6 6.4 - 8.3 g/dL    Albumin 4.5 3.5 - 5.2 g/dL    Bilirubin Total 0.2 <=1.2 mg/dL   Ferritin     Status: Abnormal   Result Value Ref Range    Ferritin 291 (H) 6 - 175 ng/mL   Iron and iron binding capacity     Status: Normal   Result Value Ref Range    Iron 68 37 - 145 ug/dL    Iron Binding Capacity 261 240 - 430 ug/dL    Iron Sat Index 26 15 - 46 %   AFP tumor marker     Status: Normal   Result Value Ref Range    AFP tumor marker 2.3 <=8.3 ng/mL    Narrative    This result is obtained using the Roche Elecsys AFP method on  the clinton e801 immunoassay analyzer. Results obtained with different assay methods or kits cannot be used interchangeably.  Reference ranges apply to non-pregnant females only.   CBC with platelets and differential     Status: Abnormal   Result Value Ref Range    WBC Count 8.7 4.0 - 11.0 10e3/uL    RBC Count 3.49 (L) 3.80 - 5.20 10e6/uL    Hemoglobin 11.4 (L) 11.7 - " 15.7 g/dL    Hematocrit 34.6 (L) 35.0 - 47.0 %    MCV 99 78 - 100 fL    MCH 32.7 26.5 - 33.0 pg    MCHC 32.9 31.5 - 36.5 g/dL    RDW 12.5 10.0 - 15.0 %    Platelet Count 275 150 - 450 10e3/uL    % Neutrophils 68 %    % Lymphocytes 24 %    % Monocytes 6 %    % Eosinophils 1 %    % Basophils 1 %    % Immature Granulocytes 0 %    NRBCs per 100 WBC 0 <1 /100    Absolute Neutrophils 5.9 1.6 - 8.3 10e3/uL    Absolute Lymphocytes 2.0 0.8 - 5.3 10e3/uL    Absolute Monocytes 0.5 0.0 - 1.3 10e3/uL    Absolute Eosinophils 0.1 0.0 - 0.7 10e3/uL    Absolute Basophils 0.1 0.0 - 0.2 10e3/uL    Absolute Immature Granulocytes 0.0 <=0.4 10e3/uL    Absolute NRBCs 0.0 10e3/uL   CBC with platelets differential     Status: Abnormal    Narrative    The following orders were created for panel order CBC with platelets differential.  Procedure                               Abnormality         Status                     ---------                               -----------         ------                     CBC with platelets and d...[[id number]]  Abnormal            Final result                 Please view results for these tests on the individual orders.         ASSESSMENT, PLAN, AND LONG-TERM FOLLOWUP RECOMMENDATIONS:  Shraddha Pearson is a now 23-year-old  female with a history of stage IV favorable histology Wilms tumor who is status post renal transplant approximately 19 years ago.  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  She also has some macrocytosis that is likely related to her antirejection medications, work up completed a few years ago.  She is mildly anemic and off iron.  She has new liver lesions that are likely benign and she is seeing GI.  She needs an MRI with IVF pre and post due to the contrast.     She is on OCP for premature ovarian failure and they may need to be adjusted with her liver lesions.  AFP is normal which is reassuring.  The following are our late effects recommendations:     1.  Risk of recurrence:  Shraddha is currently 19 years status post end of her treatment from her Wilms tumor.  Given the distance from the diagnosis of her primary malignancy, the likelihood of recurrence at this point would be extremely low.   2. Psychosocial effects:  Shraddha appears to be doing very well socially, as well as with her school performance.  We do not believe she is having any difficulties in this area.  3. Renal transplant:  Shraddha is currently followed by  Pediatric Nephrology, and we recommend continued follow up and management of her renal transplant.  Continue to report any signs of bowel obstruction related to her previous surgeries and new medications. Drink plenty of water.  Any medications the patient decides to take should be cleared with her renal transplant team.  Additionally, Shraddha should clear any major activity changes with her renal transplant team as well.    4. Risk for cardiac toxicity:  Shraddha has a history of 128 mg/m2 of doxorubicin, as well as whole abdomen radiation, which can predispose her to late cardiac difficulties, including dilated cardiomyopathy.  Shraddha had an echocardiogram completed in 2019 which was within normal limits. She needs to have surveillance echos from an oncology perspective every 5 years based on the new updated guidelines.  May be sooner for nephrology. Done 2022 and no change. Will plan to get a little sooner due to her mild HTN and will repeat in 2025.  5. Female issues related to fertility:  Shraddha has a history of whole abdomen radiation in which her ovaries were included in the field.  This may potentially have an impact on her future fertility.  She was found to have ovarian failure in 2012 and has follow up with GYN now that she is an adult.   Fertility potential would be low given this diagnosis.  She will have continued follow up with adult GYN.  They will discuss her OCP in the setting of new liver lesions.  6. Risk for peripheral  neuropathy:  Shraddha has a history of vincristine exposure which can predispose her to having issues with ongoing peripheral neuropathy.  She did have problems with foot drop during treatment.  However, this is resolved, and she is doing well.   7. Growth and development:  Shraddha's height has drifted slightly down to below the 5th percentile over the past several years.  She was following up with endocrine regularly.   She has reached her final adult height.   8. Risk for secondary malignancies:  We asked that Shraddha wear sunscreen when she is outdoors due to her increased risk of skin cancer from radiation.  Additionally, should she noted any new mass, lump or area of concern, particularly in the radiation field, we would recommend prompt evaluation. New guidelines recommend starting early colon cancer screening when she is 30 years old due to her abdominal radiation.  9.  EBV viremia:  Shraddha has been having rising EBV levels since 2013.  Last assessment was improved . Continue to monitor EBV level with nephrology given her risk for PTLD.  She will have continued EBV labs followed by nephrology.    11.  Macrocytosis:  Shraddha has been having a rising MCV.  She has had some mild chronic anemia.  Work up in 2016 and this is likely related to her antirejection meds.  12.  Insomnia:  Resolved  13.  Immunizations:  Not able to get live virus vaccines due to immunosuppression.  Considering the covid vaccine and I would recommend that she get this.  Will get the flu vaccine today.  Had 3 doses of HPV vaccine.  14.  Liver lesions:  Likely benign.  MRI done by nephrology.  AFP normal.  Saw GI and needs more follow up.  We will help try to facilitate.  She is s/p lesion embolization.    It was a pleasure to see Shraddha in our Long-Term Follow-Up Clinic today.  We certainly appreciate the opportunity to participate in your patient's care.  If you have any questions or concerns, please do not hesitate to contact us at  [phone].   We ask that Shraddha return to our clinic in one year for followup.  We will try to coordinate this on a day that she is seeing nephrology.        Dorcas Saamyoa MSN, APRN, CPNP-AC, CPON  Department of Pediatrics  Division of Hematology/Oncology    Review of the result(s) of each unique test - CBC, CMP, ferritin  Total time spent on the following services on the date of the encounter:  Preparing to see patient, chart review, review of outside records, Ordering medications, test, procedures, chemotherapy, Referring or communicating with other healthcare professionals, Interpretation of labs, imaging and other tests, Performing a medically appropriate examination , Counseling and educating the patient/family/caregiver , Documenting clinical information in the electronic or other health record , Communicating results to the patient/family/caregiver , Care coordination  and Total time spent: 40 min

## 2023-11-30 NOTE — PROGRESS NOTES
LONG-TERM FOLLOW-UP CLINIC PROGRESS NOTE     We had the pleasure of seeing your patient, Shraddha Pearson, at the [facility] Long-Term Follow-Up Clinic for childhood cancer survivors.  Shraddha was referred to us by Dr. Ingrid Constantino for ongoing management and long-term follow up of her Wilms tumor and associated chemotherapy.  The following is a summary of your patient's cancer, therapy, current history, and physical findings followed by assessment and long-term followup recommendations.      THERAPY ACCORDING TO AVAILABLE RECORDS:  Shraddha Pearson is a now 23-year-old  female with a history of stage IV favorable histology Wilms tumor that was diagnosed on 05/19/2003 at 2 1/2 years of age.  She initially presented with disease in her right kidney as well as her lungs.  She had chemotherapy, radiation, and surgery as part of her treatment.  Her initial surgery was on  05/19/2003 in which she had a right nephrectomy.  Unfortunately, she had some surgical complications which caused her to lose blood supply to the left kidney, and she also lost that one as well.  Her surgeries in detail are as follows:   1. Right nephrectomy on 05/19/2003 by Dr. Myles Velez at the [facility].   2. Cadaveric saphenous vein graft to IVC on 05/20/2003.   3. Cholecystectomy on 05/20/2003.   4. Left nephrectomy and living donor renal transplant from patient's father on 11/14/2004.   5. Tonsillectomy on 01/31/2005.   6. Liver biopsy on 01/31/2005.   7. Left renal biopsy on 11/07/2005.      Shraddha received the following chemotherapies as part of her treatment:   1. Vincristine intravenously.   2. Actinomycin D intravenously.   3. Doxorubicin intravenously with a cumulative dose of 128 mg/m2.      Radiation therapy is as follows:   1. Whole abdomen radiation for a total dose of 1080 cGy.      Shraddha's acute and chronic late effects known to date include:   1. IVC injury  causing left renal atrophy on 05/19/2003.   2. 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   3. Idiopathic intracranial hypertension 05/01/2007, resolved.   4. Recurrent UTIs and pyelonephritis, resolved.   5. Hemorrhagic gastritis 04/2007, resolved.   6. Viral meningitis 10/05/2008, resolved.   7. Elevated EBV titers requiring rituximab infusion for 2 doses in March and August 2005.   8.  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  Growth deceleration       HISTORY OF PRESENT ILLNESS:  Shraddha report to the visit today in person.  Recently had some incidental liver lesions and saw GI last year.  They were thought to be large hepatic adenomas vs FNH.  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  She last saw vascular surgery and had an MRI in February 2023.  She is due for imaging and follow up with them.  She will need IVF with any contrast medium due to her renal function issues.  She has transitioned her nephrology care to the adult team.   She has been seeing adult GI and GYN as well.   She has been sick a few times over the last year.  She was sick earlier in the year for 2 weeks and had chest pain.  She went in and had labs and was found to have strep bacteremia.  She was in the hospital in [place].  She has since recovered.    She is currently in [place] visiting family but she did buy a house in [place] close to [place] that is more convenient for her horse shows and rodeo in the winter.  She is planning to be in [place] until after [place].  She will travel back to [place] in January.   She completed college with a degree in Equine Science.  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 No issues with stomach pain.  Urination is normal.  No N/V/D/C.  No issues with breathing.     She has ongoing dental follow up and will be seeing them soon.   Her gingival hyperplasia resolved now that she has been on  tacrolimus. No more issues with sleeping.  Completed gardasil vaccines and got Bexsero in past.  Considering the COVID vaccine but hasn't gotten it yet.  Had an echo in 2022 and we will plan to reimage in 2-3 years (next 2025).  Has yearly eye exam.  Was being followed by peds ID for elevated EBV but they have since signed off.      REVIEW OF SYSTEMS:   General:  No acute distress  Skin: negative  Eyes: glasses  Ears/Nose/Throat: gingival hyperplasia- resolved;   Respiratory: No shortness of breath, dyspnea on exertion, cough, or hemoptysis  Cardiovascular: negative  Gastrointestinal: negative  Genitourinary: s/p kidney transplant  Musculoskeletal: left broken clavicle 4/2019  Neurologic: negative  Psychiatric: negative  Hematologic/Lymphatic/Immunologic: negative  Endocrine: ovarian failure and growth deceleration       IMMUNIZATIONS:  Up to date per parents      SOCIAL HISTORY:  Shraddha has completed her 2 year degree in Equine Science.  She is very active,  riding horses. Parents report in the past they have cleared her horseback riding with the renal transplant team.   Lives in [place] most of the time and comes for extended trips to [place].     FAMILY HISTORY:  Maternal grandmother with a history of heart valve disease, carotid artery obstruction, and congestive heart failure in her 50s.  Maternal grandfather with cardiac bypass in his 60s.  Paternal grandmother with history of hypertension in her 50s.  Maternal aunt with history of diabetes in her 50s.  Paternal great-grandfather with colon cancer at age 68.  Maternal  great-grandfather with history of colon cancer at 60.  Father with hypertension. No changes.      CURRENT MEDICATIONS:     Current Outpatient Medications   Medication    amLODIPine (NORVASC) 10 MG tablet    azaTHIOprine (IMURAN) 50 MG tablet    estradiol (FEMPATCH) 0.025 MG/24HR weekly patch    labetalol (NORMODYNE) 100 MG tablet    magnesium oxide (MAG-OX) 400 (241.3 Mg) MG tablet    Multiple  "Vitamins-Minerals (WOMENS MULTIVITAMIN PO)    progesterone (PROMETRIUM) 100 MG capsule    tacrolimus (GENERIC) 1 MG capsule    enalapril (VASOTEC) 5 MG tablet    estradiol (VIVELLE-DOT) 0.025 MG/24HR bi-weekly patch    estradiol-norethindrone (COMBIPATCH) 0.05-0.25 MG/DAY bi-weekly patch    levonorgest-eth estrad 91-Day (SEASONIQUE) 0.15-0.03 &0.01 MG tablet    sulfamethoxazole-trimethoprim (BACTRIM) 400-80 MG tablet     No current facility-administered medications for this visit.        ALLERGIES:  Vancomycin which causes Ramin syndrome.      PHYSICAL EXAMINATION:   VITAL SIGNS: /85 (BP Location: Right arm, Patient Position: Sitting, Cuff Size: Adult Regular)   Pulse 101   Temp 98.2  F (36.8  C) (Oral)   Resp 16   Ht 1.494 m (4' 10.82\")   Wt 55 kg (121 lb 4.1 oz)   SpO2 99%   BMI 24.64 kg/m         Physical Exam   GENERAL: Healthy, alert and no distress  EYES: Eyes grossly normal to inspection.  No discharge or erythema, or obvious scleral/conjunctival abnormalities.  HENT: Normal cephalic/atraumatic.  External ears, nose and mouth without ulcers or lesions.  No nasal drainage visible. Bilateral TMs WNL.  NECK: No asymmetry, visible masses or scars  RESP: No audible wheeze, cough, or visible cyanosis.  No visible retractions or increased work of breathing.  Lungs CTAB.  MS: No gross musculoskeletal defects noted.  Normal range of motion.  No visible edema.  SKIN: Visible skin clear. No significant rash, abnormal pigmentation or lesions.  NEURO: Cranial nerves grossly intact.  Mentation and speech appropriate for age.  PSYCH: Mentation appears normal, affect normal/bright, judgement and insight intact, normal speech and appearance well-groomed.        Wt Readings from Last 3 Encounters:   11/21/23 55 kg (121 lb 4.1 oz)   02/28/23 57.8 kg (127 lb 6.4 oz)   01/05/23 56.2 kg (123 lb 14.4 oz)     Ht Readings from Last 2 Encounters:   11/21/23 1.494 m (4' 10.82\")   02/28/23 1.499 m (4' 11\")     Facility age " limit for growth %erik is 20 years.       LABORATORY AND DIAGNOSTIC STUDIES:   Results for orders placed or performed in visit on 11/21/23   Comprehensive metabolic panel     Status: Abnormal   Result Value Ref Range    Sodium 140 135 - 145 mmol/L    Potassium 4.8 3.4 - 5.3 mmol/L    Carbon Dioxide (CO2) 26 22 - 29 mmol/L    Anion Gap 10 7 - 15 mmol/L    Urea Nitrogen 25.1 (H) 6.0 - 20.0 mg/dL    Creatinine 1.48 (H) 0.51 - 0.95 mg/dL    GFR Estimate 50 (L) >60 mL/min/1.73m2    Calcium 9.4 8.6 - 10.0 mg/dL    Chloride 104 98 - 107 mmol/L    Glucose 128 (H) 70 - 99 mg/dL    Alkaline Phosphatase 76 40 - 150 U/L    AST 23 0 - 45 U/L    ALT 17 0 - 50 U/L    Protein Total 7.6 6.4 - 8.3 g/dL    Albumin 4.5 3.5 - 5.2 g/dL    Bilirubin Total 0.2 <=1.2 mg/dL   Ferritin     Status: Abnormal   Result Value Ref Range    Ferritin 291 (H) 6 - 175 ng/mL   Iron and iron binding capacity     Status: Normal   Result Value Ref Range    Iron 68 37 - 145 ug/dL    Iron Binding Capacity 261 240 - 430 ug/dL    Iron Sat Index 26 15 - 46 %   AFP tumor marker     Status: Normal   Result Value Ref Range    AFP tumor marker 2.3 <=8.3 ng/mL    Narrative    This result is obtained using the Roche Elecsys AFP method on  the clinton e801 immunoassay analyzer. Results obtained with different assay methods or kits cannot be used interchangeably.  Reference ranges apply to non-pregnant females only.   CBC with platelets and differential     Status: Abnormal   Result Value Ref Range    WBC Count 8.7 4.0 - 11.0 10e3/uL    RBC Count 3.49 (L) 3.80 - 5.20 10e6/uL    Hemoglobin 11.4 (L) 11.7 - 15.7 g/dL    Hematocrit 34.6 (L) 35.0 - 47.0 %    MCV 99 78 - 100 fL    MCH 32.7 26.5 - 33.0 pg    MCHC 32.9 31.5 - 36.5 g/dL    RDW 12.5 10.0 - 15.0 %    Platelet Count 275 150 - 450 10e3/uL    % Neutrophils 68 %    % Lymphocytes 24 %    % Monocytes 6 %    % Eosinophils 1 %    % Basophils 1 %    % Immature Granulocytes 0 %    NRBCs per 100 WBC 0 <1 /100    Absolute  Neutrophils 5.9 1.6 - 8.3 10e3/uL    Absolute Lymphocytes 2.0 0.8 - 5.3 10e3/uL    Absolute Monocytes 0.5 0.0 - 1.3 10e3/uL    Absolute Eosinophils 0.1 0.0 - 0.7 10e3/uL    Absolute Basophils 0.1 0.0 - 0.2 10e3/uL    Absolute Immature Granulocytes 0.0 <=0.4 10e3/uL    Absolute NRBCs 0.0 10e3/uL   CBC with platelets differential     Status: Abnormal    Narrative    The following orders were created for panel order CBC with platelets differential.  Procedure                               Abnormality         Status                     ---------                               -----------         ------                     CBC with platelets and d...[[id number]]  Abnormal            Final result                 Please view results for these tests on the individual orders.         ASSESSMENT, PLAN, AND LONG-TERM FOLLOWUP RECOMMENDATIONS:  Shraddha Pearson is a now 23-year-old  female with a history of stage IV favorable histology Wilms tumor who is status post renal transplant approximately 19 years ago.  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  She also has some macrocytosis that is likely related to her antirejection medications, work up completed a few years ago.  She is mildly anemic and off iron.  She has new liver lesions that are likely benign and she is seeing GI.  She needs an MRI with IVF pre and post due to the contrast.     She is on OCP for premature ovarian failure and they may need to be adjusted with her liver lesions.  AFP is normal which is reassuring.  The following are our late effects recommendations:     1. Risk of recurrence:  Shraddha is currently 19 years status post end of her treatment from her Wilms tumor.  Given the distance from the diagnosis of her primary malignancy, the likelihood of recurrence at this point would be extremely low.   2. Psychosocial effects:  Shraddha appears to be doing very well socially, as well as with her school performance.  We do not  believe she is having any difficulties in this area.  3. Renal transplant:  Shraddha is currently followed by  Pediatric Nephrology, and we recommend continued follow up and management of her renal transplant.  Continue to report any signs of bowel obstruction related to her previous surgeries and new medications. Drink plenty of water.  Any medications the patient decides to take should be cleared with her renal transplant team.  Additionally, Shraddha should clear any major activity changes with her renal transplant team as well.    4. Risk for cardiac toxicity:  Shraddha has a history of 128 mg/m2 of doxorubicin, as well as whole abdomen radiation, which can predispose her to late cardiac difficulties, including dilated cardiomyopathy.  Shraddha had an echocardiogram completed in 2019 which was within normal limits. She needs to have surveillance echos from an oncology perspective every 5 years based on the new updated guidelines.  May be sooner for nephrology. Done 2022 and no change. Will plan to get a little sooner due to her mild HTN and will repeat in 2025.  5. Female issues related to fertility:  Shraddha has a history of whole abdomen radiation in which her ovaries were included in the field.  This may potentially have an impact on her future fertility.  She was found to have ovarian failure in 2012 and has follow up with GYN now that she is an adult.   Fertility potential would be low given this diagnosis.  She will have continued follow up with adult GYN.  They will discuss her OCP in the setting of new liver lesions.  6. Risk for peripheral neuropathy:  Shraddha has a history of vincristine exposure which can predispose her to having issues with ongoing peripheral neuropathy.  She did have problems with foot drop during treatment.  However, this is resolved, and she is doing well.   7. Growth and development:  Shraddha's height has drifted slightly down to below the 5th percentile over the past several years.   She was following up with endocrine regularly.   She has reached her final adult height.   8. Risk for secondary malignancies:  We asked that Shraddha wear sunscreen when she is outdoors due to her increased risk of skin cancer from radiation.  Additionally, should she noted any new mass, lump or area of concern, particularly in the radiation field, we would recommend prompt evaluation. New guidelines recommend starting early colon cancer screening when she is 30 years old due to her abdominal radiation.  9.  EBV viremia:  Shraddha has been having rising EBV levels since 2013.  Last assessment was improved . Continue to monitor EBV level with nephrology given her risk for PTLD.  She will have continued EBV labs followed by nephrology.    11.  Macrocytosis:  Shraddha has been having a rising MCV.  She has had some mild chronic anemia.  Work up in 2016 and this is likely related to her antirejection meds.  12.  Insomnia:  Resolved  13.  Immunizations:  Not able to get live virus vaccines due to immunosuppression.  Considering the covid vaccine and I would recommend that she get this.  Will get the flu vaccine today.  Had 3 doses of HPV vaccine.  14.  Liver lesions:  Likely benign.  MRI done by nephrology.  AFP normal.  Saw GI and needs more follow up.  We will help try to facilitate.  She is s/p lesion embolization.    It was a pleasure to see Shraddha in our Long-Term Follow-Up Clinic today.  We certainly appreciate the opportunity to participate in your patient's care.  If you have any questions or concerns, please do not hesitate to contact us at [phone].   We ask that Shraddha return to our clinic in one year for followup.  We will try to coordinate this on a day that she is seeing nephrology.              Dorcas Samayoa MSN, APRN, CPNP-AC, CPON  Department of Pediatrics  Division of Hematology/Oncology    Review of the result(s) of each unique test - CBC, CMP, ferritin  Total time spent on the following services on the  date of the encounter:  Preparing to see patient, chart review, review of outside records, Ordering medications, test, procedures, chemotherapy, Referring or communicating with other healthcare professionals, Interpretation of labs, imaging and other tests, Performing a medically appropriate examination , Counseling and educating the patient/family/caregiver , Documenting clinical information in the electronic or other health record , Communicating results to the patient/family/caregiver , Care coordination  and Total time spent: 40 min

## 2023-12-05 DIAGNOSIS — Z94.0 KIDNEY REPLACED BY TRANSPLANT: Primary | ICD-10-CM

## 2023-12-07 ENCOUNTER — TELEPHONE (OUTPATIENT)
Dept: GASTROENTEROLOGY | Facility: CLINIC | Age: 23
End: 2023-12-07
Payer: COMMERCIAL

## 2023-12-11 ENCOUNTER — TELEPHONE (OUTPATIENT)
Dept: GASTROENTEROLOGY | Facility: CLINIC | Age: 23
End: 2023-12-11
Payer: COMMERCIAL

## 2023-12-11 NOTE — TELEPHONE ENCOUNTER
LVM; pt to schedule in person or virtual w Hodan Cantrell PA-C; last seen 2022; second attempt- KB 12.11.23//

## 2024-01-18 ENCOUNTER — MYC MEDICAL ADVICE (OUTPATIENT)
Dept: OBGYN | Facility: CLINIC | Age: 24
End: 2024-01-18

## 2024-01-18 DIAGNOSIS — E28.39 PRIMARY OVARIAN FAILURE: ICD-10-CM

## 2024-01-18 RX ORDER — PROGESTERONE 100 MG/1
100 CAPSULE ORAL DAILY
Qty: 30 CAPSULE | Refills: 1 | Status: SHIPPED | OUTPATIENT
Start: 2024-01-18 | End: 2024-02-23

## 2024-01-18 NOTE — TELEPHONE ENCOUNTER
Received request for progesterone. See RN contraceptive protocol below.    For women meeting the following criteria, hormonal contraceptives may be refilled annually for up to 3 years:  -Age between 15-40? Y  -Non-smoker? Y  -Up-to-date on pap/HPV? Y, done on 2/9/22  -No major medical comorbidities? Kidney transplant/ CKD3, HTN  -No history of migraine with aura? N    Last visit 2/28/23. Filled per protocol to last until February (when she's due for an annual appt) and sent appt reminder.

## 2024-02-03 ENCOUNTER — HEALTH MAINTENANCE LETTER (OUTPATIENT)
Age: 24
End: 2024-02-03

## 2024-02-05 ENCOUNTER — MEDICAL CORRESPONDENCE (OUTPATIENT)
Dept: HEALTH INFORMATION MANAGEMENT | Facility: CLINIC | Age: 24
End: 2024-02-05
Payer: COMMERCIAL

## 2024-02-06 ENCOUNTER — TELEPHONE (OUTPATIENT)
Dept: LAB | Facility: CLINIC | Age: 24
End: 2024-02-06
Payer: COMMERCIAL

## 2024-02-06 PROBLEM — A49.1 STREPTOCOCCAL INFECTION: Status: ACTIVE | Noted: 2023-05-21

## 2024-02-06 NOTE — TELEPHONE ENCOUNTER
-- Message -----   From: Yenni Madden   Sent: 2/2/2024   2:21 PM CST   To: Denia Garduno RN   Subject: Patient Status Information                       Good Afternoon Denia:     Please complete the following missing items within the Patient Status Form (found in the Transplant Tab).   1. Functional Status   2. Cognitive Developmnet   3. Motor Development     The deadline for submitting Shraddha's information to [facility] is 2/7/24     Please respond to this message after inputting the information as well as when an attempt is made to contact the patient, even if unsuccessful.      This will allow me to adjust when I would reach out again, thereby minimizing the number of messages needing to be sent.       Thank you for partnering in our regulatory reporting to [facility],   Yenni Madden - Clinical      OUTCOme:    [facility] documentation complete    Denia Garduno RN   Transplant Coordinator  [phone]

## 2024-02-07 ENCOUNTER — TRANSCRIBE ORDERS (OUTPATIENT)
Dept: OTHER | Age: 24
End: 2024-02-07

## 2024-02-07 DIAGNOSIS — I15.0 RENOVASCULAR HYPERTENSION: Primary | ICD-10-CM

## 2024-02-21 DIAGNOSIS — E28.39 PRIMARY OVARIAN FAILURE: Primary | ICD-10-CM

## 2024-02-21 NOTE — CONFIDENTIAL NOTE
Received refill request for progesterone.  Last in clinic 2/28/23.  S/p kidney transplant.  Refilled short term supply in 1/2024 and advised to schedule annual.  No appointments scheduled yet.      Left voicemail for patient reminding her to schedule annual.  Pended to Dr Webb for review.

## 2024-02-23 ENCOUNTER — TELEPHONE (OUTPATIENT)
Dept: OBGYN | Facility: CLINIC | Age: 24
End: 2024-02-23
Payer: COMMERCIAL

## 2024-02-23 DIAGNOSIS — E28.39 PRIMARY OVARIAN FAILURE: ICD-10-CM

## 2024-02-23 RX ORDER — ESTRADIOL 0.03 MG/D
1 PATCH TRANSDERMAL WEEKLY
Qty: 12 PATCH | Refills: 0 | Status: SHIPPED | OUTPATIENT
Start: 2024-02-23 | End: 2024-06-03

## 2024-02-23 RX ORDER — PROGESTERONE 100 MG/1
100 CAPSULE ORAL DAILY
Qty: 30 CAPSULE | Refills: 1 | Status: SHIPPED | OUTPATIENT
Start: 2024-02-23

## 2024-02-23 NOTE — TELEPHONE ENCOUNTER
Patient is requesting a short refill for her estrogen and progesterone sent to a [facility] on [address] in [place], [place].     Patient does have an annual scheduled for 3-29-24.     Short refill sent.

## 2024-02-23 NOTE — TELEPHONE ENCOUNTER
Health Call Center    Phone Message    May a detailed message be left on voicemail: yes     Reason for Call: Other: Pt is scheduled for her annual on 3/29/2024. Pt is wondering if she is able to get her refills of estrogen and progesterone sent to her pharmacy. [place] and would like them to go to [facility] on [address] in [facility]. Pt states she has 2 days of progesterone and about a month of estrogen left. Please review and call for further questions.     Action Taken: Other: S    Travel Screening: Not Applicable

## 2024-02-26 RX ORDER — PROGESTERONE 100 MG/1
100 CAPSULE ORAL DAILY
Qty: 90 CAPSULE | Refills: 3 | Status: SHIPPED | OUTPATIENT
Start: 2024-02-26

## 2024-03-11 ENCOUNTER — VIRTUAL VISIT (OUTPATIENT)
Dept: FAMILY MEDICINE | Facility: CLINIC | Age: 24
End: 2024-03-11
Payer: COMMERCIAL

## 2024-03-11 DIAGNOSIS — C64.2 NEPHROBLASTOMA OF LEFT KIDNEY (H): ICD-10-CM

## 2024-03-11 DIAGNOSIS — D84.9 IMMUNOSUPPRESSION (H): ICD-10-CM

## 2024-03-11 DIAGNOSIS — I15.1 HYPERTENSION SECONDARY TO OTHER RENAL DISORDERS: ICD-10-CM

## 2024-03-11 DIAGNOSIS — N18.30 STAGE 3 CHRONIC KIDNEY DISEASE, UNSPECIFIED WHETHER STAGE 3A OR 3B CKD (H): ICD-10-CM

## 2024-03-11 PROBLEM — U07.1 COVID-19: Status: RESOLVED | Noted: 2021-09-03 | Resolved: 2024-03-11

## 2024-03-11 PROBLEM — H52.203 MYOPIA OF BOTH EYES WITH ASTIGMATISM: Status: ACTIVE | Noted: 2024-03-11

## 2024-03-11 PROBLEM — N17.9 ACUTE KIDNEY INJURY (H): Status: RESOLVED | Noted: 2021-09-04 | Resolved: 2024-03-11

## 2024-03-11 PROBLEM — N39.0 URINARY TRACT INFECTION: Status: RESOLVED | Noted: 2021-11-04 | Resolved: 2024-03-11

## 2024-03-11 PROBLEM — H52.13 MYOPIA OF BOTH EYES WITH ASTIGMATISM: Status: ACTIVE | Noted: 2024-03-11

## 2024-03-11 PROBLEM — A49.1 STREPTOCOCCAL INFECTION: Status: RESOLVED | Noted: 2023-05-21 | Resolved: 2024-03-11

## 2024-03-11 PROBLEM — R50.9 FEVER, UNSPECIFIED FEVER CAUSE: Status: RESOLVED | Noted: 2021-09-03 | Resolved: 2024-03-11

## 2024-03-11 PROCEDURE — 99203 OFFICE O/P NEW LOW 30 MIN: CPT | Mod: 95 | Performed by: FAMILY MEDICINE

## 2024-03-11 RX ORDER — AMLODIPINE BESYLATE 10 MG/1
10 TABLET ORAL DAILY
Qty: 90 TABLET | Refills: 3 | Status: SHIPPED | OUTPATIENT
Start: 2024-03-11

## 2024-03-11 RX ORDER — LABETALOL 100 MG/1
100 TABLET, FILM COATED ORAL 2 TIMES DAILY
Qty: 180 TABLET | Refills: 3 | Status: SHIPPED | OUTPATIENT
Start: 2024-03-11

## 2024-03-11 NOTE — PROGRESS NOTES
Anuradha is a 23 year old who is being evaluated via a billable video visit.      How would you like to obtain your AVS? MyChart  If the video visit is dropped, the invitation should be resent by: Text to cell phone: [phone]  Will anyone else be joining your video visit? No          Assessment & Plan     Hypertension secondary to other renal disorders  Patient is very familiar with her medications and so we updated her medication list today.  She takes amlodipine and labetalol for blood pressures.  When checked at home they typically are in the 120s/80s.  Due to some insurance issues she has had a difficult time getting into see a provider locally where she lives and so made the decision to establish care here where she also sees women's health and the solid organ transplant team.  Her labs are up-to-date.  I refilled the amlodipine and labetalol at this time.  - labetalol (NORMODYNE) 100 MG tablet; Take 1 tablet (100 mg) by mouth 2 times daily  - amLODIPine (NORVASC) 10 MG tablet; Take 1 tablet (10 mg) by mouth daily    Immunosuppression (H24)  Patient is currently on Imuran and tacrolimus under the direction of the solid organ transplant team.    Nephroblastoma of left kidney (H)  Clinically no evidence of recurrence given distant history of surgical removal of Wilms tumor.    Stage 3 chronic kidney disease, unspecified whether stage 3a or 3b CKD (H)  Current creatinine is a bit elevated but blood pressure is under control and patient knows to avoid nephrotoxic medications.  Recent creatinine has been stable and she does follow with nephrology.    Review of prior external note(s) from - [facility] information from [place] reviewed          FUTURE APPOINTMENTS:       - Follow-up visit in primary care next time she is in the [facility] for meeting with her transplant and gynecology team.    Subjective   Anuradha is a 23 year old, presenting for the following health issues:  Recheck Medication      3/11/2024      4:29 PM   Additional Questions   Roomed by Jared     HPI       Patient scheduled a visit today to establish care primarily for refills of her blood pressure medications.  She has had a primary care provider at [facility] in [place] but due to some insurance issues she felt it might be best to consolidate some care in the same location as her transplant team and women's health team.  We went through and reviewed her history and medication list today as well as her problem list.  Blood pressures checked at home once or twice a month are in the normal range with systolics 120s and diastolics in the 80s.  She denies any side effects from her blood pressure medication.      Review of Systems  Constitutional, HEENT, cardiovascular, pulmonary, gi and gu systems are negative, except as otherwise noted.      Objective           Vitals:  No vitals were obtained today due to virtual visit.    Physical Exam   GENERAL: alert and no distress  EYES: Eyes grossly normal to inspection.  No discharge or erythema, or obvious scleral/conjunctival abnormalities.  RESP: No audible wheeze, cough, or visible cyanosis.    SKIN: Visible skin clear. No significant rash, abnormal pigmentation or lesions.  NEURO: Cranial nerves grossly intact.  Mentation and speech appropriate for age.  PSYCH: Appropriate affect, tone, and pace of words    Office Visit on 11/21/2023   Component Date Value Ref Range Status    Sodium 11/21/2023 140  135 - 145 mmol/L Final    Reference intervals for this test were updated on 09/26/2023 to more accurately reflect our healthy population. There may be differences in the flagging of prior results with similar values performed with this method. Interpretation of those prior results can be made in the context of the updated reference intervals.     Potassium 11/21/2023 4.8  3.4 - 5.3 mmol/L Final    Carbon Dioxide (CO2) 11/21/2023 26  22 - 29 mmol/L Final    Anion Gap 11/21/2023 10  7 - 15 mmol/L Final    Urea  Nitrogen 11/21/2023 25.1 (H)  6.0 - 20.0 mg/dL Final    Creatinine 11/21/2023 1.48 (H)  0.51 - 0.95 mg/dL Final    GFR Estimate 11/21/2023 50 (L)  >60 mL/min/1.73m2 Final    Calcium 11/21/2023 9.4  8.6 - 10.0 mg/dL Final    Chloride 11/21/2023 104  98 - 107 mmol/L Final    Glucose 11/21/2023 128 (H)  70 - 99 mg/dL Final    Alkaline Phosphatase 11/21/2023 76  40 - 150 U/L Final    Reference intervals for this test were updated on 11/14/2023 to more accurately reflect our healthy population. There may be differences in the flagging of prior results with similar values performed with this method. Interpretation of those prior results can be made in the context of the updated reference intervals.    AST 11/21/2023 23  0 - 45 U/L Final    Reference intervals for this test were updated on 6/12/2023 to more accurately reflect our healthy population. There may be differences in the flagging of prior results with similar values performed with this method. Interpretation of those prior results can be made in the context of the updated reference intervals.    ALT 11/21/2023 17  0 - 50 U/L Final    Reference intervals for this test were updated on 6/12/2023 to more accurately reflect our healthy population. There may be differences in the flagging of prior results with similar values performed with this method. Interpretation of those prior results can be made in the context of the updated reference intervals.      Protein Total 11/21/2023 7.6  6.4 - 8.3 g/dL Final    Albumin 11/21/2023 4.5  3.5 - 5.2 g/dL Final    Bilirubin Total 11/21/2023 0.2  <=1.2 mg/dL Final    Ferritin 11/21/2023 291 (H)  6 - 175 ng/mL Final    Iron 11/21/2023 68  37 - 145 ug/dL Final    Iron Binding Capacity 11/21/2023 261  240 - 430 ug/dL Final    Iron Sat Index 11/21/2023 26  15 - 46 % Final    AFP tumor marker 11/21/2023 2.3  <=8.3 ng/mL Final    WBC Count 11/21/2023 8.7  4.0 - 11.0 10e3/uL Final    RBC Count 11/21/2023 3.49 (L)  3.80 - 5.20 10e6/uL  Final    Hemoglobin 11/21/2023 11.4 (L)  11.7 - 15.7 g/dL Final    Hematocrit 11/21/2023 34.6 (L)  35.0 - 47.0 % Final    MCV 11/21/2023 99  78 - 100 fL Final    MCH 11/21/2023 32.7  26.5 - 33.0 pg Final    MCHC 11/21/2023 32.9  31.5 - 36.5 g/dL Final    RDW 11/21/2023 12.5  10.0 - 15.0 % Final    Platelet Count 11/21/2023 275  150 - 450 10e3/uL Final    % Neutrophils 11/21/2023 68  % Final    % Lymphocytes 11/21/2023 24  % Final    % Monocytes 11/21/2023 6  % Final    % Eosinophils 11/21/2023 1  % Final    % Basophils 11/21/2023 1  % Final    % Immature Granulocytes 11/21/2023 0  % Final    NRBCs per 100 WBC 11/21/2023 0  <1 /100 Final    Absolute Neutrophils 11/21/2023 5.9  1.6 - 8.3 10e3/uL Final    Absolute Lymphocytes 11/21/2023 2.0  0.8 - 5.3 10e3/uL Final    Absolute Monocytes 11/21/2023 0.5  0.0 - 1.3 10e3/uL Final    Absolute Eosinophils 11/21/2023 0.1  0.0 - 0.7 10e3/uL Final    Absolute Basophils 11/21/2023 0.1  0.0 - 0.2 10e3/uL Final    Absolute Immature Granulocytes 11/21/2023 0.0  <=0.4 10e3/uL Final    Absolute NRBCs 11/21/2023 0.0  10e3/uL Final         Video-Visit Details    Type of service:  Video Visit     Originating Location (pt. Location): Home    Distant Location (provider location):  On-site  Platform used for Video Visit: Noel  Signed Electronically by: Gaudencio Coleman MD

## 2024-03-15 NOTE — PROGRESS NOTES
----- Message from Darlene Hatfield sent at 3/15/2024 11:29 AM CDT -----  Regarding: Heart burns  Patient been having heart burns over 3 wks and need to speak with staff about the issue. Please call back @ [phone]. Thank you Darlene     Pediatric Endocrinology Follow-up Consultation    Patient: Anuradha Pearson MRN# [id number]   YOB: 2000 Age: 16 year 7 month old   Date of Visit: Jan 17, 2017    Dear Dr. Dorcas Samayoa:    I had the pleasure of seeing your patient, Anuradha Pearson in the Pediatric Endocrinology Clinic, [facility], on Jan 17, 2017 for a follow-up consultation of primary ovarian failure and growth deceleration.           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 Provider to review       Femur fracture, right (H) 08/20/2012     Priority: Medium     Wilms' tumor (H) 07/17/2012     Priority: Medium     (Problem list name updated by automated process. Provider to review and confirm.)       Status post chemotherapy 07/17/2012     Priority: Medium     S/P radiation therapy 07/17/2012     Priority: Medium     Ankle pain 07/17/2012     Priority: Medium     Kidney replaced by transplant 12/21/2011     Priority: Medium            HPI:   Anuradha Pearson is a 16 year 7 month old  female whom I initially evaluated for growth deceleration and ovarian failure in 10/2012. I most recently saw Anuradha in 06/2013. 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 At the time of the initial evaluation in 07/2012, she had LH and FSH values that were significantly elevated at 40 and 93.7,  "respectively, with a detectable but low estradiol of 34. She had a bone age at that time that showed she still had significant room left for growth. When I last saw Shraddha in June 2013, she had shown significant improvement in growth on low dose Premarin. Bone age in June 2013 was 13 years 6 months, and in July 2014 bone age was 14 years showing that she still had a little room for growth. In 7/24/2014, we started Provera 5 mg daily, 10 days each month. Prior to starting Provera, she had persistent vaginal bleeding.      INTERIM HISTORY: Shraddha was last seen in 7/2015, and has been in excellent health. She had one ED visit secondary to allergic reaction to crab, with a rash without anaphylaxis on 12/25/16. No hospitalizations, no significant illnesses. In terms of her menstrual cycle she has been get her periods every month, with bleeding lasting 4 days, using 1-2 pads/tampons on the heaviest day. No spotting between menses, no irregular cycles. No significant cramping, no notable acne, or change in mood / behavior around her cycles. She has been taking her Provera and Premarin without issues. Her last bone density was in 6/2013 which was normal. She is taking calcium supplementation and does consume dairy products; she has had Vitamin D supplementation in the past, but is not currently taking Vitamin D. She has not had any myalgias, arthralgias, nor new bone fractures.    Shraddha's last bone age was in 7/2014 with a bone age of 14 years and chronological age of 14 years and one month; with minimal room left to grow. Shraddha has not noticed any increase in height, nor changing in clothing sizes. Her growth curve shows flattening of her curve, still below the 1%tile with a height of 58\", a half inch increase from 7/2014.     History was obtained from patient and patient's parents.          Social History:   Shraddha is in 11th grade. She enjoys horseback riding. She would like to become a .    Social " history was reviewed and is unchanged. Refer to the initial note.         Family History:     Family History   Problem Relation Age of Onset     Hypertension Father      Family history was reviewed and is unchanged. Refer to the initial note.         Allergies:     Allergies   Allergen Reactions     Vancomycin      Uses Benadryl Prior to administration             Medications:     Current Outpatient Prescriptions   Medication Sig Dispense Refill     ferrous sulfate (IRON) 325 (65 FE) MG tablet Take 1 tablet (325 mg) by mouth daily 100 tablet 11     enalapril (VASOTEC) 5 MG tablet Take 1 tablet (5 mg) by mouth daily 30 tablet 1     nitrofurantoin (MACRODANTIN) 25 MG capsule Take 2 capsules (50 mg) by mouth daily 60 capsule 6     sulfamethoxazole-trimethoprim (BACTRIM) 400-80 MG per tablet Take 0.5 tab by mouth every other day 15 tablet 11     tacrolimus (PROGRAF - GENERIC EQUIVALENT) 0.5 MG capsule Please take 4 capsules (2mg) by mouth in am and take 3 capsules (1.5mg) in the evening 210 capsule 6     estrogens, conjugated, (PREMARIN) 0.625 MG tablet Take 1 tablet (0.625 mg) by mouth daily 30 tablet 11     medroxyPROGESTERone (PROVERA) 5 MG tablet Take 1 tablet (5 mg) by mouth daily 10 days each month. 10 tablet 11     calcium carbonate-vitamin D 500-400 MG-UNIT TABS per tablet Take 1 tablet by mouth daily 30 tablet 4     valACYclovir (VALTREX) 1000 mg tablet Take 1 tablet (1,000 mg) by mouth 3 times daily 90 tablet 3     amLODIPine (NORVASC) 5 MG tablet Take 1 tablet (5 mg) by mouth 2 times daily 60 tablet 11     predniSONE (DELTASONE) 5 MG tablet Take 1 tablet (5 mg) by mouth every other day 30 tablet 6     azaTHIOprine (IMURAN) 50 MG tablet Take 0.5 tablets (25 mg) by mouth daily 30 tablet 6     senna (SENOKOT) 8.6 MG tablet Take 1 tablet by mouth daily 14 tablet 0     amoxicillin (AMOXIL) 500 MG capsule Take 4 caps (2 grams) one hour prior to dental procedure. 12 capsule 1     [DISCONTINUED] estrogens,  "conjugated, (PREMARIN) 0.625 MG tablet Take 1 tablet (0.625 mg) by mouth daily 30 tablet 4     [DISCONTINUED] enalapril (VASOTEC) 2.5 MG tablet Take 1 tablet (2.5 mg) by mouth daily 30 tablet 6             Review of Systems:   Gen: Negative  Eye: Negative  ENT: Negative  Pulmonary:  Negative  Cardio: Negative  Gastrointestinal: Negative  Hematologic: Negative  Genitourinary: Negative  Musculoskeletal: Negative  Psychiatric: Negative  Neurologic: Negative  Skin: Negative  Endocrine: see HPI.            Physical Exam:   Blood pressure 120/85, pulse 110, height 1.479 m (4' 10.23\"), weight 52.8 kg (116 lb 6.5 oz).  Blood pressure percentiles are 86% systolic and 97% diastolic based on 2000 NHANES data. Blood pressure percentile targets: 90: 122/79, 95: 126/83, 99 + 5 mmH/95.  Height: 147.9 cm  (58.23\") 1%ile based on CDC 2-20 Years stature-for-age data using vitals from 2017.  Weight: 52.8 kg (actual weight), 41%ile based on CDC 2-20 Years weight-for-age data using vitals from 2017.  BMI: Body mass index is 24.14 kg/(m^2). 81%ile based on CDC 2-20 Years BMI-for-age data using vitals from 2017.      GENERAL:  She is alert and in no apparent distress.   HEENT:  Head is  normocephalic and atraumatic.  Pupils equal, round and reactive to light and accommodation.  Extraocular movements are intact.  Funduscopic exam shows crisp disc margins and normal venous pulsations.  Nares are clear.  Oropharynx shows normal dentition uvula and palate.  Tympanic membranes visualized and clear.   NECK:  Supple.  Thyroid was nonpalpable.   LUNGS:  Clear to auscultation bilaterally.   CARDIOVASCULAR:  Regular rate and rhythm without murmur, gallop or rub.   BREASTS:  Heber V.  Axillary hair is shaven. Odor and sweat were absent.   ABDOMEN:  Nondistended.  Positive bowel sounds, soft and nontender.  No hepatosplenomegaly or masses palpable. Well healed scars at RUQ, LUQ and midline, no induration, no " erythema.  GENITOURINARY EXAM:  Heber V pubic hair. Normal female external genitalia   MUSCULOSKELETAL:  Normal muscle bulk and tone.  No evidence of scoliosis.   NEUROLOGIC:  Cranial nerves II-XII tested and intact.  Deep tendon reflexes 2+ and symmetric.   SKIN:  Normal with no evidence of acne or oiliness.         Laboratory results:     Office Visit on 01/17/2017   Component Date Value Ref Range Status     Lactate Dehydrogenase 01/17/2017 193  0 - 265 U/L Final     Uric Acid 01/17/2017 5.2* 2.1 - 5.0 mg/dL Final            Assessment and Plan:   1. Primary ovarian failure.   2. Short stature.   3. Growth deceleration.   4. History of abdominal radiation and kidney transplant.      Shraddha has grown approximately 0.5 of an inch from last year's visit and has likely reached her adult height. No further investigation is needed related to her Growth Deceleration.    Shraddha has a history of primary ovarian failure. Most recent lab work was in 7/2015 with appropriate FSH, LH and estradiol at that visit. She has continued her daily premarin and Provera for 10 days every 3 months, with regular menstrual periods without spotting. We recommend to continue this therapy. She needs yearly follow up to monitor her menstrual history in order to continue this therapy. If coming to [place] is difficult for them, they can follow up with a primary care provider as she has been stable on this current therapy. She may return if there are continued concerns of irregular menstrual history she should return.    MD Instructions:  I recommend continuing the current dose of premarin and medroxyprogesterone.  Please continue to monitor menstrual period frequency and contact my office if Shraddha is having irregular menstrual periods.      RTC for follow up evaluation in 9-12 months.    Scribe Disclosure:   JANE, Eric Nixon, MS4, am serving as a scribe; to document services personally performed by Dr. Alatorre- -based on data  collection and the provider's statements to me.       Thank you for allowing me to participate in the care of your patient.  Please do not hesitate to call with questions or concerns.    Sincerely,    I personally performed the entire clinical encounter documented in this note.    Jian Alatorre MD, PhD    Pediatric Endocrinology  [facility]  Phone: [phone]  Fax:   [phone]     Total face-to-face time 25 minutes, >50% of time spent counseling and coordination of care regarding assessment and plan described above.     CC  Patient Care Team:  [facility] as PCP - General  Shaina Ruiz MD as MD (Nephrology)  Jaydon Rao MD as MD (Pediatrics)     Parents of Anuradha Pearson  [address] [place] [id number]

## 2024-05-17 ENCOUNTER — TELEPHONE (OUTPATIENT)
Dept: OBGYN | Facility: CLINIC | Age: 24
End: 2024-05-17
Payer: COMMERCIAL

## 2024-05-30 ENCOUNTER — TELEPHONE (OUTPATIENT)
Dept: OBGYN | Facility: CLINIC | Age: 24
End: 2024-05-30
Payer: COMMERCIAL

## 2024-05-31 ENCOUNTER — VIRTUAL VISIT (OUTPATIENT)
Dept: PEDIATRIC HEMATOLOGY/ONCOLOGY | Facility: CLINIC | Age: 24
End: 2024-05-31
Attending: NURSE PRACTITIONER
Payer: COMMERCIAL

## 2024-05-31 DIAGNOSIS — Z92.21 STATUS POST CHEMOTHERAPY: ICD-10-CM

## 2024-05-31 DIAGNOSIS — Z92.3 S/P RADIATION THERAPY: ICD-10-CM

## 2024-05-31 DIAGNOSIS — Z85.528 H/O WILMS' TUMOR: Primary | ICD-10-CM

## 2024-05-31 PROCEDURE — 99215 OFFICE O/P EST HI 40 MIN: CPT | Mod: 95 | Performed by: NURSE PRACTITIONER

## 2024-05-31 NOTE — LETTER
5/31/2024       RE: Shraddha Pearson  [address]  [place] [address]     Dear Colleague,    Thank you for referring your patient, Shraddha Pearson, to the [facility] P[facility] at [facility]. Please see a copy of my visit note below.        LONG-TERM FOLLOW-UP CLINIC PROGRESS NOTE    Video visit start time:  8:19 am  Video visit end time:  8:41am     We had the pleasure of seeing your patient, Shraddha Pearson, at the [facility] Long-Term Follow-Up Clinic for childhood cancer survivors.  Shraddha was referred to us by Dr. Ingrid Constantino for ongoing management and long-term follow up of her Wilms tumor and associated chemotherapy.  The following is a summary of your patient's cancer, therapy, current history, and physical findings followed by assessment and long-term followup recommendations.      THERAPY ACCORDING TO AVAILABLE RECORDS:  Shraddha Pearson is a now 23-year-old  female with a history of stage IV favorable histology Wilms tumor that was diagnosed on 05/19/2003 at 2 1/2 years of age.  She initially presented with disease in her right kidney as well as her lungs.  She had chemotherapy, radiation, and surgery as part of her treatment.  Her initial surgery was on  05/19/2003 in which she had a right nephrectomy.  Unfortunately, she had some surgical complications which caused her to lose blood supply to the left kidney, and she also lost that one as well.  Her surgeries in detail are as follows:   1. Right nephrectomy on 05/19/2003 by Dr. Myles Velez at the [facility].   2. Cadaveric saphenous vein graft to IVC on 05/20/2003.   3. Cholecystectomy on 05/20/2003.   4. Left nephrectomy and living donor renal transplant from patient's father on 11/14/2004.   5. Tonsillectomy on 01/31/2005.   6. Liver biopsy on 01/31/2005.   7. Left renal biopsy on  11/07/2005.      Shraddha received the following chemotherapies as part of her treatment:   1. Vincristine intravenously.   2. Actinomycin D intravenously.   3. Doxorubicin intravenously with a cumulative dose of 128 mg/m2.      Radiation therapy is as follows:   1. Whole abdomen radiation for a total dose of 1080 cGy.      Shraddha's acute and chronic late effects known to date include:   1. 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   2. 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   3. Idiopathic intracranial hypertension 05/01/2007, resolved.   4. Recurrent UTIs and pyelonephritis, resolved.   5. Hemorrhagic gastritis 04/2007, resolved.   6. Viral meningitis 10/05/2008, resolved.   7. Elevated EBV titers requiring rituximab infusion for 2 doses in March and August 2005.   8.  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  Growth deceleration       HISTORY OF PRESENT ILLNESS:  Shraddha report to the visit today over video.  She is in [place] at the time of the visit.  Recently had some incidental liver lesions and saw GI in 2022.  They were thought to be large hepatic adenomas vs FNH.  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  She last saw vascular surgery and had an MRI in February 2023.  She is due for imaging and follow up with them.  Her vascular surgeon left the practice and she hasn't had GI follow up since.  She is interested in maybe getting GI care in [place] since she spends a lot of her time there.    She may need IVF with any contrast medium due to her renal function issues.  She has transitioned her nephrology care to the adult team.   She has been seeing GYN as well.  She has been well since the last visit.  No issues with fever, pain, new lumps/bumps.     She is currently in [place] visiting family but she did buy a house in [place] close to [place] that is more convenient for her horse shows and [employer]  in the winter.   She completed college with a degree in Equine Science.  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 No issues with stomach pain.  Urination is normal.  No N/V/D/C.  No issues with breathing.     She has ongoing dental follow up and will be seeing them soon.   Her gingival hyperplasia resolved now that she has been on tacrolimus. No more issues with sleeping.  Completed gardasil vaccines and got Bexsero in past.  Considering the COVID vaccine but hasn't gotten it yet.  Had an echo in 2022 and we will plan to reimage in 2-3 years (next 2025).  Has yearly eye exam.  Was being followed by peds ID for elevated EBV but they have since signed off.  Has been having regular womens health follow up.  Irregular menstrual bleeding on estrogen patch and progesterone.  Did get BP checked locally recently- per pt 115/75. Needs to get updated transplant labs and hasn't done them but once since November 2023.     REVIEW OF SYSTEMS:   General:  No acute distress  Skin: negative  Eyes: glasses  Ears/Nose/Throat: gingival hyperplasia- resolved;   Respiratory: No shortness of breath, dyspnea on exertion, cough, or hemoptysis  Cardiovascular: negative  Gastrointestinal: negative  Genitourinary: s/p kidney transplant  Musculoskeletal: left broken clavicle 4/2019  Neurologic: negative  Psychiatric: negative  Hematologic/Lymphatic/Immunologic: negative  Endocrine: ovarian failure and growth deceleration       IMMUNIZATIONS:  Up to date per parents      SOCIAL HISTORY:  Shraddha has completed her 2 year degree in Equine Science.  She is very active,  riding horses. Parents report in the past they have cleared her horseback riding with the renal transplant team.   Lives in [place] most of the time and comes for extended trips to [place].     FAMILY HISTORY:  Maternal grandmother with a history of heart valve disease, carotid artery obstruction, and congestive heart failure in her 50s.  Maternal grandfather with  cardiac bypass in his 60s.  Paternal grandmother with history of hypertension in her 50s.  Maternal aunt with history of diabetes in her 50s.  Paternal great-grandfather with colon cancer at age 68.  Maternal  great-grandfather with history of colon cancer at 60.  Father with hypertension. No changes.      CURRENT MEDICATIONS:     Current Outpatient Medications   Medication Sig Dispense Refill     amLODIPine (NORVASC) 10 MG tablet Take 1 tablet (10 mg) by mouth daily 90 tablet 3     azaTHIOprine (IMURAN) 50 MG tablet Take 1.5 tablets (75 mg) by mouth daily 45 tablet 11     estradiol (FEMPATCH) 0.025 MG/24HR weekly patch Place 1 patch onto the skin once a week 12 patch 0     labetalol (NORMODYNE) 100 MG tablet Take 1 tablet (100 mg) by mouth 2 times daily 180 tablet 3     magnesium oxide (MAG-OX) 400 (241.3 Mg) MG tablet Take 1 tablet (400 mg) by mouth 2 times daily 60 tablet 11     Multiple Vitamins-Minerals (WOMENS MULTIVITAMIN PO) Take 1 tablet by mouth daily       progesterone (PROMETRIUM) 100 MG capsule Take 1 capsule (100 mg) by mouth daily 90 capsule 3     tacrolimus (GENERIC) 1 MG capsule Take 2 capsules (2 mg) by mouth 2 times daily 360 capsule 3     estradiol (VIVELLE-DOT) 0.025 MG/24HR bi-weekly patch Place 1 patch onto the skin twice a week (Patient not taking: Reported on 11/21/2023) 8 patch 11     progesterone (PROMETRIUM) 100 MG capsule Take 1 capsule (100 mg) by mouth daily (Patient not taking: Reported on 5/31/2024) 30 capsule 1     No current facility-administered medications for this visit.        ALLERGIES:  Vancomycin which causes Ramin syndrome.      PHYSICAL EXAMINATION:   VITAL SIGNS: There were no vitals taken for this visit.       Physical Exam   GENERAL: Healthy, alert and no distress  EYES: Eyes grossly normal to inspection.  No discharge or erythema, or obvious scleral/conjunctival abnormalities.  HENT: Normal cephalic/atraumatic.  External ears, nose and mouth without ulcers or lesions.  " No nasal drainage visible.   NECK: No asymmetry, visible masses or scars  RESP: No audible wheeze, cough, or visible cyanosis.  No visible retractions or increased work of breathing.   MS: No gross musculoskeletal defects noted.  Normal range of motion.  No visible edema.  SKIN: Visible skin clear. No significant rash, abnormal pigmentation or lesions.  NEURO: Cranial nerves grossly intact.  Mentation and speech appropriate for age.  PSYCH: Mentation appears normal, affect normal/bright, judgement and insight intact, normal speech and appearance well-groomed.        Wt Readings from Last 3 Encounters:   11/21/23 55 kg (121 lb 4.1 oz)   02/28/23 57.8 kg (127 lb 6.4 oz)   01/05/23 56.2 kg (123 lb 14.4 oz)     Ht Readings from Last 2 Encounters:   11/21/23 1.494 m (4' 10.82\")   02/28/23 1.499 m (4' 11\")     No height and weight on file for this encounter.       LABORATORY AND DIAGNOSTIC STUDIES:   No results found for any visits on 05/31/24.        ASSESSMENT, PLAN, AND LONG-TERM FOLLOWUP RECOMMENDATIONS:  Shraddha Pearson is a now 23-year-old  female with a history of stage IV favorable histology Wilms tumor who is status post renal transplant approximately 20 years ago.  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  She also has some macrocytosis that is likely related to her antirejection medications, work up completed a few years ago.  She is mildly anemic and off iron.  She has new liver lesions that are likely benign and needs updated GI follow up  She needs an MRI as well but would like to consider getting this done in [facility] instead.     She is on estrogen/progesterone for premature ovarian failure and they may need to be adjusted with her liver lesions.  AFP was normal last year which is reassuring.  The following are our late effects recommendations:     1. Risk of recurrence:  Shraddha is currently 20 years status post end of her treatment from her Wilms tumor.  Given the distance " from the diagnosis of her primary malignancy, the likelihood of recurrence at this point would be extremely low.     2. Psychosocial effects:  Shraddha appears to be doing very well socially, as well as with her school performance.  We do not believe she is having any difficulties in this area.    3. Renal transplant:  Shraddha is currently followed by  Pediatric Nephrology, and we recommend continued follow up and management of her renal transplant.  Continue to report any signs of bowel obstruction related to her previous surgeries and new medications. Drink plenty of water.  Any medications the patient decides to take should be cleared with her renal transplant team.  Additionally, Shraddha should clear any major activity changes with her renal transplant team as well.      4. Risk for cardiac toxicity:  Shraddha has a history of 128 mg/m2 of doxorubicin, as well as whole abdomen radiation, which can predispose her to late cardiac difficulties, including dilated cardiomyopathy.  Shraddha had an echocardiogram completed in 2019 which was within normal limits. She needs to have surveillance echos from an oncology perspective every 5 years based on the new updated guidelines.  May be sooner for nephrology. Done 2022 and no change. Will plan to get a little sooner due to her mild HTN and will repeat in 2025.    5. Female issues related to fertility:  Shraddha has a history of whole abdomen radiation in which her ovaries were included in the field.  This may potentially have an impact on her future fertility.  She was found to have ovarian failure in 2012 and has follow up with GYN now that she is an adult.   Fertility potential would be low given this diagnosis.  She will have continued follow up with adult GYN.  They will discuss her OCP in the setting of new liver lesions.    6. Risk for peripheral neuropathy:  Shraddha has a history of vincristine exposure which can predispose her to having issues with ongoing peripheral  neuropathy.  She did have problems with foot drop during treatment.  However, this is resolved, and she is doing well.     7. Growth and development:  Shraddha's height has drifted slightly down to below the 5th percentile over the past several years.  She was following up with endocrine regularly.   She has reached her final adult height.     8. Risk for secondary malignancies:  We asked that Shraddha wear sunscreen when she is outdoors due to her increased risk of skin cancer from radiation.  Additionally, should she noted any new mass, lump or area of concern, particularly in the radiation field, we would recommend prompt evaluation. New guidelines recommend starting early colon cancer screening when she is 30 years old due to her abdominal radiation.    9.  EBV viremia:  Shraddha has been having rising EBV levels since 2013.  Last assessment was improved . Continue to monitor EBV level with nephrology given her risk for PTLD.  She will have continued EBV labs followed by nephrology.      11.  Macrocytosis:  Shraddha has been having a rising MCV.  She has had some mild chronic anemia.  Work up in 2016 and this is likely related to her antirejection meds.    12.  Insomnia:  Resolved    13.  Immunizations:  Not able to get live virus vaccines due to immunosuppression.  Considering the covid vaccine and I would recommend that she get this.  Will get the flu vaccine today.  Had 3 doses of HPV vaccine.    14.  Liver lesions:  Likely benign.  MRI done by nephrology.  AFP normal.  Saw GI and needs more follow up.  We will help try to facilitate.  She is s/p lesion embolization.    It was a pleasure to see Shraddha in our Long-Term Follow-Up Clinic today.  We certainly appreciate the opportunity to participate in your patient's care.  If you have any questions or concerns, please do not hesitate to contact us at [phone].   We ask that Shraddha return to our clinic in one year for followup.  We will try to coordinate this  on a day that she is seeing nephrology/GYN/echo.              Dorcas Samayoa MSN, APRN, CPNP-AC, CPON  Department of Pediatrics  Division of Hematology/Oncology    Review of the result(s) of each unique test - none today  Total time spent on the following services on the date of the encounter:  Preparing to see patient, chart review, review of outside records, Ordering medications, test, procedures, chemotherapy, Referring or communicating with other healthcare professionals, Interpretation of labs, imaging and other tests, Performing a medically appropriate examination , Counseling and educating the patient/family/caregiver , Documenting clinical information in the electronic or other health record , Communicating results to the patient/family/caregiver , Care coordination  and Total time spent: 40 min                                Again, thank you for allowing me to participate in the care of your patient.      Sincerely,    JASMYNE Trevino CNP

## 2024-05-31 NOTE — LETTER
5/31/2024      RE: Shraddha Pearson  [address]  [place] [address]     Dear Colleague,    Thank you for the opportunity to participate in the care of your patient, Shraddha Pearson, at the [facility] at [facility]. Please see a copy of my visit note below.        LONG-TERM FOLLOW-UP CLINIC PROGRESS NOTE    Video visit start time:  8:19 am  Video visit end time:  8:41am     We had the pleasure of seeing your patient, Shraddha Pearson, at the [facility] Long[facility]Term Follow[facility]Up Clinic for childhood cancer survivors.  Shraddha was referred to us by Dr. Ingrid Constantino for ongoing management and long-term follow up of her Wilms tumor and associated chemotherapy.  The following is a summary of your patient's cancer, therapy, current history, and physical findings followed by assessment and long-term followup recommendations.      THERAPY ACCORDING TO AVAILABLE RECORDS:  Shraddha Pearson is a now 23-year-old  female with a history of stage IV favorable histology Wilms tumor that was diagnosed on 05/19/2003 at 2 1/2 years of age.  She initially presented with disease in her right kidney as well as her lungs.  She had chemotherapy, radiation, and surgery as part of her treatment.  Her initial surgery was on  05/19/2003 in which she had a right nephrectomy.  Unfortunately, she had some surgical complications which caused her to lose blood supply to the left kidney, and she also lost that one as well.  Her surgeries in detail are as follows:   1. Right nephrectomy on 05/19/2003 by Dr. Myles Velez at the [facility].   2. Cadaveric saphenous vein graft to IVC on 05/20/2003.   3. Cholecystectomy on 05/20/2003.   4. Left nephrectomy and living donor renal transplant from patient's father on 11/14/2004.   5. Tonsillectomy on 01/31/2005.   6. Liver biopsy on  01/31/2005.   7. Left renal biopsy on 11/07/2005.      Shraddha received the following chemotherapies as part of her treatment:   1. Vincristine intravenously.   2. Actinomycin D intravenously.   3. Doxorubicin intravenously with a cumulative dose of 128 mg/m2.      Radiation therapy is as follows:   1. Whole abdomen radiation for a total dose of 1080 cGy.      Shraddha's acute and chronic late effects known to date include:   1. 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   2. 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   3. Idiopathic intracranial hypertension 05/01/2007, resolved.   4. Recurrent UTIs and pyelonephritis, resolved.   5. Hemorrhagic gastritis 04/2007, resolved.   6. Viral meningitis 10/05/2008, resolved.   7. Elevated EBV titers requiring rituximab infusion for 2 doses in March and August 2005.   8.  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  Growth deceleration       HISTORY OF PRESENT ILLNESS:  Shraddha report to the visit today over video.  She is in [place] at the time of the visit.  Recently had some incidental liver lesions and saw GI in 2022.  They were thought to be large hepatic adenomas vs FNH.  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  She last saw vascular surgery and had an MRI in February 2023.  She is due for imaging and follow up with them.  Her vascular surgeon left the practice and she hasn't had GI follow up since.  She is interested in maybe getting GI care in [place] since she spends a lot of her time there.    She may need IVF with any contrast medium due to her renal function issues.  She has transitioned her nephrology care to the adult team.   She has been seeing GYN as well.  She has been well since the last visit.  No issues with fever, pain, new lumps/bumps.     She is currently in [place] visiting family but she did buy a house in [place] close to [place] that is more  convenient for her horse shows and rodeo in the winter.   She completed college with a degree in Equine Science.  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 No issues with stomach pain.  Urination is normal.  No N/V/D/C.  No issues with breathing.     She has ongoing dental follow up and will be seeing them soon.   Her gingival hyperplasia resolved now that she has been on tacrolimus. No more issues with sleeping.  Completed gardasil vaccines and got Bexsero in past.  Considering the COVID vaccine but hasn't gotten it yet.  Had an echo in 2022 and we will plan to reimage in 2-3 years (next 2025).  Has yearly eye exam.  Was being followed by peds ID for elevated EBV but they have since signed off.  Has been having regular womens health follow up.  Irregular menstrual bleeding on estrogen patch and progesterone.  Did get BP checked locally recently- per pt 115/75. Needs to get updated transplant labs and hasn't done them but once since November 2023.     REVIEW OF SYSTEMS:   General:  No acute distress  Skin: negative  Eyes: glasses  Ears/Nose/Throat: gingival hyperplasia- resolved;   Respiratory: No shortness of breath, dyspnea on exertion, cough, or hemoptysis  Cardiovascular: negative  Gastrointestinal: negative  Genitourinary: s/p kidney transplant  Musculoskeletal: left broken clavicle 4/2019  Neurologic: negative  Psychiatric: negative  Hematologic/Lymphatic/Immunologic: negative  Endocrine: ovarian failure and growth deceleration       IMMUNIZATIONS:  Up to date per parents      SOCIAL HISTORY:  Shraddha has completed her 2 year degree in Equine Science.  She is very active,  riding horses. Parents report in the past they have cleared her horseback riding with the renal transplant team.   Lives in [place] most of the time and comes for extended trips to [place].     FAMILY HISTORY:  Maternal grandmother with a history of heart valve disease, carotid artery obstruction, and congestive heart failure  in her 50s.  Maternal grandfather with cardiac bypass in his 60s.  Paternal grandmother with history of hypertension in her 50s.  Maternal aunt with history of diabetes in her 50s.  Paternal great-grandfather with colon cancer at age 68.  Maternal  great-grandfather with history of colon cancer at 60.  Father with hypertension. No changes.      CURRENT MEDICATIONS:     Current Outpatient Medications   Medication Sig Dispense Refill     amLODIPine (NORVASC) 10 MG tablet Take 1 tablet (10 mg) by mouth daily 90 tablet 3     azaTHIOprine (IMURAN) 50 MG tablet Take 1.5 tablets (75 mg) by mouth daily 45 tablet 11     estradiol (FEMPATCH) 0.025 MG/24HR weekly patch Place 1 patch onto the skin once a week 12 patch 0     labetalol (NORMODYNE) 100 MG tablet Take 1 tablet (100 mg) by mouth 2 times daily 180 tablet 3     magnesium oxide (MAG-OX) 400 (241.3 Mg) MG tablet Take 1 tablet (400 mg) by mouth 2 times daily 60 tablet 11     Multiple Vitamins-Minerals (WOMENS MULTIVITAMIN PO) Take 1 tablet by mouth daily       progesterone (PROMETRIUM) 100 MG capsule Take 1 capsule (100 mg) by mouth daily 90 capsule 3     tacrolimus (GENERIC) 1 MG capsule Take 2 capsules (2 mg) by mouth 2 times daily 360 capsule 3     estradiol (VIVELLE-DOT) 0.025 MG/24HR bi-weekly patch Place 1 patch onto the skin twice a week (Patient not taking: Reported on 11/21/2023) 8 patch 11     progesterone (PROMETRIUM) 100 MG capsule Take 1 capsule (100 mg) by mouth daily (Patient not taking: Reported on 5/31/2024) 30 capsule 1     No current facility-administered medications for this visit.        ALLERGIES:  Vancomycin which causes Ramin syndrome.      PHYSICAL EXAMINATION:   VITAL SIGNS: There were no vitals taken for this visit.       Physical Exam   GENERAL: Healthy, alert and no distress  EYES: Eyes grossly normal to inspection.  No discharge or erythema, or obvious scleral/conjunctival abnormalities.  HENT: Normal cephalic/atraumatic.  External ears,  "nose and mouth without ulcers or lesions.  No nasal drainage visible.   NECK: No asymmetry, visible masses or scars  RESP: No audible wheeze, cough, or visible cyanosis.  No visible retractions or increased work of breathing.   MS: No gross musculoskeletal defects noted.  Normal range of motion.  No visible edema.  SKIN: Visible skin clear. No significant rash, abnormal pigmentation or lesions.  NEURO: Cranial nerves grossly intact.  Mentation and speech appropriate for age.  PSYCH: Mentation appears normal, affect normal/bright, judgement and insight intact, normal speech and appearance well-groomed.        Wt Readings from Last 3 Encounters:   11/21/23 55 kg (121 lb 4.1 oz)   02/28/23 57.8 kg (127 lb 6.4 oz)   01/05/23 56.2 kg (123 lb 14.4 oz)     Ht Readings from Last 2 Encounters:   11/21/23 1.494 m (4' 10.82\")   02/28/23 1.499 m (4' 11\")     No height and weight on file for this encounter.       LABORATORY AND DIAGNOSTIC STUDIES:   No results found for any visits on 05/31/24.        ASSESSMENT, PLAN, AND LONG-TERM FOLLOWUP RECOMMENDATIONS:  Shraddha Pearson is a now 23-year-old  female with a history of stage IV favorable histology Wilms tumor who is status post renal transplant approximately 20 years ago.  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  She also has some macrocytosis that is likely related to her antirejection medications, work up completed a few years ago.  She is mildly anemic and off iron.  She has new liver lesions that are likely benign and needs updated GI follow up  She needs an MRI as well but would like to consider getting this done in [facility] instead.     She is on estrogen/progesterone for premature ovarian failure and they may need to be adjusted with her liver lesions.  AFP was normal last year which is reassuring.  The following are our late effects recommendations:     1. Risk of recurrence:  Shraddha is currently 20 years status post end of her treatment " from her Wilms tumor.  Given the distance from the diagnosis of her primary malignancy, the likelihood of recurrence at this point would be extremely low.     2. Psychosocial effects:  Shraddha appears to be doing very well socially, as well as with her school performance.  We do not believe she is having any difficulties in this area.    3. Renal transplant:  Shraddha is currently followed by  Pediatric Nephrology, and we recommend continued follow up and management of her renal transplant.  Continue to report any signs of bowel obstruction related to her previous surgeries and new medications. Drink plenty of water.  Any medications the patient decides to take should be cleared with her renal transplant team.  Additionally, Shraddha should clear any major activity changes with her renal transplant team as well.      4. Risk for cardiac toxicity:  Shraddha has a history of 128 mg/m2 of doxorubicin, as well as whole abdomen radiation, which can predispose her to late cardiac difficulties, including dilated cardiomyopathy.  Shraddha had an echocardiogram completed in 2019 which was within normal limits. She needs to have surveillance echos from an oncology perspective every 5 years based on the new updated guidelines.  May be sooner for nephrology. Done 2022 and no change. Will plan to get a little sooner due to her mild HTN and will repeat in 2025.    5. Female issues related to fertility:  Shraddha has a history of whole abdomen radiation in which her ovaries were included in the field.  This may potentially have an impact on her future fertility.  She was found to have ovarian failure in 2012 and has follow up with GYN now that she is an adult.   Fertility potential would be low given this diagnosis.  She will have continued follow up with adult GYN.  They will discuss her OCP in the setting of new liver lesions.    6. Risk for peripheral neuropathy:  Shraddha has a history of vincristine exposure which can predispose  her to having issues with ongoing peripheral neuropathy.  She did have problems with foot drop during treatment.  However, this is resolved, and she is doing well.     7. Growth and development:  Shraddha's height has drifted slightly down to below the 5th percentile over the past several years.  She was following up with endocrine regularly.   She has reached her final adult height.     8. Risk for secondary malignancies:  We asked that Shraddha wear sunscreen when she is outdoors due to her increased risk of skin cancer from radiation.  Additionally, should she noted any new mass, lump or area of concern, particularly in the radiation field, we would recommend prompt evaluation. New guidelines recommend starting early colon cancer screening when she is 30 years old due to her abdominal radiation.    9.  EBV viremia:  Shraddha has been having rising EBV levels since 2013.  Last assessment was improved . Continue to monitor EBV level with nephrology given her risk for PTLD.  She will have continued EBV labs followed by nephrology.      11.  Macrocytosis:  Shraddha has been having a rising MCV.  She has had some mild chronic anemia.  Work up in 2016 and this is likely related to her antirejection meds.    12.  Insomnia:  Resolved    13.  Immunizations:  Not able to get live virus vaccines due to immunosuppression.  Considering the covid vaccine and I would recommend that she get this.  Will get the flu vaccine today.  Had 3 doses of HPV vaccine.    14.  Liver lesions:  Likely benign.  MRI done by nephrology.  AFP normal.  Saw GI and needs more follow up.  We will help try to facilitate.  She is s/p lesion embolization.    It was a pleasure to see Shraddha in our Long-Term Follow-Up Clinic today.  We certainly appreciate the opportunity to participate in your patient's care.  If you have any questions or concerns, please do not hesitate to contact us at [phone].   We ask that Shraddha return to our clinic in one year  for followup.  We will try to coordinate this on a day that she is seeing nephrology/GYN/echo.              Dorcas Samayoa MSN, APRN, CPNP-AC, CPON  Department of Pediatrics  Division of Hematology/Oncology    Review of the result(s) of each unique test - none today  Total time spent on the following services on the date of the encounter:  Preparing to see patient, chart review, review of outside records, Ordering medications, test, procedures, chemotherapy, Referring or communicating with other healthcare professionals, Interpretation of labs, imaging and other tests, Performing a medically appropriate examination , Counseling and educating the patient/family/caregiver , Documenting clinical information in the electronic or other health record , Communicating results to the patient/family/caregiver , Care coordination  and Total time spent: 40 min

## 2024-05-31 NOTE — PROGRESS NOTES
LONG-TERM FOLLOW-UP CLINIC PROGRESS NOTE    Video visit start time:  8:19 am  Video visit end time:  8:41am     We had the pleasure of seeing your patient, Shraddha Pearson, at the [facility]Term Follow[facility]Up Clinic for childhood cancer survivors.  hSraddha was referred to us by Dr. Ingrid Constantino for ongoing management and long-term follow up of her Wilms tumor and associated chemotherapy.  The following is a summary of your patient's cancer, therapy, current history, and physical findings followed by assessment and long-term followup recommendations.      THERAPY ACCORDING TO AVAILABLE RECORDS:  Shraddha Pearson is a now 23-year-old  female with a history of stage IV favorable histology Wilms tumor that was diagnosed on 05/19/2003 at 2 1/2 years of age.  She initially presented with disease in her right kidney as well as her lungs.  She had chemotherapy, radiation, and surgery as part of her treatment.  Her initial surgery was on  05/19/2003 in which she had a right nephrectomy.  Unfortunately, she had some surgical complications which caused her to lose blood supply to the left kidney, and she also lost that one as well.  Her surgeries in detail are as follows:   1. Right nephrectomy on 05/19/2003 by Dr. Myles Velez at the [facility].   2. Cadaveric saphenous vein graft to IVC on 05/20/2003.   3. Cholecystectomy on 05/20/2003.   4. Left nephrectomy and living donor renal transplant from patient's father on 11/14/2004.   5. Tonsillectomy on 01/31/2005.   6. Liver biopsy on 01/31/2005.   7. Left renal biopsy on 11/07/2005.      Shraddha received the following chemotherapies as part of her treatment:   1. Vincristine intravenously.   2. Actinomycin D intravenously.   3. Doxorubicin intravenously with a cumulative dose of 128 mg/m2.      Radiation therapy is as follows:   1. Whole abdomen radiation for a total dose of 1080 cGy.      Shraddha's  acute and chronic late effects known to date include:   1. 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   2. 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   3. Idiopathic intracranial hypertension 05/01/2007, resolved.   4. Recurrent UTIs and pyelonephritis, resolved.   5. Hemorrhagic gastritis 04/2007, resolved.   6. Viral meningitis 10/05/2008, resolved.   7. Elevated EBV titers requiring rituximab infusion for 2 doses in March and August 2005.   8.  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  Growth deceleration       HISTORY OF PRESENT ILLNESS:  Shraddha report to the visit today over video.  She is in [place] at the time of the visit.  Recently had some incidental liver lesions and saw GI in 2022.  They were thought to be large hepatic adenomas vs FNH.  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  She last saw vascular surgery and had an MRI in February 2023.  She is due for imaging and follow up with them.  Her vascular surgeon left the practice and she hasn't had GI follow up since.  She is interested in maybe getting GI care in [place] since she spends a lot of her time there.    She may need IVF with any contrast medium due to her renal function issues.  She has transitioned her nephrology care to the adult team.   She has been seeing GYN as well.  She has been well since the last visit.  No issues with fever, pain, new lumps/bumps.     She is currently in [place] visiting family but she did buy a house in [place] close to [place] that is more convenient for her horse shows and rodeo in the winter.   She completed college with a degree in Equine Science.  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 No issues with stomach pain.  Urination is normal.  No N/V/D/C.  No issues with breathing.     She has ongoing dental follow up and will be seeing them soon.   Her gingival hyperplasia resolved now that  she has been on tacrolimus. No more issues with sleeping.  Completed gardasil vaccines and got Bexsero in past.  Considering the COVID vaccine but hasn't gotten it yet.  Had an echo in 2022 and we will plan to reimage in 2-3 years (next 2025).  Has yearly eye exam.  Was being followed by peds ID for elevated EBV but they have since signed off.  Has been having regular womens health follow up.  Irregular menstrual bleeding on estrogen patch and progesterone.  Did get BP checked locally recently- per pt 115/75. Needs to get updated transplant labs and hasn't done them but once since November 2023.     REVIEW OF SYSTEMS:   General:  No acute distress  Skin: negative  Eyes: glasses  Ears/Nose/Throat: gingival hyperplasia- resolved;   Respiratory: No shortness of breath, dyspnea on exertion, cough, or hemoptysis  Cardiovascular: negative  Gastrointestinal: negative  Genitourinary: s/p kidney transplant  Musculoskeletal: left broken clavicle 4/2019  Neurologic: negative  Psychiatric: negative  Hematologic/Lymphatic/Immunologic: negative  Endocrine: ovarian failure and growth deceleration       IMMUNIZATIONS:  Up to date per parents      SOCIAL HISTORY:  Shraddha has completed her 2 year degree in Equine Science.  She is very active,  riding horses. Parents report in the past they have cleared her horseback riding with the renal transplant team.   Lives in [place] most of the time and comes for extended trips to [place].     FAMILY HISTORY:  Maternal grandmother with a history of heart valve disease, carotid artery obstruction, and congestive heart failure in her 50s.  Maternal grandfather with cardiac bypass in his 60s.  Paternal grandmother with history of hypertension in her 50s.  Maternal aunt with history of diabetes in her 50s.  Paternal great-grandfather with colon cancer at age 68.  Maternal  great-grandfather with history of colon cancer at 60.  Father with hypertension. No changes.      CURRENT MEDICATIONS:      Current Outpatient Medications   Medication Sig Dispense Refill    amLODIPine (NORVASC) 10 MG tablet Take 1 tablet (10 mg) by mouth daily 90 tablet 3    azaTHIOprine (IMURAN) 50 MG tablet Take 1.5 tablets (75 mg) by mouth daily 45 tablet 11    estradiol (FEMPATCH) 0.025 MG/24HR weekly patch Place 1 patch onto the skin once a week 12 patch 0    labetalol (NORMODYNE) 100 MG tablet Take 1 tablet (100 mg) by mouth 2 times daily 180 tablet 3    magnesium oxide (MAG-OX) 400 (241.3 Mg) MG tablet Take 1 tablet (400 mg) by mouth 2 times daily 60 tablet 11    Multiple Vitamins-Minerals (WOMENS MULTIVITAMIN PO) Take 1 tablet by mouth daily      progesterone (PROMETRIUM) 100 MG capsule Take 1 capsule (100 mg) by mouth daily 90 capsule 3    tacrolimus (GENERIC) 1 MG capsule Take 2 capsules (2 mg) by mouth 2 times daily 360 capsule 3    estradiol (VIVELLE-DOT) 0.025 MG/24HR bi-weekly patch Place 1 patch onto the skin twice a week (Patient not taking: Reported on 11/21/2023) 8 patch 11    progesterone (PROMETRIUM) 100 MG capsule Take 1 capsule (100 mg) by mouth daily (Patient not taking: Reported on 5/31/2024) 30 capsule 1     No current facility-administered medications for this visit.        ALLERGIES:  Vancomycin which causes Ramin syndrome.      PHYSICAL EXAMINATION:   VITAL SIGNS: There were no vitals taken for this visit.       Physical Exam   GENERAL: Healthy, alert and no distress  EYES: Eyes grossly normal to inspection.  No discharge or erythema, or obvious scleral/conjunctival abnormalities.  HENT: Normal cephalic/atraumatic.  External ears, nose and mouth without ulcers or lesions.  No nasal drainage visible.   NECK: No asymmetry, visible masses or scars  RESP: No audible wheeze, cough, or visible cyanosis.  No visible retractions or increased work of breathing.   MS: No gross musculoskeletal defects noted.  Normal range of motion.  No visible edema.  SKIN: Visible skin clear. No significant rash, abnormal  "pigmentation or lesions.  NEURO: Cranial nerves grossly intact.  Mentation and speech appropriate for age.  PSYCH: Mentation appears normal, affect normal/bright, judgement and insight intact, normal speech and appearance well-groomed.        Wt Readings from Last 3 Encounters:   11/21/23 55 kg (121 lb 4.1 oz)   02/28/23 57.8 kg (127 lb 6.4 oz)   01/05/23 56.2 kg (123 lb 14.4 oz)     Ht Readings from Last 2 Encounters:   11/21/23 1.494 m (4' 10.82\")   02/28/23 1.499 m (4' 11\")     No height and weight on file for this encounter.       LABORATORY AND DIAGNOSTIC STUDIES:   No results found for any visits on 05/31/24.        ASSESSMENT, PLAN, AND LONG-TERM FOLLOWUP RECOMMENDATIONS:  Shraddha Pearson is a now 23-year-old  female with a history of stage IV favorable histology Wilms tumor who is status post renal transplant approximately 20 years ago.  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  She also has some macrocytosis that is likely related to her antirejection medications, work up completed a few years ago.  She is mildly anemic and off iron.  She has new liver lesions that are likely benign and needs updated GI follow up  She needs an MRI as well but would like to consider getting this done in [facility] instead.     She is on estrogen/progesterone for premature ovarian failure and they may need to be adjusted with her liver lesions.  AFP was normal last year which is reassuring.  The following are our late effects recommendations:     1. Risk of recurrence:  Shraddha is currently 20 years status post end of her treatment from her Wilms tumor.  Given the distance from the diagnosis of her primary malignancy, the likelihood of recurrence at this point would be extremely low.     2. Psychosocial effects:  Shraddha appears to be doing very well socially, as well as with her school performance.  We do not believe she is having any difficulties in this area.    3. Renal transplant:  Shraddha is " currently followed by  Pediatric Nephrology, and we recommend continued follow up and management of her renal transplant.  Continue to report any signs of bowel obstruction related to her previous surgeries and new medications. Drink plenty of water.  Any medications the patient decides to take should be cleared with her renal transplant team.  Additionally, Shraddha should clear any major activity changes with her renal transplant team as well.      4. Risk for cardiac toxicity:  Shraddha has a history of 128 mg/m2 of doxorubicin, as well as whole abdomen radiation, which can predispose her to late cardiac difficulties, including dilated cardiomyopathy.  Shraddha had an echocardiogram completed in 2019 which was within normal limits. She needs to have surveillance echos from an oncology perspective every 5 years based on the new updated guidelines.  May be sooner for nephrology. Done 2022 and no change. Will plan to get a little sooner due to her mild HTN and will repeat in 2025.    5. Female issues related to fertility:  Shraddha has a history of whole abdomen radiation in which her ovaries were included in the field.  This may potentially have an impact on her future fertility.  She was found to have ovarian failure in 2012 and has follow up with GYN now that she is an adult.   Fertility potential would be low given this diagnosis.  She will have continued follow up with adult GYN.  They will discuss her OCP in the setting of new liver lesions.    6. Risk for peripheral neuropathy:  Shraddha has a history of vincristine exposure which can predispose her to having issues with ongoing peripheral neuropathy.  She did have problems with foot drop during treatment.  However, this is resolved, and she is doing well.     7. Growth and development:  Shraddha's height has drifted slightly down to below the 5th percentile over the past several years.  She was following up with endocrine regularly.   She has reached her final  adult height.     8. Risk for secondary malignancies:  We asked that Shraddha wear sunscreen when she is outdoors due to her increased risk of skin cancer from radiation.  Additionally, should she noted any new mass, lump or area of concern, particularly in the radiation field, we would recommend prompt evaluation. New guidelines recommend starting early colon cancer screening when she is 30 years old due to her abdominal radiation.    9.  EBV viremia:  Shraddha has been having rising EBV levels since 2013.  Last assessment was improved . Continue to monitor EBV level with nephrology given her risk for PTLD.  She will have continued EBV labs followed by nephrology.      11.  Macrocytosis:  Shraddha has been having a rising MCV.  She has had some mild chronic anemia.  Work up in 2016 and this is likely related to her antirejection meds.    12.  Insomnia:  Resolved    13.  Immunizations:  Not able to get live virus vaccines due to immunosuppression.  Considering the covid vaccine and I would recommend that she get this.  Will get the flu vaccine today.  Had 3 doses of HPV vaccine.    14.  Liver lesions:  Likely benign.  MRI done by nephrology.  AFP normal.  Saw GI and needs more follow up.  We will help try to facilitate.  She is s/p lesion embolization.    It was a pleasure to see Shraddha in our Long-Term Follow-Up Clinic today.  We certainly appreciate the opportunity to participate in your patient's care.  If you have any questions or concerns, please do not hesitate to contact us at [phone].   We ask that Shraddha return to our clinic in one year for followup.  We will try to coordinate this on a day that she is seeing nephrology/GYN/echo.              Dorcas Samayoa MSN, APRN, CPNP-AC, CPON  Department of Pediatrics  Division of Hematology/Oncology    Review of the result(s) of each unique test - none today  Total time spent on the following services on the date of the encounter:  Preparing to see patient, chart  review, review of outside records, Ordering medications, test, procedures, chemotherapy, Referring or communicating with other healthcare professionals, Interpretation of labs, imaging and other tests, Performing a medically appropriate examination , Counseling and educating the patient/family/caregiver , Documenting clinical information in the electronic or other health record , Communicating results to the patient/family/caregiver , Care coordination  and Total time spent: 40 min

## 2024-05-31 NOTE — NURSING NOTE
"Anuradha Pearson is a 23 year old female who is being evaluated via a billable video visit.      The patient has been notified of following:     \"This video visit will be conducted via a call between you and your physician/provider. We have found that certain health care needs can be provided without the need for an in-person physical exam.  This service lets us provide the care you need with a video conversation.  If a prescription is necessary we can send it directly to your pharmacy.  If lab work is needed we can place an order for that and you can then stop by our lab to have the test done at a later time.    If during the course of the call the physician/provider feels a video visit is not appropriate, you will not be charged for this service.\"     Patient has given verbal consent for Video visit? Yes    Patient would like the video invitation sent by: Text to cell phone: [phone]    Video Start Time: 8:08 AM    Anuradha Pearson complains of    Chief Complaint   Patient presents with    RECHECK     Patient here for 1 year long term follow up       Data Unavailable  Data Unavailable      I have reviewed and updated the patient's Past Medical History, Social History, Family History and Medication List.    ALLERGIES  Ibuprofen, Shellfish-derived products, and Vancomycin      "

## 2024-06-03 ENCOUNTER — VIRTUAL VISIT (OUTPATIENT)
Dept: OBGYN | Facility: CLINIC | Age: 24
End: 2024-06-03
Attending: OBSTETRICS & GYNECOLOGY
Payer: COMMERCIAL

## 2024-06-03 DIAGNOSIS — N93.9 ABNORMAL UTERINE BLEEDING: Primary | ICD-10-CM

## 2024-06-03 DIAGNOSIS — E28.39 PRIMARY OVARIAN FAILURE: ICD-10-CM

## 2024-06-03 PROCEDURE — 99213 OFFICE O/P EST LOW 20 MIN: CPT | Mod: 95 | Performed by: OBSTETRICS & GYNECOLOGY

## 2024-06-03 RX ORDER — ESTRADIOL 0.03 MG/D
1 PATCH TRANSDERMAL WEEKLY
Qty: 12 PATCH | Refills: 2 | Status: SHIPPED | OUTPATIENT
Start: 2024-06-03

## 2024-06-03 NOTE — LETTER
6/3/2024       RE: Anuradha Pearson  [address]  [place] [address]     Dear Colleague,    Thank you for referring your patient, Anuradha Pearson, to the [facility] [place] [facility]. Please see a copy of my visit note below.    [facility]  Gynecology Virtual Visit    Anuradha Pearson is a 24 year old female who is being evaluated via a billable virtual visit.    I spoke with: Anuradha Neelyeuniceyandel    SUBJECTIVE    Anuradha Pearson is a 24 year old  who is here for followup of hormone therapy.    She is well. She is visiting family in [place]. Lately, she has had a prolonged period, about a month. Was mostly light bleeding, +cramping, using tylenol most days. Resolved on its own about 1 week ago. No other big health changes. No hot flashes, night sweats, other concerns.    All of her doctors are in [place]. While she is probably going to be a more permanent resident of [place], she is not yet ready to change doctors/health systems and is going to be traveling back and forth still.     [facility], was diagnosed with BV. They were unsure how she was having periods but not able to have children, and Anuradha wasn't sure how to explain it.     Her LMP is: No LMP recorded.    PAST MEDICAL HISTORY  Past Medical History:   Diagnosis Date    Acute kidney injury (H24)     Fever, unspecified fever cause     H/O kidney transplant     Hypertension     Lack of expected normal physiological development     Problem list name updated by automated process. Provider to review    PONV (postoperative nausea and vomiting)     Recurrent pyelonephritis     Scar adherent     Streptococcal infection 2023    Blood    Streptococcal infection     Blood    Urinary tract infection 2021    Urinary tract infection     Wilm's tumor        MEDICATIONS  Current Outpatient  Medications   Medication Sig Dispense Refill    amLODIPine (NORVASC) 10 MG tablet Take 1 tablet (10 mg) by mouth daily 90 tablet 3    azaTHIOprine (IMURAN) 50 MG tablet Take 1.5 tablets (75 mg) by mouth daily 45 tablet 11    estradiol (FEMPATCH) 0.025 MG/24HR weekly patch Place 1 patch onto the skin once a week 12 patch 0    estradiol (VIVELLE-DOT) 0.025 MG/24HR bi-weekly patch Place 1 patch onto the skin twice a week 8 patch 11    labetalol (NORMODYNE) 100 MG tablet Take 1 tablet (100 mg) by mouth 2 times daily 180 tablet 3    magnesium oxide (MAG-OX) 400 (241.3 Mg) MG tablet Take 1 tablet (400 mg) by mouth 2 times daily 60 tablet 11    Multiple Vitamins-Minerals (WOMENS MULTIVITAMIN PO) Take 1 tablet by mouth daily      progesterone (PROMETRIUM) 100 MG capsule Take 1 capsule (100 mg) by mouth daily 90 capsule 3    progesterone (PROMETRIUM) 100 MG capsule Take 1 capsule (100 mg) by mouth daily 30 capsule 1    tacrolimus (GENERIC) 1 MG capsule Take 2 capsules (2 mg) by mouth 2 times daily 360 capsule 3     No current facility-administered medications for this visit.       ALLERGIES  Allergies   Allergen Reactions    Ibuprofen Other (See Comments)     Avoid nephrotoxic medications as needed due to kidney transplant status    Shellfish-Derived Products Nausea and Vomiting    Vancomycin      Uses Benadryl Prior to administration       ASSESSMENT  Anuradha Pearson is a 24 year old  with POI and new AUB, evaluated via virtual visit.    -will check US to rule out polyp, thickened endometrium. Chart reviewed, no recent TSH, will check this too as cause.  -refills of hormone therapy provided for bone and cardiovascular health prevention in a young person experiencing premature ovarian failure  -discussed the causes of uterine bleeding in POI and that Anuradha is not at risk of conception but that she can experience uterine bleeding    Return for US.    The patient was notified of following prior to conducting the  "telephone visit:   \"This telephone visit will be conducted via a call between you and your physician/provider. We have found that certain health care needs can be provided without the need for a physical exam. This service lets us provide the care you need with a short phone conversation. If a prescription is necessary we can send it directly to your pharmacy. If lab work is needed we can place an order for that and you can then stop by our lab to have the test done at a later time. If during the course of the call the physician/provider feels a telephone visit is not appropriate, you will not be charged for this service.\"     Joined the call at 6/3/2024, 1:44:01 pm.  Left the call at 6/3/2024, 1:57:04 pm.  You were on the call for 13 minutes 3 seconds .    I was located in my office at [facility] for the duration of this video visit.     Sejal Webb MD, MSCI    [facility]/OB[facility]  06/03/24  "

## 2024-06-03 NOTE — PROGRESS NOTES
[facility]  Gynecology Virtual Visit    Anuradha Pearson is a 24 year old female who is being evaluated via a billable virtual visit.    I spoke with: Anuradha Pearson    SUBJECTIVE    Anuradha Pearson is a 24 year old  who is here for followup of hormone therapy.    She is well. She is visiting family in [place]. Lately, she has had a prolonged period, about a month. Was mostly light bleeding, +cramping, using tylenol most days. Resolved on its own about 1 week ago. No other big health changes. No hot flashes, night sweats, other concerns.    All of her doctors are in [place]. While she is probably going to be a more permanent resident of [place], she is not yet ready to change doctors/health systems and is going to be traveling back and forth still.     [facility], was diagnosed with BV. They were unsure how she was having periods but not able to have children, and Anuradha wasn't sure how to explain it.     Her LMP is: No LMP recorded.    PAST MEDICAL HISTORY  Past Medical History:   Diagnosis Date    Acute kidney injury (H24)     Fever, unspecified fever cause     H/O kidney transplant     Hypertension     Lack of expected normal physiological development     Problem list name updated by automated process. Provider to review    PONV (postoperative nausea and vomiting)     Recurrent pyelonephritis     Scar adherent     Streptococcal infection 2023    Blood    Streptococcal infection     Blood    Urinary tract infection 2021    Urinary tract infection     Wilm's tumor        MEDICATIONS  Current Outpatient Medications   Medication Sig Dispense Refill    amLODIPine (NORVASC) 10 MG tablet Take 1 tablet (10 mg) by mouth daily 90 tablet 3    azaTHIOprine (IMURAN) 50 MG tablet Take 1.5 tablets (75 mg) by mouth daily 45 tablet 11    estradiol (FEMPATCH) 0.025 MG/24HR weekly patch Place 1 patch onto the skin once a week 12 patch 0    estradiol  "(VIVELLE-DOT) 0.025 MG/24HR bi-weekly patch Place 1 patch onto the skin twice a week 8 patch 11    labetalol (NORMODYNE) 100 MG tablet Take 1 tablet (100 mg) by mouth 2 times daily 180 tablet 3    magnesium oxide (MAG-OX) 400 (241.3 Mg) MG tablet Take 1 tablet (400 mg) by mouth 2 times daily 60 tablet 11    Multiple Vitamins-Minerals (WOMENS MULTIVITAMIN PO) Take 1 tablet by mouth daily      progesterone (PROMETRIUM) 100 MG capsule Take 1 capsule (100 mg) by mouth daily 90 capsule 3    progesterone (PROMETRIUM) 100 MG capsule Take 1 capsule (100 mg) by mouth daily 30 capsule 1    tacrolimus (GENERIC) 1 MG capsule Take 2 capsules (2 mg) by mouth 2 times daily 360 capsule 3     No current facility-administered medications for this visit.       ALLERGIES  Allergies   Allergen Reactions    Ibuprofen Other (See Comments)     Avoid nephrotoxic medications as needed due to kidney transplant status    Shellfish-Derived Products Nausea and Vomiting    Vancomycin      Uses Benadryl Prior to administration       ASSESSMENT  Anuradha Pearson is a 24 year old  with POI and new AUB, evaluated via virtual visit.    -will check US to rule out polyp, thickened endometrium. Chart reviewed, no recent TSH, will check this too as cause.  -refills of hormone therapy provided for bone and cardiovascular health prevention in a young person experiencing premature ovarian failure  -discussed the causes of uterine bleeding in POI and that Anuradha is not at risk of conception but that she can experience uterine bleeding    Return for US.    The patient was notified of following prior to conducting the telephone visit:   \"This telephone visit will be conducted via a call between you and your physician/provider. We have found that certain health care needs can be provided without the need for a physical exam. This service lets us provide the care you need with a short phone conversation. If a prescription is necessary we can send it " "directly to your pharmacy. If lab work is needed we can place an order for that and you can then stop by our lab to have the test done at a later time. If during the course of the call the physician/provider feels a telephone visit is not appropriate, you will not be charged for this service.\"     Joined the call at 6/3/2024, 1:44:01 pm.  Left the call at 6/3/2024, 1:57:04 pm.  You were on the call for 13 minutes 3 seconds .    I was located in my office at [facility] for the duration of this video visit.     Sejal Webb MD, MSCI    [facility]/OB[facility]N  06/03/24  "

## 2024-06-06 ENCOUNTER — VIRTUAL VISIT (OUTPATIENT)
Dept: TRANSPLANT | Facility: CLINIC | Age: 24
End: 2024-06-06
Attending: INTERNAL MEDICINE
Payer: COMMERCIAL

## 2024-06-06 DIAGNOSIS — B27.00 EBV (EPSTEIN-BARR VIRUS) VIREMIA: ICD-10-CM

## 2024-06-06 DIAGNOSIS — I15.1 HTN, KIDNEY TRANSPLANT RELATED: ICD-10-CM

## 2024-06-06 DIAGNOSIS — D50.9 IRON DEFICIENCY ANEMIA, UNSPECIFIED IRON DEFICIENCY ANEMIA TYPE: ICD-10-CM

## 2024-06-06 DIAGNOSIS — Z48.298 AFTERCARE FOLLOWING ORGAN TRANSPLANT: ICD-10-CM

## 2024-06-06 DIAGNOSIS — C64.1 NEPHROBLASTOMA OF RIGHT KIDNEY (H): ICD-10-CM

## 2024-06-06 DIAGNOSIS — Z29.89 NEED FOR PNEUMOCYSTIS PROPHYLAXIS: ICD-10-CM

## 2024-06-06 DIAGNOSIS — D84.9 IMMUNOSUPPRESSION (H): ICD-10-CM

## 2024-06-06 DIAGNOSIS — Z94.0 HTN, KIDNEY TRANSPLANT RELATED: ICD-10-CM

## 2024-06-06 DIAGNOSIS — Z94.0 KIDNEY REPLACED BY TRANSPLANT: Primary | ICD-10-CM

## 2024-06-06 PROCEDURE — 99214 OFFICE O/P EST MOD 30 MIN: CPT | Mod: 95 | Performed by: INTERNAL MEDICINE

## 2024-06-06 PROCEDURE — G2211 COMPLEX E/M VISIT ADD ON: HCPCS | Mod: 95 | Performed by: INTERNAL MEDICINE

## 2024-06-06 NOTE — LETTER
6/6/2024      Anuradha Pearson  [address]  [place] [address]      Dear Colleague,    Thank you for referring your patient, Anuradha Pearson, to the [facility]. Please see a copy of my visit note below.    Virtual Visit Details    Type of service:  Video Visit     Originating Location (pt. Location): Home    Distant Location (provider location):  On-site  Platform used for Video Visit: Well    TRANSPLANT NEPHROLOGY EARLY POST TRANSPLANT VISIT    Assessment & Plan  # LDKT:  overdue for labs  reminded and counseled    - Baseline Creatinine: ~ 1.3-1.5   - Proteinuria: Normal (<0.2 grams)   - Date DSA Last Checked:  Nov 2022       Latest DSA: No   - BK Viremia: No   - Kidney Tx Biopsy: 11/2022   ; Result: sub-optimal kidney biopsy, no rejection    # Immunosuppression: Tacrolimus immediate release (goal 4-6) and Azathioprine (dose 75 mg daily)    - induction: thymoglobulin   - Continue with intensive monitoring of immunosuppression for efficacy and toxicity.   - Changes: Not at this time    # Infection Prophylaxis:   - PJP: Sulfa/TMP (Bactrim)    # Hypertension: Controlled;  Goal BP: < 130/80   - Changes: Not at this time     # Hepatic adenomas:   - s/p IR arterial embolization of hepatic segment 6 and hepatic segment 7 lesions in jan 2023   - MRI a/p: 2/ 2023   - f/up GI     # Wilms tumor:   - follows with Peds Hem-Onc, last visit May 2024, low recurrence risk    # Anemia in Chronic Renal Disease: Hgb: Stable, low      LEVY: No   - Iron studies: Low iron saturation off iron    # Transplant History:  Etiology of Kidney Failure: Wilms tumor in R kidney s//p nephrectomy and L renal vein ligation and subsequent atrophy (IVC injury)  Tx: LDKT  From father  Transplant: 11/9/2004 (Kidney) LDKTX from father  Donor Type: Living Donor Class:   Crossmatch at time of Tx: negative  DSA at time of Tx: No  Significant changes in immunosuppression: None  CMV IgG Ab High Risk Discordance  (D+/R-):   EBV IgG Ab High Risk Discordance (D+/R-): Yes  Significant transplant-related complications: EBV Viremia    Transplant Office Phone Number: [phone]    Assessment and plan was discussed with the patient and she voiced her understanding and agreement.    Return visit: 3 months    Vikash Titus MD    The longitudinal plan of care for the diagnosis(es)/condition(s) as documented were addressed during this visit. Due to the added complexity in care, I will continue to support Anuradha in the subsequent management and with ongoing continuity of care.      Chief Complaint  Ms. Pearson is a 24 year old here for kidney transplant and immunosuppression management.     History of Present Illness    Anuradha has a hx of Wilms tumor diagnosed in 2003 at age 2 with right kidney as well as lung involvement s/p chemotherapy, radiation, and R nephrectomy, surgery was complicated by inadvertent ligation of the contralateral renal vein leading to ischemia to L native kidney and subsequent ESKD requiring LDKTx from her father in 2004.  Her post-transplant course has been complicated by infections (viral meningitis, EBV viremia, warts), pseudotumor cerebri.  She has been maintained on tacrolimus/AZA. She has been slowly weaned off prednisone in 2020 to allow for steroid free regimen and avoid long term steroid related complications. Baseline CR~1.3-15 with recent worsening and uptrend to 1.9 in Aug 2022. She underwent kidney bx, sample was inadequate but didn't show signs of rejection. susequent labs show improvement in renal function CR down to 1.1 on most recent check. During the ultrasound for the biopsy, a liver mass was noted.  Upon further imaging, there are three lesions that appear to be benign.  She has adult GI follow-up on 11/30/22.   Given her history of EBV, she also follows with Cuauhtemoc Rodriguez in the EBV clinic.  She had her tonsils removed earlier this year and there was no evidence of PTLD.        Interval Hx:  Lost to f/up since last visit 11/2022 and no labs since Nov 2023, counseled about the importance of regular labs and clinic visits  Continues to participate in horse shows and rodeo several times a month, lives between [place] and [place].  Underwent embolization of a hepatic adenoma in Jan 2023, last MRI Feb 2023 -due for a repeat   Continues to f/up with Peds Hem/Onc last visit in May 2024, no recurrence  Reports heavy periods, on/off, saw GYN -takes progesterone pill and uses estrogen patch  Hospitalized for a couple days in [place] Feb 2024 for few days       Current IS: FK 2/2 AZA 75 (off pred since 2020)  BP meds: amlodipine 10, labetalol, enalapril- stopped about a year ago (used to be on HCZ combo); last 24 ABP Nov 2022  Cancer screen: saw Dermatology recently 2024 (overdue), pap smear 2022 -due in 3 years  Home BP: ~110/80s at GYN clinic  Vaccines: UTD Flu , reluctant to get COVID (COVID infection in 2021, 2 day hospitalization)    Review of systems:  10 point review of systems negative except as in HPI    Problem List  Patient Active Problem List   Diagnosis     Kidney replaced by transplant     Wilms' tumor (H)     Status post chemotherapy     S/P radiation therapy     Primary ovarian failure     EBV (Derrell-Barr virus) viremia     HTN (hypertension)     Immunosuppression (H24)     Chronic kidney disease, stage 3 (H)     Status post kidney transplant     Encounter for well woman exam     Myopia of both eyes with astigmatism     Renovascular hypertension    bacteremia in 2013 (after horse injury)  - 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   - Idiopathic intracranial hypertension 05/01/2007, resolved.   -Recurrent UTIs and pyelonephritis, resolved.   - Hemorrhagic gastritis 04/2007, resolved.   - Viral meningitis 10/05/2008, resolved.   - Elevated EBV titers requiring rituximab infusion for 2 doses in March and August 2005.   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  Right nephrectomy on 05/19/2003 by Dr. Myles Velez at the [facility].   2. Cadaveric saphenous vein graft to IVC on 05/20/2003.   3. Cholecystectomy on 05/20/2003.   4. Left nephrectomy and living donor renal transplant from patient's father on 11/14/2004.   5. Tonsillectomy on 01/31/20056.   6. 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    No smoking, alcohol, illicits   Lives in [place], stays with parents in [facility] holidays   Has a brother, 25 yo      Allergies  Allergies   Allergen Reactions     Ibuprofen Other (See Comments)     Avoid nephrotoxic medications as needed due to kidney transplant status     Shellfish-Derived Products Nausea and Vomiting     Vancomycin      Uses Benadryl Prior to administration       Medications  Current Outpatient Medications   Medication Sig Dispense Refill     amLODIPine (NORVASC) 10 MG tablet Take 1 tablet (10 mg) by mouth daily 90 tablet 3     azaTHIOprine (IMURAN) 50 MG tablet Take 1.5 tablets (75 mg) by mouth daily 45 tablet 11     estradiol (FEMPATCH) 0.025 MG/24HR weekly patch Place 1 patch onto the skin once a week 12 patch 2     estradiol (VIVELLE-DOT) 0.025 MG/24HR bi-weekly patch Place 1 patch onto the skin twice a week 8 patch 11     labetalol (NORMODYNE) 100 MG tablet Take 1 tablet (100 mg) by mouth 2 times daily 180 tablet 3     magnesium oxide (MAG-OX) 400 (241.3 Mg) MG tablet Take 1 tablet (400 mg) by mouth 2 times daily 60 tablet 11     Multiple Vitamins-Minerals (WOMENS MULTIVITAMIN PO) Take 1 tablet by mouth daily       progesterone (PROMETRIUM) 100 MG capsule Take 1 capsule (100 mg) by mouth daily 90 capsule 3     progesterone (PROMETRIUM) 100 MG capsule Take 1 capsule (100 mg) by mouth daily 30 capsule 1     tacrolimus (GENERIC) 1 MG capsule Take 2 capsules (2 mg) by mouth 2 times daily 360 capsule 3     No current  facility-administered medications for this visit.     There are no discontinued medications.    Physical Exam  Vital Signs: There were no vitals taken for this visit.    GENERAL APPEARANCE: alert and no distress  HENT: mouth without ulcers or lesions  LYMPHATICS: no cervical or supraclavicular nodes  RESP: lungs clear to auscultation - no rales, rhonchi or wheezes  CV: regular rhythm, normal rate, no rub, no murmur  EDEMA: no LE edema bilaterally  ABDOMEN: soft, nondistended, nontender, bowel sounds normal  MS: extremities normal - no gross deformities noted, no evidence of inflammation in joints, no muscle tenderness  SKIN: no rash    Data        Latest Ref Rng & Units 11/21/2023     2:35 PM 5/18/2023    10:51 AM 4/19/2023    11:07 AM   Renal   Sodium 135 - 145 mmol/L 140      Na (external) 135 - 146 mmol/L  136     134       K 3.4 - 5.3 mmol/L 4.8      K (external) 3.5 - 5.3 mmol/L  5.1     5.5       Cl 98 - 107 mmol/L 104  104     105       Cl (external) 98 - 107 mmol/L 104  104     105       CO2 22 - 29 mmol/L 26      CO2 (external) 20 - 32 mmol/L  23     22       Urea Nitrogen 6.0 - 20.0 mg/dL 25.1      BUN (external) 7 - 25 mg/dL  24     33       Creatinine 0.51 - 0.95 mg/dL 1.48      Cr (external) 0.50 - 0.96 mg/dL  1.30     1.56       Glucose 70 - 99 mg/dL 128      Glucose (external) 65 - 99 mg/dL  94     89       Calcium 8.6 - 10.0 mg/dL 9.4      Ca (external) 8.6 - 10.2 mg/dL  9.8     9.4           This result is from an external source.         Latest Ref Rng & Units 11/15/2022    11:59 AM 11/2/2022     8:33 AM 10/19/2022     9:09 AM   Bone Health   Phosphorus 2.5 - 4.5 mg/dL 2.8  3.4     Phos (external) 2.3 - 4.7 mg/dL   3.3           This result is from an external source.         Latest Ref Rng & Units 11/21/2023     2:35 PM 5/18/2023    10:51 AM 4/19/2023    11:07 AM   Heme   WBC 4.0 - 11.0 10e3/uL 8.7      WBC (external) 3.8 - 10.8 Thousand/uL  9.1     7.3       Hgb 11.7 - 15.7 g/dL 11.4      Hgb  (external) 11.7 - 15.5 g/dL  10.6     9.6       Plt 150 - 450 10e3/uL 275      Plt (external) 140 - 400 Thousand/uL  296     266       ABSOLUTE NEUTROPHILS (EXTERNAL) 1,500 - 7,800 cells/uL  5,633     4,358       ABSOLUTE LYMPHOCYTES (EXTERNAL) 850 - 3,900 cells/uL  2,402     2,278       ABSOLUTE MONOCYTES (EXTERNAL) 200 - 950 cells/uL  692     416       ABSOLUTE EOSINOPHILS (EXTERNAL) 15 - 500 cells/uL  300     190       ABSOLUTE BASOPHILS (EXTERNAL) 0 - 200 cells/uL  73     58           This result is from an external source.         Latest Ref Rng & Units 11/21/2023     2:35 PM 1/5/2023     9:55 AM 11/15/2022    11:59 AM   Liver   AP 40 - 150 U/L 76  89     TBili <=1.2 mg/dL 0.2  <0.2     Bilirubin Direct 0.00 - 0.30 mg/dL  <0.20     ALT 0 - 50 U/L 17  25     AST 0 - 45 U/L 23  25     Tot Protein 6.4 - 8.3 g/dL 7.6  7.8     Albumin 3.4 - 5.0 g/dL   3.7    Albumin 3.5 - 5.2 g/dL 4.5  4.6           Latest Ref Rng & Units 11/6/2013    10:35 PM 6/22/2009     4:32 PM 10/22/2007     4:28 PM   Pancreas   Amylase 30 - 110 U/L  82  68    Lipase 20 - 250 U/L 47  67  55          Latest Ref Rng & Units 11/21/2023     2:35 PM 11/15/2022    11:59 AM 7/20/2020     9:31 AM   Iron studies   Iron 37 - 145 ug/dL 68      Iron Sat Index 15 - 46 % 26      Ferritin 6 - 175 ng/mL 291  274     Ferritin (external) 5 - 200 ng/mL   270           This result is from an external source.         Latest Ref Rng & Units 5/18/2023    10:53 AM 4/6/2023    10:52 AM 11/15/2022    11:59 AM   [facility] Virology   EBV DNA QUANT (EXTERNAL) Not Detected Copies/mL 6,674         EBV INTERP DNA QUANT (EXTERNAL) NOT DETECTED copies/mL  2,698        EBV DNA LOG OF COPIES    4.1    EBV DNA LOG OF COPIES (EXTERNAL) log copies/mL 3.82     3.43            This result is from an external source.     Failed to redirect to the Timeline version of the [employer] SmartLink.  Recent Labs   Lab Test 08/28/21  1609 09/03/21  2251 08/01/22  1010 10/19/22  0920 11/15/22  1157    DOSTAC  --   --  7/31/2022 10/18/2022  --    TACROL 3.7*   < > 4.8* 5.1 4.9*    < > = values in this interval not displayed.         Again, thank you for allowing me to participate in the care of your patient.        Sincerely,        Vikash Titus MD

## 2024-06-06 NOTE — PROGRESS NOTES
Virtual Visit Details    Type of service:  Video Visit     Originating Location (pt. Location): Home    Distant Location (provider location):  On-site  Platform used for Video Visit: Noel    TRANSPLANT NEPHROLOGY EARLY POST TRANSPLANT VISIT    Assessment & Plan   # LDKT:  overdue for labs  reminded and counseled    - Baseline Creatinine: ~ 1.3-1.5   - Proteinuria: Normal (<0.2 grams)   - Date DSA Last Checked:  Nov 2022       Latest DSA: No   - BK Viremia: No   - Kidney Tx Biopsy: 11/2022   ; Result: sub-optimal kidney biopsy, no rejection    # Immunosuppression: Tacrolimus immediate release (goal 4-6) and Azathioprine (dose 75 mg daily)    - induction: thymoglobulin   - Continue with intensive monitoring of immunosuppression for efficacy and toxicity.   - Changes: Not at this time    # Infection Prophylaxis:   - PJP: Sulfa/TMP (Bactrim)    # Hypertension: Controlled;  Goal BP: < 130/80   - Changes: Not at this time     # Hepatic adenomas:   - s/p IR arterial embolization of hepatic segment 6 and hepatic segment 7 lesions in jan 2023   - MRI a/p: 2/ 2023   - f/up GI     # Wilms tumor:   - follows with Peds Hem-Onc, last visit May 2024, low recurrence risk    # Anemia in Chronic Renal Disease: Hgb: Stable, low      LEVY: No   - Iron studies: Low iron saturation off iron    # Transplant History:  Etiology of Kidney Failure: Wilms tumor in R kidney s//p nephrectomy and L renal vein ligation and subsequent atrophy (IVC injury)  Tx: LDKT  From father  Transplant: 11/9/2004 (Kidney) LDKTX from father  Donor Type: Living Donor Class:   Crossmatch at time of Tx: negative  DSA at time of Tx: No  Significant changes in immunosuppression: None  CMV IgG Ab High Risk Discordance (D+/R-):   EBV IgG Ab High Risk Discordance (D+/R-): Yes  Significant transplant-related complications: EBV Viremia    Transplant Office Phone Number: [phone]    Assessment and plan was discussed with the patient and she voiced her understanding  and agreement.    Return visit: 3 months    Vikash Titus MD    The longitudinal plan of care for the diagnosis(es)/condition(s) as documented were addressed during this visit. Due to the added complexity in care, I will continue to support Anuradha in the subsequent management and with ongoing continuity of care.      Chief Complaint   Ms. Pearson is a 24 year old here for kidney transplant and immunosuppression management.     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  Her post-transplant course has been complicated by infections (viral meningitis, EBV viremia, warts), pseudotumor cerebri.  She has been maintained on tacrolimus/AZA. She has been slowly weaned off prednisone in 2020 to allow for steroid free regimen and avoid long term steroid related complications. Baseline CR~1.3-15 with recent worsening and uptrend to 1.9 in Aug 2022. She underwent kidney bx, sample was inadequate but didn't show signs of rejection. susequent labs show improvement in renal function CR down to 1.1 on most recent check. During the ultrasound for the biopsy, a liver mass was noted.  Upon further imaging, there are three lesions that appear to be benign.  She has adult GI follow-up on 11/30/22.   Given her history of EBV, she also follows with Cuauhtemoc Rodriguez in the EBV clinic.  She had her tonsils removed earlier this year and there was no evidence of PTLD.       Interval Hx:  Lost to f/up since last visit 11/2022 and no labs since Nov 2023, counseled about the importance of regular labs and clinic visits  Continues to participate in horse shows and [employer] several times a month, lives between [place] and  [place].  Underwent embolization of a hepatic adenoma in Jan 2023, last MRI Feb 2023 -due for a repeat   Continues to f/up with Peds Hem/Onc last visit in May 2024, no recurrence  Reports heavy periods, on/off, saw GYN -takes progesterone pill and uses estrogen patch  Hospitalized for a couple days in [place] Feb 2024 for few days       Current IS: FK 2/2 AZA 75 (off pred since 2020)  BP meds: amlodipine 10, labetalol, enalapril- stopped about a year ago (used to be on HCZ combo); last 24 ABP Nov 2022  Cancer screen: saw Dermatology recently 2024 (overdue), pap smear 2022 -due in 3 years  Home BP: ~110/80s at GYN clinic  Vaccines: UTD Flu , reluctant to get COVID (COVID infection in 2021, 2 day hospitalization)    Review of systems:  10 point review of systems negative except as in HPI    Problem List   Patient Active Problem List   Diagnosis    Kidney replaced by transplant    Wilms' tumor (H)    Status post chemotherapy    S/P radiation therapy    Primary ovarian failure    EBV (Derrell-Barr virus) viremia    HTN (hypertension)    Immunosuppression (H24)    Chronic kidney disease, stage 3 (H)    Status post kidney transplant    Encounter for well woman exam    Myopia of both eyes with astigmatism    Renovascular hypertension    bacteremia in 2013 (after horse injury)  - 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   - Idiopathic intracranial hypertension 05/01/2007, resolved.   -Recurrent UTIs and pyelonephritis, resolved.   - Hemorrhagic gastritis 04/2007, resolved.   - Viral meningitis 10/05/2008, resolved.   - Elevated EBV titers requiring rituximab infusion for 2 doses in March and August 2005.   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 Right nephrectomy on 05/19/2003 by Dr. Myles Velez at the [facility].   2. Cadaveric saphenous vein graft to IVC on 05/20/2003.   3. Cholecystectomy on 05/20/2003.   4. Left nephrectomy and living donor renal transplant from patient's father  on 11/14/2004.   5. Tonsillectomy on 01/31/20056.   6. 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    No smoking, alcohol, illicits   Lives in [place], stays with parents in [facility] holidays   Has a brother, 25 yo      Allergies   Allergies   Allergen Reactions    Ibuprofen Other (See Comments)     Avoid nephrotoxic medications as needed due to kidney transplant status    Shellfish-Derived Products Nausea and Vomiting    Vancomycin      Uses Benadryl Prior to administration       Medications   Current Outpatient Medications   Medication Sig Dispense Refill    amLODIPine (NORVASC) 10 MG tablet Take 1 tablet (10 mg) by mouth daily 90 tablet 3    azaTHIOprine (IMURAN) 50 MG tablet Take 1.5 tablets (75 mg) by mouth daily 45 tablet 11    estradiol (FEMPATCH) 0.025 MG/24HR weekly patch Place 1 patch onto the skin once a week 12 patch 2    estradiol (VIVELLE-DOT) 0.025 MG/24HR bi-weekly patch Place 1 patch onto the skin twice a week 8 patch 11    labetalol (NORMODYNE) 100 MG tablet Take 1 tablet (100 mg) by mouth 2 times daily 180 tablet 3    magnesium oxide (MAG-OX) 400 (241.3 Mg) MG tablet Take 1 tablet (400 mg) by mouth 2 times daily 60 tablet 11    Multiple Vitamins-Minerals (WOMENS MULTIVITAMIN PO) Take 1 tablet by mouth daily      progesterone (PROMETRIUM) 100 MG capsule Take 1 capsule (100 mg) by mouth daily 90 capsule 3    progesterone (PROMETRIUM) 100 MG capsule Take 1 capsule (100 mg) by mouth daily 30 capsule 1    tacrolimus (GENERIC) 1 MG capsule Take 2 capsules (2 mg) by mouth 2 times daily 360 capsule 3     No current facility-administered medications for this visit.     There are no discontinued medications.    Physical Exam   Vital Signs: There were no vitals taken for this visit.    GENERAL APPEARANCE: alert and no distress  HENT: mouth without ulcers or lesions  LYMPHATICS: no cervical  or supraclavicular nodes  RESP: lungs clear to auscultation - no rales, rhonchi or wheezes  CV: regular rhythm, normal rate, no rub, no murmur  EDEMA: no LE edema bilaterally  ABDOMEN: soft, nondistended, nontender, bowel sounds normal  MS: extremities normal - no gross deformities noted, no evidence of inflammation in joints, no muscle tenderness  SKIN: no rash    Data         Latest Ref Rng & Units 11/21/2023     2:35 PM 5/18/2023    10:51 AM 4/19/2023    11:07 AM   Renal   Sodium 135 - 145 mmol/L 140      Na (external) 135 - 146 mmol/L  136     134       K 3.4 - 5.3 mmol/L 4.8      K (external) 3.5 - 5.3 mmol/L  5.1     5.5       Cl 98 - 107 mmol/L 104  104     105       Cl (external) 98 - 107 mmol/L 104  104     105       CO2 22 - 29 mmol/L 26      CO2 (external) 20 - 32 mmol/L  23     22       Urea Nitrogen 6.0 - 20.0 mg/dL 25.1      BUN (external) 7 - 25 mg/dL  24     33       Creatinine 0.51 - 0.95 mg/dL 1.48      Cr (external) 0.50 - 0.96 mg/dL  1.30     1.56       Glucose 70 - 99 mg/dL 128      Glucose (external) 65 - 99 mg/dL  94     89       Calcium 8.6 - 10.0 mg/dL 9.4      Ca (external) 8.6 - 10.2 mg/dL  9.8     9.4           This result is from an external source.         Latest Ref Rng & Units 11/15/2022    11:59 AM 11/2/2022     8:33 AM 10/19/2022     9:09 AM   Bone Health   Phosphorus 2.5 - 4.5 mg/dL 2.8  3.4     Phos (external) 2.3 - 4.7 mg/dL   3.3           This result is from an external source.         Latest Ref Rng & Units 11/21/2023     2:35 PM 5/18/2023    10:51 AM 4/19/2023    11:07 AM   Heme   WBC 4.0 - 11.0 10e3/uL 8.7      WBC (external) 3.8 - 10.8 Thousand/uL  9.1     7.3       Hgb 11.7 - 15.7 g/dL 11.4      Hgb (external) 11.7 - 15.5 g/dL  10.6     9.6       Plt 150 - 450 10e3/uL 275      Plt (external) 140 - 400 Thousand/uL  296     266       ABSOLUTE NEUTROPHILS (EXTERNAL) 1,500 - 7,800 cells/uL  5,633     4,358       ABSOLUTE LYMPHOCYTES (EXTERNAL) 850 - 3,900 cells/uL  2,402      2,278       ABSOLUTE MONOCYTES (EXTERNAL) 200 - 950 cells/uL  692     416       ABSOLUTE EOSINOPHILS (EXTERNAL) 15 - 500 cells/uL  300     190       ABSOLUTE BASOPHILS (EXTERNAL) 0 - 200 cells/uL  73     58           This result is from an external source.         Latest Ref Rng & Units 11/21/2023     2:35 PM 1/5/2023     9:55 AM 11/15/2022    11:59 AM   Liver   AP 40 - 150 U/L 76  89     TBili <=1.2 mg/dL 0.2  <0.2     Bilirubin Direct 0.00 - 0.30 mg/dL  <0.20     ALT 0 - 50 U/L 17  25     AST 0 - 45 U/L 23  25     Tot Protein 6.4 - 8.3 g/dL 7.6  7.8     Albumin 3.4 - 5.0 g/dL   3.7    Albumin 3.5 - 5.2 g/dL 4.5  4.6           Latest Ref Rng & Units 11/6/2013    10:35 PM 6/22/2009     4:32 PM 10/22/2007     4:28 PM   Pancreas   Amylase 30 - 110 U/L  82  68    Lipase 20 - 250 U/L 47  67  55          Latest Ref Rng & Units 11/21/2023     2:35 PM 11/15/2022    11:59 AM 7/20/2020     9:31 AM   Iron studies   Iron 37 - 145 ug/dL 68      Iron Sat Index 15 - 46 % 26      Ferritin 6 - 175 ng/mL 291  274     Ferritin (external) 5 - 200 ng/mL   270           This result is from an external source.         Latest Ref Rng & Units 5/18/2023    10:53 AM 4/6/2023    10:52 AM 11/15/2022    11:59 AM   [facility] Vi[facility]   EBV DNA QUANT (EXTERNAL) Not Detected Copies/mL 6,674         EBV INTERP DNA QUANT (EXTERNAL) NOT DETECTED copies/mL  2,698        EBV DNA LOG OF COPIES    4.1    EBV DNA LOG OF COPIES (EXTERNAL) log copies/mL 3.82     3.43            This result is from an external source.     Failed to redirect to the Timeline version of the REVFS SmartLink.  Recent Labs   Lab Test 08/28/21  1609 09/03/21  2251 08/01/22  1010 10/19/22  0920 11/15/22  1159   DOSTAC  --   --  7/31/2022 10/18/2022  --    TACROL 3.7*   < > 4.8* 5.1 4.9*    < > = values in this interval not displayed.

## 2024-06-06 NOTE — NURSING NOTE
Is the patient currently in the state of [place]? YES    Visit mode:VIDEO    If the visit is dropped, the patient can be reconnected by: VIDEO VISIT: Text to cell phone:   Telephone Information:   Mobile [phone]       Will anyone else be joining the visit? NO  (If patient encounters technical issues they should call [phone]    How would you like to obtain your AVS? MyChart    Are changes needed to the allergy or medication list? No    Are refills needed on medications prescribed by this physician? NO    Reason for visit: RECHECK    Jesus BACON

## 2024-06-06 NOTE — PATIENT INSTRUCTIONS
You are overdue for labs        Transplant Patient Information  Your Post Transplant Coordinator is: Denia Garduno  You and your care team can contact your transplant coordinator Monday - Friday, 8am - 5pm at [phone] (Option 2 to reach the coordinator or Option 4 to schedule an appointment).  You can also reach your care team online via [employer].  After hours for urgent matters, please call [facility] at [phone].

## 2024-07-26 ENCOUNTER — MYC REFILL (OUTPATIENT)
Dept: NEPHROLOGY | Facility: CLINIC | Age: 24
End: 2024-07-26
Payer: COMMERCIAL

## 2024-07-26 DIAGNOSIS — Z94.0 KIDNEY REPLACED BY TRANSPLANT: Primary | ICD-10-CM

## 2024-07-26 RX ORDER — AZATHIOPRINE 50 MG/1
75 TABLET ORAL DAILY
Qty: 45 TABLET | Refills: 0 | Status: SHIPPED | OUTPATIENT
Start: 2024-07-26 | End: 2024-08-26

## 2024-08-23 ENCOUNTER — TELEPHONE (OUTPATIENT)
Dept: OBGYN | Facility: CLINIC | Age: 24
End: 2024-08-23
Payer: COMMERCIAL

## 2024-08-26 ENCOUNTER — MYC REFILL (OUTPATIENT)
Dept: NEPHROLOGY | Facility: CLINIC | Age: 24
End: 2024-08-26
Payer: COMMERCIAL

## 2024-08-26 DIAGNOSIS — Z94.0 KIDNEY REPLACED BY TRANSPLANT: ICD-10-CM

## 2024-08-27 RX ORDER — AZATHIOPRINE 50 MG/1
75 TABLET ORAL DAILY
Qty: 45 TABLET | Refills: 0 | Status: SHIPPED | OUTPATIENT
Start: 2024-08-27 | End: 2024-09-18

## 2024-09-18 ENCOUNTER — TELEPHONE (OUTPATIENT)
Dept: TRANSPLANT | Facility: CLINIC | Age: 24
End: 2024-09-18
Payer: COMMERCIAL

## 2024-09-18 ENCOUNTER — MYC REFILL (OUTPATIENT)
Dept: TRANSPLANT | Facility: CLINIC | Age: 24
End: 2024-09-18
Payer: COMMERCIAL

## 2024-09-18 DIAGNOSIS — Z94.0 KIDNEY TRANSPLANTED: ICD-10-CM

## 2024-09-18 DIAGNOSIS — Z94.0 KIDNEY REPLACED BY TRANSPLANT: ICD-10-CM

## 2024-09-18 RX ORDER — TACROLIMUS 1 MG/1
2 CAPSULE ORAL 2 TIMES DAILY
Qty: 360 CAPSULE | Refills: 0 | Status: SHIPPED | OUTPATIENT
Start: 2024-09-18 | End: 2024-09-27

## 2024-09-18 RX ORDER — TACROLIMUS 1 MG/1
2 CAPSULE ORAL 2 TIMES DAILY
Qty: 120 CAPSULE | Refills: 0 | Status: CANCELLED | OUTPATIENT
Start: 2024-09-18

## 2024-09-18 RX ORDER — AZATHIOPRINE 50 MG/1
75 TABLET ORAL DAILY
Qty: 135 TABLET | Refills: 0 | Status: SHIPPED | OUTPATIENT
Start: 2024-09-18

## 2024-09-18 NOTE — TELEPHONE ENCOUNTER
ISSUE:   The most recent laboratory results we have on file are from November of 2023  Received request for refills on azathioprine and tacrolimus.  90 day refills sent to preferred pharmacy.  Spoke with Anuradha rasmussen V/U of labs every 3 months. States she will have labs drawn next week.    Denia Garduno RN   Transplant Coordinator  [phone]

## 2024-09-18 NOTE — TELEPHONE ENCOUNTER
Provider Call: Medication Refill  Route to LPN  Pharmacy Name: [facility]  Store # [id number]  Pharmacy Location: [place]  Name of Medication: Tacto  Dose: 1mg - pt needs 2 week emergent supply  Mail order will not delivered in time  When will the patient be out of this medication?: Less than 3 days (Route high priority)  Callback needed? No

## 2024-09-27 ENCOUNTER — TELEPHONE (OUTPATIENT)
Dept: TRANSPLANT | Facility: CLINIC | Age: 24
End: 2024-09-27
Payer: COMMERCIAL

## 2024-09-27 DIAGNOSIS — Z94.0 KIDNEY TRANSPLANTED: ICD-10-CM

## 2024-09-27 RX ORDER — TACROLIMUS 1 MG/1
2 CAPSULE ORAL 2 TIMES DAILY
Qty: 120 CAPSULE | Refills: 0 | Status: SHIPPED | OUTPATIENT
Start: 2024-09-27

## 2024-09-27 NOTE — TELEPHONE ENCOUNTER
Last lab 11/21/23  due  Last TAC level 5/18/23  due  Last transplant Visit 6/6/24      30 day prescription, 0 refills   sent to [facility] - [facility] Huerta

## 2024-09-27 NOTE — TELEPHONE ENCOUNTER
Provider Call: Medication Refill  Route to LPN  Pharmacy Name: [facility]  Pharmacy Location: [facility]   Name of Medication: Tacro  Dose: 1mg   When will the patient be out of this medication?: Less than 3 days (Route high priority)  Callback needed? No

## 2024-10-29 ENCOUNTER — MYC MEDICAL ADVICE (OUTPATIENT)
Dept: OTHER | Age: 24
End: 2024-10-29

## 2024-11-08 ENCOUNTER — MYC MEDICAL ADVICE (OUTPATIENT)
Dept: TRANSPLANT | Facility: CLINIC | Age: 24
End: 2024-11-08
Payer: COMMERCIAL

## 2024-11-08 DIAGNOSIS — Z94.0 KIDNEY REPLACED BY TRANSPLANT: ICD-10-CM

## 2024-12-16 DIAGNOSIS — Z94.0 KIDNEY REPLACED BY TRANSPLANT: ICD-10-CM

## 2024-12-16 RX ORDER — AZATHIOPRINE 50 MG/1
75 TABLET ORAL DAILY
Qty: 135 TABLET | Refills: 0 | Status: CANCELLED | OUTPATIENT
Start: 2024-12-16

## 2024-12-18 DIAGNOSIS — Z94.0 KIDNEY REPLACED BY TRANSPLANT: ICD-10-CM

## 2024-12-18 NOTE — TELEPHONE ENCOUNTER
Medication Refill  Route to [facility]    Pharmacy Name:   [facility] - [place], [place] - [address] Phone: [phone]   Fax: [phone]          Name of Medication: azaTHIOprine (IMURAN) 50 MG tablet    Quantity / Dose: 135 / 50MG

## 2024-12-19 RX ORDER — AZATHIOPRINE 50 MG/1
75 TABLET ORAL DAILY
Qty: 135 TABLET | Refills: 3 | Status: SHIPPED | OUTPATIENT
Start: 2024-12-19

## 2025-02-13 DIAGNOSIS — I15.1 HYPERTENSION SECONDARY TO OTHER RENAL DISORDERS: ICD-10-CM

## 2025-02-13 RX ORDER — LABETALOL 100 MG/1
100 TABLET, FILM COATED ORAL 2 TIMES DAILY
Qty: 180 TABLET | Refills: 1 | Status: SHIPPED | OUTPATIENT
Start: 2025-02-13

## 2025-03-02 ENCOUNTER — HEALTH MAINTENANCE LETTER (OUTPATIENT)
Age: 25
End: 2025-03-02

## 2025-03-03 ENCOUNTER — MYC MEDICAL ADVICE (OUTPATIENT)
Dept: OTHER | Age: 25
End: 2025-03-03

## 2025-03-03 DIAGNOSIS — Z94.0 KIDNEY TRANSPLANTED: ICD-10-CM

## 2025-03-04 ENCOUNTER — MYC REFILL (OUTPATIENT)
Dept: OBGYN | Facility: CLINIC | Age: 25
End: 2025-03-04
Payer: COMMERCIAL

## 2025-03-04 ENCOUNTER — MYC REFILL (OUTPATIENT)
Dept: TRANSPLANT | Facility: CLINIC | Age: 25
End: 2025-03-04
Payer: COMMERCIAL

## 2025-03-04 DIAGNOSIS — Z94.0 KIDNEY REPLACED BY TRANSPLANT: ICD-10-CM

## 2025-03-04 DIAGNOSIS — E28.39 PRIMARY OVARIAN FAILURE: ICD-10-CM

## 2025-03-04 DIAGNOSIS — N93.9 ABNORMAL UTERINE BLEEDING (AUB): Primary | ICD-10-CM

## 2025-03-04 DIAGNOSIS — Z94.0 KIDNEY TRANSPLANTED: ICD-10-CM

## 2025-03-04 RX ORDER — TACROLIMUS 1 MG/1
2 CAPSULE ORAL 2 TIMES DAILY
Qty: 120 CAPSULE | Refills: 2 | Status: SHIPPED | OUTPATIENT
Start: 2025-03-04

## 2025-03-04 NOTE — TELEPHONE ENCOUNTER
"Received refill request for fempatch. Pt last seen in clinic 6/3/24.   From last visit note:    \"ASSESSMENT  Anuradha Pearson is a 24 year old  with POI and new AUB, evaluated via virtual visit.     -will check US to rule out polyp, thickened endometrium. Chart reviewed, no recent TSH, will check this too as cause.  -refills of hormone therapy provided for bone and cardiovascular health prevention in a young person experiencing premature ovarian failure  -discussed the causes of uterine bleeding in POI and that Anuradha is not at risk of conception but that she can experience uterine bleeding     Return for US.\"    Does not appear that Anuradha had her ultrasound done.     Will pend to ordering provider.   "

## 2025-03-05 RX ORDER — ESTRADIOL 0.03 MG/D
1 PATCH TRANSDERMAL WEEKLY
Qty: 12 PATCH | Refills: 2 | Status: SHIPPED | OUTPATIENT
Start: 2025-03-05

## 2025-03-26 DIAGNOSIS — Z94.0 KIDNEY TRANSPLANTED: ICD-10-CM

## 2025-03-26 DIAGNOSIS — Z94.0 KIDNEY REPLACED BY TRANSPLANT: ICD-10-CM

## 2025-03-26 RX ORDER — TACROLIMUS 1 MG/1
2 CAPSULE ORAL 2 TIMES DAILY
Qty: 120 CAPSULE | Refills: 11 | Status: SHIPPED | OUTPATIENT
Start: 2025-03-26

## 2025-05-13 DIAGNOSIS — E28.39 PRIMARY OVARIAN FAILURE: ICD-10-CM

## 2025-05-13 RX ORDER — PROGESTERONE 100 MG/1
100 CAPSULE ORAL DAILY
Qty: 90 CAPSULE | Refills: 0 | Status: SHIPPED | OUTPATIENT
Start: 2025-05-13

## 2025-05-13 NOTE — TELEPHONE ENCOUNTER
Progesterone refill request received.     Patient was last seen 6/3/2024 with Dr. Webb.   Past Medical History:   Diagnosis Date    Acute kidney injury 9/4/2021    Fever, unspecified fever cause 9/3/2021    H/O kidney transplant     Hypertension     Lack of expected normal physiological development 10/5/2012    Problem list name updated by automated process. Provider to review    PONV (postoperative nausea and vomiting)     Recurrent pyelonephritis     Scar adherent 8/14/2015    Streptococcal infection 05/21/2023    Blood    Streptococcal infection 5/21/2023    Blood    Urinary tract infection 11/04/2021    Urinary tract infection 11/4/2021    Wilm's tumor       No future appointments scheduled at this time.     [employer] message sent to patient requesting visit.

## 2025-06-18 ENCOUNTER — TELEPHONE (OUTPATIENT)
Dept: TRANSPLANT | Facility: CLINIC | Age: 25
End: 2025-06-18

## 2025-06-18 ENCOUNTER — VIRTUAL VISIT (OUTPATIENT)
Dept: TRANSPLANT | Facility: CLINIC | Age: 25
End: 2025-06-18
Attending: INTERNAL MEDICINE
Payer: COMMERCIAL

## 2025-06-18 DIAGNOSIS — I15.1 HTN, KIDNEY TRANSPLANT RELATED: ICD-10-CM

## 2025-06-18 DIAGNOSIS — Z94.0 HTN, KIDNEY TRANSPLANT RELATED: ICD-10-CM

## 2025-06-18 DIAGNOSIS — Z48.298 AFTERCARE FOLLOWING ORGAN TRANSPLANT: ICD-10-CM

## 2025-06-18 DIAGNOSIS — Z94.0 KIDNEY REPLACED BY TRANSPLANT: ICD-10-CM

## 2025-06-18 DIAGNOSIS — D84.9 IMMUNOSUPPRESSION: ICD-10-CM

## 2025-06-18 DIAGNOSIS — Z29.89 NEED FOR PNEUMOCYSTIS PROPHYLAXIS: ICD-10-CM

## 2025-06-18 DIAGNOSIS — Z94.0 KIDNEY REPLACED BY TRANSPLANT: Primary | ICD-10-CM

## 2025-06-18 NOTE — LETTER
6/18/2025      Anuradha Pearson  [address]  [place] [address]      Dear Colleague,    Thank you for referring your patient, Anuradha Pearson, to the [facility]PL[facility]. Please see a copy of my visit note below.    Virtual Visit Details    Type of service:  Video Visit     Originating Location (pt. Location): Home    Distant Location (provider location):  Off-site  Platform used for Video Visit: [facility]    TRANSPLANT NEPHROLOGY VISIT    Assessment & Plan  # LDKT: due for labs reminded and counseled about the importance of following lab and clinic visit schedule   - Baseline Creatinine: ~ 1.3-1.5   - Proteinuria: Normal (<0.2 grams)   - DSA: No   - BK Viremia: No   - Kidney Tx Biopsy: 11/2022: No Rejection    . She plans to move to [place], will send all records to accepting transplant center and discuss with accepting transplant nephrologist if needed    # Immunosuppression: Tacrolimus immediate release (goal 4-6) and Azathioprine (dose 75 mg daily)    - induction: high risk,  r-ATG   - Continue with intensive monitoring of immunosuppression for efficacy and toxicity.   - Changes: Not at this time    # Infection Prophylaxis:   - PJP: None, CD4-928 on last check    # Hypertension: Not checked recently;  Goal BP: < 130/80   - Changes: Not at this time     # Hepatic adenomas:   - s/p IR arterial embolization of hepatic segment 6 and hepatic segment 7 lesions in jan 2023   - MRI a/p: 2/ 2023   - f/up GI     # Wilms tumor:   - follows with Peds Hem-Onc, no show to f/up visit Jun 2025    # Anemia in Chronic Renal Disease: Hgb: Stable, low      LEVY: No   - Iron studies: due for updated labs    # Menorrhagia:   - improved, on progesterone pill and uses estrogen patch    # Transplant History:  Etiology of Kidney Failure: Wilms tumor in R kidney s//p nephrectomy and L renal vein ligation and subsequent atrophy (IVC injury)  Tx: LDKT  From father  Transplant: 11/9/2004 (Kidney) LDKTX from  father  Donor Type: Living Donor Class:   Crossmatch at time of Tx: negative  DSA at time of Tx: No  Significant changes in immunosuppression: None  CMV IgG Ab High Risk Discordance (D+/R-):   EBV IgG Ab High Risk Discordance (D+/R-): Yes  Significant transplant-related complications: EBV Viremia    Transplant Office Phone Number: [phone]    Assessment and plan was discussed with the patient and she voiced her understanding and agreement.    Return visit: 1 year    Vikash Titus MD    The longitudinal plan of care for the diagnosis(es)/condition(s) as documented were addressed during this visit. Due to the added complexity in care, I will continue to support Anuradha in the subsequent management and with ongoing continuity of care.    Chief Complaint  Ms. Pearson is a 25 year old here for kidney transplant and immunosuppression management.     History of Present Illness    Anuradha has a hx of Wilms tumor diagnosed in 2003 at age 2 with right kidney as well as lung involvement s/p chemotherapy, radiation, and R nephrectomy, surgery was complicated by inadvertent ligation of the contralateral renal vein leading to ischemia to L native kidney and subsequent ESKD requiring LDKTx from her father in 2004.  Her post-transplant course has been complicated by infections (viral meningitis, EBV viremia, warts), pseudotumor cerebri.  She has been maintained on tacrolimus/AZA. She has been slowly weaned off prednisone in 2020 to allow for steroid free regimen and avoid long term steroid related complications. Baseline CR~1.3-15 with recent worsening and uptrend to 1.9 in Aug 2022. She underwent kidney bx, sample was limited but didn't show signs of rejection. susequent labs show improvement in renal function CR down to 1.1 on most recent check. During the ultrasound for the biopsy, a liver mass was noted.  Upon further imaging, there are three lesions that appear to be benign.  She has adult GI follow-up on 11/30/22.  Given her history of EBV, she also follows with Cuauhtemoc Rodriguez in the EBV clinic.  She had her tonsils removed  and there was no evidence of PTLD.     Interval Hx:    Feels good overall. She had  L Otitis media in April, completed a course of amoxicillin. She remains very busy and continues to participate in horse shows and rodeo several times a month.   Energy level is good. Denies any fevers, chills, weight loss, night sweats. No nausea, vomiting, abdominal pain, diarrhea. No chest pain, dyspnea, leg swelling. No recent hospitalization.   Reports infrequent missed immunosuppressive meds    Current IS: FK 2/2 AZA 75 (off pred since 2020), missed once last month    Home BP: ~not checked    Review of systems:  10 point review of systems negative except as in HPI    Problem List  Patient Active Problem List   Diagnosis     Kidney replaced by transplant     Wilms' tumor (H)     Status post chemotherapy     S/P radiation therapy     Primary ovarian failure     EBV (Derrell-Barr virus) viremia     HTN (hypertension)     Immunosuppression     Chronic kidney disease, stage 3 (H)     Status post kidney transplant     Encounter for well woman exam     Myopia of both eyes with astigmatism     Renovascular hypertension    bacteremia in 2013 (after horse injury)  - 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   - Idiopathic intracranial hypertension 05/01/2007, resolved.   -Recurrent UTIs and pyelonephritis, resolved.   - Hemorrhagic gastritis 04/2007, resolved.   - Viral meningitis 10/05/2008, resolved.   - Elevated EBV titers requiring rituximab infusion for 2 doses in March and August 2005.   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 Right nephrectomy on 05/19/2003 by Dr. Myles Velez at the [facility].   2. Cadaveric saphenous vein graft to IVC on 05/20/2003.   3. Cholecystectomy on 05/20/2003.   4. Left nephrectomy and living donor renal transplant from patient's father on  11/14/2004.   5. Tonsillectomy on 01/31/20056.   6. 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    No smoking, alcohol, illicits   Lives in [place], stays with parents in [facility] holidays   Has a brother, 25 yo      Allergies  Allergies   Allergen Reactions     Ibuprofen Other (See Comments)     Avoid nephrotoxic medications as needed due to kidney transplant status     Shellfish-Derived Products Nausea and Vomiting     Vancomycin      Uses Benadryl Prior to administration       Medications  Current Outpatient Medications   Medication Sig Dispense Refill     amLODIPine (NORVASC) 10 MG tablet TAKE 1 TABLET (10 MG) BY MOUTH DAILY. 90 tablet 1     azaTHIOprine (IMURAN) 50 MG tablet Take 1.5 tablets (75 mg) by mouth daily. 135 tablet 3     estradiol (FEMPATCH) 0.025 MG/24HR weekly patch Place 1 patch onto the skin once a week. 12 patch 2     estradiol (VIVELLE-DOT) 0.025 MG/24HR bi-weekly patch Place 1 patch onto the skin twice a week 8 patch 11     labetalol (NORMODYNE) 100 MG tablet TAKE 1 TABLET BY MOUTH TWICE A  tablet 1     magnesium oxide (MAG-OX) 400 (241.3 Mg) MG tablet Take 1 tablet (400 mg) by mouth 2 times daily 60 tablet 11     Multiple Vitamins-Minerals (WOMENS MULTIVITAMIN PO) Take 1 tablet by mouth daily       progesterone (PROMETRIUM) 100 MG capsule TAKE 1 CAPSULE BY MOUTH EVERY DAY 90 capsule 0     progesterone (PROMETRIUM) 100 MG capsule Take 1 capsule (100 mg) by mouth daily 30 capsule 1     tacrolimus (GENERIC EQUIVALENT) 1 MG capsule Take 2 capsules (2 mg) by mouth 2 times daily. Needs labs for additional refills 120 capsule 11     No current facility-administered medications for this visit.     There are no discontinued medications.    Physical Exam  Vital Signs: There were no vitals taken for this visit.    GENERAL APPEARANCE: alert and no distress  HENT: mouth without  ulcers or lesions  LYMPHATICS: no cervical or supraclavicular nodes  RESP: lungs clear to auscultation - no rales, rhonchi or wheezes  CV: regular rhythm, normal rate, no rub, no murmur  EDEMA: no LE edema bilaterally  ABDOMEN: soft, nondistended, nontender, bowel sounds normal  MS: extremities normal - no gross deformities noted, no evidence of inflammation in joints, no muscle tenderness  SKIN: no rash    Data        Latest Ref Rng & Units 3/20/2025    10:55 AM 11/7/2024    11:23 AM 11/21/2023     2:35 PM   Renal   Sodium 135 - 145 mmol/L   140    Na (external) 135 - 146 mmol/L 134  134     K 3.4 - 5.3 mmol/L   4.8    K (external) 3.5 - 5.3 mmol/L 4.7  5.4     Cl 98 - 110 mmol/L 105  102  104    Cl (external) 98 - 110 mmol/L 105  102  104    CO2 22 - 29 mmol/L   26    CO2 (external) 20 - 32 mmol/L 20  23     Urea Nitrogen 6.0 - 20.0 mg/dL   25.1    BUN (external) 7 - 25 mg/dL 33  27     Creatinine 0.51 - 0.95 mg/dL   1.48    Cr (external) 0.50 - 0.96 mg/dL 1.45  1.64     Glucose 70 - 99 mg/dL   128    Glucose (external) 65 - 99 mg/dL 85  83     Calcium 8.6 - 10.0 mg/dL   9.4    Ca (external) 8.6 - 10.2 mg/dL 9.0  9.4           Latest Ref Rng & Units 11/15/2022    11:59 AM 11/2/2022     8:33 AM 10/19/2022     9:09 AM   Bone Health   Phosphorus 2.5 - 4.5 mg/dL 2.8  3.4     Phos (external) 2.3 - 4.7 mg/dL   3.3          Latest Ref Rng & Units 3/20/2025    10:55 AM 11/7/2024    11:23 AM 11/21/2023     2:35 PM   Heme   WBC 4.0 - 11.0 10e3/uL   8.7    WBC (external) 3.8 - 10.8 Thousand/uL 7.8  8.1     Hgb 11.7 - 15.7 g/dL   11.4    Hgb (external) 11.7 - 15.5 g/dL 10.3  10.4     Plt 150 - 450 10e3/uL   275    Plt (external) 140 - 400 Thousand/uL 308  358     ABSOLUTE NEUTROPHIL 1.6 - 8.3 10e3/uL   5.9    ABSOLUTE NEUTROPHILS (EXTERNAL) 1,500 - 7,800 cells/uL 4,984  5,484     ABSOLUTE LYMPHOCYTES 0.8 - 5.3 10e3/uL   2.0    ABSOLUTE LYMPHOCYTES (EXTERNAL) 850 - 3,900 cells/uL 2,145  1,871     ABSOLUTE MONOCYTES 0.0 - 1.3  10e3/uL   0.5    ABSOLUTE MONOCYTES (EXTERNAL) 200 - 950 cells/uL 530  518     ABSOLUTE EOSINOPHILS 0.0 - 0.7 10e3/uL   0.1    ABSOLUTE EOSINOPHILS (EXTERNAL) 15 - 500 cells/uL 109  154     ABSOLUTE BASOPHILS 0.0 - 0.2 10e3/uL   0.1    ABSOLUTE BASOPHILS (EXTERNAL) 0 - 200 cells/uL 31  73           Latest Ref Rng & Units 11/21/2023     2:35 PM 1/5/2023     9:55 AM 11/15/2022    11:59 AM   Liver   AP 40 - 150 U/L 76  89     TBili <=1.2 mg/dL 0.2  <0.2     Bilirubin Direct 0.00 - 0.30 mg/dL  <0.20     ALT 0 - 50 U/L 17  25     AST 0 - 45 U/L 23  25     Tot Protein 6.4 - 8.3 g/dL 7.6  7.8     Albumin 3.4 - 5.0 g/dL   3.7    Albumin 3.5 - 5.2 g/dL 4.5  4.6           Latest Ref Rng & Units 11/6/2013    10:35 PM 6/22/2009     4:32 PM 10/22/2007     4:28 PM   Pancreas   Amylase 30 - 110 U/L  82  68    Lipase 20 - 250 U/L 47  67  55          Latest Ref Rng & Units 11/21/2023     2:35 PM 11/15/2022    11:59 AM 7/20/2020     9:31 AM   Iron studies   Iron 37 - 145 ug/dL 68      Iron Sat Index 15 - 46 % 26      Ferritin 6 - 175 ng/mL 291  274     Ferritin (external) 5 - 200 ng/mL   270          Latest Ref Rng & Units 5/18/2023    10:53 AM 4/6/2023    10:52 AM 11/15/2022    11:59 AM   [facility]   EBV DNA QUANT (EXTERNAL) Not Detected Copies/mL 6,674      EBV INTERP DNA QUANT (EXTERNAL) NOT DETECTED copies/mL  2,698     EBV DNA LOG OF COPIES    4.1    EBV DNA LOG OF COPIES (EXTERNAL) log copies/mL 3.82  3.43       Failed to redirect to the Timeline version of the REVFS SmartLink.  Recent Labs   Lab Test 08/28/21  1609 09/03/21  2251 08/01/22  1010 10/19/22  0920 11/15/22  1159   DOSTAC  --   --  7/31/2022 10/18/2022  --    TACROL 3.7*   < > 4.8* 5.1 4.9*    < > = values in this interval not displayed.       Again, thank you for allowing me to participate in the care of your patient.        Sincerely,        Vikash Titus MD    Electronically signed

## 2025-06-18 NOTE — NURSING NOTE
Current patient location: [address]  [place] [address]    Is the patient currently in the state of [place]? YES    Visit mode: VIDEO    If the visit is dropped, the patient can be reconnected by:VIDEO VISIT: Text to cell phone:   Telephone Information:   Mobile [phone]       Will anyone else be joining the visit? NO  (If patient encounters technical issues they should call [phone]    Are changes needed to the allergy or medication list? No    Are refills needed on medications prescribed by this physician? NO    Rooming Documentation:  Patient will complete questionnaire(s) in [id number]    Reason for visit: RECHECK    Jesus LANGEF

## 2025-06-18 NOTE — TELEPHONE ENCOUNTER
Vikash Koch MD Sveiven, Sara, [place] plans to move to [place] soon and would like a referral to [facility] transplant center.    I reminded her about the importance of following the lab schedule and she will go for labs this month    thanks

## 2025-06-18 NOTE — PROGRESS NOTES
Virtual Visit Details    Type of service:  Video Visit     Originating Location (pt. Location): Home    Distant Location (provider location):  Off-site  Platform used for Video Visit: Well    TRANSPLANT NEPHROLOGY VISIT    Assessment & Plan   # LDKT: due for labs reminded and counseled about the importance of following lab and clinic visit schedule   - Baseline Creatinine: ~ 1.3-1.5   - Proteinuria: Normal (<0.2 grams)   - DSA: No   - BK Viremia: No   - Kidney Tx Biopsy: 11/2022: No Rejection    . She plans to move to [place], will send all records to accepting transplant center and discuss with accepting transplant nephrologist if needed    # Immunosuppression: Tacrolimus immediate release (goal 4-6) and Azathioprine (dose 75 mg daily)    - induction: high risk,  r-ATG   - Continue with intensive monitoring of immunosuppression for efficacy and toxicity.   - Changes: Not at this time    # Infection Prophylaxis:   - PJP: None, CD4-928 on last check    # Hypertension: Not checked recently;  Goal BP: < 130/80   - Changes: Not at this time     # Hepatic adenomas:   - s/p IR arterial embolization of hepatic segment 6 and hepatic segment 7 lesions in jan 2023   - MRI a/p: 2/ 2023   - f/up GI     # Wilms tumor:   - follows with Peds Hem-Onc, no show to f/up visit Jun 2025    # Anemia in Chronic Renal Disease: Hgb: Stable, low      LEVY: No   - Iron studies: due for updated labs    # Menorrhagia:   - improved, on progesterone pill and uses estrogen patch    # Transplant History:  Etiology of Kidney Failure: Wilms tumor in R kidney s//p nephrectomy and L renal vein ligation and subsequent atrophy (IVC injury)  Tx: LDKT  From father  Transplant: 11/9/2004 (Kidney) LDKTX from father  Donor Type: Living Donor Class:   Crossmatch at time of Tx: negative  DSA at time of Tx: No  Significant changes in immunosuppression: None  CMV IgG Ab High Risk Discordance (D+/R-):   EBV IgG Ab High Risk Discordance (D+/R-): Yes  Significant  transplant-related complications: EBV Viremia    Transplant Office Phone Number: [phone]    Assessment and plan was discussed with the patient and she voiced her understanding and agreement.    Return visit: 1 year    Vikash Titus MD    The longitudinal plan of care for the diagnosis(es)/condition(s) as documented were addressed during this visit. Due to the added complexity in care, I will continue to support Anuradha in the subsequent management and with ongoing continuity of care.    Chief Complaint   Ms. Pearson is a 25 year old here for kidney transplant and immunosuppression management.     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  Her post-transplant course has been complicated by infections (viral meningitis, EBV viremia, warts), pseudotumor cerebri.  She has been maintained on tacrolimus/AZA. She has been slowly weaned off prednisone in 2020 to allow for steroid free regimen and avoid long term steroid related complications. Baseline CR~1.3-15 with recent worsening and uptrend to 1.9 in Aug 2022. She underwent kidney bx, sample was limited but didn't show signs of rejection. susequent labs show improvement in renal function CR down to 1.1 on most recent check. During the ultrasound for the biopsy, a liver mass was noted.  Upon further imaging, there are three lesions that appear to be benign.  She has adult GI follow-up on 11/30/22. Given her history of EBV, she also follows with Cuauhtemoc Rodriguez in the EBV clinic.  She had her tonsils removed  and there was no evidence of PTLD.     Interval Hx:    Feels good overall. She had  L Otitis media in April, completed a course of  amoxicillin. She remains very busy and continues to participate in horse shows and rodeo several times a month.   Energy level is good. Denies any fevers, chills, weight loss, night sweats. No nausea, vomiting, abdominal pain, diarrhea. No chest pain, dyspnea, leg swelling. No recent hospitalization.   Reports infrequent missed immunosuppressive meds    Current IS: FK 2/2 AZA 75 (off pred since 2020), missed once last month    Home BP: ~not checked    Review of systems:  10 point review of systems negative except as in HPI    Problem List   Patient Active Problem List   Diagnosis    Kidney replaced by transplant    Wilms' tumor (H)    Status post chemotherapy    S/P radiation therapy    Primary ovarian failure    EBV (Derrell-Barr virus) viremia    HTN (hypertension)    Immunosuppression    Chronic kidney disease, stage 3 (H)    Status post kidney transplant    Encounter for well woman exam    Myopia of both eyes with astigmatism    Renovascular hypertension    bacteremia in 2013 (after horse injury)  - 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   - Idiopathic intracranial hypertension 05/01/2007, resolved.   -Recurrent UTIs and pyelonephritis, resolved.   - Hemorrhagic gastritis 04/2007, resolved.   - Viral meningitis 10/05/2008, resolved.   - Elevated EBV titers requiring rituximab infusion for 2 doses in March and August 2005.   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 Right nephrectomy on 05/19/2003 by Dr. Myles Velez at the [facility].   2. Cadaveric saphenous vein graft to IVC on 05/20/2003.   3. Cholecystectomy on 05/20/2003.   4. Left nephrectomy and living donor renal transplant from patient's father on 11/14/2004.   5. Tonsillectomy on 01/31/20056.   6. 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    No smoking, alcohol,  illicits   [place] [facility]   Has a brother, 25 yo      Allergies   Allergies   Allergen Reactions    Ibuprofen Other (See Comments)     Avoid nephrotoxic medications as needed due to kidney transplant status    Shellfish-Derived Products Nausea and Vomiting    Vancomycin      Uses Benadryl Prior to administration       Medications   Current Outpatient Medications   Medication Sig Dispense Refill    amLODIPine (NORVASC) 10 MG tablet TAKE 1 TABLET (10 MG) BY MOUTH DAILY. 90 tablet 1    azaTHIOprine (IMURAN) 50 MG tablet Take 1.5 tablets (75 mg) by mouth daily. 135 tablet 3    estradiol (FEMPATCH) 0.025 MG/24HR weekly patch Place 1 patch onto the skin once a week. 12 patch 2    estradiol (VIVELLE-DOT) 0.025 MG/24HR bi-weekly patch Place 1 patch onto the skin twice a week 8 patch 11    labetalol (NORMODYNE) 100 MG tablet TAKE 1 TABLET BY MOUTH TWICE A  tablet 1    magnesium oxide (MAG-OX) 400 (241.3 Mg) MG tablet Take 1 tablet (400 mg) by mouth 2 times daily 60 tablet 11    Multiple Vitamins-Minerals (WOMENS MULTIVITAMIN PO) Take 1 tablet by mouth daily      progesterone (PROMETRIUM) 100 MG capsule TAKE 1 CAPSULE BY MOUTH EVERY DAY 90 capsule 0    progesterone (PROMETRIUM) 100 MG capsule Take 1 capsule (100 mg) by mouth daily 30 capsule 1    tacrolimus (GENERIC EQUIVALENT) 1 MG capsule Take 2 capsules (2 mg) by mouth 2 times daily. Needs labs for additional refills 120 capsule 11     No current facility-administered medications for this visit.     There are no discontinued medications.    Physical Exam   Vital Signs: There were no vitals taken for this visit.    GENERAL APPEARANCE: alert and no distress  HENT: mouth without ulcers or lesions  LYMPHATICS: no cervical or supraclavicular nodes  RESP: lungs clear to auscultation - no rales, rhonchi or wheezes  CV: regular rhythm, normal rate, no rub, no murmur  EDEMA: no LE edema bilaterally  ABDOMEN: soft, nondistended,  nontender, bowel sounds normal  MS: extremities normal - no gross deformities noted, no evidence of inflammation in joints, no muscle tenderness  SKIN: no rash    Data         Latest Ref Rng & Units 3/20/2025    10:55 AM 11/7/2024    11:23 AM 11/21/2023     2:35 PM   Renal   Sodium 135 - 145 mmol/L   140    Na (external) 135 - 146 mmol/L 134  134     K 3.4 - 5.3 mmol/L   4.8    K (external) 3.5 - 5.3 mmol/L 4.7  5.4     Cl 98 - 110 mmol/L 105  102  104    Cl (external) 98 - 110 mmol/L 105  102  104    CO2 22 - 29 mmol/L   26    CO2 (external) 20 - 32 mmol/L 20  23     Urea Nitrogen 6.0 - 20.0 mg/dL   25.1    BUN (external) 7 - 25 mg/dL 33  27     Creatinine 0.51 - 0.95 mg/dL   1.48    Cr (external) 0.50 - 0.96 mg/dL 1.45  1.64     Glucose 70 - 99 mg/dL   128    Glucose (external) 65 - 99 mg/dL 85  83     Calcium 8.6 - 10.0 mg/dL   9.4    Ca (external) 8.6 - 10.2 mg/dL 9.0  9.4           Latest Ref Rng & Units 11/15/2022    11:59 AM 11/2/2022     8:33 AM 10/19/2022     9:09 AM   Bone Health   Phosphorus 2.5 - 4.5 mg/dL 2.8  3.4     Phos (external) 2.3 - 4.7 mg/dL   3.3          Latest Ref Rng & Units 3/20/2025    10:55 AM 11/7/2024    11:23 AM 11/21/2023     2:35 PM   Heme   WBC 4.0 - 11.0 10e3/uL   8.7    WBC (external) 3.8 - 10.8 Thousand/uL 7.8  8.1     Hgb 11.7 - 15.7 g/dL   11.4    Hgb (external) 11.7 - 15.5 g/dL 10.3  10.4     Plt 150 - 450 10e3/uL   275    Plt (external) 140 - 400 Thousand/uL 308  358     ABSOLUTE NEUTROPHIL 1.6 - 8.3 10e3/uL   5.9    ABSOLUTE NEUTROPHILS (EXTERNAL) 1,500 - 7,800 cells/uL 4,984  5,484     ABSOLUTE LYMPHOCYTES 0.8 - 5.3 10e3/uL   2.0    ABSOLUTE LYMPHOCYTES (EXTERNAL) 850 - 3,900 cells/uL 2,145  1,871     ABSOLUTE MONOCYTES 0.0 - 1.3 10e3/uL   0.5    ABSOLUTE MONOCYTES (EXTERNAL) 200 - 950 cells/uL 530  518     ABSOLUTE EOSINOPHILS 0.0 - 0.7 10e3/uL   0.1    ABSOLUTE EOSINOPHILS (EXTERNAL) 15 - 500 cells/uL 109  154     ABSOLUTE BASOPHILS 0.0 - 0.2 10e3/uL   0.1    ABSOLUTE  BASOPHILS (EXTERNAL) 0 - 200 cells/uL 31  73           Latest Ref Rng & Units 11/21/2023     2:35 PM 1/5/2023     9:55 AM 11/15/2022    11:59 AM   Liver   AP 40 - 150 U/L 76  89     TBili <=1.2 mg/dL 0.2  <0.2     Bilirubin Direct 0.00 - 0.30 mg/dL  <0.20     ALT 0 - 50 U/L 17  25     AST 0 - 45 U/L 23  25     Tot Protein 6.4 - 8.3 g/dL 7.6  7.8     Albumin 3.4 - 5.0 g/dL   3.7    Albumin 3.5 - 5.2 g/dL 4.5  4.6           Latest Ref Rng & Units 11/6/2013    10:35 PM 6/22/2009     4:32 PM 10/22/2007     4:28 PM   Pancreas   Amylase 30 - 110 U/L  82  68    Lipase 20 - 250 U/L 47  67  55          Latest Ref Rng & Units 11/21/2023     2:35 PM 11/15/2022    11:59 AM 7/20/2020     9:31 AM   Iron studies   Iron 37 - 145 ug/dL 68      Iron Sat Index 15 - 46 % 26      Ferritin 6 - 175 ng/mL 291  274     Ferritin (external) 5 - 200 ng/mL   270          Latest Ref Rng & Units 5/18/2023    10:53 AM 4/6/2023    10:52 AM 11/15/2022    11:59 AM   [facility]   EBV DNA QUANT (EXTERNAL) Not Detected Copies/mL 6,674      EBV INTERP DNA QUANT (EXTERNAL) NOT DETECTED copies/mL  2,698     EBV DNA LOG OF COPIES    4.1    EBV DNA LOG OF COPIES (EXTERNAL) log copies/mL 3.82  3.43       Failed to redirect to the Timeline version of the REVFS SmartLink.  Recent Labs   Lab Test 08/28/21  1609 09/03/21  2251 08/01/22  1010 10/19/22  0920 11/15/22  1159   DOSTAC  --   --  7/31/2022 10/18/2022  --    TACROL 3.7*   < > 4.8* 5.1 4.9*    < > = values in this interval not displayed.

## 2025-06-18 NOTE — LETTER
Anuradha Pearson  [address]  [place] [id number] June 23, 2025    Please accept this referral for transfer of transplant cares.   Patient Name:Anuradha Pearson  YOB: 2000    Anuradha Pearson is status-post Kidney transplant on 11/9/2004.  Due to relocation, we are seeking your assistance in meeting Anuradha Pearson's transplant needs.    Please call with any questions or concerns.  Please request [facility] reporting transfer from [facility], as appropriate.      Thank you,      Denia Garduno RN  [facility]   Phone: [phone]  Fax: [phone]

## 2025-06-18 NOTE — LETTER
Anuradha Pearson  [address]  [place] [phone] June 19, 2025    Please accept this referral for transfer of transplant cares.   Patient Name:Anuradha Pearson  YOB: 2000    Anuradha Pearson is status-post *** transplant on ***.  Due to relocation, we are seeking your assistance in meeting Anuradha Pearson's transplant needs.    Please call with any questions or concerns.  Please request [facility] reporting transfer from [facility], as appropriate.      Thank you,      Denia Garduno RN  [facility]plant   Phone: [phone]  Fax: [phone]

## 2025-06-23 NOTE — TELEPHONE ENCOUNTER
Patient has expressed interest in transferring transplant care to [facility] as she plans to relocate to [place] full time.      Request sent to admin to send patient JONAH documentation in order for [facility] to rax over records along with a referral.    Patient aware.    [facility] Fax: [phone]    Denia Garduno RN   Transplant Coordinator  [phone]

## 2025-06-25 ENCOUNTER — TELEPHONE (OUTPATIENT)
Dept: TRANSPLANT | Facility: CLINIC | Age: 25
End: 2025-06-25
Payer: COMMERCIAL

## 2025-06-25 DIAGNOSIS — Z94.0 KIDNEY REPLACED BY TRANSPLANT: ICD-10-CM

## 2025-06-25 NOTE — TELEPHONE ENCOUNTER
Demographic sheet faxed to [facility].Shilpi Vizcarra LPN on 6/25/2025 at 11:53 AM

## 2025-06-25 NOTE — TELEPHONE ENCOUNTER
General  Route to LPN    Reason for call: Christina received a letter jean-pierre, she did not receive any demographics or records for alice    Call back needed? Yes    Return Call Needed  Same as documented in contacts section  When to return call?: Greater than one day: Route standard priority

## 2025-06-25 NOTE — TELEPHONE ENCOUNTER
[facility] aware patients records will be sent once JONAH is completed by patient.      JONAH sent to patient 6/23    Denia Garduno RN   Transplant Coordinator  [phone]

## 2025-08-20 DIAGNOSIS — I15.1 HYPERTENSION SECONDARY TO OTHER RENAL DISORDERS: ICD-10-CM

## 2025-08-20 RX ORDER — LABETALOL 100 MG/1
100 TABLET, FILM COATED ORAL 2 TIMES DAILY
Qty: 180 TABLET | Refills: 1 | Status: SHIPPED | OUTPATIENT
Start: 2025-08-20

## 2025-08-21 DIAGNOSIS — E28.39 PRIMARY OVARIAN FAILURE: ICD-10-CM

## 2025-09-02 RX ORDER — PROGESTERONE 100 MG/1
100 CAPSULE ORAL DAILY
Qty: 90 CAPSULE | Refills: 0 | OUTPATIENT
Start: 2025-09-02

## (undated) DEVICE — Device

## (undated) DEVICE — SYR 10ML PREFILLED 0.9% NACL INJ NOT STERILE 306500

## (undated) DEVICE — LINEN GOWN LG 5406

## (undated) DEVICE — DRSG TELFA 3X8" 1238

## (undated) DEVICE — BLADE KNIFE SURG 11 WITH HANDLE 4-411

## (undated) DEVICE — NDL BLUNT 18GA 1" W/O FILTER 305181

## (undated) DEVICE — SPONGE SURGIFOAM 12 1972

## (undated) DEVICE — GLOVE PROTEXIS W/NEU-THERA 7.5  2D73TE75

## (undated) DEVICE — DRSG PRIMAPORE 02X3" 7133

## (undated) DEVICE — SPECIMEN CONTAINER W/20ML 10% BUFF FORMALIN C4322-11

## (undated) DEVICE — GUIDEWIRE FIXED CORE HEPARIN COAT STR .035X50CM G00662

## (undated) DEVICE — COVER TRANSDUCER PROBE 7X24" 610-575

## (undated) RX ORDER — FENTANYL CITRATE 50 UG/ML
INJECTION, SOLUTION INTRAMUSCULAR; INTRAVENOUS
Status: DISPENSED
Start: 2023-01-05

## (undated) RX ORDER — SODIUM CHLORIDE 9 MG/ML
INJECTION, SOLUTION INTRAVENOUS
Status: DISPENSED
Start: 2023-01-05

## (undated) RX ORDER — HEPARIN SODIUM 200 [USP'U]/100ML
INJECTION, SOLUTION INTRAVENOUS
Status: DISPENSED
Start: 2023-01-05

## (undated) RX ORDER — LIDOCAINE HYDROCHLORIDE 10 MG/ML
INJECTION, SOLUTION EPIDURAL; INFILTRATION; INTRACAUDAL; PERINEURAL
Status: DISPENSED
Start: 2023-01-05

## (undated) RX ORDER — ONDANSETRON 2 MG/ML
INJECTION INTRAMUSCULAR; INTRAVENOUS
Status: DISPENSED
Start: 2023-01-05

## (undated) RX ORDER — LIDOCAINE HYDROCHLORIDE 10 MG/ML
INJECTION, SOLUTION EPIDURAL; INFILTRATION; INTRACAUDAL; PERINEURAL
Status: DISPENSED
Start: 2022-11-02

## (undated) RX ORDER — AMPICILLIN AND SULBACTAM 2; 1 G/1; G/1
INJECTION, POWDER, FOR SOLUTION INTRAMUSCULAR; INTRAVENOUS
Status: DISPENSED
Start: 2023-01-05